# Patient Record
Sex: MALE | Race: WHITE | NOT HISPANIC OR LATINO | Employment: FULL TIME | URBAN - METROPOLITAN AREA
[De-identification: names, ages, dates, MRNs, and addresses within clinical notes are randomized per-mention and may not be internally consistent; named-entity substitution may affect disease eponyms.]

---

## 2017-03-03 ENCOUNTER — TRANSCRIBE ORDERS (OUTPATIENT)
Dept: MRI IMAGING | Facility: CLINIC | Age: 52
End: 2017-03-03

## 2017-03-07 ENCOUNTER — ALLSCRIPTS OFFICE VISIT (OUTPATIENT)
Dept: OTHER | Facility: OTHER | Age: 52
End: 2017-03-07

## 2017-03-21 ENCOUNTER — APPOINTMENT (OUTPATIENT)
Dept: LAB | Facility: CLINIC | Age: 52
End: 2017-03-21
Payer: COMMERCIAL

## 2017-03-21 ENCOUNTER — TRANSCRIBE ORDERS (OUTPATIENT)
Dept: LAB | Facility: CLINIC | Age: 52
End: 2017-03-21

## 2017-03-21 DIAGNOSIS — Z12.11 SPECIAL SCREENING FOR MALIGNANT NEOPLASMS, COLON: Primary | ICD-10-CM

## 2017-03-21 DIAGNOSIS — Z12.5 SPECIAL SCREENING FOR MALIGNANT NEOPLASM OF PROSTATE: ICD-10-CM

## 2017-03-21 DIAGNOSIS — I10 UNSPECIFIED ESSENTIAL HYPERTENSION: ICD-10-CM

## 2017-03-21 DIAGNOSIS — E78.5 HYPERLIPIDEMIA, UNSPECIFIED HYPERLIPIDEMIA TYPE: ICD-10-CM

## 2017-03-21 DIAGNOSIS — Z12.11 SPECIAL SCREENING FOR MALIGNANT NEOPLASMS, COLON: ICD-10-CM

## 2017-03-21 LAB
ALBUMIN SERPL BCP-MCNC: 3.6 G/DL (ref 3.5–5)
ALP SERPL-CCNC: 58 U/L (ref 46–116)
ALT SERPL W P-5'-P-CCNC: 29 U/L (ref 12–78)
ANION GAP SERPL CALCULATED.3IONS-SCNC: 5 MMOL/L (ref 4–13)
AST SERPL W P-5'-P-CCNC: 12 U/L (ref 5–45)
BILIRUB SERPL-MCNC: 0.47 MG/DL (ref 0.2–1)
BUN SERPL-MCNC: 15 MG/DL (ref 5–25)
CALCIUM SERPL-MCNC: 8.8 MG/DL (ref 8.3–10.1)
CHLORIDE SERPL-SCNC: 104 MMOL/L (ref 100–108)
CK SERPL-CCNC: 92 U/L (ref 39–308)
CO2 SERPL-SCNC: 30 MMOL/L (ref 21–32)
CREAT SERPL-MCNC: 0.77 MG/DL (ref 0.6–1.3)
GFR SERPL CREATININE-BSD FRML MDRD: >60 ML/MIN/1.73SQ M
GLUCOSE P FAST SERPL-MCNC: 89 MG/DL (ref 65–99)
POTASSIUM SERPL-SCNC: 3.8 MMOL/L (ref 3.5–5.3)
PROT SERPL-MCNC: 7 G/DL (ref 6.4–8.2)
SODIUM SERPL-SCNC: 139 MMOL/L (ref 136–145)
TSH SERPL DL<=0.05 MIU/L-ACNC: 0.8 UIU/ML (ref 0.36–3.74)

## 2017-03-21 PROCEDURE — 80053 COMPREHEN METABOLIC PANEL: CPT

## 2017-03-21 PROCEDURE — 82550 ASSAY OF CK (CPK): CPT

## 2017-03-21 PROCEDURE — 86617 LYME DISEASE ANTIBODY: CPT

## 2017-03-21 PROCEDURE — 36415 COLL VENOUS BLD VENIPUNCTURE: CPT

## 2017-03-21 PROCEDURE — 84443 ASSAY THYROID STIM HORMONE: CPT

## 2017-03-21 PROCEDURE — 86618 LYME DISEASE ANTIBODY: CPT

## 2017-03-22 LAB
B BURGDOR IGG SER IA-ACNC: 3.03
B BURGDOR IGM SER IA-ACNC: 0.14

## 2017-03-24 LAB

## 2017-03-28 ENCOUNTER — ALLSCRIPTS OFFICE VISIT (OUTPATIENT)
Dept: OTHER | Facility: OTHER | Age: 52
End: 2017-03-28

## 2017-04-10 ENCOUNTER — APPOINTMENT (OUTPATIENT)
Dept: LAB | Facility: CLINIC | Age: 52
End: 2017-04-10
Payer: COMMERCIAL

## 2017-04-10 DIAGNOSIS — I10 ESSENTIAL (PRIMARY) HYPERTENSION: ICD-10-CM

## 2017-04-10 DIAGNOSIS — E87.6 HYPOKALEMIA: ICD-10-CM

## 2017-04-10 LAB
ANION GAP SERPL CALCULATED.3IONS-SCNC: 9 MMOL/L (ref 4–13)
BUN SERPL-MCNC: 20 MG/DL (ref 5–25)
CALCIUM SERPL-MCNC: 8.9 MG/DL (ref 8.3–10.1)
CHLORIDE SERPL-SCNC: 102 MMOL/L (ref 100–108)
CO2 SERPL-SCNC: 29 MMOL/L (ref 21–32)
CREAT SERPL-MCNC: 0.92 MG/DL (ref 0.6–1.3)
GFR SERPL CREATININE-BSD FRML MDRD: >60 ML/MIN/1.73SQ M
GLUCOSE P FAST SERPL-MCNC: 94 MG/DL (ref 65–99)
POTASSIUM SERPL-SCNC: 3.4 MMOL/L (ref 3.5–5.3)
SODIUM SERPL-SCNC: 140 MMOL/L (ref 136–145)

## 2017-04-10 PROCEDURE — 80048 BASIC METABOLIC PNL TOTAL CA: CPT

## 2017-04-10 PROCEDURE — 36415 COLL VENOUS BLD VENIPUNCTURE: CPT

## 2017-04-11 ENCOUNTER — GENERIC CONVERSION - ENCOUNTER (OUTPATIENT)
Dept: OTHER | Facility: OTHER | Age: 52
End: 2017-04-11

## 2017-04-13 ENCOUNTER — ALLSCRIPTS OFFICE VISIT (OUTPATIENT)
Dept: OTHER | Facility: OTHER | Age: 52
End: 2017-04-13

## 2017-04-24 ENCOUNTER — GENERIC CONVERSION - ENCOUNTER (OUTPATIENT)
Dept: OTHER | Facility: OTHER | Age: 52
End: 2017-04-24

## 2017-04-24 ENCOUNTER — APPOINTMENT (OUTPATIENT)
Dept: LAB | Facility: CLINIC | Age: 52
End: 2017-04-24
Payer: COMMERCIAL

## 2017-04-24 DIAGNOSIS — E87.6 HYPOKALEMIA: ICD-10-CM

## 2017-04-24 DIAGNOSIS — I10 ESSENTIAL (PRIMARY) HYPERTENSION: ICD-10-CM

## 2017-04-24 LAB
ANION GAP SERPL CALCULATED.3IONS-SCNC: 7 MMOL/L (ref 4–13)
BUN SERPL-MCNC: 16 MG/DL (ref 5–25)
CALCIUM SERPL-MCNC: 9.4 MG/DL (ref 8.3–10.1)
CHLORIDE SERPL-SCNC: 104 MMOL/L (ref 100–108)
CO2 SERPL-SCNC: 29 MMOL/L (ref 21–32)
CREAT SERPL-MCNC: 0.81 MG/DL (ref 0.6–1.3)
GFR SERPL CREATININE-BSD FRML MDRD: >60 ML/MIN/1.73SQ M
GLUCOSE SERPL-MCNC: 90 MG/DL (ref 65–140)
POTASSIUM SERPL-SCNC: 3.5 MMOL/L (ref 3.5–5.3)
SODIUM SERPL-SCNC: 140 MMOL/L (ref 136–145)

## 2017-04-24 PROCEDURE — 36415 COLL VENOUS BLD VENIPUNCTURE: CPT

## 2017-04-24 PROCEDURE — 80048 BASIC METABOLIC PNL TOTAL CA: CPT

## 2017-06-02 ENCOUNTER — GENERIC CONVERSION - ENCOUNTER (OUTPATIENT)
Dept: OTHER | Facility: OTHER | Age: 52
End: 2017-06-02

## 2018-01-10 NOTE — RESULT NOTES
Verified Results  (1) BASIC METABOLIC PROFILE [id number] 05:01PM Shaun Kerns    Order Number: [id number]     Test Name Result Flag Reference   SODIUM 140 mmol/L  136-145   POTASSIUM 3 4 mmol/L L 3 5-5 3   CHLORIDE 102 mmol/L  100-108   CARBON DIOXIDE 29 mmol/L  21-32   ANION GAP (CALC) 9 mmol/L  4-13   BLOOD UREA NITROGEN 20 mg/dL  5-25   CREATININE 0 92 mg/dL  0 60-1 30   Standardized to IDMS reference method   CALCIUM 8 9 mg/dL  8 3-10 1   eGFR Non-African American      >60 0 ml/min/1 73sq [facility] Energy Disease Education Program recommendations are as follows:  GFR calculation is accurate only with a steady state creatinine  Chronic Kidney disease less than 60 ml/min/1 73 sq  meters  Kidney failure less than 15 ml/min/1 73 sq  meters     GLUCOSE FASTING 94 mg/dL  65-99

## 2018-01-11 NOTE — RESULT NOTES
Verified Results  (1) BASIC METABOLIC PROFILE [id number] 04:22PM Flakita Strauss Estimable   TW Order Number: [id number]     Test Name Result Flag Reference   GLUCOSE,RANDM 90 mg/dL     If the patient is fasting, the ADA then defines impaired fasting glucose as > 100 mg/dL and diabetes as > or equal to 123 mg/dL  SODIUM 140 mmol/L  136-145   POTASSIUM 3 5 mmol/L  3 5-5 3   CHLORIDE 104 mmol/L  100-108   CARBON DIOXIDE 29 mmol/L  21-32   ANION GAP (CALC) 7 mmol/L  4-13   BLOOD UREA NITROGEN 16 mg/dL  5-25   CREATININE 0 81 mg/dL  0 60-1 30   Standardized to IDMS reference method   CALCIUM 9 4 mg/dL  8 3-10 1   eGFR Non-African American      >60 0 ml/min/1 73sq m   [facility] Energy Disease Education Program recommendations are as follows:  GFR calculation is accurate only with a steady state creatinine  Chronic Kidney disease less than 60 ml/min/1 73 sq  meters  Kidney failure less than 15 ml/min/1 73 sq  meters

## 2018-01-13 VITALS
SYSTOLIC BLOOD PRESSURE: 138 MMHG | HEART RATE: 85 BPM | TEMPERATURE: 97.7 F | OXYGEN SATURATION: 96 % | WEIGHT: 271.13 LBS | HEIGHT: 67 IN | DIASTOLIC BLOOD PRESSURE: 94 MMHG | BODY MASS INDEX: 42.55 KG/M2

## 2018-01-13 VITALS
HEART RATE: 67 BPM | DIASTOLIC BLOOD PRESSURE: 90 MMHG | SYSTOLIC BLOOD PRESSURE: 134 MMHG | BODY MASS INDEX: 41.91 KG/M2 | OXYGEN SATURATION: 97 % | TEMPERATURE: 98.1 F | WEIGHT: 267 LBS | HEIGHT: 67 IN

## 2018-01-14 VITALS
HEART RATE: 82 BPM | HEIGHT: 67 IN | OXYGEN SATURATION: 95 % | SYSTOLIC BLOOD PRESSURE: 116 MMHG | WEIGHT: 264.38 LBS | TEMPERATURE: 98 F | BODY MASS INDEX: 41.49 KG/M2 | DIASTOLIC BLOOD PRESSURE: 80 MMHG

## 2018-02-25 ENCOUNTER — APPOINTMENT (EMERGENCY)
Dept: RADIOLOGY | Facility: HOSPITAL | Age: 53
End: 2018-02-25
Payer: COMMERCIAL

## 2018-02-25 ENCOUNTER — HOSPITAL ENCOUNTER (OUTPATIENT)
Facility: HOSPITAL | Age: 53
Setting detail: OBSERVATION
Discharge: HOME | End: 2018-02-26
Attending: EMERGENCY MEDICINE | Admitting: INTERNAL MEDICINE
Payer: COMMERCIAL

## 2018-02-25 DIAGNOSIS — R07.9 CHEST PAIN: Primary | ICD-10-CM

## 2018-02-25 PROBLEM — E87.6 HYPOKALEMIA: Status: ACTIVE | Noted: 2018-02-25

## 2018-02-25 PROBLEM — E78.49 OTHER HYPERLIPIDEMIA: Chronic | Status: ACTIVE | Noted: 2018-02-25

## 2018-02-25 PROBLEM — I10 ESSENTIAL HYPERTENSION: Status: ACTIVE | Noted: 2018-02-25

## 2018-02-25 PROBLEM — R07.89 OTHER CHEST PAIN: Status: ACTIVE | Noted: 2018-02-25

## 2018-02-25 PROBLEM — F17.229 CHEWING TOBACCO NICOTINE DEPENDENCE WITH NICOTINE-INDUCED DISORDER: Status: ACTIVE | Noted: 2018-02-25

## 2018-02-25 PROBLEM — E66.01 MORBID OBESITY (HCC): Chronic | Status: ACTIVE | Noted: 2018-02-25

## 2018-02-25 LAB
ALBUMIN SERPL BCP-MCNC: 4 G/DL (ref 3.5–5)
ALP SERPL-CCNC: 62 U/L (ref 46–116)
ALT SERPL W P-5'-P-CCNC: 37 U/L (ref 12–78)
ANION GAP SERPL CALCULATED.3IONS-SCNC: 7 MMOL/L (ref 4–13)
AST SERPL W P-5'-P-CCNC: 20 U/L (ref 5–45)
ATRIAL RATE: 76 BPM
BASOPHILS # BLD AUTO: 0.05 THOUSANDS/ΜL (ref 0–0.1)
BASOPHILS NFR BLD AUTO: 1 % (ref 0–1)
BILIRUB SERPL-MCNC: 0.5 MG/DL (ref 0.2–1)
BUN SERPL-MCNC: 17 MG/DL (ref 5–25)
CALCIUM SERPL-MCNC: 9.6 MG/DL (ref 8.3–10.1)
CHLORIDE SERPL-SCNC: 101 MMOL/L (ref 100–108)
CO2 SERPL-SCNC: 32 MMOL/L (ref 21–32)
CREAT SERPL-MCNC: 0.92 MG/DL (ref 0.6–1.3)
EOSINOPHIL # BLD AUTO: 0.14 THOUSAND/ΜL (ref 0–0.61)
EOSINOPHIL NFR BLD AUTO: 2 % (ref 0–6)
ERYTHROCYTE [DISTWIDTH] IN BLOOD BY AUTOMATED COUNT: 12.6 % (ref 11.6–15.1)
GFR SERPL CREATININE-BSD FRML MDRD: 95 ML/MIN/1.73SQ M
GLUCOSE SERPL-MCNC: 98 MG/DL (ref 65–140)
HCT VFR BLD AUTO: 49.7 % (ref 36.5–49.3)
HGB BLD-MCNC: 16.3 G/DL (ref 12–17)
LYMPHOCYTES # BLD AUTO: 1.57 THOUSANDS/ΜL (ref 0.6–4.47)
LYMPHOCYTES NFR BLD AUTO: 21 % (ref 14–44)
MAGNESIUM SERPL-MCNC: 2.1 MG/DL (ref 1.6–2.6)
MCH RBC QN AUTO: 28 PG (ref 26.8–34.3)
MCHC RBC AUTO-ENTMCNC: 32.8 G/DL (ref 31.4–37.4)
MCV RBC AUTO: 85 FL (ref 82–98)
MONOCYTES # BLD AUTO: 0.55 THOUSAND/ΜL (ref 0.17–1.22)
MONOCYTES NFR BLD AUTO: 7 % (ref 4–12)
NEUTROPHILS # BLD AUTO: 5.3 THOUSANDS/ΜL (ref 1.85–7.62)
NEUTS SEG NFR BLD AUTO: 69 % (ref 43–75)
NRBC BLD AUTO-RTO: 0 /100 WBCS
P AXIS: 39 DEGREES
PLATELET # BLD AUTO: 187 THOUSANDS/UL (ref 149–390)
PMV BLD AUTO: 8.4 FL (ref 8.9–12.7)
POTASSIUM SERPL-SCNC: 3.4 MMOL/L (ref 3.5–5.3)
PR INTERVAL: 152 MS
PROT SERPL-MCNC: 7.8 G/DL (ref 6.4–8.2)
QRS AXIS: 18 DEGREES
QRSD INTERVAL: 92 MS
QT INTERVAL: 402 MS
QTC INTERVAL: 452 MS
RBC # BLD AUTO: 5.83 MILLION/UL (ref 3.88–5.62)
SODIUM SERPL-SCNC: 140 MMOL/L (ref 136–145)
T WAVE AXIS: 3 DEGREES
TROPONIN I SERPL-MCNC: <0.02 NG/ML
VENTRICULAR RATE: 76 BPM
WBC # BLD AUTO: 7.63 THOUSAND/UL (ref 4.31–10.16)

## 2018-02-25 PROCEDURE — 85025 COMPLETE CBC W/AUTO DIFF WBC: CPT | Performed by: EMERGENCY MEDICINE

## 2018-02-25 PROCEDURE — 93010 ELECTROCARDIOGRAM REPORT: CPT | Performed by: INTERNAL MEDICINE

## 2018-02-25 PROCEDURE — 84484 ASSAY OF TROPONIN QUANT: CPT | Performed by: EMERGENCY MEDICINE

## 2018-02-25 PROCEDURE — 93005 ELECTROCARDIOGRAM TRACING: CPT

## 2018-02-25 PROCEDURE — 71046 X-RAY EXAM CHEST 2 VIEWS: CPT

## 2018-02-25 PROCEDURE — 83735 ASSAY OF MAGNESIUM: CPT | Performed by: INTERNAL MEDICINE

## 2018-02-25 PROCEDURE — 80053 COMPREHEN METABOLIC PANEL: CPT | Performed by: EMERGENCY MEDICINE

## 2018-02-25 PROCEDURE — 84484 ASSAY OF TROPONIN QUANT: CPT | Performed by: INTERNAL MEDICINE

## 2018-02-25 PROCEDURE — 99285 EMERGENCY DEPT VISIT HI MDM: CPT

## 2018-02-25 PROCEDURE — 36415 COLL VENOUS BLD VENIPUNCTURE: CPT | Performed by: EMERGENCY MEDICINE

## 2018-02-25 PROCEDURE — 99220 PR INITIAL OBSERVATION CARE/DAY 70 MINUTES: CPT | Performed by: INTERNAL MEDICINE

## 2018-02-25 RX ORDER — HEPARIN SODIUM 5000 [USP'U]/ML
5000 INJECTION, SOLUTION INTRAVENOUS; SUBCUTANEOUS EVERY 8 HOURS SCHEDULED
Status: DISCONTINUED | OUTPATIENT
Start: 2018-02-25 | End: 2018-02-26 | Stop reason: HOSPADM

## 2018-02-25 RX ORDER — ASPIRIN 325 MG
325 TABLET, DELAYED RELEASE (ENTERIC COATED) ORAL DAILY
Status: DISCONTINUED | OUTPATIENT
Start: 2018-02-26 | End: 2018-02-25

## 2018-02-25 RX ORDER — ATORVASTATIN CALCIUM 40 MG/1
80 TABLET, FILM COATED ORAL
Status: DISCONTINUED | OUTPATIENT
Start: 2018-02-25 | End: 2018-02-26 | Stop reason: HOSPADM

## 2018-02-25 RX ORDER — POTASSIUM CHLORIDE 20 MEQ/1
40 TABLET, EXTENDED RELEASE ORAL ONCE
Status: COMPLETED | OUTPATIENT
Start: 2018-02-25 | End: 2018-02-25

## 2018-02-25 RX ORDER — LOSARTAN POTASSIUM AND HYDROCHLOROTHIAZIDE 25; 100 MG/1; MG/1
1 TABLET ORAL DAILY
COMMUNITY
End: 2018-03-27 | Stop reason: SINTOL

## 2018-02-25 RX ORDER — ASPIRIN 325 MG
325 TABLET, DELAYED RELEASE (ENTERIC COATED) ORAL DAILY
Status: DISCONTINUED | OUTPATIENT
Start: 2018-02-25 | End: 2018-02-26 | Stop reason: HOSPADM

## 2018-02-25 RX ORDER — ATORVASTATIN CALCIUM 10 MG/1
10 TABLET, FILM COATED ORAL DAILY
COMMUNITY
End: 2018-03-27 | Stop reason: SINTOL

## 2018-02-25 RX ADMIN — ASPIRIN 325 MG: 325 TABLET, DELAYED RELEASE ORAL at 17:24

## 2018-02-25 RX ADMIN — POTASSIUM CHLORIDE 40 MEQ: 1500 TABLET, EXTENDED RELEASE ORAL at 17:24

## 2018-02-25 RX ADMIN — HEPARIN SODIUM 5000 UNITS: 5000 INJECTION, SOLUTION INTRAVENOUS; SUBCUTANEOUS at 21:06

## 2018-02-25 RX ADMIN — ATORVASTATIN CALCIUM 80 MG: 40 TABLET, FILM COATED ORAL at 17:24

## 2018-02-25 NOTE — ED NOTES
ORTHOSTATIC VITAL SIGNS BLOOD PRESSURE HEART RATE           LYING 135/90 66           SITTING 130/78 60           STANDING 138/90 900 [facility]  02/25/18 1734

## 2018-02-25 NOTE — H&P
History and Physical - [facility]    Patient Information: Erick Espinosa 47 y o  male MRN: [id number]  Unit/Bed#: ED 29 Encounter: [id number]  Admitting Physician: Lobo Kramer MD  PCP: Sheila Major MD  Date of Admission:  02/25/18    Assessment/Plan:    Hospital Problem List:     Active problems:   Chest pain   HTN  HLP  Morbid obesity   Nicotine dependence  Hypokalemia     Plan for the Primary Problem(s):  · Chest pain: r/o ACS  · Risk factors: HTN, morbid obesity, nicotine dependence  · 1st TNI negative  · EKG - NSR at 76 bpm, TWIs in III, aVF and V1 - no prior for comparison   · CXR clear  · Check TNI x 3, lipid profile, A1c  · Check 2D ECHO  · Start  mg PO Qdaily, Lipitor 80 mg PO QHS  · Start Losartan 50 mg PO Qdaily   · Cardiology consult     Plan for Additional Problems:   · HTN: Resume Losartan/Hctz   · Family to doublecheck dosage  · HLP: check lipid profile  · Lipitor as above  · Morbid obesity: likely 2/2 excess calorie intake  · Recommend lifestyle modification - diet and exercise   · Nicotine dependence: Uses chewing tobacco - cessation advised  · Quit smoking cigarettes 22 yrs ago   · Prolonged 2nd hand smoke exposure growing up   · Start Nicotine patch   · Hypokalemia: check Mg  · Replete w/KCl 40 mEq PO x 1   · Dizziness: unknown etiology   · R/o orthostatic hypotension   · No headaches or visual disturbances   · Hx of transient arrhythmia     VTE Prophylaxis: Heparin  / sequential compression device   Code Status: Full code   POLST: There is no POLST form on file for this patient (pre-hospital)    Anticipated Length of Stay:  Patient will be admitted on an Observation basis with an anticipated length of stay of  < 2 midnights  Justification for Hospital Stay: chest pain r/o ACS    Total Time for Visit, including Counseling / Coordination of Care: 45 minutes  Greater than 50% of this total time spent on direct patient counseling and coordination of care      Chief Complaint:   Chest pain    History of Present Illness:    Viridiaan Delgado is a 47 y o  male w/PMH of HTN, HLP, morbid obesity and nicotine dependence who presents with  4/10, intermittent, achy/throbbing, left sided chest pain that lasts anywhere from 30 sec to 1 minute and resolves on its own without any intervention  It radiates down the left arm along with numbness and tingling  No SOB, diaphoresis, or palpitations  Patient reports dizziness when he was performing simple tasks such as picking up a pot or chopping up vegetables  He states that he lifts a lot of heavy things at work  Four days ago, he was chopping up firewood and lifted the pieces up to place them in the truck  He quit smoking cigarettes 22 yrs ago but still uses chewing tobacco    No F/C/C  No N/V/D  No abdominal pain  No leg swelling  No melena  Urinates frequently but drinks a lot of fluid (on Hctz)  No headaches or visual disturbances  No positional changes bring on the dizziness  Reports a vague hx of an irregular heart rhythm due to dehydration  In the ED, labs were drawn  CXR and EKG were obtained  Patient received     Review of Systems:    Review of Systems   Constitutional: Negative for chills, diaphoresis, fatigue and unexpected weight change  HENT: Negative for congestion, ear discharge, ear pain, facial swelling, hearing loss, mouth sores, nosebleeds, postnasal drip, rhinorrhea, sinus pressure, sneezing, sore throat, tinnitus, trouble swallowing and voice change  Eyes: Negative for photophobia, discharge, redness and visual disturbance  Respiratory: Negative for cough, chest tightness, shortness of breath, wheezing and stridor  Cardiovascular: Positive for chest pain  Negative for palpitations and leg swelling  Gastrointestinal: Negative for abdominal distention, abdominal pain, anal bleeding, blood in stool, constipation, diarrhea, nausea and vomiting  Endocrine: Negative for polydipsia, polyphagia and polyuria  Genitourinary: Negative for decreased urine volume, difficulty urinating, dysuria, flank pain, frequency, hematuria and urgency  Musculoskeletal: Negative for arthralgias, back pain and neck stiffness  Skin: Negative for pallor and rash  Neurological: Positive for dizziness  Negative for seizures, facial asymmetry, speech difficulty, light-headedness, numbness and headaches  Hematological: Negative for adenopathy  Does not bruise/bleed easily  Psychiatric/Behavioral: Negative for agitation and confusion  All other systems reviewed and are negative  Past Medical and Surgical History:     Past Medical History:   Diagnosis Date    Hypertension    HLP     Past Surgical History:   Procedure Laterality Date    BACK SURGERY      spinal fusion    SEPTOPLASTY         Meds/Allergies:    Prior to Admission medications    Losartan/Hctz - unknown dose  Atorvastatin - unknown dose     I have reviewed home medications with patient personally  Allergies: No Known Allergies    Social History:     Marital Status: /Civil Union   Occupation: [employer]ing [employer], [employer]   Patient Pre-hospital Living Situation: Lives w/his wife  Patient Pre-hospital Level of Mobility: Independent   Patient Pre-hospital Diet Restrictions: None  Substance Use History:   History   Alcohol Use No     History   Smoking Status    Former Smoker    Quit date: 2/25/1998   Smokeless Tobacco    Current User    Types: Chew     History   Drug Use No       Family History:  Kids - 24 yo, 25 yo, 24 yo --> healthy   Mother - lung cancer,  Irregular heart beat, HTN, RA  Father - lung cancer    Physical Exam:     Vitals:   Blood Pressure: 119/83 (02/25/18 1430)  Pulse: 66 (02/25/18 1430)  Temperature: 98 °F (36 7 °C) (02/25/18 1348)  Temp Source: Oral (02/25/18 1348)  Respirations: 17 (02/25/18 1430)  Weight - Scale: 118 kg (259 lb 11 2 oz) (02/25/18 1348)  SpO2: 97 % (02/25/18 1430)    Physical Exam   Constitutional: No distress  HENT:   Head: Normocephalic and atraumatic  Nose: Nose normal    Mouth/Throat: Oropharynx is clear and moist    Eyes: Conjunctivae and EOM are normal  Pupils are equal, round, and reactive to light  Neck: Normal range of motion  Neck supple  No JVD present  Cardiovascular: Normal rate, regular rhythm and normal heart sounds  Exam reveals no gallop and no friction rub  No murmur heard  Pulmonary/Chest: Effort normal and breath sounds normal  No respiratory distress  He has no wheezes  He has no rales  He exhibits no tenderness  Abdominal: Soft  Bowel sounds are normal  He exhibits no distension  There is no tenderness  There is no rebound and no guarding  Musculoskeletal: He exhibits no edema  Neurological: He is alert  No cranial nerve deficit  Skin: Skin is warm and dry  No rash noted  Psychiatric: He has a normal mood and affect  Additional Data:     Lab Results: I have personally reviewed pertinent reports  Results from last 7 days  Lab Units 02/25/18  1405   WBC Thousand/uL 7 63   HEMOGLOBIN g/dL 16 3   HEMATOCRIT % 49 7*   PLATELETS Thousands/uL 187   NEUTROS PCT % 69   LYMPHS PCT % 21   MONOS PCT % 7   EOS PCT % 2       Results from last 7 days  Lab Units 02/25/18  1405   SODIUM mmol/L 140   POTASSIUM mmol/L 3 4*   CHLORIDE mmol/L 101   CO2 mmol/L 32   BUN mg/dL 17   CREATININE mg/dL 0 92   CALCIUM mg/dL 9 6   TOTAL PROTEIN g/dL 7 8   BILIRUBIN TOTAL mg/dL 0 50   ALK PHOS U/L 62   ALT U/L 37   AST U/L 20   GLUCOSE RANDOM mg/dL 98           Imaging: I have personally reviewed pertinent films in PACS    X-ray Chest 2 Views    Result Date: 2/25/2018  Narrative: CHEST INDICATION: chest pain COMPARISON:  None EXAM PERFORMED/VIEWS:  XR CHEST PA & LATERAL FINDINGS: Cardiomediastinal silhouette appears unremarkable  The lungs are clear  No pneumothorax or pleural effusion  Osseous structures appear within normal limits for patient age       Impression: No acute cardiopulmonary disease  Workstation performed: [id number]       EKG, Pathology, and Other Studies Reviewed on Admission:   · EKG: NSR at 76 bpm, TWIs in leads III, minimal TWIs in aVF and V1     Allscripts / Epic Records Reviewed: Yes     ** Please Note: This note has been constructed using a voice recognition system   **

## 2018-02-25 NOTE — ED PROVIDER NOTES
History  Chief Complaint   Patient presents with    Chest Pain     patient is c/o left sided chest pain x 4-5 days, worse with exertion        Chest Pain   Pain location:  Substernal area and L chest  Pain quality: aching and tightness    Pain radiates to:  L arm  Pain radiates to the back: no    Pain severity:  Moderate  Onset quality:  Gradual  Duration:  5 days  Timing:  Intermittent  Progression:  Waxing and waning  Chronicity:  New  Context: at rest    Context: not breathing and not lifting    Relieved by:  Rest  Worsened by:  Exertion  Ineffective treatments:  None tried  Associated symptoms: dizziness and nausea    Associated symptoms: no abdominal pain, no anxiety, no back pain, no cough, no diaphoresis, no lower extremity edema, no numbness, no palpitations, no shortness of breath, no syncope, not vomiting and no weakness    Risk factors: high cholesterol, hypertension, male sex and obesity    Risk factors: no coronary artery disease, no diabetes mellitus, no prior DVT/PE and no smoking        None       Past Medical History:   Diagnosis Date    Hypertension        Past Surgical History:   Procedure Laterality Date    BACK SURGERY      spinal fusion    SEPTOPLASTY         History reviewed  No pertinent family history  I have reviewed and agree with the history as documented  Social History   Substance Use Topics    Smoking status: Former Smoker     Quit date: 2/25/1998    Smokeless tobacco: Current User     Types: Chew    Alcohol use No        Review of Systems   Constitutional: Negative for diaphoresis  Respiratory: Negative for cough and shortness of breath  Cardiovascular: Positive for chest pain  Negative for palpitations and syncope  Gastrointestinal: Positive for nausea  Negative for abdominal pain and vomiting  Musculoskeletal: Negative for back pain  Neurological: Positive for dizziness  Negative for weakness and numbness     All other systems reviewed and are negative  Physical Exam  ED Triage Vitals [02/25/18 1348]   Temperature Pulse Respirations Blood Pressure SpO2   98 °F (36 7 °C) 76 16 167/95 100 %      Temp Source Heart Rate Source Patient Position - Orthostatic VS BP Location FiO2 (%)   Oral Monitor Sitting Left arm --      Pain Score       5           Orthostatic Vital Signs  Vitals:    02/25/18 1348 02/25/18 1400 02/25/18 1430   BP: 167/95 144/91 119/83   Pulse: 76 71 66   Patient Position - Orthostatic VS: Sitting         Physical Exam   Constitutional: He is oriented to person, place, and time  He appears well-developed and well-nourished  No distress  HENT:   Head: Normocephalic and atraumatic  Nose: Nose normal    Mouth/Throat: Oropharynx is clear and moist    Eyes: Conjunctivae and EOM are normal  Pupils are equal, round, and reactive to light  Neck: Normal range of motion  Neck supple  Cardiovascular: Normal rate, regular rhythm, normal heart sounds and intact distal pulses  Pulmonary/Chest: Effort normal and breath sounds normal  No respiratory distress  Abdominal: Soft  Bowel sounds are normal  He exhibits no distension  There is no tenderness  Musculoskeletal: Normal range of motion  He exhibits no edema  Neurological: He is alert and oriented to person, place, and time  Skin: Skin is warm and dry  He is not diaphoretic  Psychiatric: He has a normal mood and affect  Nursing note and vitals reviewed        ED Medications  Medications - No data to display    Diagnostic Studies  Results Reviewed     Procedure Component Value Units Date/Time    Troponin I [[id number]]  (Normal) Collected:  02/25/18 1405    Lab Status:  Final result Specimen:  Blood from Arm, Right Updated:  02/25/18 1429     Troponin I <0 02 ng/mL     Narrative:         [employer] Chemistry analyzer 99% cutoff is > 0 04 ng/mL in network labs    o cTnI 99% cutoff is useful only when applied to patients in the clinical setting of myocardial ischemia  o cTnI 99% cutoff should be interpreted in the context of clinical history, ECG findings and possibly cardiac imaging to establish correct diagnosis  o cTnI 99% cutoff may be suggestive but clearly not indicative of a coronary event without the clinical setting of myocardial ischemia  Comprehensive metabolic panel [[id number]]  (Abnormal) Collected:  02/25/18 1405    Lab Status:  Final result Specimen:  Blood from Arm, Right Updated:  02/25/18 1426     Sodium 140 mmol/L      Potassium 3 4 (L) mmol/L      Chloride 101 mmol/L      CO2 32 mmol/L      Anion Gap 7 mmol/L      BUN 17 mg/dL      Creatinine 0 92 mg/dL      Glucose 98 mg/dL      Calcium 9 6 mg/dL      AST 20 U/L      ALT 37 U/L      Alkaline Phosphatase 62 U/L      Total Protein 7 8 g/dL      Albumin 4 0 g/dL      Total Bilirubin 0 50 mg/dL      eGFR 95 ml/min/1 73sq m     Narrative:         National Kidney Disease Education Program recommendations are as follows:  GFR calculation is accurate only with a steady state creatinine  Chronic Kidney disease less than 60 ml/min/1 73 sq  meters  Kidney failure less than 15 ml/min/1 73 sq  meters      CBC and differential [[id number]]  (Abnormal) Collected:  02/25/18 1405    Lab Status:  Final result Specimen:  Blood from Arm, Right Updated:  02/25/18 1410     WBC 7 63 Thousand/uL      RBC 5 83 (H) Million/uL      Hemoglobin 16 3 g/dL      Hematocrit 49 7 (H) %      MCV 85 fL      MCH 28 0 pg      MCHC 32 8 g/dL      RDW 12 6 %      MPV 8 4 (L) fL      Platelets 415 Thousands/uL      nRBC 0 /100 WBCs      Neutrophils Relative 69 %      Lymphocytes Relative 21 %      Monocytes Relative 7 %      Eosinophils Relative 2 %      Basophils Relative 1 %      Neutrophils Absolute 5 30 Thousands/µL      Lymphocytes Absolute 1 57 Thousands/µL      Monocytes Absolute 0 55 Thousand/µL      Eosinophils Absolute 0 14 Thousand/µL      Basophils Absolute 0 05 Thousands/µL                  X-ray chest 2 views   ED Interpretation by Ignacio Romano MD (02/25 1421)   NAD                 Procedures  ECG 12 Lead Documentation  Date/Time: 2/25/2018 2:21 PM  Performed by: Robb Adame  Authorized by: Robb Adame     Indications / Diagnosis:  Chest pain  ECG reviewed by me, the ED Provider: yes    Patient location:  ED  Rate:     ECG rate:  76    ECG rate assessment: normal    Rhythm:     Rhythm: sinus rhythm    Ectopy:     Ectopy: none    QRS:     QRS axis:  Normal  ST segments:     ST segments:  Normal  T waves:     T waves: inverted      Inverted:  III             Phone Contacts  ED Phone Contact    ED Course  ED Course          HEART Risk Score    Flowsheet Row Most Recent Value   History  2 Filed at: 02/25/2018 1453   ECG  0 Filed at: 02/25/2018 1453   Age  1 Filed at: 02/25/2018 1453   Risk Factors  1 Filed at: 02/25/2018 1453   Troponin  0 Filed at: 02/25/2018 1453   Heart Score Risk Calculator   History  2 Filed at: 02/25/2018 1453   ECG  0 Filed at: 02/25/2018 1453   Age  1 Filed at: 02/25/2018 1453   Risk Factors  1 Filed at: 02/25/2018 1453   Troponin  0 Filed at: 02/25/2018 1453   HEART Score  4 Filed at: 02/25/2018 1453   HEART Score  4 Filed at: 02/25/2018 1453                            MDM  Number of Diagnoses or Management Options  Chest pain: new and requires workup     Amount and/or Complexity of Data Reviewed  Clinical lab tests: ordered and reviewed  Tests in the radiology section of CPT®: ordered and reviewed  Independent visualization of images, tracings, or specimens: yes    Risk of Complications, Morbidity, and/or Mortality  Presenting problems: high    Patient Progress  Patient progress: stable    CritCare Time    Disposition  Final diagnoses:   Chest pain     Time reflects when diagnosis was documented in both MDM as applicable and the Disposition within this note     Time User Action Codes Description Comment    2/25/2018  3:27 PM Nasima DUKE Add [R07 9] Chest pain       ED Disposition     ED Disposition Condition Comment Admit  Case was discussed with Yesy Navarro and the patient's admission status was agreed to be Admission Status: observation status to the service of Dr Yesy aNvarro   Follow-up Information    None       Patient's Medications    No medications on file     No discharge procedures on file      ED Provider  Electronically Signed by           Lex Linares MD  02/25/18 8453

## 2018-02-26 ENCOUNTER — APPOINTMENT (OUTPATIENT)
Dept: NUCLEAR MEDICINE | Facility: HOSPITAL | Age: 53
End: 2018-02-26
Payer: COMMERCIAL

## 2018-02-26 ENCOUNTER — APPOINTMENT (OUTPATIENT)
Dept: NON INVASIVE DIAGNOSTICS | Facility: HOSPITAL | Age: 53
End: 2018-02-26
Payer: COMMERCIAL

## 2018-02-26 VITALS
HEIGHT: 66 IN | TEMPERATURE: 98 F | HEART RATE: 74 BPM | OXYGEN SATURATION: 96 % | DIASTOLIC BLOOD PRESSURE: 89 MMHG | BODY MASS INDEX: 40.39 KG/M2 | SYSTOLIC BLOOD PRESSURE: 148 MMHG | WEIGHT: 251.32 LBS | RESPIRATION RATE: 18 BRPM

## 2018-02-26 LAB
ALBUMIN SERPL BCP-MCNC: 3.3 G/DL (ref 3.5–5)
ALP SERPL-CCNC: 50 U/L (ref 46–116)
ALT SERPL W P-5'-P-CCNC: 30 U/L (ref 12–78)
ANION GAP SERPL CALCULATED.3IONS-SCNC: 8 MMOL/L (ref 4–13)
AST SERPL W P-5'-P-CCNC: 14 U/L (ref 5–45)
BILIRUB SERPL-MCNC: 0.7 MG/DL (ref 0.2–1)
BUN SERPL-MCNC: 15 MG/DL (ref 5–25)
CALCIUM SERPL-MCNC: 8.7 MG/DL (ref 8.3–10.1)
CHEST PAIN STATEMENT: NORMAL
CHLORIDE SERPL-SCNC: 104 MMOL/L (ref 100–108)
CHOLEST SERPL-MCNC: 187 MG/DL (ref 50–200)
CO2 SERPL-SCNC: 29 MMOL/L (ref 21–32)
CREAT SERPL-MCNC: 0.81 MG/DL (ref 0.6–1.3)
ERYTHROCYTE [DISTWIDTH] IN BLOOD BY AUTOMATED COUNT: 12.6 % (ref 11.6–15.1)
EST. AVERAGE GLUCOSE BLD GHB EST-MCNC: 111 MG/DL
GFR SERPL CREATININE-BSD FRML MDRD: 102 ML/MIN/1.73SQ M
GLUCOSE P FAST SERPL-MCNC: 95 MG/DL (ref 65–99)
GLUCOSE SERPL-MCNC: 95 MG/DL (ref 65–140)
GLUCOSE SERPL-MCNC: 95 MG/DL (ref 65–140)
HBA1C MFR BLD: 5.5 % (ref 4.2–6.3)
HCT VFR BLD AUTO: 44.4 % (ref 36.5–49.3)
HDLC SERPL-MCNC: 43 MG/DL (ref 40–60)
HGB BLD-MCNC: 14.6 G/DL (ref 12–17)
LDLC SERPL CALC-MCNC: 130 MG/DL (ref 0–100)
MAGNESIUM SERPL-MCNC: 1.9 MG/DL (ref 1.6–2.6)
MAX DIASTOLIC BP: 78 MMHG
MAX HEART RATE: 151 BPM
MAX PREDICTED HEART RATE: 168 BPM
MAX. SYSTOLIC BP: 178 MMHG
MCH RBC QN AUTO: 27.9 PG (ref 26.8–34.3)
MCHC RBC AUTO-ENTMCNC: 32.9 G/DL (ref 31.4–37.4)
MCV RBC AUTO: 85 FL (ref 82–98)
PLATELET # BLD AUTO: 155 THOUSANDS/UL (ref 149–390)
PMV BLD AUTO: 8.5 FL (ref 8.9–12.7)
POTASSIUM SERPL-SCNC: 3.7 MMOL/L (ref 3.5–5.3)
PROT SERPL-MCNC: 6.6 G/DL (ref 6.4–8.2)
PROTOCOL NAME: NORMAL
RBC # BLD AUTO: 5.23 MILLION/UL (ref 3.88–5.62)
SODIUM SERPL-SCNC: 141 MMOL/L (ref 136–145)
TARGET HR FORMULA: NORMAL
TEST INDICATION: NORMAL
TIME IN EXERCISE PHASE: NORMAL
TRIGL SERPL-MCNC: 71 MG/DL
WBC # BLD AUTO: 6.57 THOUSAND/UL (ref 4.31–10.16)

## 2018-02-26 PROCEDURE — 90686 IIV4 VACC NO PRSV 0.5 ML IM: CPT | Performed by: INTERNAL MEDICINE

## 2018-02-26 PROCEDURE — 82948 REAGENT STRIP/BLOOD GLUCOSE: CPT

## 2018-02-26 PROCEDURE — 99244 OFF/OP CNSLTJ NEW/EST MOD 40: CPT | Performed by: INTERNAL MEDICINE

## 2018-02-26 PROCEDURE — 83735 ASSAY OF MAGNESIUM: CPT | Performed by: INTERNAL MEDICINE

## 2018-02-26 PROCEDURE — 80061 LIPID PANEL: CPT | Performed by: INTERNAL MEDICINE

## 2018-02-26 PROCEDURE — 93017 CV STRESS TEST TRACING ONLY: CPT

## 2018-02-26 PROCEDURE — 93306 TTE W/DOPPLER COMPLETE: CPT | Performed by: INTERNAL MEDICINE

## 2018-02-26 PROCEDURE — 85027 COMPLETE CBC AUTOMATED: CPT | Performed by: INTERNAL MEDICINE

## 2018-02-26 PROCEDURE — 78452 HT MUSCLE IMAGE SPECT MULT: CPT

## 2018-02-26 PROCEDURE — 93306 TTE W/DOPPLER COMPLETE: CPT

## 2018-02-26 PROCEDURE — A9502 TC99M TETROFOSMIN: HCPCS

## 2018-02-26 PROCEDURE — 80053 COMPREHEN METABOLIC PANEL: CPT | Performed by: INTERNAL MEDICINE

## 2018-02-26 PROCEDURE — 83036 HEMOGLOBIN GLYCOSYLATED A1C: CPT | Performed by: INTERNAL MEDICINE

## 2018-02-26 PROCEDURE — 99217 PR OBSERVATION CARE DISCHARGE MANAGEMENT: CPT | Performed by: PHYSICIAN ASSISTANT

## 2018-02-26 RX ADMIN — ATORVASTATIN CALCIUM 80 MG: 40 TABLET, FILM COATED ORAL at 17:26

## 2018-02-26 RX ADMIN — INFLUENZA VIRUS VACCINE 0.5 ML: 15; 15; 15; 15 SUSPENSION INTRAMUSCULAR at 17:24

## 2018-02-26 RX ADMIN — HEPARIN SODIUM 5000 UNITS: 5000 INJECTION, SOLUTION INTRAVENOUS; SUBCUTANEOUS at 14:10

## 2018-02-26 RX ADMIN — LOSARTAN POTASSIUM: 50 TABLET, FILM COATED ORAL at 08:17

## 2018-02-26 RX ADMIN — ASPIRIN 325 MG: 325 TABLET, DELAYED RELEASE ORAL at 08:17

## 2018-02-26 RX ADMIN — HEPARIN SODIUM 5000 UNITS: 5000 INJECTION, SOLUTION INTRAVENOUS; SUBCUTANEOUS at 06:10

## 2018-02-26 NOTE — ASSESSMENT & PLAN NOTE
· Chest pain is atypical nature and has been going on for a while  He reports this feels like previous episodes when he has been dehydrated    Symptoms are not clearly reproducible, they are not exertional or positional   · Cardiology evaluated the patient, echocardiogram and stress test

## 2018-02-26 NOTE — PLAN OF CARE
Problem: PAIN - ADULT  Goal: Verbalizes/displays adequate comfort level or baseline comfort level  Interventions:  - Encourage patient to monitor pain and request assistance  - Assess pain using appropriate pain scale  - Administer analgesics based on type and severity of pain and evaluate response  - Implement non-pharmacological measures as appropriate and evaluate response  - Consider cultural and social influences on pain and pain management  - Notify physician/advanced practitioner if interventions unsuccessful or patient reports new pain   Outcome: Progressing      Problem: INFECTION - ADULT  Goal: Absence or prevention of progression during hospitalization  INTERVENTIONS:  - Assess and monitor for signs and symptoms of infection  - Monitor lab/diagnostic results  - Monitor all insertion sites, i e  indwelling lines, tubes, and drains  - Monitor endotracheal (as able) and nasal secretions for changes in amount and color  - [place] appropriate cooling/warming therapies per order  - Administer medications as ordered  - Instruct and encourage patient and family to use good hand hygiene technique  - Identify and instruct in appropriate isolation precautions for identified infection/condition   Outcome: Progressing    Goal: Absence of fever/infection during neutropenic period  INTERVENTIONS:  - Monitor WBC  - Implement neutropenic guidelines   Outcome: Progressing      Problem: SAFETY ADULT  Goal: Patient will remain free of falls  INTERVENTIONS:  - Assess patient frequently for physical needs  -  Identify cognitive and physical deficits and behaviors that affect risk of falls    -  [place] fall precautions as indicated by assessment   - Educate patient/family on patient safety including physical limitations  - Instruct patient to call for assistance with activity based on assessment  - Modify environment to reduce risk of injury  - Consider OT/PT consult to assist with strengthening/mobility   Outcome: Progressing    Goal: Maintain or return to baseline ADL function  INTERVENTIONS:  -  Assess patient's ability to carry out ADLs; assess patient's baseline for ADL function and identify physical deficits which impact ability to perform ADLs (bathing, care of mouth/teeth, toileting, grooming, dressing, etc )  - Assess/evaluate cause of self-care deficits   - Assess range of motion  - Assess patient's mobility; develop plan if impaired  - Assess patient's need for assistive devices and provide as appropriate  - Encourage maximum independence but intervene and supervise when necessary  ¯ Involve family in performance of ADLs  ¯ Assess for home care needs following discharge   ¯ Request OT consult to assist with ADL evaluation and planning for discharge  ¯ Provide patient education as appropriate   Outcome: Progressing    Goal: Maintain or return mobility status to optimal level  INTERVENTIONS:  - Assess patient's baseline mobility status (ambulation, transfers, stairs, etc )    - Identify cognitive and physical deficits and behaviors that affect mobility  - Identify mobility aids required to assist with transfers and/or ambulation (gait belt, sit-to-stand, lift, walker, cane, etc )  - [place] fall precautions as indicated by assessment  - Record patient progress and toleration of activity level on Mobility SBAR; progress patient to next Phase/Stage  - Instruct patient to call for assistance with activity based on assessment  - Request Rehabilitation consult to assist with strengthening/weightbearing, etc    Outcome: Progressing      Problem: DISCHARGE PLANNING  Goal: Discharge to home or other facility with appropriate resources  INTERVENTIONS:  - Identify barriers to discharge w/patient and caregiver  - Arrange for needed discharge resources and transportation as appropriate  - Identify discharge learning needs (meds, wound care, etc )  - Arrange for interpretive services to assist at discharge as needed  - Refer to Case Management Department for coordinating discharge planning if the patient needs post-hospital services based on physician/advanced practitioner order or complex needs related to functional status, cognitive ability, or social support system   Outcome: Progressing      Problem: Knowledge Deficit  Goal: Patient/family/caregiver demonstrates understanding of disease process, treatment plan, medications, and discharge instructions  Complete learning assessment and assess knowledge base  Interventions:  - Provide teaching at level of understanding  - Provide teaching via preferred learning methods   Outcome: Progressing      Problem: CARDIOVASCULAR - ADULT  Goal: Maintains optimal cardiac output and hemodynamic stability  INTERVENTIONS:  - Monitor I/O, vital signs and rhythm  - Monitor for S/S and trends of decreased cardiac output i e  bleeding, hypotension  - Administer and titrate ordered vasoactive medications to optimize hemodynamic stability  - Assess quality of pulses, skin color and temperature  - Assess for signs of decreased coronary artery perfusion - ex   Angina  - Instruct patient to report change in severity of symptoms   Outcome: Progressing    Goal: Absence of cardiac dysrhythmias or at baseline rhythm  INTERVENTIONS:  - Continuous cardiac monitoring, monitor vital signs, obtain 12 lead EKG if indicated  - Administer antiarrhythmic and heart rate control medications as ordered  - Monitor electrolytes and administer replacement therapy as ordered   Outcome: Progressing      Problem: METABOLIC, FLUID AND ELECTROLYTES - ADULT  Goal: Electrolytes maintained within normal limits  INTERVENTIONS:  - Monitor labs and assess patient for signs and symptoms of electrolyte imbalances  - Administer electrolyte replacement as ordered  - Monitor response to electrolyte replacements, including repeat lab results as appropriate  - Instruct patient on fluid and nutrition as appropriate   Outcome: Progressing    Goal: Fluid balance maintained  INTERVENTIONS:  - Monitor labs and assess for signs and symptoms of volume excess or deficit  - Monitor I/O and WT  - Instruct patient on fluid and nutrition as appropriate   Outcome: Progressing    Goal: Glucose maintained within target range  INTERVENTIONS:  - Monitor Blood Glucose as ordered  - Assess for signs and symptoms of hyperglycemia and hypoglycemia  - Administer ordered medications to maintain glucose within target range  - Assess nutritional intake and initiate nutrition service referral as needed   Outcome: Progressing

## 2018-02-26 NOTE — PLAN OF CARE
CARDIOVASCULAR - ADULT     Maintains optimal cardiac output and hemodynamic stability Progressing     Absence of cardiac dysrhythmias or at baseline rhythm Progressing        DISCHARGE PLANNING     Discharge to home or other facility with appropriate resources Progressing        INFECTION - ADULT     Absence or prevention of progression during hospitalization Progressing        Knowledge Deficit     Patient/family/caregiver demonstrates understanding of disease process, treatment plan, medications, and discharge instructions Progressing        METABOLIC, FLUID AND ELECTROLYTES - ADULT     Electrolytes maintained within normal limits Progressing     Fluid balance maintained Progressing     Glucose maintained within target range Progressing        PAIN - ADULT     Verbalizes/displays adequate comfort level or baseline comfort level Progressing        SAFETY ADULT     Patient will remain free of falls Progressing     Maintain or return to baseline ADL function Progressing     Maintain or return mobility status to optimal level Progressing

## 2018-02-26 NOTE — ASSESSMENT & PLAN NOTE
· Patient has history of cigarette use, quit 22 years ago but continues to chew tobacco   Patient counseled on risks can't associate it with chewing tobacco

## 2018-02-26 NOTE — DISCHARGE INSTR - AVS FIRST PAGE
As discussed, please check your blood pressures randomly at home, keep a log of this to take your primary care follow-up  Would also like for you to take a blood pressure cuff to have that correlated at the follow-up appointment  If blood pressures are running low, or symptoms would recur, I recommend that you cut your blood pressure medication in half  Discussed with your primary care provider regarding Holter monitor

## 2018-02-26 NOTE — CASE MANAGEMENT
Initial Clinical Review    Admission: Date/Time/Statement: 2/25 @1528    Orders Placed This Encounter   Procedures    Place in Observation (expected length of stay for this patient is less than two midnights)     Standing Status:   Standing     Number of Occurrences:   1     Order Specific Question:   Admitting Physician     Answer:   Kathytesha Curry [id number]     Order Specific Question:   Level of Care     Answer:   Med Surg [16]         ED: Date/Time/Mode of Arrival:   ED Arrival Information     Expected Arrival Acuity Means of Arrival Escorted By Service Admission Type    - 2/25/2018 13:42 Urgent Walk-In Spouse General Medicine Urgent    Arrival Complaint    CHEST PAIN          Chief Complaint:   Chief Complaint   Patient presents with    Chest Pain     patient is c/o left sided chest pain x 4-5 days, worse with exertion        History of Illness: Stephanie Mary is a 47 y o  male w/PMH of HTN, HLP, morbid obesity and nicotine dependence who presents with  4/10, intermittent, achy/throbbing, left sided chest pain that lasts anywhere from 30 sec to 1 minute and resolves on its own without any intervention  It radiates down the left arm along with numbness and tingling  No SOB, diaphoresis, or palpitations  Patient reports dizziness when he was performing simple tasks such as picking up a pot or chopping up vegetables  He states that he lifts a lot of heavy things at work  Four days ago, he was chopping up firewood and lifted the pieces up to place them in the truck  He quit smoking cigarettes 22 yrs ago but still uses chewing tobacco    a  Urinates frequently but drinks a lot of fluid (on Hctz)  Reports a vague hx of an irregular heart rhythm due to dehydration       ED Vital Signs:   ED Triage Vitals [02/25/18 1348]   Temperature Pulse Respirations Blood Pressure SpO2   98 °F (36 7 °C) 76 16 167/95 100 %      Temp Source Heart Rate Source Patient Position - Orthostatic VS BP Location FiO2 (%)   Oral Monitor Sitting Left arm --      Pain Score       5        Wt Readings from Last 1 Encounters:   02/26/18 114 kg (251 lb 5 2 oz)       Vital Signs (abnormal):     Date/Time  Temp  Pulse  Resp  BP   SpO2  O2 Device  Patient Position - Orthostatic VS   02/26/18 0757  98 °F (36 7 °C)  68  18  153/96   96 %  None (Room air)  Lying         Abnormal Labs/Diagnostic Test Results:   K 3 4  Troponin <0 02, <0 02, <0 02  EKG  Normal sinus rhythm  Normal ECG    ED Treatment:   Medication Administration from 02/25/2018 1342 to 02/25/2018 1750       Date/Time Order Dose Route Action     02/25/2018 1724 atorvastatin (LIPITOR) tablet 80 mg 80 mg Oral Given     02/25/2018 1724 potassium chloride (K-DUR,KLOR-CON) CR tablet 40 mEq 40 mEq Oral Given     02/25/2018 1724 aspirin (ECOTRIN) EC tablet 325 mg 325 mg Oral Given          Past Medical/Surgical History: Active Ambulatory Problems     Diagnosis Date Noted    No Active Ambulatory Problems     Resolved Ambulatory Problems     Diagnosis Date Noted    No Resolved Ambulatory Problems     Past Medical History:   Diagnosis Date    Hypertension        Admitting Diagnosis: Chest pain [R07 9]    Age/Sex: 47 y o  male    Assessment/Plan:   Active problems:   Chest pain   HTN  HLP  Morbid obesity   Nicotine dependence  Hypokalemia      Plan for the Primary Problem(s):  · Chest pain: r/o ACS  ? Risk factors: HTN, morbid obesity, nicotine dependence  ? 1st TNI negative  ? EKG - NSR at 76 bpm, TWIs in III, aVF and V1 - no prior for comparison   ? CXR clear  ? Check TNI x 3, lipid profile, A1c  ? Check 2D ECHO  ? Start  mg PO Qdaily, Lipitor 80 mg PO QHS  ? Start Losartan 50 mg PO Qdaily   ? Cardiology consult      Plan for Additional Problems:   · HTN: Resume Losartan/Hctz    ? Family to doublecheck dosage  · HLP: check lipid profile  ? Lipitor as above  · Morbid obesity: likely 2/2 excess calorie intake  ?  Recommend lifestyle modification - diet and exercise   · Nicotine dependence: Uses chewing tobacco - cessation advised  ? Quit smoking cigarettes 22 yrs ago   ? Prolonged 2nd hand smoke exposure growing up   ? Start Nicotine patch   · Hypokalemia: check Mg  ? Replete w/KCl 40 mEq PO x 1   · Dizziness: unknown etiology   ? R/o orthostatic hypotension   ? No headaches or visual disturbances   ? Hx of transient arrhythmia      VTE Prophylaxis: Heparin  / sequential compression device   Anticipated Length of Stay:  Patient will be admitted on an Observation basis with an anticipated length of stay of  < 2 midnights  Justification for Hospital Stay: chest pain r/o ACS    Admission Orders:  Scheduled Meds:   Current Facility-Administered Medications:  aspirin 325 mg Oral Daily Meagan De La Torre MD   atorvastatin 80 mg Oral Daily With Ophelia Aguilar MD   heparin (porcine) 5,000 Units Subcutaneous Q8H [facility], MD   influenza vaccine 0 5 mL Intramuscular Prior to discharge Peter Yun MD   losartan potassium-hydrochlorothiazide (HYZAAR 50/12  5) combination  Oral Daily Meagan De La Torre MD   nicotine 1 patch Transdermal Daily Meagan De La Torre MD     Continuous Infusions:    PRN Meds: influenza vaccine    Cardiac diet  ECHO  Cardiology consult  Tele  SCD's

## 2018-02-26 NOTE — CONSULTS
Consultation - Cardiology    Muna Faulkner 47 y o  male MRN: [id number]  Unit/Bed#: -01 Encounter: [id number]    Physician Requesting Consult: Truman Marques MD    Reason for Consult / Principal Problem:  Chest pain    HPI: Muna Faulkner is a 47y o  year old male with a past medical history significant hypertension, hyperlipidemia, obesity, tobacco abuse  Patient presented with complaints chest pain  He states that he has been having intermittent stents the last few days  He describes it as a left-sided chest aching and associated with dizziness  He reports the symptoms/30 seconds to a minute at time and spontaneously resolved without treatment  He does admit to left arm numbness/tingling, but states that he has the symptoms chronically due to prior history of back surgery  He denies exertional symptoms  He denies shortness of breath, palpitations, syncope, edema, orthopnea, PND  He denies history of CAD, arrhythmia, valvular disorder  He does state that his mother has history of " irregular heartbeat", but no known CAD  He denies recent cardiac workup  Patient denies smoking, but does use chewing tobacco      Historical Information   Past Medical History:   Diagnosis Date    Hypertension      Past Surgical History:   Procedure Laterality Date    BACK SURGERY      spinal fusion    SEPTOPLASTY       History   Alcohol Use No     History   Drug Use No     History   Smoking Status    Former Smoker    Quit date: 2/25/1998   Smokeless Tobacco    Current User    Types: Chew       FAMILY HISTORY:  History reviewed  No pertinent family history      MEDS & ALLERGIES:  all current active meds have been reviewed and current meds:   Current Facility-Administered Medications   Medication Dose Route Frequency    aspirin (ECOTRIN) EC tablet 325 mg  325 mg Oral Daily    atorvastatin (LIPITOR) tablet 80 mg  80 mg Oral Daily With Dinner    heparin (porcine) subcutaneous injection 5,000 Units  5,000 Units Subcutaneous Q8H [address]    influenza inactivated quadrivalent vaccine (FLULAVAL) IM injection 0 5 mL  0 5 mL Intramuscular Prior to discharge    losartan potassium-hydrochlorothiazide (HYZAAR 50/12  5) combination   Oral Daily    nicotine (NICODERM CQ) 7 mg/24hr TD 24 hr patch 1 patch  1 patch Transdermal Daily        No Known Allergies      REVIEW OF SYSTEMS:  Constitutional: Denies fever or chills  Eyes: Denies visual disturbance change in visual acuity   HENT: Denies nasal congestion or sore throat   Respiratory: Denies cough, expectoration or shortness of breath   Cardiovascular: +chest pain Denies  palpitations or lower extremity swelling   GI: Denies abdominal pain, nausea, vomiting, bloody stools or diarrhea   : Denies dysuria, frequency, hematuria  Musculoskeletal: Denies back pain or joint pain   Neurologic: +lightheadeness Denies syncope, headache, focal weakness or sensory changes   Psychiatric: Denies depression, anxiety       PHYSICAL EXAM:  Vitals:   Vitals:    02/26/18 0757   BP: 153/96   Pulse: 68   Resp: 18   Temp: 98 °F (36 7 °C)   SpO2: 96%     Body mass index is 40 56 kg/m²  Systolic (04ZCW), OEX:726 , Min:119 , DEW:749     Diastolic (92IET), SFR:52, Min:64, Max:96      Intake/Output Summary (Last 24 hours) at 02/26/18 0845  Last data filed at 02/25/18 1900   Gross per 24 hour   Intake              240 ml   Output              200 ml   Net               40 ml     Weight (last 2 days)     Date/Time   Weight    02/26/18 0600  114 (251 32)    02/25/18 1813  114 (250 44)    02/25/18 1348  118 (259 7)            Invasive Devices     Peripheral Intravenous Line            Peripheral IV 02/25/18 Right Antecubital less than 1 day                General: Patient is not in acute distress  Laying comfortably in the bed  Head: Normocephalic  Atraumatic  HEENT: Both pupils normal sized, round and reactive to light  Nonicteric  Neck: JVP not elevated   Trachea central    Respiratory: Bilateral bronchovascular breath sounds with no crackles or rhonchi  Cardiovascular: RRR  S1 and S2 normal  No murmur, rub or gallop  GI: Abdomen soft, nontender  No guarding or rigidity  Neurologic: Patient is awake, alert, responding to commands  Well-oriented to time, place and person   Moving all extremities  Extremities: No clubbing, cyanosis or edema  Integument: No skin rashes or ulceration      LABORATORY RESULTS:    Results from last 7 days  Lab Units 02/25/18  2130 02/25/18  1727 02/25/18  1405   TROPONIN I ng/mL <0 02 <0 02 <0 02       CBC with diff:   Results from last 7 days  Lab Units 02/26/18  0521 02/25/18  1405   WBC Thousand/uL 6 57 7 63   HEMOGLOBIN g/dL 14 6 16 3   HEMATOCRIT % 44 4 49 7*   MCV fL 85 85   PLATELETS Thousands/uL 155 187   MCH pg 27 9 28 0   MCHC g/dL 32 9 32 8   RDW % 12 6 12 6   MPV fL 8 5* 8 4*   NRBC AUTO /100 WBCs  --  0       CMP:  Results from last 7 days  Lab Units 02/26/18  0521 02/25/18  1405   SODIUM mmol/L 141 140   POTASSIUM mmol/L 3 7 3 4*   CHLORIDE mmol/L 104 101   CO2 mmol/L 29 32   ANION GAP mmol/L 8 7   BUN mg/dL 15 17   CREATININE mg/dL 0 81 0 92   GLUCOSE RANDOM mg/dL 95 98   CALCIUM mg/dL 8 7 9 6   AST U/L 14 20   ALT U/L 30 37   ALK PHOS U/L 50 62   TOTAL PROTEIN g/dL 6 6 7 8   BILIRUBIN TOTAL mg/dL 0 70 0 50   EGFR ml/min/1 73sq m 102 95       BMP:  Results from last 7 days  Lab Units 02/26/18  0521 02/25/18  1405   SODIUM mmol/L 141 140   POTASSIUM mmol/L 3 7 3 4*   CHLORIDE mmol/L 104 101   CO2 mmol/L 29 32   BUN mg/dL 15 17   CREATININE mg/dL 0 81 0 92   GLUCOSE RANDOM mg/dL 95 98   CALCIUM mg/dL 8 7 9 6              Results from last 7 days  Lab Units 02/26/18  0521 02/25/18  1405   MAGNESIUM mg/dL 1 9 2 1       Results from last 7 days  Lab Units 02/26/18  0521   HEMOGLOBIN A1C % 5 5               Lipid Profile:   Lab Results   Component Value Date    CHOL 187 02/26/2018    CHOL 136 08/22/2016    CHOL 170 03/23/2016     Lab Results   Component Value Date    HDL 43 02/26/2018    HDL 41 08/22/2016    HDL 51 03/23/2016     Lab Results   Component Value Date    LDLCALC 130 (H) 02/26/2018    LDLCALC 79 08/22/2016    LDLCALC 103 (H) 03/23/2016     Lab Results   Component Value Date    TRIG 71 02/26/2018    TRIG 81 08/22/2016    TRIG 78 03/23/2016         Imaging: I have personally reviewed pertinent reports  EKG reviewed personally:  Normal sinus rhythm, normal EKG    Telemetry reviewed personally:  No events on telemetry      Assessment and Plan:  Chest pain  -Troponin negative X 3  -symptoms are somewhat atypical, however patient had several admissions in the past with similar complaints and he does have risk factors for CAD, will obtain exercise nuclear stress test to evaluate for ischemic etiology  -ECHO pending    Hypertension- BP stable     Hyperlipidemia- continue atorvastatin     Tobacco abuse-cardiovascular risk associated continued tobacco use discussed  Cessation of all tobacco products advised      Code Status: Level 1 - Full Code      Thank you for allowing us to participate in this patient's care  Counseling / Coordination of Care  Total floor / unit time spent today 35 minutes  Greater than 50% of total time was spent with the patient and / or family counseling and / or coordination of care  A description of the counseling / coordination of care: Review of history, current assessment, development of a plan         Sofi Araya PA-C  2/26/2018,8:45 AM

## 2018-02-26 NOTE — DISCHARGE SUMMARY
Discharge- Stephanie [place] 1965, 47 y o  male MRN: [id number]    Unit/Bed#: -01 Encounter: [id number]    Primary Care Provider: Simon Marcus MD   Date and time admitted to hospital: 2/25/2018  1:44 PM    * Other chest pain   Assessment & Plan    · Chest pain is atypical nature and has been going on for a while  He reports this feels like previous episodes when he has been dehydrated  Symptoms are not clearly reproducible, they are not exertional or positional   · Cardiology evaluated the patient, echocardiogram and stress test   Echo pending at time of dictation, stress test normal   · Consider cardiac Holter monitor  · Stable cardiac standpoint for discharge  Other hyperlipidemia   Assessment & Plan    · Total cholesterol 187, , HDL 43, triglycerides 71  A1c 5 5%  ASCVD risk 7 2% for the next 10 years  Continue his previous statin dose  Morbid obesity ([address] )   Assessment & Plan    · BMI 40 56, patient encouraged on lifestyle and dietary modification        Chewing tobacco nicotine dependence with nicotine-induced disorder   Assessment & Plan    · Patient has history of cigarette use, quit 22 years ago but continues to chew tobacco   Patient counseled on risks can't associate it with chewing tobacco         Essential hypertension   Assessment & Plan    · Continue losartan-hydrochlorothiazide, consider decreasing if symptoms recur          Hypokalemia   Assessment & Plan    · Resolved, status post 40 mEq oral replacement            Resolved Problems  Date Reviewed: 2/26/2018    None          Consultations During Hospital Stay:  · Cardiology    Procedures Performed:     · CXR   · Echo  · Stress     Significant Findings / Test Results:     · Hypokalemia, resolved with normal magnesium  · A1c 5 5%  · Total cholesterol 187, , HDL 43, TG 71  · Troponin (-) x3  · Telemetry unremarkable  · CXR normal  · Echo pending at time of discharge  · Stress test artifactual defect, normal stress otherwise with normal EF    Incidental Findings:   · None    Test Results Pending at Discharge (will require follow up): · The echo results     Outpatient Tests Requested:  · Cardiac Holter monitor  · Follow-up PCP in 1 week    Complications:  None    Reason for Admission:  Chest, atypical    Hospital Course:     Mahesh Meier is a 47 y o  male patient who originally presented to the hospital on 2/25/2018 due to atypical chest pain  Patient's past medical history significant for hypertension, hyperlipidemia, morbid obesity, tobacco abuse with current chewing tobacco use  Patient presented with chest pain, atypical in nature, which can last a few days time  It is described as left-sided, aching, with associated dizziness  Patient was brought in under observation for ACS rule out  Troponins (-) x3, echo and stress test obtained  Cardiology evaluated the patient, he should follow up outpatient for Holter monitor to evaluate for any cardiac dysrhythmias, his mother does have apparently have a history of abnormal heart rhythm  Please see above list of diagnoses and related plan for additional information  Condition at Discharge: good     Discharge Day Visit / Exam:     Subjective:  Patient seen examined, no further episodes of pain  He is feeling well  He is hopeful to get stress test be able go home  Denies any palpitations, pain, shortness of breath, dizziness presently  He is eating and hydrating normally  Going to the bathroom as per normal   Vitals: Blood Pressure: 153/96 (02/26/18 0757)  Pulse: 68 (02/26/18 0757)  Temperature: 98 °F (36 7 °C) (02/26/18 0757)  Temp Source: Oral (02/26/18 0757)  Respirations: 18 (02/26/18 0757)  Height: 5' 6" (167 6 cm) (02/25/18 1813)  Weight - Scale: 114 kg (251 lb 5 2 oz) (02/26/18 0600)  SpO2: 96 % (02/26/18 0757)  Exam:   Physical Exam   Constitutional: He is oriented to person, place, and time   Vital signs are normal  He appears well-developed and well-nourished  No distress  Pleasant, obese   Cardiovascular: Normal rate, regular rhythm, S1 normal, S2 normal, normal heart sounds and intact distal pulses  No murmur heard  Pulmonary/Chest: Effort normal and breath sounds normal  No respiratory distress  He has no wheezes  He has no rhonchi  He has no rales  He exhibits no tenderness  Abdominal: Soft  Bowel sounds are normal  He exhibits no distension  There is no tenderness  obese   Musculoskeletal: He exhibits no edema  Neurological: He is alert and oriented to person, place, and time  Nursing note and vitals reviewed  Discussion with Family:  None    Discharge instructions/Information to patient and family:   See after visit summary for information provided to patient and family  Provisions for Follow-Up Care:  See after visit summary for information related to follow-up care and any pertinent home health orders  Disposition:     Home    For Discharges to [facility] SNF:   · Not Applicable to this Patient - Not Applicable to this Patient    Planned Readmission:  None     Discharge Statement:  I spent approximately 25 minutes discharging the patient  This time was spent on the day of discharge  I had direct contact with the patient on the day of discharge  Greater than 50% of the total time was spent examining patient, answering all patient questions, arranging and discussing plan of care with patient as well as directly providing post-discharge instructions  Additional time then spent on discharge activities  Discharge Medications:  See after visit summary for reconciled discharge medications provided to patient and family        ** Please Note: This note has been constructed using a voice recognition system **

## 2018-02-26 NOTE — SOCIAL WORK
Cm met with pt at bedside  OBS status reviewed and signed  Copy to pt and copy to MR for scanning  Pt lives with his wife Mejia Singleton and children in a ranch home with 2 steps w/railing to enter the residence  Pt uses no DME's and is independent with ADL's  He had back surgery 2years ago, but never was in Physical Therapy  He has never used [facility] services  Rates his pain at the present time as a 0/10  His PCP is Dr Deon Stein  Denies substance abuse or mental health issues  He uses [employer] on [address] and has no problem with his co-pays  Pt does not have a POA or Advanced Directive, but states he already has all the info needed to complete  He does work and he does drive  His wife or daughter will transport home when medically cleared  CM discussed discharge needs and none were identified  CM will continue to follow through hosptializations  CM reviewed discharge planning process including the following: identifying help at home, patient preference for discharge planning needs, pharmacy preference, and availability of treatment team to discuss questions or concerns patient and/or family may have regarding understanding medications and recognizing signs and symptoms once discharged  CM also encouraged patient to follow up with all recommended appointments after discharge  Patient advised of importance for patient and family to participate in managing patients medical well being

## 2018-02-26 NOTE — ASSESSMENT & PLAN NOTE
· Continue losartan-hydrochlorothiazide, consider increasing if persistent elevation in blood pressure

## 2018-02-26 NOTE — PLAN OF CARE
Problem: DISCHARGE PLANNING - CARE MANAGEMENT  Goal: Discharge to post-acute care or home with appropriate resources  INTERVENTIONS:  - Conduct assessment to determine patient/family and health care team treatment goals, and need for post-acute services based on payer coverage, community resources, and patient preferences, and barriers to discharge  - Address psychosocial, clinical, and financial barriers to discharge as identified in assessment in conjunction with the patient/family and health care team  - Arrange appropriate level of post-acute services according to patients   needs and preference and payer coverage in collaboration with the physician and health care team  - Communicate with and update the patient/family, physician, and health care team regarding progress on the discharge plan  - Arrange appropriate transportation to post-acute venues  Outcome: Progressing  Cm met with pt at bedside  OBS status reviewed and signed  Copy to pt and copy to MR for scanning  Pt lives with his wife Monique Estes and children in a ranch home with 2 steps w/railing to enter the residence  Pt uses no DME's and is independent with ADL's  He had back surgery 2years ago, but never was in Physical Therapy  He has never used [facility] services  Rates his pain at the present time as a 0/10  His PCP is Dr Cheko Gomez  Denies substance abuse or mental health issues  He uses [employer] on [address] and has no problem with his co-pays  Pt does not have a POA or Advanced Directive, but states he already has all the info needed to complete  He does work and he does drive  His wife or daughter will transport home when medically cleared  CM discussed discharge needs and none were identified  CM will continue to follow through hosptializations      CM reviewed discharge planning process including the following: identifying help at home, patient preference for discharge planning needs, pharmacy preference, and availability of treatment team to discuss questions or concerns patient and/or family may have regarding understanding medications and recognizing signs and symptoms once discharged  CM also encouraged patient to follow up with all recommended appointments after discharge  Patient advised of importance for patient and family to participate in managing patients medical well being

## 2018-02-26 NOTE — ASSESSMENT & PLAN NOTE
· Total cholesterol 187, , HDL 43, triglycerides 71  A1c 5 5%  ASCVD risk 7 2% for the next 10 years

## 2018-02-27 ENCOUNTER — TELEPHONE (OUTPATIENT)
Dept: CARDIOLOGY CLINIC | Facility: CLINIC | Age: 53
End: 2018-02-27

## 2018-02-27 ENCOUNTER — TELEPHONE (OUTPATIENT)
Dept: INTERNAL MEDICINE CLINIC | Facility: CLINIC | Age: 53
End: 2018-02-27

## 2018-02-27 NOTE — TELEPHONE ENCOUNTER
FYI: Patient's wife called to schedule appt for her  for discharge from [address] [place] [address] on 2/26/18  Patient would only see Dr Waynette Kayser so wife was refusing a [facility] appt   Patient is scheduled with [facility] on 3/27/18

## 2018-02-27 NOTE — TELEPHONE ENCOUNTER
PT WAS DISCHARGED YESTERDAY FROM [place]  PT NEEDS A HOSP F/U  WHERE CAN WE SCHED APPT? PLEASE ADVISE   PT HASNT BEEN SEEN IN OFFICE BEFORE

## 2018-02-28 NOTE — TELEPHONE ENCOUNTER
SPOKE TO PT & HE IS COMING IN ON 4/05/18 TO SEE  [address] ; PT IS ALSO ON THE CANCELLATION LIST

## 2018-03-26 RX ORDER — DIFLORASONE DIACETATE 0.5 MG/G
CREAM TOPICAL 2 TIMES DAILY
COMMUNITY
End: 2018-05-08 | Stop reason: SDUPTHER

## 2018-03-27 ENCOUNTER — OFFICE VISIT (OUTPATIENT)
Dept: INTERNAL MEDICINE CLINIC | Facility: CLINIC | Age: 53
End: 2018-03-27
Payer: COMMERCIAL

## 2018-03-27 ENCOUNTER — TELEPHONE (OUTPATIENT)
Dept: INTERNAL MEDICINE CLINIC | Facility: CLINIC | Age: 53
End: 2018-03-27

## 2018-03-27 VITALS
BODY MASS INDEX: 40.85 KG/M2 | WEIGHT: 254.2 LBS | OXYGEN SATURATION: 97 % | HEART RATE: 82 BPM | HEIGHT: 66 IN | DIASTOLIC BLOOD PRESSURE: 78 MMHG | SYSTOLIC BLOOD PRESSURE: 122 MMHG

## 2018-03-27 DIAGNOSIS — E87.6 HYPOKALEMIA: ICD-10-CM

## 2018-03-27 DIAGNOSIS — E78.00 HYPERCHOLESTEROLEMIA: Primary | Chronic | ICD-10-CM

## 2018-03-27 DIAGNOSIS — I10 ESSENTIAL HYPERTENSION: ICD-10-CM

## 2018-03-27 PROBLEM — E66.9 OBESITY: Chronic | Status: ACTIVE | Noted: 2018-02-25

## 2018-03-27 PROBLEM — E86.0 DEHYDRATION: Status: ACTIVE | Noted: 2018-03-27

## 2018-03-27 PROCEDURE — 99214 OFFICE O/P EST MOD 30 MIN: CPT | Performed by: INTERNAL MEDICINE

## 2018-03-27 RX ORDER — ROSUVASTATIN CALCIUM 10 MG/1
5 TABLET, COATED ORAL DAILY
Qty: 30 TABLET | Refills: 5 | Status: SHIPPED | OUTPATIENT
Start: 2018-03-27 | End: 2018-05-08 | Stop reason: SDUPTHER

## 2018-03-27 RX ORDER — VALSARTAN 160 MG/1
160 TABLET ORAL DAILY
Qty: 30 TABLET | Refills: 5 | Status: SHIPPED | OUTPATIENT
Start: 2018-03-27 | End: 2018-05-08 | Stop reason: SDUPTHER

## 2018-03-27 NOTE — ASSESSMENT & PLAN NOTE
Dehydration and hypokalemia  Likely due to his hyzaar   Will dc hyzaar and switch to diovan 160 mg daily and will titrate that to 320 mg and consider adding amlodipine if that's not sufficient to bring him under control

## 2018-03-27 NOTE — PROGRESS NOTES
Assessment/Plan:    Hypertension  Dehydration and hypokalemia  Likely due to his hyzaar  Will dc hyzaar and switch to diovan 160 mg daily and will titrate that to 320 mg and consider adding amlodipine if that's not sufficient to bring him under control    Hypercholesterolemia  He has had myalgias with atorvastatin so will switch to rosuvastatin    Dehydration  Likely secondary to hctz  Will avoid diuretic in the future       Diagnoses and all orders for this visit:    Hypercholesterolemia  -     rosuvastatin (CRESTOR) 10 MG tablet; Take 0 5 tablets (5 mg total) by mouth daily    Essential hypertension  -     valsartan (DIOVAN) 160 mg tablet; Take 1 tablet (160 mg total) by mouth daily    Hypokalemia    Other orders  -     diflorasone (PSORCON) 0 05 % cream; Apply topically 2 (two) times a day          Subjective:      Patient ID: Karla Blackwell is a 47 y o  male  Patient presents follow up after recent hospitalization for lightheadedness and faint and visual changes  He did not have syncope and tingling in his chest without chest pain or pressure sob or palpitations  He was evaluated at [facility] and was diagnosed with dehydration and hypokalemia  He had a similar event a yr ago and was taken off diuretic but his bp remained high and his diuretic was restarted   He had stress test, echo and labs and he had no evidence of ischemia on his test but there was an arrhythmia that was was discovered and he has an appt to see Dr Shy Brothers          The following portions of the patient's history were reviewed and updated as appropriate: allergies, current medications, past family history, past medical history, past social history, past surgical history and problem list     Review of Systems      Objective:      /78 (BP Location: Left arm, Patient Position: Sitting)   Pulse 82   Ht 5' 6" (1 676 m)   Wt 115 kg (254 lb 3 2 oz)   SpO2 97%   BMI 41 03 kg/m²          Physical Exam

## 2018-03-30 ENCOUNTER — TELEPHONE (OUTPATIENT)
Dept: INTERNAL MEDICINE CLINIC | Facility: CLINIC | Age: 53
End: 2018-03-30

## 2018-04-04 ENCOUNTER — HOSPITAL ENCOUNTER (EMERGENCY)
Facility: HOSPITAL | Age: 53
Discharge: HOME | End: 2018-04-04
Attending: EMERGENCY MEDICINE | Admitting: EMERGENCY MEDICINE
Payer: COMMERCIAL

## 2018-04-04 ENCOUNTER — APPOINTMENT (EMERGENCY)
Dept: CT IMAGING | Facility: HOSPITAL | Age: 53
End: 2018-04-04
Payer: COMMERCIAL

## 2018-04-04 ENCOUNTER — APPOINTMENT (EMERGENCY)
Dept: RADIOLOGY | Facility: HOSPITAL | Age: 53
End: 2018-04-04
Payer: COMMERCIAL

## 2018-04-04 VITALS
HEART RATE: 60 BPM | OXYGEN SATURATION: 96 % | TEMPERATURE: 98.1 F | DIASTOLIC BLOOD PRESSURE: 76 MMHG | BODY MASS INDEX: 42.27 KG/M2 | HEIGHT: 66 IN | SYSTOLIC BLOOD PRESSURE: 118 MMHG | WEIGHT: 263 LBS | RESPIRATION RATE: 13 BRPM

## 2018-04-04 DIAGNOSIS — I10 HYPERTENSION: ICD-10-CM

## 2018-04-04 DIAGNOSIS — R42 VERTIGO: Primary | ICD-10-CM

## 2018-04-04 LAB
ANION GAP SERPL CALCULATED.3IONS-SCNC: 10 MMOL/L (ref 4–13)
APTT PPP: 30 SECONDS (ref 23–35)
ATRIAL RATE: 77 BPM
BASOPHILS # BLD AUTO: 0.03 THOUSANDS/ΜL (ref 0–0.1)
BASOPHILS NFR BLD AUTO: 0 % (ref 0–1)
BUN SERPL-MCNC: 14 MG/DL (ref 5–25)
CALCIUM SERPL-MCNC: 8.8 MG/DL (ref 8.3–10.1)
CHLORIDE SERPL-SCNC: 105 MMOL/L (ref 100–108)
CO2 SERPL-SCNC: 26 MMOL/L (ref 21–32)
CREAT SERPL-MCNC: 0.86 MG/DL (ref 0.6–1.3)
EOSINOPHIL # BLD AUTO: 0.12 THOUSAND/ΜL (ref 0–0.61)
EOSINOPHIL NFR BLD AUTO: 1 % (ref 0–6)
ERYTHROCYTE [DISTWIDTH] IN BLOOD BY AUTOMATED COUNT: 12.9 % (ref 11.6–15.1)
GFR SERPL CREATININE-BSD FRML MDRD: 100 ML/MIN/1.73SQ M
GLUCOSE SERPL-MCNC: 109 MG/DL (ref 65–140)
HCT VFR BLD AUTO: 45.5 % (ref 36.5–49.3)
HGB BLD-MCNC: 15.1 G/DL (ref 12–17)
INR PPP: 0.95 (ref 0.86–1.16)
LYMPHOCYTES # BLD AUTO: 1.32 THOUSANDS/ΜL (ref 0.6–4.47)
LYMPHOCYTES NFR BLD AUTO: 15 % (ref 14–44)
MCH RBC QN AUTO: 28 PG (ref 26.8–34.3)
MCHC RBC AUTO-ENTMCNC: 33.2 G/DL (ref 31.4–37.4)
MCV RBC AUTO: 84 FL (ref 82–98)
MONOCYTES # BLD AUTO: 0.54 THOUSAND/ΜL (ref 0.17–1.22)
MONOCYTES NFR BLD AUTO: 6 % (ref 4–12)
NEUTROPHILS # BLD AUTO: 6.92 THOUSANDS/ΜL (ref 1.85–7.62)
NEUTS SEG NFR BLD AUTO: 77 % (ref 43–75)
NRBC BLD AUTO-RTO: 0 /100 WBCS
P AXIS: 46 DEGREES
PLATELET # BLD AUTO: 168 THOUSANDS/UL (ref 149–390)
PMV BLD AUTO: 8.3 FL (ref 8.9–12.7)
POTASSIUM SERPL-SCNC: 3.8 MMOL/L (ref 3.5–5.3)
PR INTERVAL: 140 MS
PROTHROMBIN TIME: 12.9 SECONDS (ref 12.1–14.4)
QRS AXIS: 33 DEGREES
QRSD INTERVAL: 86 MS
QT INTERVAL: 390 MS
QTC INTERVAL: 441 MS
RBC # BLD AUTO: 5.39 MILLION/UL (ref 3.88–5.62)
SODIUM SERPL-SCNC: 141 MMOL/L (ref 136–145)
T WAVE AXIS: 33 DEGREES
TROPONIN I SERPL-MCNC: <0.02 NG/ML
VENTRICULAR RATE: 77 BPM
WBC # BLD AUTO: 8.96 THOUSAND/UL (ref 4.31–10.16)

## 2018-04-04 PROCEDURE — 93010 ELECTROCARDIOGRAM REPORT: CPT | Performed by: INTERNAL MEDICINE

## 2018-04-04 PROCEDURE — 36415 COLL VENOUS BLD VENIPUNCTURE: CPT | Performed by: PHYSICIAN ASSISTANT

## 2018-04-04 PROCEDURE — 70450 CT HEAD/BRAIN W/O DYE: CPT

## 2018-04-04 PROCEDURE — 80048 BASIC METABOLIC PNL TOTAL CA: CPT | Performed by: PHYSICIAN ASSISTANT

## 2018-04-04 PROCEDURE — 96375 TX/PRO/DX INJ NEW DRUG ADDON: CPT

## 2018-04-04 PROCEDURE — 85610 PROTHROMBIN TIME: CPT | Performed by: PHYSICIAN ASSISTANT

## 2018-04-04 PROCEDURE — 96374 THER/PROPH/DIAG INJ IV PUSH: CPT

## 2018-04-04 PROCEDURE — 84484 ASSAY OF TROPONIN QUANT: CPT | Performed by: PHYSICIAN ASSISTANT

## 2018-04-04 PROCEDURE — 71046 X-RAY EXAM CHEST 2 VIEWS: CPT

## 2018-04-04 PROCEDURE — 99285 EMERGENCY DEPT VISIT HI MDM: CPT

## 2018-04-04 PROCEDURE — 85730 THROMBOPLASTIN TIME PARTIAL: CPT | Performed by: PHYSICIAN ASSISTANT

## 2018-04-04 PROCEDURE — 85025 COMPLETE CBC W/AUTO DIFF WBC: CPT | Performed by: PHYSICIAN ASSISTANT

## 2018-04-04 PROCEDURE — 93005 ELECTROCARDIOGRAM TRACING: CPT

## 2018-04-04 RX ORDER — DIAZEPAM 5 MG/ML
5 INJECTION, SOLUTION INTRAMUSCULAR; INTRAVENOUS ONCE
Status: COMPLETED | OUTPATIENT
Start: 2018-04-04 | End: 2018-04-04

## 2018-04-04 RX ORDER — MECLIZINE HYDROCHLORIDE 25 MG/1
25 TABLET ORAL 3 TIMES DAILY PRN
Qty: 30 TABLET | Refills: 0 | Status: SHIPPED | OUTPATIENT
Start: 2018-04-04 | End: 2018-12-27 | Stop reason: CLARIF

## 2018-04-04 RX ORDER — ONDANSETRON 2 MG/ML
4 INJECTION INTRAMUSCULAR; INTRAVENOUS ONCE
Status: COMPLETED | OUTPATIENT
Start: 2018-04-04 | End: 2018-04-04

## 2018-04-04 RX ADMIN — DIAZEPAM 5 MG: 5 INJECTION, SOLUTION INTRAMUSCULAR; INTRAVENOUS at 08:02

## 2018-04-04 RX ADMIN — ONDANSETRON 4 MG: 2 INJECTION INTRAMUSCULAR; INTRAVENOUS at 08:02

## 2018-04-04 NOTE — ED PROVIDER NOTES
History  Chief Complaint   Patient presents with    Dizziness     Pt stated that he woke up this morning with dizziness  dizziness increased with ROM of head  21-year-old male with past medical history significant for hypertension, hypercholesterolemia and obesity presents to the emergency department with chief complaint of high blood pressure and dizziness  Onset of symptoms reported as sudden, this morning when he awoke  Location of symptoms reported as the head  Quality is reported as sensation of dizziness described as feeling of the room spinning around when he got up out of bed this morning  Severity reported as moderate  Associated symptoms:  Positive for elevated blood pressure  Denies chest pain  Denies shortness of breath  Denies syncope  Denies headache  Denies blurred vision  Denies nausea or vomiting  Denies upper lower extremity paralysis paresthesias or weakness  Denies facial droop or slurred speech  Denies neck pain  Modifying factors:  Patient reports movement of his head seems to exacerbate his dizziness  Context:  Patient reports he noted his blood pressure was extremely high this morning  He reports he had his blood pressure medication changed from dyazide to losartan by his primary care physician last week because he was becoming dehydrated frequently which was felt secondary to the diuretic component of his antihypertensive  He reports he had been feeling well since the blood pressure medication change in had an appointment this morning with his cardiologist for recheck but states new onset of symptoms prompted him to come to the emergency department today  He denies any recent falls injuries or trauma  Denies any similar prior episodes in the past   Patient was hospitalized from February 25, 2018 to February 26, 2018 for evaluation of chest pain with dizziness    He underwent echocardiogram which was unremarkable as well as a stress test which showed diaphragmatic attenuation but no other significant abnormalities  History provided by:  Patient and spouse   used: No        Prior to Admission Medications   Prescriptions Last Dose Informant Patient Reported? Taking? diflorasone (PSORCON) 0 05 % cream  Self Yes No   Sig: Apply topically 2 (two) times a day   rosuvastatin (CRESTOR) 10 MG tablet   No No   Sig: Take 0 5 tablets (5 mg total) by mouth daily   valsartan (DIOVAN) 160 mg tablet   No No   Sig: Take 1 tablet (160 mg total) by mouth daily      Facility-Administered Medications: None       Past Medical History:   Diagnosis Date    Hypercholesterolemia     Hypertension     Obesity     Sexual dysfunction     Psychosexual dysfunction with inhibited sexual excitement        Past Surgical History:   Procedure Laterality Date    BACK SURGERY      spinal fusion    SEPTOPLASTY         Family History   Problem Relation Age of Onset    Atrial fibrillation Mother     COPD Mother     Coronary artery disease Mother     Lung cancer Mother     Rheum arthritis Mother     Stroke Mother      Syndrome    Lung cancer Father     Prostate cancer Maternal Grandfather      I have reviewed and agree with the history as documented  Social History   Substance Use Topics    Smoking status: Former Smoker     Quit date: 2/25/1998    Smokeless tobacco: Current User     Types: Chew    Alcohol use Yes      Comment: Occ alcohol use per [employer]         Review of Systems   Constitutional: Negative for activity change, appetite change, chills, diaphoresis, fatigue, fever and unexpected weight change  HENT: Negative for congestion, dental problem, drooling, ear discharge, ear pain, facial swelling, hearing loss, mouth sores, nosebleeds, postnasal drip, rhinorrhea, sinus pain, sinus pressure, sneezing, sore throat, tinnitus, trouble swallowing and voice change  Eyes: Negative for photophobia, pain, discharge, redness, itching and visual disturbance  Respiratory: Negative for apnea, cough, choking, chest tightness, shortness of breath, wheezing and stridor  Cardiovascular: Negative for chest pain, palpitations and leg swelling  Gastrointestinal: Negative for abdominal distention, abdominal pain, anal bleeding, blood in stool, constipation, diarrhea, nausea, rectal pain and vomiting  Endocrine: Negative for cold intolerance, heat intolerance, polydipsia, polyphagia and polyuria  Genitourinary: Negative for difficulty urinating, dysuria, flank pain, frequency, hematuria and urgency  Musculoskeletal: Negative for arthralgias, back pain, gait problem, joint swelling, myalgias, neck pain and neck stiffness  Skin: Negative for color change, pallor, rash and wound  Allergic/Immunologic: Negative for environmental allergies, food allergies and immunocompromised state  Neurological: Positive for dizziness  Negative for tremors, seizures, syncope, facial asymmetry, speech difficulty, weakness, light-headedness, numbness and headaches  Hematological: Negative for adenopathy  Does not bruise/bleed easily  Psychiatric/Behavioral: Negative for agitation, confusion and hallucinations  The patient is nervous/anxious  All other systems reviewed and are negative        Physical Exam  ED Triage Vitals   Temperature Pulse Respirations Blood Pressure SpO2   04/04/18 0732 04/04/18 0721 04/04/18 0721 04/04/18 0721 04/04/18 0721   98 1 °F (36 7 °C) 71 21 (!) 192/101 99 %      Temp Source Heart Rate Source Patient Position - Orthostatic VS BP Location FiO2 (%)   04/04/18 0732 04/04/18 0721 04/04/18 0721 04/04/18 0721 --   Oral Monitor Lying Right arm       Pain Score       --                  Orthostatic Vital Signs  Vitals:    04/04/18 0721 04/04/18 0730 04/04/18 0800 04/04/18 0830   BP: (!) 192/101 170/83 143/93 117/71   Pulse: 71 67 62 61   Patient Position - Orthostatic VS: Lying Sitting Lying Lying       Physical Exam   Constitutional: He is oriented to person, place, and time  He appears well-developed and well-nourished  No distress  BP (!) 192/101 (BP Location: Right arm)   Pulse 71   Temp 98 1 °F (36 7 °C) (Oral)   Resp 21   Ht 5' 6" (1 676 m)   Wt 119 kg (263 lb)   SpO2 99%   BMI 42 45 kg/m²     Vs reviewed - blood pressure hypertensive  Pulse normal, o2 normal, RR slightly tachypneic  Patient observed  HENT:   Head: Normocephalic and atraumatic  Right Ear: External ear normal    Left Ear: External ear normal    Nose: Nose normal    Mouth/Throat: Oropharynx is clear and moist  No oropharyngeal exudate  Eyes: Conjunctivae and EOM are normal  Pupils are equal, round, and reactive to light  Right eye exhibits no discharge  Left eye exhibits no discharge  No scleral icterus  Neck: Normal range of motion  Neck supple  No JVD present  No tracheal deviation present  No thyromegaly present  Cardiovascular: Normal rate, regular rhythm and intact distal pulses  Pulmonary/Chest: Effort normal and breath sounds normal  No stridor  No respiratory distress  He has no wheezes  He has no rales  He exhibits no tenderness  Abdominal: Soft  Bowel sounds are normal  He exhibits no distension and no mass  There is no tenderness  There is no rebound and no guarding  No hernia  Musculoskeletal: Normal range of motion  He exhibits no edema, tenderness or deformity  Lymphadenopathy:     He has no cervical adenopathy  Neurological: He is alert and oriented to person, place, and time  He displays normal reflexes  No cranial nerve deficit or sensory deficit  He exhibits normal muscle tone  Coordination normal    Skin: Skin is warm and dry  Capillary refill takes less than 2 seconds  No rash noted  He is not diaphoretic  No erythema  No pallor  Psychiatric: He has a normal mood and affect  His behavior is normal  Judgment and thought content normal    Nursing note and vitals reviewed        ED Medications  Medications   diazepam (VALIUM) injection 5 mg (5 mg Intravenous Given 4/4/18 0802)   ondansetron (ZOFRAN) injection 4 mg (4 mg Intravenous Given 4/4/18 0802)       Diagnostic Studies  Results Reviewed     Procedure Component Value Units Date/Time    Troponin I [[id number]]  (Normal) Collected:  04/04/18 0733    Lab Status:  Final result Specimen:  Blood from Arm, Left Updated:  04/04/18 0757     Troponin I <0 02 ng/mL     Narrative:         [employer] Chemistry analyzer 99% cutoff is > 0 04 ng/mL in network labs    o cTnI 99% cutoff is useful only when applied to patients in the clinical setting of myocardial ischemia  o cTnI 99% cutoff should be interpreted in the context of clinical history, ECG findings and possibly cardiac imaging to establish correct diagnosis  o cTnI 99% cutoff may be suggestive but clearly not indicative of a coronary event without the clinical setting of myocardial ischemia  Roxann Perry [[id number]]  (Normal) Collected:  04/04/18 0733    Lab Status:  Final result Specimen:  Blood from Arm, Left Updated:  04/04/18 0755     Protime 12 9 seconds      INR 0 95    APTT [[id number]]  (Normal) Collected:  04/04/18 0733    Lab Status:  Final result Specimen:  Blood from Arm, Left Updated:  04/04/18 0755     PTT 30 seconds     Narrative: Therapeutic Heparin Range = 60-90 seconds    Basic metabolic panel [[id number]] Collected:  04/04/18 0733    Lab Status:  Final result Specimen:  Blood from Arm, Left Updated:  04/04/18 0750     Sodium 141 mmol/L      Potassium 3 8 mmol/L      Chloride 105 mmol/L      CO2 26 mmol/L      Anion Gap 10 mmol/L      BUN 14 mg/dL      Creatinine 0 86 mg/dL      Glucose 109 mg/dL      Calcium 8 8 mg/dL      eGFR 100 ml/min/1 73sq m     Narrative:         National Kidney Disease Education Program recommendations are as follows:  GFR calculation is accurate only with a steady state creatinine  Chronic Kidney disease less than 60 ml/min/1 73 sq  meters  Kidney failure less than 15 ml/min/1 73 sq  meters      CBC and differential [[id number]]  (Abnormal) Collected:  04/04/18 0733    Lab Status:  Final result Specimen:  Blood from Arm, Left Updated:  04/04/18 0744     WBC 8 96 Thousand/uL      RBC 5 39 Million/uL      Hemoglobin 15 1 g/dL      Hematocrit 45 5 %      MCV 84 fL      MCH 28 0 pg      MCHC 33 2 g/dL      RDW 12 9 %      MPV 8 3 (L) fL      Platelets 575 Thousands/uL      nRBC 0 /100 WBCs      Neutrophils Relative 77 (H) %      Lymphocytes Relative 15 %      Monocytes Relative 6 %      Eosinophils Relative 1 %      Basophils Relative 0 %      Neutrophils Absolute 6 92 Thousands/µL      Lymphocytes Absolute 1 32 Thousands/µL      Monocytes Absolute 0 54 Thousand/µL      Eosinophils Absolute 0 12 Thousand/µL      Basophils Absolute 0 03 Thousands/µL                  XR chest 2 views   ED Interpretation by Jorge Ruiz PA-C (04/04 4768)   No acute infiltrate, no pneumothorax      Final Result by Mala Crockett MD (04/04 1800)      No acute cardiopulmonary disease  Findings concur with the preliminary report by the referring clinician already in PACS and/or our electronic record EPIC  Workstation performed: [id number]         CT head without contrast   Final Result by Angie Barker MD (04/04 9274)      No acute intracranial abnormality                    Workstation performed: [id number]                    Procedures  ECG 12 Lead Documentation  Date/Time: 4/4/2018 7:30 AM  Performed by: Adan Kumar  Authorized by: Barbara Echevarria     Indications / Diagnosis:  Hypertension/dizziness  ECG reviewed by me, the ED Provider: yes    Patient location:  ED  Previous ECG:     Previous ECG:  Compared to current    Comparison ECG info:  [id number]    Similarity:  No change    Comparison to cardiac monitor: Yes    Interpretation:     Interpretation: normal    Rate:     ECG rate:  77    ECG rate assessment: normal    Rhythm:     Rhythm: sinus rhythm    Ectopy:     Ectopy: none    QRS:     QRS axis:  Normal    QRS intervals:  Normal  Conduction:     Conduction: normal    ST segments:     ST segments:  Normal  T waves:     T waves: normal             Phone Contacts  ED Phone Contact    ED Course  ED Course as of Apr 04 0856   Wed Apr 04, 2018   5117 Cxr reviewed - no acute infiltrate or pneumothorax  XR chest 2 views   0756 Cbc and bmp reviewed- normal wbc, normal hgb and hct, normal bun/creatinine and electrolytes  MCHC: 33 2   0836 Re-evaluation:  blood pressure improved - down to 143/93 on recheck  BP improved, continue to monitor  0839 XR chest 2 views   0839 Ct head images visualized by me  Radiology reports reviewed: no acute intracranial abnormality  CT head without contrast   0853 Re-evaluation patient feels improved after valium given in ed  Dizziness resolved  Blood pressure improved  Discussed all result with patient and wife at bedside  Discussed diagnosis of vertigo - possibly exacerbated by recent discontinuation of diuretic or elevation in blood pressure after antihypertensive reduction  Improved in ed  No ischemic changes on ekg  Lab evaluation unremarkable  Follow up with cardiologist today at 3 pm   Reviewed reasons to return to ed  Patient verbalized understanding of diagnosis and agreement with discharge plan of care as well as understanding of reasons to return to ed  MDM  Number of Diagnoses or Management Options  Diagnosis management comments: ddx includes but is not limited to vertigo, ICH, acs/usa, hypertensive urgency, acute kidney injury, dehydration, electrolyte abnormality, pneumothorax, pericardial effusion, pleural effusion, pericarditis, anxiety, vertebrobasilar insufficiency, cardiac arrhythmia  Plan workup including add valium for vertigo, check labs, ekg, head ct and cxr  MDM:  Patient with significant hypertension on arrival and complaint of dizziness  NIH stroke score is 0  Doubt stroke but consider hypertensive urgency    Will check CT scan of the head to rule out intracranial hemorrhage  Will treat for vertigo with Valium and Zofran  Consider exacerbation by recent discontinuation of diuretic which may be source for patient's increased blood pressure  Monitor blood pressure in ED, consider antihypertensive administration if not improved after observation and administration of valium for vertigo  Amount and/or Complexity of Data Reviewed  Clinical lab tests: ordered  Tests in the radiology section of CPT®: ordered  Tests in the medicine section of CPT®: ordered and reviewed  Obtain history from someone other than the patient: yes (spouse)  Review and summarize past medical records: yes  Independent visualization of images, tracings, or specimens: yes      CritCare Time    Disposition  Final diagnoses:   Vertigo   Hypertension     Time reflects when diagnosis was documented in both MDM as applicable and the Disposition within this note     Time User Action Codes Description Comment    4/4/2018  8:54 AM Raj Calderon [[id number]] Vertigo     4/4/2018  8:54 AM Raj Calderon [[id number]] Hypertension       ED Disposition     ED Disposition Condition Comment    Discharge  [facility] discharge to home/self care      Condition at discharge: Stable        Follow-up Information     Follow up With Specialties Details Why Contact Info Additional Information    Sharron Fernando MD Internal Medicine Call in 1 day for further evaluation of symptoms [address]       Vandana Quiroz MD Cardiology Go on 4/4/2018 for further evaluation of symptoms [address] [facility] [address] [facility] Emergency Department Emergency Medicine Go to If symptoms worsen [address] [facility] [address] ED, [address] [facility], [place], [place], [address]        Patient's Medications   Discharge Prescriptions    MECLIZINE (ANTIVERT) 25 MG TABLET    Take 1 tablet (25 mg total) by mouth 3 (three) times a day as needed for dizziness       Start Date: 4/4/2018  End Date: --       Order Dose: 25 mg       Quantity: 30 tablet    Refills: 0     No discharge procedures on file      ED Provider  Electronically Signed by           Teofilo Sanchez PA-C  04/04/18 4681

## 2018-04-04 NOTE — DISCHARGE INSTRUCTIONS
Vertigo   WHAT YOU NEED TO KNOW:   Vertigo is a condition that causes you to feel dizzy  You may feel that you or everything around you is moving or spinning  You may also feel like you are being pulled down or toward your side  DISCHARGE INSTRUCTIONS:   Return to the emergency department if:   · You have a headache and a stiff neck  · You have shaking chills and a fever  · You vomit over and over with no relief  · You have blood, pus, or fluid coming out of your ears  · You are confused  Contact your healthcare provider if:   · Your symptoms do not get better with treatment  · You have questions about your condition or care  Medicines:   · Medicine  may be given to help relieve your symptoms  · Take your medicine as directed  Contact your healthcare provider if you think your medicine is not helping or if you have side effects  Tell him or her if you are allergic to any medicine  Keep a list of the medicines, vitamins, and herbs you take  Include the amounts, and when and why you take them  Bring the list or the pill bottles to follow-up visits  Carry your medicine list with you in case of an emergency  Manage your symptoms:   · Do not drive , walk without help, or operate heavy machinery when you are dizzy  · Move slowly  when you move from one position to another position  Get up slowly from sitting or lying down  Sit or lie down right away if you feel dizzy  · Drink plenty of liquids  Liquids help prevent dehydration  Ask how much liquid to drink each day and which liquids are best for you  · Vestibular and balance rehabilitation therapy (VBRT)  is used to teach you exercises to improve your balance and strength  These exercises may help decrease your vertigo and improve your balance  Ask for more information about this therapy  Follow up with your healthcare provider as directed:  Write down your questions so you remember to ask them during your visits     © 2017 [address]  Information is for End User's use only and may not be sold, redistributed or otherwise used for commercial purposes  All illustrations and images included in CareNotes® are the copyrighted property of [employer]  or [employer]  The above information is an  only  It is not intended as medical advice for individual conditions or treatments  Talk to your doctor, nurse or pharmacist before following any medical regimen to see if it is safe and effective for you  Hypertension   WHAT YOU NEED TO KNOW:   Hypertension is high blood pressure (BP)  Your BP is the force of your blood moving against the walls of your arteries  Normal BP is less than 120/80  Prehypertension is between 120/80 and 139/89  Hypertension is 140/90 or higher  Hypertension causes your BP to get so high that your heart has to work much harder than normal  This can damage your heart  You can control hypertension with a healthy lifestyle or medicines  A controlled blood pressure helps protect your organs, such as your heart, lungs, brain, and kidneys  DISCHARGE INSTRUCTIONS:   Call 911 for any of the following:   · You have discomfort in your chest that feels like squeezing, pressure, fullness, or pain  · You become confused or have difficulty speaking  · You suddenly feel lightheaded or have trouble breathing  · You have pain or discomfort in your back, neck, jaw, stomach, or arm  Return to the emergency department if:   · You have a severe headache or vision loss  · You have weakness in an arm or leg  Contact your healthcare provider if:   · You feel faint, dizzy, confused, or drowsy  · You have been taking your BP medicine and your BP is still higher than your healthcare provider says it should be  · You have questions or concerns about your condition or care  Medicines: You may  need any of the following:  · Medicine  may be used to help lower your BP   You may need more than one type of medicine  Take the medicine exactly as directed  · Diuretics  help decrease extra fluid that collects in your body  This will help lower your BP  You may urinate more often while you take this medicine  · Cholesterol medicine  helps lower your cholesterol level  A low cholesterol level helps prevent heart disease and makes it easier to control your blood pressure  · Take your medicine as directed  Contact your healthcare provider if you think your medicine is not helping or if you have side effects  Tell him or her if you are allergic to any medicine  Keep a list of the medicines, vitamins, and herbs you take  Include the amounts, and when and why you take them  Bring the list or the pill bottles to follow-up visits  Carry your medicine list with you in case of an emergency  Follow up with your healthcare provider as directed: You will need to return to have your BP checked and to have other lab tests done  Write down your questions so you remember to ask them during your visits  Manage hypertension:  Talk with your healthcare provider about these and other ways to manage hypertension:  · Check your BP at home  Sit and rest for 5 minutes before you take your BP  Extend your arm and support it on a flat surface  Your arm should be at the same level as your heart  Follow the directions that came with your BP monitor  If possible, take at least 2 BP readings each time  Take your BP at least twice a day at the same times each day, such as morning and evening  Keep a record of your BP readings and bring it to your follow-up visits  Ask your healthcare provider what your BP should be  · Limit sodium (salt) as directed  Too much sodium can affect your fluid balance  Check labels to find low-sodium or no-salt-added foods  Some low-sodium foods use potassium salts for flavor  Too much potassium can also cause health problems   Your healthcare provider will tell you how much sodium and potassium are safe for you to have in a day  He or she may recommend that you limit sodium to 2,300 mg a day  · Follow the meal plan recommended by your healthcare provider  A dietitian or your provider can give you more information on low-sodium plans or the DASH (Dietary Approaches to Stop Hypertension) eating plan  The DASH plan is low in sodium, unhealthy fats, and total fat  It is high in potassium, calcium, and fiber  · Exercise to maintain a healthy weight  Exercise at least 30 minutes per day, on most days of the week  This will help decrease your blood pressure  Ask your healthcare provider about the best exercise plan for you  · Decrease stress  This may help lower your BP  Learn ways to relax, such as deep breathing or listening to music  · Limit alcohol  Women should limit alcohol to 1 drink a day  Men should limit alcohol to 2 drinks a day  A drink of alcohol is 12 ounces of beer, 5 ounces of wine, or 1½ ounces of liquor  · Do not smoke  Nicotine and other chemicals in cigarettes and cigars can increase your BP and also cause lung damage  Ask your healthcare provider for information if you currently smoke and need help to quit  E-cigarettes or smokeless tobacco still contain nicotine  Talk to your healthcare provider before you use these products  · Manage any other health conditions you have  Health conditions such as diabetes can increase your risk for hypertension  Follow your healthcare provider's instructions and take all your medicines as directed  © 2017 [address] [employer] Information is for End User's use only and may not be sold, redistributed or otherwise used for commercial purposes  All illustrations and images included in CareNotes® are the copyrighted property of [employer]  or [address]  The above information is an  only   It is not intended as medical advice for individual conditions or treatments  Talk to your doctor, nurse or pharmacist before following any medical regimen to see if it is safe and effective for you

## 2018-04-05 ENCOUNTER — OFFICE VISIT (OUTPATIENT)
Dept: CARDIOLOGY CLINIC | Facility: CLINIC | Age: 53
End: 2018-04-05
Payer: COMMERCIAL

## 2018-04-05 VITALS
HEART RATE: 79 BPM | DIASTOLIC BLOOD PRESSURE: 84 MMHG | SYSTOLIC BLOOD PRESSURE: 132 MMHG | OXYGEN SATURATION: 96 % | BODY MASS INDEX: 41.46 KG/M2 | HEIGHT: 66 IN | WEIGHT: 258 LBS

## 2018-04-05 DIAGNOSIS — M47.812 SPONDYLOSIS OF CERVICAL REGION WITHOUT MYELOPATHY OR RADICULOPATHY: ICD-10-CM

## 2018-04-05 DIAGNOSIS — E66.01 MORBID OBESITY DUE TO EXCESS CALORIES (HCC): ICD-10-CM

## 2018-04-05 DIAGNOSIS — I10 ESSENTIAL HYPERTENSION: ICD-10-CM

## 2018-04-05 DIAGNOSIS — Z72.0 TOBACCO ABUSE: ICD-10-CM

## 2018-04-05 DIAGNOSIS — R42 VERTIGO: Primary | ICD-10-CM

## 2018-04-05 DIAGNOSIS — E78.2 MIXED HYPERLIPIDEMIA: ICD-10-CM

## 2018-04-05 PROCEDURE — 99244 OFF/OP CNSLTJ NEW/EST MOD 40: CPT | Performed by: INTERNAL MEDICINE

## 2018-05-08 ENCOUNTER — OFFICE VISIT (OUTPATIENT)
Dept: INTERNAL MEDICINE CLINIC | Facility: CLINIC | Age: 53
End: 2018-05-08
Payer: COMMERCIAL

## 2018-05-08 VITALS
HEIGHT: 66 IN | BODY MASS INDEX: 40.72 KG/M2 | OXYGEN SATURATION: 96 % | SYSTOLIC BLOOD PRESSURE: 128 MMHG | WEIGHT: 253.4 LBS | HEART RATE: 66 BPM | DIASTOLIC BLOOD PRESSURE: 88 MMHG

## 2018-05-08 DIAGNOSIS — E78.00 HYPERCHOLESTEROLEMIA: Chronic | ICD-10-CM

## 2018-05-08 DIAGNOSIS — Z12.5 SCREENING FOR PROSTATE CANCER: ICD-10-CM

## 2018-05-08 DIAGNOSIS — I10 ESSENTIAL HYPERTENSION: Primary | ICD-10-CM

## 2018-05-08 DIAGNOSIS — Z12.11 SCREENING FOR MALIGNANT NEOPLASM OF COLON: ICD-10-CM

## 2018-05-08 DIAGNOSIS — E66.01 MORBID OBESITY DUE TO EXCESS CALORIES (HCC): Chronic | ICD-10-CM

## 2018-05-08 DIAGNOSIS — E86.0 DEHYDRATION: ICD-10-CM

## 2018-05-08 DIAGNOSIS — L24.89 IRRITANT CONTACT DERMATITIS DUE TO OTHER AGENTS: ICD-10-CM

## 2018-05-08 PROCEDURE — 99214 OFFICE O/P EST MOD 30 MIN: CPT | Performed by: INTERNAL MEDICINE

## 2018-05-08 RX ORDER — ROSUVASTATIN CALCIUM 5 MG/1
5 TABLET, COATED ORAL DAILY
Qty: 30 TABLET | Refills: 11 | Status: SHIPPED | OUTPATIENT
Start: 2018-05-08 | End: 2019-06-18 | Stop reason: SDUPTHER

## 2018-05-08 RX ORDER — DIFLORASONE DIACETATE 0.5 MG/G
CREAM TOPICAL 2 TIMES DAILY
Qty: 30 G | Refills: 1 | Status: SHIPPED | OUTPATIENT
Start: 2018-05-08 | End: 2018-06-05

## 2018-05-08 RX ORDER — VALSARTAN 320 MG/1
320 TABLET ORAL DAILY
Qty: 30 TABLET | Refills: 11 | Status: SHIPPED | OUTPATIENT
Start: 2018-05-08 | End: 2018-12-19

## 2018-05-08 NOTE — PROGRESS NOTES
Assessment/Plan:    Screening for malignant neoplasm of colon  A colonoscopy had been ordered about a yr ago but he never scheduled it  Will reorder test and encouraged him to do it  Hypercholesterolemia  Check ck lfts and lipids      Irritant contact dermatitis due to other agents  Renew psorcon    Dehydration  Avoid diuretic    Hypertension  Diastolic bp still elevated  Increase to 320 mg daily of valsartan     Screening for prostate cancer  psa and mireya at follow up        Diagnoses and all orders for this visit:    Essential hypertension  -     valsartan (DIOVAN) 320 MG tablet; Take 1 tablet (320 mg total) by mouth daily  -     Comprehensive metabolic panel; Future    Hypercholesterolemia  -     rosuvastatin (CRESTOR) 5 mg tablet; Take 1 tablet (5 mg total) by mouth daily  -     Comprehensive metabolic panel; Future  -     CK; Future  -     Lipid Panel with Direct LDL reflex; Future    Morbid obesity due to excess calories (HCC)    Dehydration    Irritant contact dermatitis due to other agents  -     diflorasone (PSORCON) 0 05 % cream; Apply topically 2 (two) times a day    Screening for malignant neoplasm of colon  -     Ambulatory referral to Gastroenterology; Future    Screening for prostate cancer  -     PSA, Total Screen; Future          Subjective:      Patient ID: France Okeefe is a 47 y o  male  Patient presents in follow up for his recent episode of dehydration  Now off diuretic  Dr Kenya Humphries felt his chest pain was msk and he has been getting massage to help with spasm in his upper back and chest  He has no lightheadedness chest pain palpitations           The following portions of the patient's history were reviewed and updated as appropriate: allergies, current medications, past family history, past medical history, past social history, past surgical history and problem list     Review of Systems   Constitutional: Negative for activity change, appetite change, chills, diaphoresis, fatigue, fever and unexpected weight change  HENT: Negative for congestion, dental problem, drooling, ear discharge, ear pain, facial swelling, hearing loss, mouth sores, nosebleeds, postnasal drip, rhinorrhea, sinus pain, sinus pressure, sneezing, sore throat, tinnitus, trouble swallowing and voice change  Eyes: Negative for photophobia, pain, discharge, redness, itching and visual disturbance  Respiratory: Negative for apnea, cough, choking, chest tightness, shortness of breath, wheezing and stridor  Cardiovascular: Negative for chest pain, palpitations and leg swelling  Gastrointestinal: Negative for abdominal distention, abdominal pain, anal bleeding, blood in stool, constipation, diarrhea, nausea, rectal pain and vomiting  Endocrine: Negative for cold intolerance, heat intolerance, polydipsia, polyphagia and polyuria  Genitourinary: Negative for decreased urine volume, difficulty urinating, dysuria, enuresis, flank pain, frequency, genital sores, hematuria and urgency  Musculoskeletal: Negative for arthralgias, back pain, gait problem, joint swelling, myalgias, neck pain and neck stiffness  Skin: Negative for color change, pallor, rash and wound  Allergic/Immunologic: Negative for environmental allergies, food allergies and immunocompromised state  Neurological: Negative for dizziness, tremors, seizures, syncope, facial asymmetry, speech difficulty, weakness, light-headedness, numbness and headaches  Hematological: Negative for adenopathy  Does not bruise/bleed easily  Psychiatric/Behavioral: Negative for agitation, self-injury, sleep disturbance and suicidal ideas  The patient is not nervous/anxious  Objective:      /88 (BP Location: Left arm, Patient Position: Sitting)   Pulse 66   Ht 5' 6" (1 676 m)   Wt 115 kg (253 lb 6 4 oz)   SpO2 96%   BMI 40 90 kg/m²          Physical Exam   Constitutional: He is oriented to person, place, and time   He appears well-developed and well-nourished  No distress  HENT:   Head: Normocephalic and atraumatic  Right Ear: External ear normal    Left Ear: External ear normal    Mouth/Throat: Oropharynx is clear and moist    Eyes: Conjunctivae and EOM are normal  Pupils are equal, round, and reactive to light  No scleral icterus  Neck: Normal range of motion  Neck supple  No JVD present  No tracheal deviation present  No thyromegaly present  Cardiovascular: Normal rate, regular rhythm and intact distal pulses  Exam reveals no gallop and no friction rub  No murmur heard  Pulmonary/Chest: Effort normal and breath sounds normal  No respiratory distress  He has no wheezes  He has no rales  He exhibits no tenderness  Abdominal: Soft  Bowel sounds are normal  He exhibits no distension and no mass  There is no tenderness  There is no rebound and no guarding  Musculoskeletal: Normal range of motion  He exhibits no edema, tenderness or deformity  Lymphadenopathy:     He has no cervical adenopathy  Neurological: He is alert and oriented to person, place, and time  He has normal reflexes  No cranial nerve deficit  Coordination normal    Skin: Skin is warm and dry  No rash noted  He is not diaphoretic  No erythema  No pallor  Psychiatric: He has a normal mood and affect   His behavior is normal  Judgment and thought content normal

## 2018-05-08 NOTE — ASSESSMENT & PLAN NOTE
A colonoscopy had been ordered about a yr ago but he never scheduled it  Will reorder test and encouraged him to do it

## 2018-05-24 ENCOUNTER — TELEPHONE (OUTPATIENT)
Dept: GASTROENTEROLOGY | Facility: CLINIC | Age: 53
End: 2018-05-24

## 2018-05-29 PROBLEM — Z12.11 SCREEN FOR COLON CANCER: Status: ACTIVE | Noted: 2018-05-29

## 2018-06-05 ENCOUNTER — APPOINTMENT (EMERGENCY)
Dept: RADIOLOGY | Facility: HOSPITAL | Age: 53
End: 2018-06-05
Payer: COMMERCIAL

## 2018-06-05 ENCOUNTER — HOSPITAL ENCOUNTER (OUTPATIENT)
Facility: HOSPITAL | Age: 53
Setting detail: OBSERVATION
Discharge: HOME | End: 2018-06-06
Attending: EMERGENCY MEDICINE | Admitting: INTERNAL MEDICINE
Payer: COMMERCIAL

## 2018-06-05 ENCOUNTER — APPOINTMENT (EMERGENCY)
Dept: INTERVENTIONAL RADIOLOGY/VASCULAR | Facility: HOSPITAL | Age: 53
End: 2018-06-05
Payer: COMMERCIAL

## 2018-06-05 DIAGNOSIS — R07.9 CHEST PAIN: Primary | ICD-10-CM

## 2018-06-05 DIAGNOSIS — K21.9 GASTROESOPHAGEAL REFLUX DISEASE WITHOUT ESOPHAGITIS: ICD-10-CM

## 2018-06-05 LAB
ALBUMIN SERPL BCP-MCNC: 4.5 G/DL (ref 3.5–5)
ALP SERPL-CCNC: 60 U/L (ref 46–116)
ALT SERPL W P-5'-P-CCNC: 42 U/L (ref 12–78)
ANION GAP SERPL CALCULATED.3IONS-SCNC: 13 MMOL/L (ref 4–13)
AST SERPL W P-5'-P-CCNC: 17 U/L (ref 5–45)
ATRIAL RATE: 66 BPM
BASOPHILS # BLD AUTO: 0.04 THOUSANDS/ΜL (ref 0–0.1)
BASOPHILS NFR BLD AUTO: 1 % (ref 0–1)
BILIRUB SERPL-MCNC: 0.5 MG/DL (ref 0.2–1)
BUN SERPL-MCNC: 13 MG/DL (ref 5–25)
CALCIUM SERPL-MCNC: 9.5 MG/DL (ref 8.3–10.1)
CHLORIDE SERPL-SCNC: 103 MMOL/L (ref 100–108)
CO2 SERPL-SCNC: 26 MMOL/L (ref 21–32)
CREAT SERPL-MCNC: 0.9 MG/DL (ref 0.6–1.3)
EOSINOPHIL # BLD AUTO: 0.1 THOUSAND/ΜL (ref 0–0.61)
EOSINOPHIL NFR BLD AUTO: 1 % (ref 0–6)
ERYTHROCYTE [DISTWIDTH] IN BLOOD BY AUTOMATED COUNT: 12.3 % (ref 11.6–15.1)
GFR SERPL CREATININE-BSD FRML MDRD: 98 ML/MIN/1.73SQ M
GLUCOSE SERPL-MCNC: 101 MG/DL (ref 65–140)
HCT VFR BLD AUTO: 50.4 % (ref 36.5–49.3)
HGB BLD-MCNC: 16.7 G/DL (ref 12–17)
IMM GRANULOCYTES # BLD AUTO: 0.04 THOUSAND/UL (ref 0–0.2)
IMM GRANULOCYTES NFR BLD AUTO: 1 % (ref 0–2)
LYMPHOCYTES # BLD AUTO: 1.56 THOUSANDS/ΜL (ref 0.6–4.47)
LYMPHOCYTES NFR BLD AUTO: 19 % (ref 14–44)
MCH RBC QN AUTO: 28 PG (ref 26.8–34.3)
MCHC RBC AUTO-ENTMCNC: 33.1 G/DL (ref 31.4–37.4)
MCV RBC AUTO: 85 FL (ref 82–98)
MONOCYTES # BLD AUTO: 0.51 THOUSAND/ΜL (ref 0.17–1.22)
MONOCYTES NFR BLD AUTO: 6 % (ref 4–12)
NEUTROPHILS # BLD AUTO: 6.14 THOUSANDS/ΜL (ref 1.85–7.62)
NEUTS SEG NFR BLD AUTO: 72 % (ref 43–75)
NRBC BLD AUTO-RTO: 0 /100 WBCS
P AXIS: 48 DEGREES
PLATELET # BLD AUTO: 186 THOUSANDS/UL (ref 149–390)
PMV BLD AUTO: 8.5 FL (ref 8.9–12.7)
POTASSIUM SERPL-SCNC: 3.5 MMOL/L (ref 3.5–5.3)
PR INTERVAL: 142 MS
PROT SERPL-MCNC: 7.9 G/DL (ref 6.4–8.2)
QRS AXIS: 16 DEGREES
QRSD INTERVAL: 92 MS
QT INTERVAL: 436 MS
QTC INTERVAL: 457 MS
RBC # BLD AUTO: 5.96 MILLION/UL (ref 3.88–5.62)
SODIUM SERPL-SCNC: 142 MMOL/L (ref 136–145)
T WAVE AXIS: 26 DEGREES
TROPONIN I SERPL-MCNC: <0.02 NG/ML
VENTRICULAR RATE: 66 BPM
WBC # BLD AUTO: 8.39 THOUSAND/UL (ref 4.31–10.16)

## 2018-06-05 PROCEDURE — 93458 L HRT ARTERY/VENTRICLE ANGIO: CPT | Performed by: PHYSICIAN ASSISTANT

## 2018-06-05 PROCEDURE — 93005 ELECTROCARDIOGRAM TRACING: CPT

## 2018-06-05 PROCEDURE — 93010 ELECTROCARDIOGRAM REPORT: CPT | Performed by: INTERNAL MEDICINE

## 2018-06-05 PROCEDURE — 71046 X-RAY EXAM CHEST 2 VIEWS: CPT

## 2018-06-05 PROCEDURE — C1894 INTRO/SHEATH, NON-LASER: HCPCS | Performed by: PHYSICIAN ASSISTANT

## 2018-06-05 PROCEDURE — C1769 GUIDE WIRE: HCPCS | Performed by: PHYSICIAN ASSISTANT

## 2018-06-05 PROCEDURE — 96375 TX/PRO/DX INJ NEW DRUG ADDON: CPT

## 2018-06-05 PROCEDURE — 93458 L HRT ARTERY/VENTRICLE ANGIO: CPT | Performed by: INTERNAL MEDICINE

## 2018-06-05 PROCEDURE — 99219 PR INITIAL OBSERVATION CARE/DAY 50 MINUTES: CPT | Performed by: INTERNAL MEDICINE

## 2018-06-05 PROCEDURE — 80053 COMPREHEN METABOLIC PANEL: CPT

## 2018-06-05 PROCEDURE — 99152 MOD SED SAME PHYS/QHP 5/>YRS: CPT | Performed by: PHYSICIAN ASSISTANT

## 2018-06-05 PROCEDURE — 84484 ASSAY OF TROPONIN QUANT: CPT

## 2018-06-05 PROCEDURE — 85025 COMPLETE CBC W/AUTO DIFF WBC: CPT

## 2018-06-05 PROCEDURE — 96374 THER/PROPH/DIAG INJ IV PUSH: CPT

## 2018-06-05 PROCEDURE — 36415 COLL VENOUS BLD VENIPUNCTURE: CPT

## 2018-06-05 PROCEDURE — 99152 MOD SED SAME PHYS/QHP 5/>YRS: CPT | Performed by: INTERNAL MEDICINE

## 2018-06-05 PROCEDURE — 99244 OFF/OP CNSLTJ NEW/EST MOD 40: CPT | Performed by: INTERNAL MEDICINE

## 2018-06-05 PROCEDURE — C1760 CLOSURE DEV, VASC: HCPCS | Performed by: PHYSICIAN ASSISTANT

## 2018-06-05 PROCEDURE — 99285 EMERGENCY DEPT VISIT HI MDM: CPT

## 2018-06-05 PROCEDURE — 99153 MOD SED SAME PHYS/QHP EA: CPT | Performed by: PHYSICIAN ASSISTANT

## 2018-06-05 RX ORDER — FENTANYL CITRATE 50 UG/ML
INJECTION, SOLUTION INTRAMUSCULAR; INTRAVENOUS CODE/TRAUMA/SEDATION MEDICATION
Status: COMPLETED | OUTPATIENT
Start: 2018-06-05 | End: 2018-06-05

## 2018-06-05 RX ORDER — NITROGLYCERIN 20 MG/100ML
INJECTION INTRAVENOUS CODE/TRAUMA/SEDATION MEDICATION
Status: COMPLETED | OUTPATIENT
Start: 2018-06-05 | End: 2018-06-05

## 2018-06-05 RX ORDER — ASPIRIN 81 MG/1
324 TABLET, CHEWABLE ORAL ONCE
Status: COMPLETED | OUTPATIENT
Start: 2018-06-05 | End: 2018-06-05

## 2018-06-05 RX ORDER — ACETAMINOPHEN 325 MG/1
650 TABLET ORAL EVERY 6 HOURS PRN
Status: DISCONTINUED | OUTPATIENT
Start: 2018-06-05 | End: 2018-06-06 | Stop reason: HOSPADM

## 2018-06-05 RX ORDER — LIDOCAINE HYDROCHLORIDE 10 MG/ML
INJECTION, SOLUTION INFILTRATION; PERINEURAL CODE/TRAUMA/SEDATION MEDICATION
Status: COMPLETED | OUTPATIENT
Start: 2018-06-05 | End: 2018-06-05

## 2018-06-05 RX ORDER — VERAPAMIL HCL 2.5 MG/ML
AMPUL (ML) INTRAVENOUS CODE/TRAUMA/SEDATION MEDICATION
Status: COMPLETED | OUTPATIENT
Start: 2018-06-05 | End: 2018-06-05

## 2018-06-05 RX ORDER — MECLIZINE HYDROCHLORIDE 25 MG/1
25 TABLET ORAL 3 TIMES DAILY PRN
Status: DISCONTINUED | OUTPATIENT
Start: 2018-06-05 | End: 2018-06-06 | Stop reason: HOSPADM

## 2018-06-05 RX ORDER — PRAVASTATIN SODIUM 40 MG
40 TABLET ORAL
Status: DISCONTINUED | OUTPATIENT
Start: 2018-06-05 | End: 2018-06-06 | Stop reason: HOSPADM

## 2018-06-05 RX ORDER — ISOSORBIDE MONONITRATE 30 MG/1
30 TABLET, EXTENDED RELEASE ORAL DAILY
Status: DISCONTINUED | OUTPATIENT
Start: 2018-06-05 | End: 2018-06-06 | Stop reason: HOSPADM

## 2018-06-05 RX ORDER — SODIUM CHLORIDE 9 MG/ML
50 INJECTION, SOLUTION INTRAVENOUS CONTINUOUS
Status: DISCONTINUED | OUTPATIENT
Start: 2018-06-05 | End: 2018-06-06 | Stop reason: HOSPADM

## 2018-06-05 RX ORDER — MIDAZOLAM HYDROCHLORIDE 1 MG/ML
INJECTION INTRAMUSCULAR; INTRAVENOUS CODE/TRAUMA/SEDATION MEDICATION
Status: COMPLETED | OUTPATIENT
Start: 2018-06-05 | End: 2018-06-05

## 2018-06-05 RX ORDER — VALSARTAN 160 MG/1
320 TABLET ORAL DAILY
Status: DISCONTINUED | OUTPATIENT
Start: 2018-06-05 | End: 2018-06-06 | Stop reason: HOSPADM

## 2018-06-05 RX ADMIN — FENTANYL CITRATE 50 MCG: 50 INJECTION, SOLUTION INTRAMUSCULAR; INTRAVENOUS at 12:42

## 2018-06-05 RX ADMIN — ASPIRIN 81 MG 324 MG: 81 TABLET ORAL at 11:38

## 2018-06-05 RX ADMIN — SODIUM CHLORIDE 50 ML/HR: 0.9 INJECTION, SOLUTION INTRAVENOUS at 16:44

## 2018-06-05 RX ADMIN — LIDOCAINE HYDROCHLORIDE 1 ML: 10 INJECTION, SOLUTION INFILTRATION; PERINEURAL at 12:41

## 2018-06-05 RX ADMIN — VERAPAMIL HYDROCHLORIDE 2.5 MG: 2.5 INJECTION, SOLUTION INTRAVENOUS at 12:43

## 2018-06-05 RX ADMIN — VALSARTAN 320 MG: 160 TABLET, FILM COATED ORAL at 16:43

## 2018-06-05 RX ADMIN — ISOSORBIDE MONONITRATE 30 MG: 30 TABLET, EXTENDED RELEASE ORAL at 14:46

## 2018-06-05 RX ADMIN — MIDAZOLAM HYDROCHLORIDE 2 MG: 1 INJECTION, SOLUTION INTRAMUSCULAR; INTRAVENOUS at 12:40

## 2018-06-05 RX ADMIN — NITROGLYCERIN 200 MCG: 20 INJECTION INTRAVENOUS at 12:43

## 2018-06-05 RX ADMIN — FENTANYL CITRATE 50 MCG: 50 INJECTION, SOLUTION INTRAMUSCULAR; INTRAVENOUS at 12:40

## 2018-06-05 RX ADMIN — PRAVASTATIN SODIUM 40 MG: 40 TABLET ORAL at 16:43

## 2018-06-05 RX ADMIN — LIDOCAINE HYDROCHLORIDE 8 ML: 10 INJECTION, SOLUTION INFILTRATION; PERINEURAL at 12:46

## 2018-06-05 RX ADMIN — IOHEXOL 50 ML: 350 INJECTION, SOLUTION INTRAVENOUS at 13:05

## 2018-06-05 NOTE — ASSESSMENT & PLAN NOTE
· Patient with history of, states that he did have an episode of lightheadedness/dizziness today but is currently asymptomatic  · Continue p r n   Meclizine  · Will give gentle IVF   · Monitor

## 2018-06-05 NOTE — H&P
H&P- Pro Bassett 1965, 47 y o  male MRN: [id number]    Unit/Bed#: -01 Encounter: [id number]    Primary Care Provider: Prabhakar Hanks MD   Date and time admitted to hospital: 6/5/2018 10:16 AM        DOS: 6/5/2018      * Chest pain   Assessment & Plan    · Patient with complaints of left-sided chest pain during yard work today  · Chest x-ray with no evidence of acute cardiopulmonary disease  · Has been admitted for chest pain in the past, unremarkable echo and stress test performed in February of this year  · Patient underwent cardiac catheterization today, no significant CAD noted  · Troponin negative  · EKG normal sinus rhythm  · Cardiology following  · Recommending patient's symptoms could be related to microvascular disease or coronary spasm  · Admit the patient for continues cardiac monitoring overnight and add Imdur 30 mg daily  · Monitor symptoms and telemetry        Tobacco abuse   Assessment & Plan    · Pt reports that he does not smoke cigarettes anymore but at times does use chewing tobacco   · Encourage cessation         Vertigo   Assessment & Plan    · Patient with history of, states that he did have an episode of lightheadedness/dizziness today but is currently asymptomatic  · Continue p r n  Meclizine  · Will give gentle IVF   · Monitor        Hypercholesterolemia   Assessment & Plan    · Continue statin        Hypertension   Assessment & Plan    · Most recent /80  · Seems well controlled here  · Continue valsartan 320 mg daily  · Monitor          VTE Prophylaxis: Patient low risk, ambulate patient    Code Status:  Level 1-full code  POLST: There is no POLST form on file for this patient (pre-hospital)  Discussion with family:  Discussed with patient at bedside    Anticipated Length of Stay:  Patient will be admitted on an Observation basis with an anticipated length of stay of  < 2 midnights     Justification for Hospital Stay:  Continuous cardiac monitoring of chest pain status post catheterization and trial of Imdur  Total Time for Visit, including Counseling / Coordination of Care: 45 minutes  Greater than 50% of this total time spent on direct patient counseling and coordination of care  Chief Complaint:   "I started having chest pain when I was mowing my lawn today "    History of Present Illness:    Stephanie Soni is a 47 y o  male with significant past medical history of hypertension, hyperlipidemia, tobacco use who presents with chest pain x1 day  Pt reports that he woke up this morning and felt a little lightheaded but states he did drink a few beers last night which is abnormal for him  He reports that he when out to mow his lawn and shortly after developed chest pressure/tightness that was substernal and radiated to his left shoulder  Patient also reported nausea and states that he had some dry heaving but did not vomit  He reports this was the only episode of chest pain but it lasted for many minutes and he continues to have some discomfort in his left shoulder still  Patient reports that he was hospitalized before for chest pains and he had a stress test and echo performed that were within normal limits  He denies any previous history of any arrhythmias or cardiac events  Patient reports that he did take 1 meclizine due to some lightheadedness/dizziness that accompanied that episode  He denies these symptoms currently  Review of Systems:    Review of Systems   Constitutional: Negative for appetite change, chills, diaphoresis and fever  HENT: Negative for congestion, postnasal drip, rhinorrhea, sinus pain, sinus pressure, sneezing, sore throat, tinnitus and trouble swallowing  Eyes: Negative  Negative for visual disturbance  Respiratory: Negative for cough, chest tightness, shortness of breath and wheezing  Cardiovascular: Positive for chest pain  Negative for palpitations and leg swelling  Gastrointestinal: Positive for nausea   Negative for abdominal pain, constipation, diarrhea and vomiting  Genitourinary: Negative for difficulty urinating, dysuria, frequency, hematuria and urgency  Musculoskeletal: Negative for gait problem, joint swelling and myalgias  Skin: Negative for color change, pallor and rash  Neurological: Positive for dizziness and light-headedness  Negative for syncope and headaches  Past Medical and Surgical History:     Past Medical History:   Diagnosis Date    Hypercholesterolemia     Hypertension     Obesity     Sexual dysfunction     Psychosexual dysfunction with inhibited sexual excitement        Past Surgical History:   Procedure Laterality Date    BACK SURGERY      spinal fusion    SEPTOPLASTY         Meds/Allergies:    Prior to Admission medications    Medication Sig Start Date End Date Taking? Authorizing Provider   meclizine (ANTIVERT) 25 mg tablet Take 1 tablet (25 mg total) by mouth 3 (three) times a day as needed for dizziness 4/4/18  Yes Gardenia Neff PA-C   rosuvastatin (CRESTOR) 5 mg tablet Take 1 tablet (5 mg total) by mouth daily 5/8/18  Yes Wilson Agosto MD   valsartan (DIOVAN) 320 MG tablet Take 1 tablet (320 mg total) by mouth daily 5/8/18  Yes Wilson Agosto MD   diflorasone (PSORCON) 0 05 % cream Apply topically 2 (two) times a day 5/8/18 6/5/18  Wilson Agosto MD     I have reviewed home medications with patient personally      Allergies: No Known Allergies    Social History:     Marital Status: /Civil Union   Occupation:   Patient Pre-hospital Living Situation:  Patient lives at home with his wife and children  Patient Pre-hospital Level of Mobility:  Full mobility  Patient Pre-hospital Diet Restrictions:  None  Substance Use History:   History   Alcohol Use    Yes     Comment: Occ alcohol use per [employer]      History   Smoking Status    Former Smoker    Quit date: 2/25/1998   Smokeless Tobacco    Current User    Types: Chew     History   Drug Use No       Family History:    Patient reports mother with heart disease    Physical Exam:     Vitals:   Blood Pressure: 120/80 (06/05/18 1515)  Pulse: 64 (06/05/18 1515)  Temperature: 98 °F (36 7 °C) (06/05/18 1430)  Temp Source: Oral (06/05/18 1430)  Respirations: 17 (06/05/18 1515)  Height: 5' 6" (167 6 cm) (06/05/18 1500)  Weight - Scale: 110 kg (242 lb 8 1 oz) (06/05/18 1500)  SpO2: 96 % (06/05/18 1515)    Physical Exam   Constitutional: He appears well-developed and well-nourished  No distress  Patient is in no acute distress lying in his hospital bed resting comfortably   HENT:   Head: Normocephalic and atraumatic  Eyes: Conjunctivae are normal  No scleral icterus  Cardiovascular: Normal rate, regular rhythm and intact distal pulses  Pulmonary/Chest: Effort normal and breath sounds normal  No respiratory distress  He has no wheezes  He has no rales  Abdominal: Soft  Bowel sounds are normal  He exhibits no distension  There is no tenderness  There is no rebound  Musculoskeletal: He exhibits no edema  Neurological: He is alert  Skin: Skin is warm and dry  He is not diaphoretic  No erythema  Psychiatric: He has a normal mood and affect  Vitals reviewed  Additional Data:     Lab Results: I have personally reviewed pertinent reports  Results from last 7 days  Lab Units 06/05/18  1027   WBC Thousand/uL 8 39   HEMOGLOBIN g/dL 16 7   HEMATOCRIT % 50 4*   PLATELETS Thousands/uL 186   NEUTROS PCT % 72   LYMPHS PCT % 19   MONOS PCT % 6   EOS PCT % 1       Results from last 7 days  Lab Units 06/05/18  1027   SODIUM mmol/L 142   POTASSIUM mmol/L 3 5   CHLORIDE mmol/L 103   CO2 mmol/L 26   BUN mg/dL 13   CREATININE mg/dL 0 90   CALCIUM mg/dL 9 5   TOTAL PROTEIN g/dL 7 9   BILIRUBIN TOTAL mg/dL 0 50   ALK PHOS U/L 60   ALT U/L 42   AST U/L 17   GLUCOSE RANDOM mg/dL 101                   Imaging: I have personally reviewed pertinent reports        X-ray chest 2 views   ED Interpretation by Jordon Tracy DO (06/05 1104)   No acute abnormality      Final Result by Mary Fregoso MD (06/05 1124)      No acute cardiopulmonary disease  Workstation performed: [id number]             EKG, Pathology, and Other Studies Reviewed on Admission:   · Chest x-ray results reviewed    Allscripts / Epic Records Reviewed: Yes     ** Please Note: This note has been constructed using a voice recognition system   **

## 2018-06-05 NOTE — ASSESSMENT & PLAN NOTE
· Patient with complaints of left-sided chest pain during yard work today  · Chest x-ray with no evidence of acute cardiopulmonary disease  · Has been admitted for chest pain in the past, unremarkable echo and stress test performed in February of this year  · Patient underwent cardiac catheterization today, no significant CAD noted  · Troponin negative  · EKG normal sinus rhythm  · Cardiology following  · Recommending patient's symptoms could be related to microvascular disease or coronary spasm  · Admit the patient for continues cardiac monitoring overnight and add Imdur 30 mg daily  · Monitor symptoms and telemetry

## 2018-06-05 NOTE — ED PROVIDER NOTES
History  Chief Complaint   Patient presents with    Chest Pain     Pt presents to ER with c/o's dizziness with chest pressure that started today while mowing lawn  Healthy appearing adult presents in no distress  52-year-old male history of hypertension hyperlipidemia obesity previous tobacco use presenting for evaluation of chest pain  Patient reports that he woke up this morning in his usual health he went to mow the lawn in approximately 20 min into mowing he developed a tightness across his entire chest state radiated to his left shoulder is described as uncomfortable pressure he states that he felt nauseous with this and had a stop improved after approximately 20 min however but is intermittent and persistent since that time again described as a mild uncomfortable pressure across the top of his chest   Patient is here with his wife for further evaluation he was told in the past this was muscle spasm he was hospitalized at an outside hospital approximately a year ago, he was hospitalized earlier this year for a stress test which was likely normal but limited secondary to motion  Patient otherwise denies recent fevers headache cough production hemoptysis shortness of breath diaphoresis abdominal pain flank or back pain change in bowels or bladder joint pain swelling rashes or other associated symptoms denies signs symptoms risk factors for DVT or PE            Prior to Admission Medications   Prescriptions Last Dose Informant Patient Reported?  Taking?   meclizine (ANTIVERT) 25 mg tablet  Self No Yes   Sig: Take 1 tablet (25 mg total) by mouth 3 (three) times a day as needed for dizziness   rosuvastatin (CRESTOR) 5 mg tablet  Self No Yes   Sig: Take 1 tablet (5 mg total) by mouth daily   valsartan (DIOVAN) 320 MG tablet  Self No Yes   Sig: Take 1 tablet (320 mg total) by mouth daily      Facility-Administered Medications: None       Past Medical History:   Diagnosis Date    Hypercholesterolemia     Hypertension     Obesity     Sexual dysfunction     Psychosexual dysfunction with inhibited sexual excitement        Past Surgical History:   Procedure Laterality Date    BACK SURGERY      spinal fusion    SEPTOPLASTY         Family History   Problem Relation Age of Onset    Atrial fibrillation Mother     COPD Mother     Coronary artery disease Mother     Lung cancer Mother     Rheum arthritis Mother     Stroke Mother      Syndrome    Lung cancer Father     Prostate cancer Maternal Grandfather      I have reviewed and agree with the history as documented  Social History   Substance Use Topics    Smoking status: Former Smoker     Quit date: 2/25/1998    Smokeless tobacco: Current User     Types: Chew    Alcohol use Yes      Comment: Occ alcohol use per [employer]         Review of Systems   Constitutional: Negative for chills and fever  HENT: Negative for rhinorrhea and sore throat  Eyes: Negative for photophobia and pain  Respiratory: Positive for chest tightness  Negative for cough and shortness of breath  Cardiovascular: Positive for chest pain  Negative for palpitations  Gastrointestinal: Positive for nausea  Negative for abdominal pain, diarrhea and vomiting  Endocrine: Negative for polydipsia and polyphagia  Genitourinary: Negative for dysuria, frequency and urgency  Musculoskeletal: Negative for arthralgias, back pain and myalgias  Skin: Negative for color change and rash  Neurological: Positive for dizziness  Negative for weakness and headaches  Hematological: Negative for adenopathy  Does not bruise/bleed easily  Psychiatric/Behavioral: Negative for agitation and behavioral problems  All other systems reviewed and are negative  Physical Exam  Physical Exam   Constitutional: He is oriented to person, place, and time  He appears well-developed and well-nourished  No distress  Well-appearing in no acute distress   HENT:   Head: Normocephalic and atraumatic  Eyes: EOM are normal  Pupils are equal, round, and reactive to light  Neck: Normal range of motion  Neck supple  No tracheal deviation present  Cardiovascular: Normal rate, regular rhythm and normal heart sounds  Exam reveals no gallop and no friction rub  No murmur heard  Pulmonary/Chest: Effort normal and breath sounds normal  He has no wheezes  He has no rales  Abdominal: Soft  Bowel sounds are normal  He exhibits no distension  There is no tenderness  There is no rebound and no guarding  Musculoskeletal: Normal range of motion  He exhibits no edema or tenderness  Neurological: He is alert and oriented to person, place, and time  No cranial nerve deficit  He exhibits normal muscle tone  Coordination normal    Skin: Skin is warm and dry  No rash noted  Psychiatric: He has a normal mood and affect  His behavior is normal    Nursing note and vitals reviewed        Vital Signs  ED Triage Vitals   Temperature Pulse Respirations Blood Pressure SpO2   06/05/18 1022 06/05/18 1022 06/05/18 1022 06/05/18 1022 06/05/18 1022   98 1 °F (36 7 °C) 67 20 167/83 100 %      Temp Source Heart Rate Source Patient Position - Orthostatic VS BP Location FiO2 (%)   06/05/18 1022 06/05/18 1022 06/05/18 1022 06/05/18 1022 --   Oral Monitor Lying Right arm       Pain Score       06/05/18 1131       No Pain           Vitals:    06/05/18 1654 06/05/18 1715 06/05/18 1815 06/05/18 1915   BP: 115/65 117/69 121/75 120/67   Pulse:  64 64 70   Patient Position - Orthostatic VS:  Lying Lying Lying       Visual Acuity      ED Medications  Medications   isosorbide mononitrate (IMDUR) 24 hr tablet 30 mg (30 mg Oral Given 6/5/18 1446)   pravastatin (PRAVACHOL) tablet 40 mg (40 mg Oral Given 6/5/18 1643)   valsartan (DIOVAN) tablet 320 mg (320 mg Oral Given 6/5/18 1643)   meclizine (ANTIVERT) tablet 25 mg (not administered)   acetaminophen (TYLENOL) tablet 650 mg (not administered)   sodium chloride 0 9 % infusion (50 mL/hr Intravenous New Bag 6/5/18 1644)   aspirin chewable tablet 324 mg (324 mg Oral Given 6/5/18 1138)   lidocaine (XYLOCAINE) 1 % injection (8 mL Infiltration Given 6/5/18 1246)   fentanyl citrate (PF) 100 MCG/2ML (50 mcg Intravenous Given 6/5/18 1242)   midazolam (VERSED) injection (2 mg Intravenous Given 6/5/18 1240)   nitroGLYcerin (TRIDIL) 50 mg in 250 mL infusion (premix) (200 mcg Intra-arterial Given 6/5/18 1243)   verapamil (ISOPTIN) injection (2 5 mg Intra-arterial Given 6/5/18 1243)   iohexol (OMNIPAQUE) 350 MG/ML injection (SINGLE-DOSE) (50 mL Intravenous Given 6/5/18 1305)       Diagnostic Studies  Results Reviewed     Procedure Component Value Units Date/Time    Troponin I [[id number]]  (Normal) Collected:  06/05/18 1027    Lab Status:  Final result Specimen:  Blood from Arm, Left Updated:  06/05/18 1059     Troponin I <0 02 ng/mL     Narrative:         [employer] Chemistry analyzer 99% cutoff is > 0 04 ng/mL in network labs    o cTnI 99% cutoff is useful only when applied to patients in the clinical setting of myocardial ischemia  o cTnI 99% cutoff should be interpreted in the context of clinical history, ECG findings and possibly cardiac imaging to establish correct diagnosis  o cTnI 99% cutoff may be suggestive but clearly not indicative of a coronary event without the clinical setting of myocardial ischemia      Comprehensive metabolic panel [[id number]] Collected:  06/05/18 1027    Lab Status:  Final result Specimen:  Blood from Arm, Left Updated:  06/05/18 1054     Sodium 142 mmol/L      Potassium 3 5 mmol/L      Chloride 103 mmol/L      CO2 26 mmol/L      Anion Gap 13 mmol/L      BUN 13 mg/dL      Creatinine 0 90 mg/dL      Glucose 101 mg/dL      Calcium 9 5 mg/dL      AST 17 U/L      ALT 42 U/L      Alkaline Phosphatase 60 U/L      Total Protein 7 9 g/dL      Albumin 4 5 g/dL      Total Bilirubin 0 50 mg/dL      eGFR 98 ml/min/1 73sq m     Narrative:         National Kidney Disease Education Program recommendations are as follows:  GFR calculation is accurate only with a steady state creatinine  Chronic Kidney disease less than 60 ml/min/1 73 sq  meters  Kidney failure less than 15 ml/min/1 73 sq  meters  CBC and differential [[id number]]  (Abnormal) Collected:  06/05/18 1027    Lab Status:  Final result Specimen:  Blood from Arm, Left Updated:  06/05/18 1036     WBC 8 39 Thousand/uL      RBC 5 96 (H) Million/uL      Hemoglobin 16 7 g/dL      Hematocrit 50 4 (H) %      MCV 85 fL      MCH 28 0 pg      MCHC 33 1 g/dL      RDW 12 3 %      MPV 8 5 (L) fL      Platelets 795 Thousands/uL      nRBC 0 /100 WBCs      Neutrophils Relative 72 %      Immat GRANS % 1 %      Lymphocytes Relative 19 %      Monocytes Relative 6 %      Eosinophils Relative 1 %      Basophils Relative 1 %      Neutrophils Absolute 6 14 Thousands/µL      Immature Grans Absolute 0 04 Thousand/uL      Lymphocytes Absolute 1 56 Thousands/µL      Monocytes Absolute 0 51 Thousand/µL      Eosinophils Absolute 0 10 Thousand/µL      Basophils Absolute 0 04 Thousands/µL                  X-ray chest 2 views   ED Interpretation by Alfredo Garzon DO (06/05 1104)   No acute abnormality      Final Result by Jaguar Johnson MD (06/05 1124)      No acute cardiopulmonary disease  Workstation performed: [id number]                    Procedures  Procedures       Phone Contacts  ED Phone Contact    ED Course  ED Course as of Jun 05 2310 Tue Jun 05, 2018   1029 EKG reviewed normal sinus rhythm 66 beats per minute normal access normal intervals nonspecific T-wave abnormality unchanged from EKG April 2018    1032 2/26 IMPRESSIONS: Normal study after maximal exercise  There was a moderate to large myocardial perfusion defect of the basal-mid inferior and inferoseptal wall and apical cap region which significantly improved on the supine stress images and  was not seen on the prone stress images indicating that it was likely a diaphragmatic attenuation artifact   Left ventricular systolic function was normal     1317 Patient to cardiac cath, clean coronaries skin concerning story for anginal symptoms, discussed with Cardiology concern for possible vasospasm in the setting of clean coronary arteries, request observe, will just medications, better plan for discharge, discussed with Medicine, agreeable                                MDM  Number of Diagnoses or Management Options  Chest pain:   Diagnosis management comments: 60-year-old male hypertension hyperlipidemia previous smoker, seen evaluated approximately 4 times in the last year for chest discomfort, escalating, was mowing the lawn had exertional central chest pressure tightness and pain in his central chest radiated to his left shoulder, associated with nausea, improved with rest, mildly persistent and ongoing since that time patient reports similar symptoms over the last year but again worsening, he has no risk factors signs of symptoms of DVT or PE no other risk factors for ACS no other personal cardiac history previous stress tests and hospitalizations and consult reviewed, 1st nurse cardiac labs EKG chest x-ray reviewed no acute abnormality given the patient is concerning story, age, risk factors, recent stress test will discuss with Cardiology for further evaluation and management, disposition pending further evaluation reassessment    CritCare Time    Disposition  Final diagnoses:   Chest pain     Time reflects when diagnosis was documented in both MDM as applicable and the Disposition within this note     Time User Action Codes Description Comment    6/5/2018 11:10 AM Parisa Dejesus Add [R07 9] Chest pain     6/5/2018  3:30 PM Rosetta Sumter Modify [R07 9] Chest pain       ED Disposition     ED Disposition Condition Comment    Admit  Case was discussed with Ted Landau and the patient's admission status was agreed to be Admission Status: observation status to the service of Dr Olya Price None         Current Discharge Medication List      CONTINUE these medications which have NOT CHANGED    Details   meclizine (ANTIVERT) 25 mg tablet Take 1 tablet (25 mg total) by mouth 3 (three) times a day as needed for dizziness  Qty: 30 tablet, Refills: 0    Associated Diagnoses: Vertigo      rosuvastatin (CRESTOR) 5 mg tablet Take 1 tablet (5 mg total) by mouth daily  Qty: 30 tablet, Refills: 11    Associated Diagnoses: Hypercholesterolemia      valsartan (DIOVAN) 320 MG tablet Take 1 tablet (320 mg total) by mouth daily  Qty: 30 tablet, Refills: 11    Associated Diagnoses: Essential hypertension           No discharge procedures on file      ED Provider  Electronically Signed by           Alfredo Garzon DO  06/05/18 6390

## 2018-06-05 NOTE — CONSULTS
Consultation - Cardiology Team One  Sallie Fleischer 47 y o  male MRN: [id number]  Unit/Bed#: ED 17 Encounter: [id number]    Inpatient consult to Cardiology  Consult performed by: Evelio All  Consult ordered by: Ronald Murrell          Physician Requesting Consult: Josi Daniels DO  Reason for Consult / Principal Problem: Chest pain    HPI: Cardiologist Dr Jang Angry is a 47y o  year old male who has a history of HTN, HLD, obesity, chewing tobacco use presenting with chest pain  Patient states that he had substernal chest tightness while mowing lawn, and another episode shortly after  Chest pain radiated to the jaw, posterior neck, and back  Symptoms accompanied by lightheadedness  Denies SOB, palpitations, leg swelling, syncope  Has been having exertional chest pain episodes for the last 2 years, and pain has been getting progressively worse  Had unremarkable ECHO and stress test in February 2018  No prior cardiac history  No family history of CAD  Quit smoking 20 years ago, but continues to occasionally chew tobacco       REVIEW OF SYSTEMS:  Constitutional:  Denies fever or chills   Eyes:  Denies change in visual acuity   HENT:  Denies nasal congestion or sore throat   Respiratory:  Denies cough or shortness of breath   Cardiovascular:  +chest pain  Denies  edema   GI:  Denies abdominal pain, nausea, vomiting, bloody stools or diarrhea   :  Denies dysuria, frequency, difficulty in micturition and nocturia  Musculoskeletal:  Denies back pain or joint pain   Neurologic:  +lightheadedness   Denies headache, focal weakness or sensory changes   Endocrine:  Denies polyuria or polydipsia   Lymphatic:  Denies swollen glands   Psychiatric:  Denies depression or anxiety     Historical Information   Past Medical History:   Diagnosis Date    Hypercholesterolemia     Hypertension     Obesity     Sexual dysfunction     Psychosexual dysfunction with inhibited sexual excitement      Past Surgical History:   Procedure Laterality Date    BACK SURGERY      spinal fusion    SEPTOPLASTY       History   Alcohol Use    Yes     Comment: Occ alcohol use per [employer]      History   Drug Use No     History   Smoking Status    Former Smoker    Quit date: 2/25/1998   Smokeless Tobacco    Current User    Types: Chew       Family History:   Family History   Problem Relation Age of Onset    Atrial fibrillation Mother     COPD Mother     Coronary artery disease Mother     Lung cancer Mother     Rheum arthritis Mother     Stroke Mother      Syndrome    Lung cancer Father     Prostate cancer Maternal Grandfather        MEDS & ALLERGIES:  all current active meds have been reviewed and current meds: Current Facility-Administered Medications   Medication Dose Route Frequency    aspirin chewable tablet 324 mg  324 mg Oral Once        No Known Allergies    OBJECTIVE:  Vitals:   Vitals:    06/05/18 1022   BP: 167/83   Pulse: 67   Resp: 20   Temp: 98 1 °F (36 7 °C)   SpO2: 100%     Body mass index is 39 22 kg/m²  Systolic (46SLC), FOH:385 , Min:167 , IUP:690     Diastolic (82MRQ), STEPHANIE:17, Min:83, Max:83    No intake or output data in the 24 hours ending 06/05/18 1115  Weight (last 2 days)     Date/Time   Weight    06/05/18 1022  110 (243)            Invasive Devices     Peripheral Intravenous Line            Peripheral IV 06/05/18 Left Antecubital less than 1 day                PHYSICAL EXAMS:  General:  Patient is not in acute distress, laying in the bed comfortably, awake, alert responding to commands  Head: Normocephalic, Atraumatic  HEENT: White sclera, pink conjunctiva,  PERRLA,pharynx benign  Neck:  Supple, no neck vein distention, carotids+2/+2 no bruits, thyromegaly, adenopathy  Respiratory: clear to P/A  Cardiovascular:  PMI normal, S1-S2 normal, No  Murmurs, thrills, gallops, rubs   Regular rhythm  GI:  Abdomen soft nontender   No hepatosplenomegaly, adenopathy, ascites,or rebound tenderness  Extremities: No edema, normal pulses, no calf tenderness, no joint deformities, no venous disease   Integument:  No skin rashes or ulceration  Lymphatic:  No cervical or inguinal lymphadenopathy  Neurologic:  Patient is awake alert, responding to command, well-oriented to time and place and person moving all extremities    LABORATORY RESULTS:    Results from last 7 days  Lab Units 06/05/18  1027   TROPONIN I ng/mL <0 02     CBC with diff:   Results from last 7 days  Lab Units 06/05/18  1027   WBC Thousand/uL 8 39   HEMOGLOBIN g/dL 16 7   HEMATOCRIT % 50 4*   MCV fL 85   PLATELETS Thousands/uL 186   MCH pg 28 0   MCHC g/dL 33 1   RDW % 12 3   MPV fL 8 5*   NRBC AUTO /100 WBCs 0       CMP:  Results from last 7 days  Lab Units 06/05/18  1027   SODIUM mmol/L 142   POTASSIUM mmol/L 3 5   CHLORIDE mmol/L 103   CO2 mmol/L 26   ANION GAP mmol/L 13   BUN mg/dL 13   CREATININE mg/dL 0 90   GLUCOSE RANDOM mg/dL 101   CALCIUM mg/dL 9 5   AST U/L 17   ALT U/L 42   ALK PHOS U/L 60   TOTAL PROTEIN g/dL 7 9   BILIRUBIN TOTAL mg/dL 0 50   EGFR ml/min/1 73sq m 98       BMP:  Results from last 7 days  Lab Units 06/05/18  1027   SODIUM mmol/L 142   POTASSIUM mmol/L 3 5   CHLORIDE mmol/L 103   CO2 mmol/L 26   BUN mg/dL 13   CREATININE mg/dL 0 90   GLUCOSE RANDOM mg/dL 101   CALCIUM mg/dL 9 5                              Lipid Profile:   Lab Results   Component Value Date    CHOL 187 02/26/2018    CHOL 136 08/22/2016    CHOL 170 03/23/2016     Lab Results   Component Value Date    HDL 43 02/26/2018    HDL 41 08/22/2016    HDL 51 03/23/2016     Lab Results   Component Value Date    LDLCALC 130 (H) 02/26/2018    LDLCALC 79 08/22/2016    LDLCALC 103 (H) 03/23/2016     Lab Results   Component Value Date    TRIG 71 02/26/2018    TRIG 81 08/22/2016    TRIG 78 03/23/2016       Cardiac testing:   Results for orders placed during the hospital encounter of 02/25/18   Echo complete with contrast if indicated    Narrative [facility] [place], [place] [address]  [phone]    Transthoracic Echocardiogram  2D, M-mode, Doppler, and Color Doppler    Study date:  2018    Patient: Marlon Emmanuel  MR number: [id number]  Account number: [de-identified]  : 1965  Age: 47 years  Gender: Male  Status: Outpatient  Location: Bedside  Height: 66 in  Weight: 251 lb  BP: 153/ 96 mmHg    Indications: Chest pain, pressure nad tightness, Assess left ventricular function  Diagnoses: R07 9 - Chest pain, unspecified    Sonographer:   [facility] [address] [place]  Interpreting Physician:  Nisa Reynoso MD  Referring Physician:  Patience Morales MD  Group:  [facility]    SUMMARY    PROCEDURE INFORMATION:  This was a technically difficult study  Echocardiographic views were limited due to poor acoustic window availability, decreased penetration, and lung interference  LEFT VENTRICLE:  Systolic function was normal  Ejection fraction was estimated to be 60 %  There were no regional wall motion abnormalities  Left ventricular diastolic function parameters were normal     RIGHT VENTRICLE:  The size was normal   Systolic function was normal     MITRAL VALVE:  There was trace regurgitation  TRICUSPID VALVE:  There was trace regurgitation  Pulmonary artery systolic pressure was within the normal range  HISTORY: Symptoms: chest pain  PRIOR HISTORY: Risk factors: hypertension, hypercholesterolemia, and morbid obesity  PROCEDURE: The procedure was performed at the bedside  This was a routine study  The transthoracic approach was used  The study included complete 2D imaging, M-mode, complete spectral Doppler, and color Doppler  The heart rate was 69 bpm,  at the start of the study  Images were obtained from the parasternal, apical, subcostal, and suprasternal notch acoustic windows   Echocardiographic views were limited due to poor acoustic window availability, decreased penetration, and  lung interference  This was a technically difficult study  LEFT VENTRICLE: Size was normal  Systolic function was normal  Ejection fraction was estimated to be 60 %  There were no regional wall motion abnormalities  Wall thickness was normal  No evidence of apical thrombus  DOPPLER: Left  ventricular diastolic function parameters were normal     RIGHT VENTRICLE: The size was normal  Systolic function was normal  Wall thickness was normal     LEFT ATRIUM: Size was normal     RIGHT ATRIUM: Size was normal     MITRAL VALVE: Valve structure was normal  There was normal leaflet separation  DOPPLER: The transmitral velocity was within the normal range  There was no evidence for stenosis  There was trace regurgitation  AORTIC VALVE: The valve was trileaflet  Leaflets exhibited mildly increased thickness and normal cuspal separation  DOPPLER: Transaortic velocity was within the normal range  There was no evidence for stenosis  There was no significant  regurgitation  TRICUSPID VALVE: The valve structure was normal  There was normal leaflet separation  DOPPLER: The transtricuspid velocity was within the normal range  There was no evidence for stenosis  There was trace regurgitation  Pulmonary artery  systolic pressure was within the normal range  PULMONIC VALVE: Leaflets exhibited normal thickness, no calcification, and normal cuspal separation  DOPPLER: The transpulmonic velocity was within the normal range  There was no significant regurgitation  PERICARDIUM: There was no pericardial effusion  The pericardium was normal in appearance  AORTA: The root exhibited normal size  SYSTEMIC VEINS: IVC: The inferior vena cava was normal in size      SYSTEM MEASUREMENT TABLES    2D  %FS: 36 6 %  Ao Diam: 2 9 cm  EDV(Teich): 93 6 ml  EF(Teich): 66 5 %  ESV(Teich): 31 3 ml  IVSd: 1 cm  LA Area: 18 1 cm2  LA Diam: 4 cm  LVEDV MOD A4C: 130 5 ml  LVEF MOD A4C: 65 5 %  LVESV MOD A4C: 45 ml  LVIDd: 4 5 cm  LVIDs: 2 9 cm  LVLd A4C: 9 cm  LVLs A4C: 6 7 cm  LVPWd: 0 9 cm  RA Area: 19 3 cm2  RVIDd: 3 3 cm  SV MOD A4C: 85 5 ml  SV(Teich): 62 2 ml    CW  TR Vmax: 2 3 m/s  TR maxP 8 mmHg    MM  TAPSE: 2 3 cm    PW  E': 0 1 m/s  E/E': 8  MV A Tono: 0 8 m/s  MV Dec [place]: 3 6 m/s2  MV DecT: 227 5 ms  MV E Tono: 0 8 m/s  MV E/A Ratio: 1  MV PHT: 66 ms  MVA By PHT: 3 3 cm2    [facility] Accredited [facility]chocardiography Laboratory    Prepared and electronically signed by    Wai Florian MD  Signed [id number] 21:17:05         No procedure found  Results for orders placed during the hospital encounter of 18   NM myocardial perfusion spect (stress and/or rest)    [address] [phone]    Rest/Stress Gated SPECT Myocardial Perfusion Imaging After Exercise    Patient: Alvina Covarrubias  MR number: [id number]  Account number: [de-identified]  : 1965  Age: 47 years  Gender: Male  Status: Outpatient  Location: Stress lab  Height: 66 in  Weight: 250 lb  BP: 118/ 80 mmHg    Allergies: NO KNOWN ALLERGIES    Diagnosis: R07 9 - Chest pain, unspecified    Primary Physician:  Rohan Oliva MD  RN:  Luisa Leon  Interpreting Physician:  Wai Florian MD  Referring Physician:  Rowena Ortiz PA-C  Group:  [facility]  Report Prepared By[de-identified]  Jeffry Villa    INDICATIONS: Detection of coronary artery disease  HISTORY: The patient is a 47year old  male  Chest pain status: recent onset chest pain, radiation to left arm with numbness and tingling  Coronary artery disease risk factors: dyslipidemia, hypertension, and smoking  Medications:  an ARB, a diuretic, aspirin, and a lipid lowering agent  PHYSICAL EXAM: Baseline physical exam screening: no wheezes audible, no loud murmur  REST ECG: Normal baseline ECG  PROCEDURE: The study was performed in the stress lab   Treadmill exercise testing was performed, using the Harrison protocol  Gated SPECT myocardial perfusion imaging was performed after stress and at rest  Systolic blood pressure was 118  mmHg, at the start of the study  Diastolic blood pressure was 80 mmHg, at the start of the study  The heart rate was 75 bpm, at the start of the study  Patient was not experiencing chest pain at the time of the injection of the  radiopharmaceutical  IV double checked  HARRISON PROTOCOL:  HR bpm SBP mmHg DBP mmHg Symptoms ST change Rhythm/conduct  Baseline 75 118 80 none none NSR, no ectopy  Stage 1 97 140 80 none -- --  Stage 2 117 144 82 none -- --  Stage 3 148 160 80 mild dyspnea, mild fatigue -- --  Recovery 1 78 178 78 subsiding -- --  Recovery 2 102 134 80 none -- --  No medications or fluids given  STRESS SUMMARY: Duration of exercise was 9 min  The patient exercised to protocol stage 3  Maximal work rate was 10 4 METs  Functional capacity was normal  Maximal heart rate during stress was 151 bpm ( 90 % of maximal predicted heart  rate)  The heart rate response to stress was normal  There was normal resting blood pressure with an appropriate response to stress  The rate-pressure product for the peak heart rate and blood pressure was 30094  There was no chest pain  during stress  The stress test was terminated due to achievement of maximal (symptom limited) exercise, mild dyspnea, and mild fatigue  Pre oxygen saturation: 98 %  Peak oxygen saturation: 98 %  The stress ECG was negative for ischemia and  normal  Arrhythmia during stress: isolated atrial premature beats and isolated premature ventricular beats  ISOTOPE ADMINISTRATION:  Resting isotope administration Stress isotope administration  Agent Sestamibi Sestamibi  Dose 11 mCi 33 mCi  Date 02/26/2018 02/26/2018  Injection-image interval 30 min 30 min    The radiopharmaceutical was injected one minute before the end of exercise  The radiopharmaceutical was injected at the peak effect of pharmacologic stress      MYOCARDIAL PERFUSION IMAGING:  The image quality was fair  Left ventricular size was normal     PERFUSION DEFECTS:  -  There was a moderate to large myocardial perfusion defect of the basal-mid inferior and inferoseptal wall and apical cap region which significantly improved on the supine stress images and was not seen on the prone stress images  indicating that it was likely a diaphragmatic attenuation artifact  GATED SPECT:  The calculated left ventricular ejection fraction was 61 %  Left ventricular ejection fraction was within normal limits by visual estimate  There was no diagnostic evidence for left ventricular regional abnormality  SUMMARY:  -  Stress results: Duration of exercise was 9 min  Target heart rate was achieved  There was no chest pain during stress  -  ECG conclusions: The stress ECG was negative for ischemia and normal  Arrhythmia during stress: isolated atrial premature beats and isolated premature ventricular beats  -  Perfusion imaging: There was a moderate to large myocardial perfusion defect of the basal-mid inferior and inferoseptal wall and apical cap region which significantly improved on the supine stress images and was not seen on the prone  stress images indicating that it was likely a diaphragmatic attenuation artifact   -  Gated SPECT: The calculated left ventricular ejection fraction was 61 %  Left ventricular ejection fraction was within normal limits by visual estimate  There was no diagnostic evidence for left ventricular regional abnormality  IMPRESSIONS: Normal study after maximal exercise  There was a moderate to large myocardial perfusion defect of the basal-mid inferior and inferoseptal wall and apical cap region which significantly improved on the supine stress images and  was not seen on the prone stress images indicating that it was likely a diaphragmatic attenuation artifact   Left ventricular systolic function was normal     Prepared and signed by    Aranza Franz MD  Signed 02/26/2018 16:49:00           Imaging:   I have personally reviewed pertinent reports  EKG reviewed personally:  NSR    Assessment/Plan:  1  Chest pain:    -Initial troponin negative, continue to trend  EKG unremarkable  -CXR unremarkable  -Echo and stress test done in February 2018 unremarkable  -chest pain concerning for angina  Will proceed with cardiac catheterization  Procedure of cardiac catheterization explained  Risks and benefits discussed  Adverse outcomes include 1% risk of bleeding infection, stroke, myocardial infarction, kidney injury  All questions answered, informed consent obtained  No dye allergy  NPO starting now  2   Hyperlipidemia:  Last lipid panel shows   Resume home statin  3   Hypertension:  Stable, continue present regimen  4   Tobacco use:  Patient continues to chew tobacco     Code Status: Prior    Counseling / Coordination of Care  Total floor / unit time spent today 35  minutes  Greater than 50% of total time was spent with the patient and / or family counseling and / or coordination of care  A description of the counseling / coordination of care: Review of history, current assessment, development of a plan      Jr Woods PA-C  6/5/2018,11:15 AM

## 2018-06-05 NOTE — ASSESSMENT & PLAN NOTE
· Pt reports that he does not smoke cigarettes anymore but at times does use chewing tobacco   · Encourage cessation

## 2018-06-06 VITALS
SYSTOLIC BLOOD PRESSURE: 116 MMHG | HEIGHT: 66 IN | WEIGHT: 242.51 LBS | OXYGEN SATURATION: 97 % | RESPIRATION RATE: 18 BRPM | BODY MASS INDEX: 38.97 KG/M2 | TEMPERATURE: 97.9 F | HEART RATE: 69 BPM | DIASTOLIC BLOOD PRESSURE: 66 MMHG

## 2018-06-06 PROBLEM — K21.9 GASTROESOPHAGEAL REFLUX DISEASE WITHOUT ESOPHAGITIS: Status: ACTIVE | Noted: 2018-06-06

## 2018-06-06 LAB
ERYTHROCYTE [DISTWIDTH] IN BLOOD BY AUTOMATED COUNT: 12.6 % (ref 11.6–15.1)
HCT VFR BLD AUTO: 42.7 % (ref 36.5–49.3)
HGB BLD-MCNC: 13.8 G/DL (ref 12–17)
MCH RBC QN AUTO: 28 PG (ref 26.8–34.3)
MCHC RBC AUTO-ENTMCNC: 32.3 G/DL (ref 31.4–37.4)
MCV RBC AUTO: 87 FL (ref 82–98)
PLATELET # BLD AUTO: 147 THOUSANDS/UL (ref 149–390)
PMV BLD AUTO: 8.7 FL (ref 8.9–12.7)
RBC # BLD AUTO: 4.93 MILLION/UL (ref 3.88–5.62)
WBC # BLD AUTO: 8.11 THOUSAND/UL (ref 4.31–10.16)

## 2018-06-06 PROCEDURE — 85027 COMPLETE CBC AUTOMATED: CPT | Performed by: PHYSICIAN ASSISTANT

## 2018-06-06 PROCEDURE — 99213 OFFICE O/P EST LOW 20 MIN: CPT | Performed by: INTERNAL MEDICINE

## 2018-06-06 PROCEDURE — 99217 PR OBSERVATION CARE DISCHARGE MANAGEMENT: CPT | Performed by: INTERNAL MEDICINE

## 2018-06-06 RX ORDER — PANTOPRAZOLE SODIUM 40 MG/1
40 TABLET, DELAYED RELEASE ORAL
Status: DISCONTINUED | OUTPATIENT
Start: 2018-06-06 | End: 2018-06-06 | Stop reason: HOSPADM

## 2018-06-06 RX ORDER — CALCIUM CARBONATE 200(500)MG
500 TABLET,CHEWABLE ORAL 3 TIMES DAILY PRN
Status: DISCONTINUED | OUTPATIENT
Start: 2018-06-06 | End: 2018-06-06 | Stop reason: HOSPADM

## 2018-06-06 RX ORDER — ISOSORBIDE MONONITRATE 30 MG/1
30 TABLET, EXTENDED RELEASE ORAL DAILY
Qty: 30 TABLET | Refills: 2 | Status: ON HOLD | OUTPATIENT
Start: 2018-06-07 | End: 2018-06-14 | Stop reason: SINTOL

## 2018-06-06 RX ORDER — PANTOPRAZOLE SODIUM 40 MG/1
40 TABLET, DELAYED RELEASE ORAL
Qty: 60 TABLET | Refills: 2 | Status: SHIPPED | OUTPATIENT
Start: 2018-06-06 | End: 2018-12-27 | Stop reason: CLARIF

## 2018-06-06 RX ORDER — ASPIRIN 81 MG/1
81 TABLET, CHEWABLE ORAL DAILY
Refills: 0
Start: 2018-06-06

## 2018-06-06 RX ADMIN — ACETAMINOPHEN 650 MG: 325 TABLET ORAL at 00:06

## 2018-06-06 RX ADMIN — SODIUM CHLORIDE 50 ML/HR: 0.9 INJECTION, SOLUTION INTRAVENOUS at 04:48

## 2018-06-06 RX ADMIN — ISOSORBIDE MONONITRATE 30 MG: 30 TABLET, EXTENDED RELEASE ORAL at 09:35

## 2018-06-06 RX ADMIN — PANTOPRAZOLE SODIUM 40 MG: 40 TABLET, DELAYED RELEASE ORAL at 10:39

## 2018-06-06 RX ADMIN — ACETAMINOPHEN 650 MG: 325 TABLET ORAL at 05:49

## 2018-06-06 RX ADMIN — ANTACID TABLETS 500 MG: 500 TABLET, CHEWABLE ORAL at 10:40

## 2018-06-06 RX ADMIN — VALSARTAN 320 MG: 160 TABLET, FILM COATED ORAL at 09:35

## 2018-06-06 NOTE — CASE MANAGEMENT
Initial Clinical Review    Admission: Date/Time/Statement: OBS    6/5   1320    Orders Placed This Encounter   Procedures    Place in Observation (expected length of stay for this patient is less than two midnights)     Standing Status:   Standing     Number of Occurrences:   1     Order Specific Question:   Admitting Physician     Answer:   Smith Huff [[id number]]     Order Specific Question:   Level of Care     Answer:   Med Surg [16]         ED: Date/Time/Mode of Arrival:   ED Arrival Information     Expected Arrival Acuity Means of Arrival Escorted By Service Admission Type    - 6/5/2018 10:12 Emergent Walk-In Spouse General Medicine Emergency    Arrival Complaint    ELEVATED BP/CP          Chief Complaint:   Chief Complaint   Patient presents with    Chest Pain     Pt presents to ER with c/o's dizziness with chest pressure that started today while mowing lawn  Healthy appearing adult presents in no distress  History of Illness:    Ana Sandoval is a 47 y o  male with significant past medical history of hypertension, hyperlipidemia, tobacco use who presents with chest pain x1 day  Pt reports that he woke up this morning and felt a little lightheaded but states he did drink a few beers last night which is abnormal for him  He reports that he when out to mow his lawn and shortly after developed chest pressure/tightness that was substernal and radiated to his left shoulder  Patient also reported nausea and states that he had some dry heaving but did not vomit  He reports this was the only episode of chest pain but it lasted for many minutes and he continues to have some discomfort in his left shoulder still  Patient reports that he was hospitalized before for chest pains and he had a stress test and echo performed that were within normal limits  He denies any previous history of any arrhythmias or cardiac events    Patient reports that he did take 1 meclizine due to some lightheadedness/dizziness that accompanied that episode  He denies these symptoms currently  ED Vital Signs:   ED Triage Vitals   Temperature Pulse Respirations Blood Pressure SpO2   06/05/18 1022 06/05/18 1022 06/05/18 1022 06/05/18 1022 06/05/18 1022   98 1 °F (36 7 °C) 67 20 167/83 100 %      Temp Source Heart Rate Source Patient Position - Orthostatic VS BP Location FiO2 (%)   06/05/18 1022 06/05/18 1022 06/05/18 1022 06/05/18 1022 --   Oral Monitor Lying Right arm       Pain Score       06/05/18 1131       No Pain        Wt Readings from Last 1 Encounters:   06/05/18 110 kg (242 lb 8 1 oz)       Vital Signs (abnormal): wnl     Abnormal Labs/Diagnostic Test Results:   H&H   16 7  50 4   CXR - wnl   EKG- NSR     ED Treatment:   Medication Administration from 06/05/2018 1012 to 06/05/2018 1430       Date/Time Order Dose Route Action Action by Comments     06/05/2018 1138 aspirin chewable tablet 324 mg 324 mg Oral Given Haven Truong RN      06/05/2018 1246 lidocaine (XYLOCAINE) 1 % injection 8 mL Infiltration Given Ricardo Nava MD RIGHT GROIN     06/05/2018 1241 lidocaine (XYLOCAINE) 1 % injection 1 mL Infiltration Given Ricardo Nava MD      06/05/2018 1242 fentanyl citrate (PF) 100 MCG/2ML 50 mcg Intravenous Given Angelo Enrique RN      06/05/2018 1240 fentanyl citrate (PF) 100 MCG/2ML 50 mcg Intravenous Given Angelo Enrique RN      06/05/2018 1240 midazolam (VERSED) injection 2 mg Intravenous Given Angelo Enrique RN      06/05/2018 1243 nitroGLYcerin (TRIDIL) 50 mg in 250 mL infusion (premix) 200 mcg Intra-arterial Given Angelo Enrique RN      06/05/2018 1243 verapamil (ISOPTIN) injection 2 5 mg Intra-arterial Given Angelo Enrique RN      06/05/2018 1305 iohexol (OMNIPAQUE) 350 MG/ML injection (SINGLE-DOSE) 50 mL Intravenous Given Ricardo Nava MD intra-arterial via [facility]          Past Medical/Surgical History:    Active Ambulatory Problems     Diagnosis Date Noted    Hypertension 02/25/2018    Hypercholesterolemia 02/25/2018    Morbid obesity due to excess calories ([facility] [address] ) 02/25/2018    Chewing tobacco nicotine dependence with nicotine-induced disorder 02/25/2018    Allergic rhinitis 01/10/2014    Lumbar radiculopathy 09/15/2015    Dehydration 03/27/2018    Vertigo 04/05/2018    Spondylosis of cervical region without myelopathy or radiculopathy 04/05/2018    Tobacco abuse 04/05/2018    Screening for malignant neoplasm of colon 05/08/2018    Irritant contact dermatitis due to other agents 05/08/2018    Screening for prostate cancer 05/08/2018    Screen for colon cancer 05/29/2018     Resolved Ambulatory Problems     Diagnosis Date Noted    No Resolved Ambulatory Problems     Past Medical History:   Diagnosis Date    Hypercholesterolemia     Hypertension     Obesity     Sexual dysfunction        Admitting Diagnosis: Chest pain [R07 9]    Age/Sex: 47 y o  male    Assessment/Plan:   * Chest pain   Assessment & Plan     · Patient with complaints of left-sided chest pain during yard work today  · Chest x-ray with no evidence of acute cardiopulmonary disease  · Has been admitted for chest pain in the past, unremarkable echo and stress test performed in February of this year  · Patient underwent cardiac catheterization today, no significant CAD noted  · Troponin negative  · EKG normal sinus rhythm  · Cardiology following  ? Recommending patient's symptoms could be related to microvascular disease or coronary spasm  ? Admit the patient for continues cardiac monitoring overnight and add Imdur 30 mg daily  ? Monitor symptoms and telemetry        Tobacco abuse   Assessment & Plan     · Pt reports that he does not smoke cigarettes anymore but at times does use chewing tobacco   · Encourage cessation         Vertigo   Assessment & Plan     · Patient with history of, states that he did have an episode of lightheadedness/dizziness today but is currently asymptomatic  · Continue p r n   Meclizine  · Will give gentle IVF · Monitor        Hypercholesterolemia   Assessment & Plan     · Continue statin        Hypertension   Assessment & Plan     · Most recent /80  · Seems well controlled here  · Continue valsartan 320 mg daily  · Monitor         VTE Prophylaxis: Patient low risk, ambulate patient    Code Status:  Level 1-full code  POLST: There is no POLST form on file for this patient (pre-hospital)  Discussion with family:  Discussed with patient at bedside   Anticipated Length of Stay:  Patient will be admitted on an Observation basis with an anticipated length of stay of  < 2 midnights  Justification for Hospital Stay:  Continuous cardiac monitoring of chest pain status post catheterization and trial of Imdur      Admission Orders:  Scheduled Meds:   Current Facility-Administered Medications:  acetaminophen 650 mg Oral Q6H PRN Milagros Alina, PA-C    isosorbide mononitrate 30 mg Oral Daily Nurys Vandana, PA-C    meclizine 25 mg Oral TID PRN Milagros Alina, PA-C    pravastatin 40 mg Oral Daily With Dinner Evelyne Nunez PA-C    sodium chloride 50 mL/hr Intravenous Continuous Milagros Alina, PA-C Last Rate: 50 mL/hr (06/06/18 0448)   valsartan 320 mg Oral Daily Evelyne MORENO Nunez PA-JENNY      Continuous Infusions:   sodium chloride 50 mL/hr Last Rate: 50 mL/hr (06/06/18 0448)     PRN Meds:   acetaminophen    meclizine     Neurovascular checks  Tele   EKG w / CP   Elevate HOB   Up and OOB as rakesh   amb pt   Cardiology consult   Cardiac cath   Serial trop all wnl   6/6 cbc   plt   147    Cardiology consult   6/5  Assessment/Plan:  1  Chest pain:    -Initial troponin negative, continue to trend  EKG unremarkable  -CXR unremarkable  -Echo and stress test done in February 2018 unremarkable  -chest pain concerning for angina  Will proceed with cardiac catheterization  Procedure of cardiac catheterization explained  Risks and benefits discussed    Adverse outcomes include 1% risk of bleeding infection, stroke, myocardial infarction, kidney injury  All questions answered, informed consent obtained  No dye allergy  NPO starting now    2   Hyperlipidemia:  Last lipid panel shows   Resume home statin    3   Hypertension:  Stable, continue present regimen    4   Tobacco use:  Patient continues to chew tobacco     Cardiac cath 6/6  CORONARY VESSELS:   --  Left main: Normal   --  LAD: Normal  DHRUV II flow  The vessel was normal sized  --  1st diagonal: Normal   --  Circumflex: Normal  DHRUV II flow  The vessel was normal sized  --  Ramus intermedius: Normal  The vessel was normal sized  --  RCA: Normal  DHRUV II flow  The vessel was large sized (dominant)    --  Right PDA: Normal   --  Right posterolateral segment: Normal

## 2018-06-06 NOTE — PROGRESS NOTES
General Cardiology   Progress Note   Ana Sandoval 47 y o  male MRN: [id number]  Unit/Bed#: -01 Encounter: [id number]        Subjective:    No significant events since the last encounter  s/p cardiac catheterization which did not show significant CAD  Patient placed on imdur, today complains of headache  Objective:   Vitals:  Vitals:    06/06/18 0700   BP: 123/78   Pulse: 59   Resp: 18   Temp: 97 9 °F (36 6 °C)   SpO2: 98%       Body mass index is 39 14 kg/m²  Systolic (03AMI), POQ:666 , Min:102 , EMMANUEL:965     Diastolic (27FFJ), BCK:53, Min:59, Max:83      Intake/Output Summary (Last 24 hours) at 06/06/18 1049  Last data filed at 06/06/18 0645   Gross per 24 hour   Intake           700 83 ml   Output              300 ml   Net           400 83 ml     Weight (last 2 days)     Date/Time   Weight    06/05/18 1500  110 (242 51)    06/05/18 1022  110 (243)              PHYSICAL EXAMS:  General:  Patient is not in acute distress, laying in the bed comfortably, awake, alert responding to commands  Head: Normocephalic, Atraumatic  HEENT: White sclera, pink conjunctiva,  PERRLA,pharynx benign  Neck:  Supple, no neck vein distention, carotids+2/+2 no bruits, thyromegaly, adenopathy  Respiratory: clear to P/A  Cardiovascular:  PMI normal, S1-S2 normal, No  Murmurs, thrills, gallops, rubs   Regular rhythm  GI:  Abdomen soft nontender   No hepatosplenomegaly, adenopathy, ascites,or rebound tenderness  Extremities: No edema, normal pulses, no calf tenderness, no joint deformities, no venous disease   Integument:  No skin rashes or ulceration  Lymphatic:  No cervical or inguinal lymphadenopathy  Neurologic:  Patient is awake alert, responding to command, well-oriented to time and place and person moving all extremities      LABORATORY RESULTS:    Results from last 7 days  Lab Units 06/05/18  1027   TROPONIN I ng/mL <0 02     CBC with diff:   Results from last 7 days  Lab Units 06/06/18  0454 06/05/18  1027   WBC Thousand/uL 8 11 8 39   HEMOGLOBIN g/dL 13 8 16 7   HEMATOCRIT % 42 7 50 4*   MCV fL 87 85   PLATELETS Thousands/uL 147* 186   MCH pg 28 0 28 0   MCHC g/dL 32 3 33 1   RDW % 12 6 12 3   MPV fL 8 7* 8 5*   NRBC AUTO /100 WBCs  --  0       CMP:  Results from last 7 days  Lab Units 18  1027   SODIUM mmol/L 142   POTASSIUM mmol/L 3 5   CHLORIDE mmol/L 103   CO2 mmol/L 26   ANION GAP mmol/L 13   BUN mg/dL 13   CREATININE mg/dL 0 90   GLUCOSE RANDOM mg/dL 101   CALCIUM mg/dL 9 5   AST U/L 17   ALT U/L 42   ALK PHOS U/L 60   TOTAL PROTEIN g/dL 7 9   BILIRUBIN TOTAL mg/dL 0 50   EGFR ml/min/1 73sq m 98       BMP:  Results from last 7 days  Lab Units 18  1027   SODIUM mmol/L 142   POTASSIUM mmol/L 3 5   CHLORIDE mmol/L 103   CO2 mmol/L 26   BUN mg/dL 13   CREATININE mg/dL 0 90   GLUCOSE RANDOM mg/dL 101   CALCIUM mg/dL 9 5                                Lipid Profile:   Lab Results   Component Value Date    CHOL 187 2018    CHOL 136 2016    CHOL 170 2016     Lab Results   Component Value Date    HDL 43 2018    HDL 41 2016    HDL 51 2016     Lab Results   Component Value Date    LDLCALC 130 (H) 2018    LDLCALC 79 2016    LDLCALC 103 (H) 2016     Lab Results   Component Value Date    TRIG 71 2018    TRIG 81 2016    TRIG 78 2016       Cardiac testing:   Results for orders placed during the hospital encounter of 18   Echo complete with contrast if indicated    Narrative [address] [place] [id number] [phone]    Transthoracic Echocardiogram  2D, M-mode, Doppler, and Color Doppler    Study date:  2018    Patient: Jayro Inman  MR number: [id number]  Account number: [de-identified]  : 1965  Age: 47 years  Gender: Male  Status: Outpatient  Location: Bedside  Height: 66 in  Weight: 251 lb  BP: 153/ 96 mmHg    Indications: Chest pain, pressure nad tightness, Assess left ventricular function  Diagnoses: R07 9 - Chest pain, unspecified    Sonographer:  April 100 [facility] Marisela Richter  Interpreting Physician:  Janiya Toth MD  Referring Physician:  Mary Jane Agarwal MD  Group:  [facility]    SUMMARY    PROCEDURE INFORMATION:  This was a technically difficult study  Echocardiographic views were limited due to poor acoustic window availability, decreased penetration, and lung interference  LEFT VENTRICLE:  Systolic function was normal  Ejection fraction was estimated to be 60 %  There were no regional wall motion abnormalities  Left ventricular diastolic function parameters were normal     RIGHT VENTRICLE:  The size was normal   Systolic function was normal     MITRAL VALVE:  There was trace regurgitation  TRICUSPID VALVE:  There was trace regurgitation  Pulmonary artery systolic pressure was within the normal range  HISTORY: Symptoms: chest pain  PRIOR HISTORY: Risk factors: hypertension, hypercholesterolemia, and morbid obesity  PROCEDURE: The procedure was performed at the bedside  This was a routine study  The transthoracic approach was used  The study included complete 2D imaging, M-mode, complete spectral Doppler, and color Doppler  The heart rate was 69 bpm,  at the start of the study  Images were obtained from the parasternal, apical, subcostal, and suprasternal notch acoustic windows  Echocardiographic views were limited due to poor acoustic window availability, decreased penetration, and  lung interference  This was a technically difficult study  LEFT VENTRICLE: Size was normal  Systolic function was normal  Ejection fraction was estimated to be 60 %  There were no regional wall motion abnormalities  Wall thickness was normal  No evidence of apical thrombus   DOPPLER: Left  ventricular diastolic function parameters were normal     RIGHT VENTRICLE: The size was normal  Systolic function was normal  Wall thickness was normal     LEFT ATRIUM: Size was normal     RIGHT ATRIUM: Size was normal     MITRAL VALVE: Valve structure was normal  There was normal leaflet separation  DOPPLER: The transmitral velocity was within the normal range  There was no evidence for stenosis  There was trace regurgitation  AORTIC VALVE: The valve was trileaflet  Leaflets exhibited mildly increased thickness and normal cuspal separation  DOPPLER: Transaortic velocity was within the normal range  There was no evidence for stenosis  There was no significant  regurgitation  TRICUSPID VALVE: The valve structure was normal  There was normal leaflet separation  DOPPLER: The transtricuspid velocity was within the normal range  There was no evidence for stenosis  There was trace regurgitation  Pulmonary artery  systolic pressure was within the normal range  PULMONIC VALVE: Leaflets exhibited normal thickness, no calcification, and normal cuspal separation  DOPPLER: The transpulmonic velocity was within the normal range  There was no significant regurgitation  PERICARDIUM: There was no pericardial effusion  The pericardium was normal in appearance  AORTA: The root exhibited normal size  SYSTEMIC VEINS: IVC: The inferior vena cava was normal in size      SYSTEM MEASUREMENT TABLES    2D  %FS: 36 6 %  Ao Diam: 2 9 cm  EDV(Teich): 93 6 ml  EF(Teich): 66 5 %  ESV(Teich): 31 3 ml  IVSd: 1 cm  LA Area: 18 1 cm2  LA Diam: 4 cm  LVEDV MOD A4C: 130 5 ml  LVEF MOD A4C: 65 5 %  LVESV MOD A4C: 45 ml  LVIDd: 4 5 cm  LVIDs: 2 9 cm  LVLd A4C: 9 cm  LVLs A4C: 6 7 cm  LVPWd: 0 9 cm  RA Area: 19 3 cm2  RVIDd: 3 3 cm  SV MOD A4C: 85 5 ml  SV(Teich): 62 2 ml    CW  TR Vmax: 2 3 m/s  TR maxP 8 mmHg    MM  TAPSE: 2 3 cm    PW  E': 0 1 m/s  E/E': 8  MV A Tono: 0 8 m/s  MV Dec Bradford: 3 6 m/s2  MV DecT: 227 5 ms  MV E Tono: 0 8 m/s  MV E/A Ratio: 1  MV PHT: 66 ms  MVA By PHT: 3 3 cm2    [employer] Accredited Echocardiography Laboratory    Prepared and electronically signed by    Morgan Miller Sanjuana Burdick MD  Signed 2018 21:17:05           No procedure found  Results for orders placed during the hospital encounter of 18   NM myocardial perfusion spect (stress and/or rest)    [place] [id number]  [phone]    Rest/Stress Gated SPECT Myocardial Perfusion Imaging After Exercise    Patient: Linette Herrera  MR number: [id number]  Account number: [de-identified]  : 1965  Age: 47 years  Gender: Male  Status: Outpatient  Location: Stress lab  Height: 66 in  Weight: 250 lb  BP: 118/ 80 mmHg    Allergies: NO KNOWN ALLERGIES    Diagnosis: R07 9 - Chest pain, unspecified    Primary Physician:  Marcia Cabrera MD  RN:  Adam Tucker  Interpreting Physician:  Sylvie Garner MD  Referring Physician:  Mercedez Helm PA-C  Group:  [facility]  Report Prepared By[de-identified]  Shawna Prado    INDICATIONS: Detection of coronary artery disease  HISTORY: The patient is a 47year old  male  Chest pain status: recent onset chest pain, radiation to left arm with numbness and tingling  Coronary artery disease risk factors: dyslipidemia, hypertension, and smoking  Medications:  an ARB, a diuretic, aspirin, and a lipid lowering agent  PHYSICAL EXAM: Baseline physical exam screening: no wheezes audible, no loud murmur  REST ECG: Normal baseline ECG  PROCEDURE: The study was performed in the stress lab  Treadmill exercise testing was performed, using the Maikel protocol  Gated SPECT myocardial perfusion imaging was performed after stress and at rest  Systolic blood pressure was 118  mmHg, at the start of the study  Diastolic blood pressure was 80 mmHg, at the start of the study  The heart rate was 75 bpm, at the start of the study  Patient was not experiencing chest pain at the time of the injection of the  radiopharmaceutical  IV double checked      MAIKEL PROTOCOL:  HR bpm SBP mmHg DBP mmHg Symptoms ST change Rhythm/conduct  Baseline 75 118 80 none none NSR, no ectopy  Stage 1 97 140 80 none -- --  Stage 2 117 144 82 none -- --  Stage 3 148 160 80 mild dyspnea, mild fatigue -- --  Recovery 1 78 178 78 subsiding -- --  Recovery 2 102 134 80 none -- --  No medications or fluids given  STRESS SUMMARY: Duration of exercise was 9 min  The patient exercised to protocol stage 3  Maximal work rate was 10 4 METs  Functional capacity was normal  Maximal heart rate during stress was 151 bpm ( 90 % of maximal predicted heart  rate)  The heart rate response to stress was normal  There was normal resting blood pressure with an appropriate response to stress  The rate-pressure product for the peak heart rate and blood pressure was 39081  There was no chest pain  during stress  The stress test was terminated due to achievement of maximal (symptom limited) exercise, mild dyspnea, and mild fatigue  Pre oxygen saturation: 98 %  Peak oxygen saturation: 98 %  The stress ECG was negative for ischemia and  normal  Arrhythmia during stress: isolated atrial premature beats and isolated premature ventricular beats  ISOTOPE ADMINISTRATION:  Resting isotope administration Stress isotope administration  Agent Sestamibi Sestamibi  Dose 11 mCi 33 mCi  Date 02/26/2018 02/26/2018  Injection-image interval 30 min 30 min    The radiopharmaceutical was injected one minute before the end of exercise  The radiopharmaceutical was injected at the peak effect of pharmacologic stress  MYOCARDIAL PERFUSION IMAGING:  The image quality was fair  Left ventricular size was normal     PERFUSION DEFECTS:  -  There was a moderate to large myocardial perfusion defect of the basal-mid inferior and inferoseptal wall and apical cap region which significantly improved on the supine stress images and was not seen on the prone stress images  indicating that it was likely a diaphragmatic attenuation artifact      GATED SPECT:  The calculated left ventricular ejection fraction was 61 %  Left ventricular ejection fraction was within normal limits by visual estimate  There was no diagnostic evidence for left ventricular regional abnormality  SUMMARY:  -  Stress results: Duration of exercise was 9 min  Target heart rate was achieved  There was no chest pain during stress  -  ECG conclusions: The stress ECG was negative for ischemia and normal  Arrhythmia during stress: isolated atrial premature beats and isolated premature ventricular beats  -  Perfusion imaging: There was a moderate to large myocardial perfusion defect of the basal-mid inferior and inferoseptal wall and apical cap region which significantly improved on the supine stress images and was not seen on the prone  stress images indicating that it was likely a diaphragmatic attenuation artifact   -  Gated SPECT: The calculated left ventricular ejection fraction was 61 %  Left ventricular ejection fraction was within normal limits by visual estimate  There was no diagnostic evidence for left ventricular regional abnormality  IMPRESSIONS: Normal study after maximal exercise  There was a moderate to large myocardial perfusion defect of the basal-mid inferior and inferoseptal wall and apical cap region which significantly improved on the supine stress images and  was not seen on the prone stress images indicating that it was likely a diaphragmatic attenuation artifact   Left ventricular systolic function was normal     Prepared and signed by    Lesia Reno MD  Signed 02/26/2018 16:49:00         Meds/Allergies   all current active meds have been reviewed and current meds:   Current Facility-Administered Medications   Medication Dose Route Frequency    acetaminophen (TYLENOL) tablet 650 mg  650 mg Oral Q6H PRN    calcium carbonate (TUMS) chewable tablet 500 mg  500 mg Oral TID PRN    isosorbide mononitrate (IMDUR) 24 hr tablet 30 mg  30 mg Oral Daily    meclizine (ANTIVERT) tablet 25 mg  25 mg Oral TID PRN   Bruce Ashford pantoprazole (PROTONIX) EC tablet 40 mg  40 mg Oral BID AC    pravastatin (PRAVACHOL) tablet 40 mg  40 mg Oral Daily With Dinner    sodium chloride 0 9 % infusion  50 mL/hr Intravenous Continuous    valsartan (DIOVAN) tablet 320 mg  320 mg Oral Daily     Prescriptions Prior to Admission   Medication    meclizine (ANTIVERT) 25 mg tablet    rosuvastatin (CRESTOR) 5 mg tablet    valsartan (DIOVAN) 320 MG tablet         sodium chloride 50 mL/hr Last Rate: 50 mL/hr (06/06/18 0448)       Assessment/Plan:  1  Chest pain, coronary vasospasm:    -Initial troponin negative  EKG unremarkable  -CXR unremarkable  -ECHO and stress test done in February 2018 unremarkable  -Patient underwent cardiac catheterization which did not show occlusive CAD, but it is suspected that patient had vasospasm  Imdur was started  -Headache could possibly be due to Imdur  Advised patient to continue to take Imdur for 1 week, if headache persists then patient is advised to discontinue Imdur      2  Hyperlipidemia:  Last lipid panel shows   Resume home statin      3  Hypertension:  Stable, continue present regimen      4  Tobacco use:  Patient continues to chew tobacco     Stable from cardiac standpoint  Follow up with patient's cardiologist, Dr Bernabe Closs, as scheduled  ** Please Note: Dragon 360 Dictation voice to text software may have been used in the creation of this document   **

## 2018-06-06 NOTE — DISCHARGE SUMMARY
Discharge Summary - [address] [facility]    Patient Information: Nataly Reed 47 y o  male MRN: [id number]  Unit/Bed#: -01 Encounter: [id number]    Discharging Physician / Practitioner: Yin Herrera MD  PCP: Melissa Giordano MD  Admission Date: 6/5/2018  Discharge Date: 06/06/18    Reason for Admission:  Chest pain    Discharge Diagnoses:     Principal Problem:    Chest pain  Active Problems:    Hypertension    Hypercholesterolemia    Vertigo    Tobacco abuse    Gastroesophageal reflux disease without esophagitis  Resolved Problems:    * No resolved hospital problems  *    Present on Admission:   Chest pain   Hypertension   Hypercholesterolemia   Tobacco abuse   Vertigo   Gastroesophageal reflux disease without esophagitis    Consultations During Hospital Stay:  · Cardiology    Procedures Performed:     · Status post cardiac catheterization-no significant coronary artery disease, however, concern about coronary spasm    Significant Findings:     · As above    Incidental Findings:   · As above     Test Results Pending at Discharge (will require follow up): · None     Outpatient Tests Requested:  · None    Complications:  None    Hospital Course:     Nataly Reed is a 47 y o  male patient who originally presented to the hospital on 6/5/2018 due to chest pain  He was a kept under observation status after cardiac catheterization as there was concern about patient having coronary arteries past causing his symptoms and to start him on new medications  He has some discomfort after he ate this morning which was more so upper part of his abdomen under the rib cage area  Not sure if he is having some reflux symptoms  Otherwise no real chest pain  Complaining of some headache which could be secondary to nitrates  Started him on PPI  He would also be on nitrates in addition to his home medications and baby aspirin to take daily  He is scheduled to have a colonoscopy within the next week or so  I advised him to discussed with his gastroenterologist about getting an EGD as well  He was encouraged to quit smoking  Also advised on healthy diet and exercise as his BMI is almost 40  Condition at Discharge: good     Discharge Day Visit / Exam:     Subjective:  Complaining of some discomfort/nausea after he ate  The discomfort was under his ribcage area  Now feels fine  No real chest pain  Vitals: Blood Pressure: 123/78 (06/06/18 0700)  Pulse: 59 (06/06/18 0700)  Temperature: 97 9 °F (36 6 °C) (06/06/18 0700)  Temp Source: Oral (06/06/18 0700)  Respirations: 18 (06/06/18 0700)  Height: 5' 6" (167 6 cm) (06/05/18 1500)  Weight - Scale: 110 kg (242 lb 8 1 oz) (06/05/18 1500)  SpO2: 98 % (06/06/18 0700)  Exam:        Vital signs reviewed as above  Patient sitting in chair  Not in any distress  S1 and S2 audible  In sinus rhythm  Abdomen is obese  Nontender  Bowel sounds are  Awake and alert  Oriented x3  Mood and affect are pleasant  Skin is warm and dry             Discharge instructions/Information to patient and family:   See after visit summary for information provided to patient and family  Provisions for Follow-Up Care:  See after visit summary for information related to follow-up care and any pertinent home health orders  Disposition:     Home    For Discharges to [address] [facility]:   · Not Applicable to this Patient - Not Applicable to this Patient    Planned Readmission:  None     Discharge Statement:  I spent 25+ minutes discharging the patient  This time was spent on the day of discharge  I had direct contact with the patient on the day of discharge  Greater than 50% of the total time was spent examining patient, answering all patient questions, arranging and discussing plan of care with patient as well as directly providing post-discharge instructions  Additional time then spent on discharge activities      Discharge Medications:  See after visit summary for reconciled discharge medications provided to patient and family  ** Please Note: Dragon 360 Dictation voice to text software may have been used in the creation of this document   **

## 2018-06-12 ENCOUNTER — OFFICE VISIT (OUTPATIENT)
Dept: GASTROENTEROLOGY | Facility: CLINIC | Age: 53
End: 2018-06-12
Payer: COMMERCIAL

## 2018-06-12 VITALS
DIASTOLIC BLOOD PRESSURE: 74 MMHG | SYSTOLIC BLOOD PRESSURE: 124 MMHG | BODY MASS INDEX: 39.53 KG/M2 | HEIGHT: 66 IN | WEIGHT: 246 LBS

## 2018-06-12 DIAGNOSIS — R10.13 DYSPEPSIA: Primary | ICD-10-CM

## 2018-06-12 DIAGNOSIS — Z12.11 ENCOUNTER FOR SCREENING COLONOSCOPY: ICD-10-CM

## 2018-06-12 PROCEDURE — 99204 OFFICE O/P NEW MOD 45 MIN: CPT | Performed by: NURSE PRACTITIONER

## 2018-06-12 PROCEDURE — 1111F DSCHRG MED/CURRENT MED MERGE: CPT | Performed by: NURSE PRACTITIONER

## 2018-06-12 NOTE — PROGRESS NOTES
Assessment/Plan:    Patient for screening colonoscopy  Endoscopy for dyspepsia, GERD  Continue pantoprazole as prescribed  Gaviscon as prescribed       Problem List Items Addressed This Visit     Dyspepsia - Primary    Relevant Medications    aluminum hydroxide-magnesium carbonate (GAVISCON)  mg/15 mL oral suspension    Other Relevant Orders    US abdomen complete    Case request operating room: EGD AND COLONOSCOPY (Completed)      Other Visit Diagnoses     Encounter for screening colonoscopy        Relevant Orders    Case request operating room: EGD AND COLONOSCOPY (Completed)            Subjective:      Patient ID: Sujit Alvarenga is a 47 y o  male  81-year-old male is here for screening colonoscopy  He has no lower GI symptoms  This is his initial screening colonoscopy  Patient with back surgery last year and medications related to back pain and then recovery including NSAIDs is here for upper GI symptoms including abdominal bloating, belching, nausea, heartburn  He had a full workup done for cardiac recently and was told that this is GI  He was started on pantoprazole which seems to be helping but he still has episodes of abdominal bloating and dyspepsia  He has not had an abdominal ultrasound get and so we will order that along with an endoscopy and Gaviscon  He is professionally a  and is trying hard to follow a GERD diet        The following portions of the patient's history were reviewed and updated as appropriate:   He  has a past medical history of Hypercholesterolemia; Hypertension; Obesity; and Sexual dysfunction    He   Patient Active Problem List    Diagnosis Date Noted    Dyspepsia 06/12/2018    Gastroesophageal reflux disease without esophagitis 06/06/2018    Chest pain 06/05/2018    Screen for colon cancer 05/29/2018    Screening for malignant neoplasm of colon 05/08/2018    Irritant contact dermatitis due to other agents 05/08/2018    Screening for prostate cancer 05/08/2018  Vertigo 04/05/2018    Spondylosis of cervical region without myelopathy or radiculopathy 04/05/2018    Tobacco abuse 04/05/2018    Dehydration 03/27/2018    Hypertension 02/25/2018    Hypercholesterolemia 02/25/2018    Morbid obesity due to excess calories ([facility] [address] ) 02/25/2018    Chewing tobacco nicotine dependence with nicotine-induced disorder 02/25/2018    Lumbar radiculopathy 09/15/2015    Allergic rhinitis 01/10/2014     He  has a past surgical history that includes Back surgery and Septoplasty  His family history includes Atrial fibrillation in his mother; COPD in his mother; Coronary artery disease in his mother; Lung cancer in his father and mother; Prostate cancer in his maternal grandfather; Rheum arthritis in his mother; Stroke in his mother  He  reports that he quit smoking about 20 years ago  His smokeless tobacco use includes Chew  He reports that he drinks alcohol  He reports that he does not use drugs  Current Outpatient Prescriptions   Medication Sig Dispense Refill    aspirin 81 mg chewable tablet Chew 1 tablet (81 mg total) daily  0    meclizine (ANTIVERT) 25 mg tablet Take 1 tablet (25 mg total) by mouth 3 (three) times a day as needed for dizziness 30 tablet 0    pantoprazole (PROTONIX) 40 mg tablet Take 1 tablet (40 mg total) by mouth 2 (two) times a day before meals 60 tablet 2    rosuvastatin (CRESTOR) 5 mg tablet Take 1 tablet (5 mg total) by mouth daily 30 tablet 11    valsartan (DIOVAN) 320 MG tablet Take 1 tablet (320 mg total) by mouth daily 30 tablet 11    aluminum hydroxide-magnesium carbonate (GAVISCON)  mg/15 mL oral suspension Take 15 mL by mouth 4 (four) times daily (after meals and at bedtime) 1 Bottle 0    isosorbide mononitrate (IMDUR) 30 mg 24 hr tablet Take 1 tablet (30 mg total) by mouth daily 30 tablet 2     No current facility-administered medications for this visit        Current Outpatient Prescriptions on File Prior to Visit   Medication Sig    aspirin 81 mg chewable tablet Chew 1 tablet (81 mg total) daily    meclizine (ANTIVERT) 25 mg tablet Take 1 tablet (25 mg total) by mouth 3 (three) times a day as needed for dizziness    pantoprazole (PROTONIX) 40 mg tablet Take 1 tablet (40 mg total) by mouth 2 (two) times a day before meals    rosuvastatin (CRESTOR) 5 mg tablet Take 1 tablet (5 mg total) by mouth daily    valsartan (DIOVAN) 320 MG tablet Take 1 tablet (320 mg total) by mouth daily    isosorbide mononitrate (IMDUR) 30 mg 24 hr tablet Take 1 tablet (30 mg total) by mouth daily     No current facility-administered medications on file prior to visit  He has No Known Allergies       Review of Systems   Constitutional: Negative for fatigue  HENT: Negative  Respiratory: Negative  Cardiovascular: Negative  Gastrointestinal: Positive for abdominal distention, abdominal pain and nausea  Skin: Negative  Psychiatric/Behavioral: Negative  Objective:      /74   Ht 5' 6" (1 676 m)   Wt 112 kg (246 lb)   BMI 39 71 kg/m²          Physical Exam   Constitutional: He is oriented to person, place, and time  He appears well-developed and well-nourished  Eyes: No scleral icterus  Cardiovascular: Normal rate and regular rhythm  Pulmonary/Chest: Effort normal and breath sounds normal    Abdominal: Soft  Bowel sounds are normal    Lymphadenopathy:     He has no cervical adenopathy  Neurological: He is alert and oriented to person, place, and time  Skin: Skin is warm and dry  Psychiatric: He has a normal mood and affect

## 2018-06-14 ENCOUNTER — HOSPITAL ENCOUNTER (INPATIENT)
Facility: HOSPITAL | Age: 53
LOS: 3 days | Discharge: HOME | DRG: 394 | End: 2018-06-17
Attending: EMERGENCY MEDICINE | Admitting: ANESTHESIOLOGY
Payer: COMMERCIAL

## 2018-06-14 ENCOUNTER — APPOINTMENT (EMERGENCY)
Dept: CT IMAGING | Facility: HOSPITAL | Age: 53
DRG: 394 | End: 2018-06-14
Payer: COMMERCIAL

## 2018-06-14 DIAGNOSIS — K62.5 BRIGHT RED BLOOD PER RECTUM: ICD-10-CM

## 2018-06-14 DIAGNOSIS — R10.13 DYSPEPSIA: ICD-10-CM

## 2018-06-14 DIAGNOSIS — K92.2 GI BLEED: Primary | ICD-10-CM

## 2018-06-14 DIAGNOSIS — K21.9 GASTROESOPHAGEAL REFLUX DISEASE WITHOUT ESOPHAGITIS: ICD-10-CM

## 2018-06-14 PROBLEM — R79.89 BLOOD CREATININE INCREASED COMPARED WITH PRIOR MEASUREMENT: Status: ACTIVE | Noted: 2018-06-14

## 2018-06-14 PROBLEM — D72.829 LEUKOCYTOSIS: Status: ACTIVE | Noted: 2018-06-14

## 2018-06-14 LAB
ABO GROUP BLD: NORMAL
ALBUMIN SERPL BCP-MCNC: 3.6 G/DL (ref 3.5–5)
ALP SERPL-CCNC: 59 U/L (ref 46–116)
ALT SERPL W P-5'-P-CCNC: 25 U/L (ref 12–78)
ANION GAP SERPL CALCULATED.3IONS-SCNC: 9 MMOL/L (ref 4–13)
APTT PPP: 28 SECONDS (ref 24–36)
AST SERPL W P-5'-P-CCNC: 14 U/L (ref 5–45)
BASOPHILS # BLD AUTO: 0.04 THOUSANDS/ΜL (ref 0–0.1)
BASOPHILS # BLD AUTO: 0.05 THOUSANDS/ΜL (ref 0–0.1)
BASOPHILS NFR BLD AUTO: 1 % (ref 0–1)
BASOPHILS NFR BLD AUTO: 1 % (ref 0–1)
BILIRUB SERPL-MCNC: 0.5 MG/DL (ref 0.2–1)
BLD GP AB SCN SERPL QL: NEGATIVE
BUN SERPL-MCNC: 20 MG/DL (ref 5–25)
CALCIUM SERPL-MCNC: 9 MG/DL (ref 8.3–10.1)
CHLORIDE SERPL-SCNC: 105 MMOL/L (ref 100–108)
CO2 SERPL-SCNC: 28 MMOL/L (ref 21–32)
CREAT SERPL-MCNC: 1.14 MG/DL (ref 0.6–1.3)
EOSINOPHIL # BLD AUTO: 0.15 THOUSAND/ΜL (ref 0–0.61)
EOSINOPHIL # BLD AUTO: 0.15 THOUSAND/ΜL (ref 0–0.61)
EOSINOPHIL NFR BLD AUTO: 1 % (ref 0–6)
EOSINOPHIL NFR BLD AUTO: 2 % (ref 0–6)
ERYTHROCYTE [DISTWIDTH] IN BLOOD BY AUTOMATED COUNT: 12.4 % (ref 11.6–15.1)
ERYTHROCYTE [DISTWIDTH] IN BLOOD BY AUTOMATED COUNT: 12.4 % (ref 11.6–15.1)
GFR SERPL CREATININE-BSD FRML MDRD: 74 ML/MIN/1.73SQ M
GLUCOSE SERPL-MCNC: 131 MG/DL (ref 65–140)
HCT VFR BLD AUTO: 39.2 % (ref 36.5–49.3)
HCT VFR BLD AUTO: 42.8 % (ref 36.5–49.3)
HGB BLD-MCNC: 12.5 G/DL (ref 12–17)
HGB BLD-MCNC: 12.8 G/DL (ref 12–17)
HGB BLD-MCNC: 14.1 G/DL (ref 12–17)
IMM GRANULOCYTES # BLD AUTO: 0.02 THOUSAND/UL (ref 0–0.2)
IMM GRANULOCYTES # BLD AUTO: 0.03 THOUSAND/UL (ref 0–0.2)
IMM GRANULOCYTES NFR BLD AUTO: 0 % (ref 0–2)
IMM GRANULOCYTES NFR BLD AUTO: 0 % (ref 0–2)
INR PPP: 1.08 (ref 0.86–1.17)
LACTATE SERPL-SCNC: 1.2 MMOL/L (ref 0.5–2)
LYMPHOCYTES # BLD AUTO: 1.44 THOUSANDS/ΜL (ref 0.6–4.47)
LYMPHOCYTES # BLD AUTO: 2.55 THOUSANDS/ΜL (ref 0.6–4.47)
LYMPHOCYTES NFR BLD AUTO: 20 % (ref 14–44)
LYMPHOCYTES NFR BLD AUTO: 23 % (ref 14–44)
MCH RBC QN AUTO: 27.9 PG (ref 26.8–34.3)
MCH RBC QN AUTO: 28.3 PG (ref 26.8–34.3)
MCHC RBC AUTO-ENTMCNC: 32.7 G/DL (ref 31.4–37.4)
MCHC RBC AUTO-ENTMCNC: 32.9 G/DL (ref 31.4–37.4)
MCV RBC AUTO: 86 FL (ref 82–98)
MCV RBC AUTO: 86 FL (ref 82–98)
MONOCYTES # BLD AUTO: 0.55 THOUSAND/ΜL (ref 0.17–1.22)
MONOCYTES # BLD AUTO: 0.86 THOUSAND/ΜL (ref 0.17–1.22)
MONOCYTES NFR BLD AUTO: 8 % (ref 4–12)
MONOCYTES NFR BLD AUTO: 8 % (ref 4–12)
NEUTROPHILS # BLD AUTO: 5.17 THOUSANDS/ΜL (ref 1.85–7.62)
NEUTROPHILS # BLD AUTO: 7.28 THOUSANDS/ΜL (ref 1.85–7.62)
NEUTS SEG NFR BLD AUTO: 67 % (ref 43–75)
NEUTS SEG NFR BLD AUTO: 69 % (ref 43–75)
NRBC BLD AUTO-RTO: 0 /100 WBCS
NRBC BLD AUTO-RTO: 0 /100 WBCS
PLATELET # BLD AUTO: 166 THOUSANDS/UL (ref 149–390)
PLATELET # BLD AUTO: 200 THOUSANDS/UL (ref 149–390)
PMV BLD AUTO: 8.5 FL (ref 8.9–12.7)
PMV BLD AUTO: 8.7 FL (ref 8.9–12.7)
POTASSIUM SERPL-SCNC: 3.7 MMOL/L (ref 3.5–5.3)
PROT SERPL-MCNC: 6.9 G/DL (ref 6.4–8.2)
PROTHROMBIN TIME: 13.9 SECONDS (ref 11.8–14.2)
RBC # BLD AUTO: 4.58 MILLION/UL (ref 3.88–5.62)
RBC # BLD AUTO: 4.99 MILLION/UL (ref 3.88–5.62)
RH BLD: POSITIVE
SODIUM SERPL-SCNC: 142 MMOL/L (ref 136–145)
SPECIMEN EXPIRATION DATE: NORMAL
TROPONIN I SERPL-MCNC: <0.02 NG/ML
WBC # BLD AUTO: 10.92 THOUSAND/UL (ref 4.31–10.16)
WBC # BLD AUTO: 7.37 THOUSAND/UL (ref 4.31–10.16)

## 2018-06-14 PROCEDURE — 85610 PROTHROMBIN TIME: CPT | Performed by: EMERGENCY MEDICINE

## 2018-06-14 PROCEDURE — 84484 ASSAY OF TROPONIN QUANT: CPT | Performed by: PHYSICIAN ASSISTANT

## 2018-06-14 PROCEDURE — 36415 COLL VENOUS BLD VENIPUNCTURE: CPT | Performed by: EMERGENCY MEDICINE

## 2018-06-14 PROCEDURE — 93005 ELECTROCARDIOGRAM TRACING: CPT

## 2018-06-14 PROCEDURE — 85730 THROMBOPLASTIN TIME PARTIAL: CPT | Performed by: EMERGENCY MEDICINE

## 2018-06-14 PROCEDURE — 83605 ASSAY OF LACTIC ACID: CPT | Performed by: PHYSICIAN ASSISTANT

## 2018-06-14 PROCEDURE — 86850 RBC ANTIBODY SCREEN: CPT | Performed by: EMERGENCY MEDICINE

## 2018-06-14 PROCEDURE — 74177 CT ABD & PELVIS W/CONTRAST: CPT

## 2018-06-14 PROCEDURE — 85025 COMPLETE CBC W/AUTO DIFF WBC: CPT | Performed by: EMERGENCY MEDICINE

## 2018-06-14 PROCEDURE — 86900 BLOOD TYPING SEROLOGIC ABO: CPT | Performed by: EMERGENCY MEDICINE

## 2018-06-14 PROCEDURE — 86901 BLOOD TYPING SEROLOGIC RH(D): CPT | Performed by: EMERGENCY MEDICINE

## 2018-06-14 PROCEDURE — 99285 EMERGENCY DEPT VISIT HI MDM: CPT

## 2018-06-14 PROCEDURE — 85018 HEMOGLOBIN: CPT | Performed by: PHYSICIAN ASSISTANT

## 2018-06-14 PROCEDURE — 80053 COMPREHEN METABOLIC PANEL: CPT | Performed by: EMERGENCY MEDICINE

## 2018-06-14 RX ORDER — CHLORHEXIDINE GLUCONATE 0.12 MG/ML
15 RINSE ORAL EVERY 12 HOURS SCHEDULED
Status: DISCONTINUED | OUTPATIENT
Start: 2018-06-14 | End: 2018-06-17 | Stop reason: HOSPADM

## 2018-06-14 RX ORDER — PANTOPRAZOLE SODIUM 40 MG/1
40 TABLET, DELAYED RELEASE ORAL
Status: DISCONTINUED | OUTPATIENT
Start: 2018-06-15 | End: 2018-06-15

## 2018-06-14 RX ORDER — SODIUM CHLORIDE, SODIUM GLUCONATE, SODIUM ACETATE, POTASSIUM CHLORIDE, MAGNESIUM CHLORIDE, SODIUM PHOSPHATE, DIBASIC, AND POTASSIUM PHOSPHATE .53; .5; .37; .037; .03; .012; .00082 G/100ML; G/100ML; G/100ML; G/100ML; G/100ML; G/100ML; G/100ML
500 INJECTION, SOLUTION INTRAVENOUS ONCE
Status: DISCONTINUED | OUTPATIENT
Start: 2018-06-14 | End: 2018-06-14

## 2018-06-14 RX ORDER — SODIUM CHLORIDE, SODIUM GLUCONATE, SODIUM ACETATE, POTASSIUM CHLORIDE, MAGNESIUM CHLORIDE, SODIUM PHOSPHATE, DIBASIC, AND POTASSIUM PHOSPHATE .53; .5; .37; .037; .03; .012; .00082 G/100ML; G/100ML; G/100ML; G/100ML; G/100ML; G/100ML; G/100ML
125 INJECTION, SOLUTION INTRAVENOUS CONTINUOUS
Status: DISCONTINUED | OUTPATIENT
Start: 2018-06-14 | End: 2018-06-16

## 2018-06-14 RX ADMIN — CHLORHEXIDINE GLUCONATE 15 ML: 1.2 RINSE ORAL at 21:33

## 2018-06-14 RX ADMIN — POLYETHYLENE GLYCOL 3350, SODIUM SULFATE ANHYDROUS, SODIUM BICARBONATE, SODIUM CHLORIDE, POTASSIUM CHLORIDE 4000 ML: 236; 22.74; 6.74; 5.86; 2.97 POWDER, FOR SOLUTION ORAL at 22:50

## 2018-06-14 RX ADMIN — POLYETHYLENE GLYCOL 3350, SODIUM SULFATE ANHYDROUS, SODIUM BICARBONATE, SODIUM CHLORIDE, POTASSIUM CHLORIDE 4000 ML: 236; 22.74; 6.74; 5.86; 2.97 POWDER, FOR SOLUTION ORAL at 22:02

## 2018-06-14 RX ADMIN — POLYETHYLENE GLYCOL 3350, SODIUM SULFATE ANHYDROUS, SODIUM BICARBONATE, SODIUM CHLORIDE, POTASSIUM CHLORIDE 4000 ML: 236; 22.74; 6.74; 5.86; 2.97 POWDER, FOR SOLUTION ORAL at 23:30

## 2018-06-14 RX ADMIN — IOHEXOL 100 ML: 350 INJECTION, SOLUTION INTRAVENOUS at 18:50

## 2018-06-14 RX ADMIN — POLYETHYLENE GLYCOL 3350, SODIUM SULFATE ANHYDROUS, SODIUM BICARBONATE, SODIUM CHLORIDE, POTASSIUM CHLORIDE 4000 ML: 236; 22.74; 6.74; 5.86; 2.97 POWDER, FOR SOLUTION ORAL at 23:00

## 2018-06-14 RX ADMIN — POLYETHYLENE GLYCOL 3350, SODIUM SULFATE ANHYDROUS, SODIUM BICARBONATE, SODIUM CHLORIDE, POTASSIUM CHLORIDE 4000 ML: 236; 22.74; 6.74; 5.86; 2.97 POWDER, FOR SOLUTION ORAL at 23:45

## 2018-06-14 RX ADMIN — POLYETHYLENE GLYCOL 3350, SODIUM SULFATE ANHYDROUS, SODIUM BICARBONATE, SODIUM CHLORIDE, POTASSIUM CHLORIDE 4000 ML: 236; 22.74; 6.74; 5.86; 2.97 POWDER, FOR SOLUTION ORAL at 23:15

## 2018-06-14 RX ADMIN — BISACODYL 10 MG: 5 TABLET, COATED ORAL at 21:33

## 2018-06-14 RX ADMIN — POLYETHYLENE GLYCOL 3350, SODIUM SULFATE ANHYDROUS, SODIUM BICARBONATE, SODIUM CHLORIDE, POTASSIUM CHLORIDE 4000 ML: 236; 22.74; 6.74; 5.86; 2.97 POWDER, FOR SOLUTION ORAL at 22:30

## 2018-06-14 RX ADMIN — POLYETHYLENE GLYCOL 3350, SODIUM SULFATE ANHYDROUS, SODIUM BICARBONATE, SODIUM CHLORIDE, POTASSIUM CHLORIDE 4000 ML: 236; 22.74; 6.74; 5.86; 2.97 POWDER, FOR SOLUTION ORAL at 22:45

## 2018-06-14 RX ADMIN — POLYETHYLENE GLYCOL 3350, SODIUM SULFATE ANHYDROUS, SODIUM BICARBONATE, SODIUM CHLORIDE, POTASSIUM CHLORIDE 4000 ML: 236; 22.74; 6.74; 5.86; 2.97 POWDER, FOR SOLUTION ORAL at 22:15

## 2018-06-14 RX ADMIN — SODIUM CHLORIDE, SODIUM GLUCONATE, SODIUM ACETATE, POTASSIUM CHLORIDE, MAGNESIUM CHLORIDE, SODIUM PHOSPHATE, DIBASIC, AND POTASSIUM PHOSPHATE 125 ML/HR: .53; .5; .37; .037; .03; .012; .00082 INJECTION, SOLUTION INTRAVENOUS at 21:33

## 2018-06-14 NOTE — ED NOTES
Patient transported to [facility] [address] [place] [address]  06/14/18 0803

## 2018-06-14 NOTE — ED PROVIDER NOTES
Pt Name: Lanette Sparks  MRN: [id number]  [place] 1965  Age/Sex: 47 y o  male  Date of evaluation: 6/14/2018  PCP: Jing Hernández MD    [address] [place] [address]    Chief Complaint   Patient presents with   Wilbern Martinez or Bloody Stool     Pt states he has had rectal bleeding since Tuesday  Pt states he came in today because there was more blood than the last 2 days  Pt c/o RUQ pain         HPI    47 y o  male presenting with bright red blood per rectum  Patient notes his 1st episode on Tuesday, with dark red blood in the toilet  Yesterday, he had a normal bowel, but today he came home from work and had a large, loose bowel movement with a large quantity of bright red blood  He denies chest pain, shortness of breath, trauma, prior episodes other than this week  He also complains of abdominal pain, although he is unable to clearly localize where it is worst   Patient has had a history of chest pain in the past, and had a negative catheterization recently, he is now seen GI with concern for possible ulcer and has an endoscopy and colonoscopy scheduled for Tuesday of next week  HPI      Past Medical and Surgical History    Past Medical History:   Diagnosis Date    Hypercholesterolemia     Hypertension     Obesity     Sexual dysfunction     Psychosexual dysfunction with inhibited sexual excitement        Past Surgical History:   Procedure Laterality Date    BACK SURGERY      spinal fusion    CARDIAC CATHETERIZATION      SEPTOPLASTY         Family History   Problem Relation Age of Onset    Atrial fibrillation Mother     COPD Mother     Coronary artery disease Mother     Lung cancer Mother     Rheum arthritis Mother     Stroke Mother         Syndrome    Lung cancer Father     Prostate cancer Maternal Grandfather        Social History   Substance Use Topics    Smoking status: Former Smoker     Quit date: 2/25/1998    Smokeless tobacco: Current User     Types: Chew    Alcohol use Yes      Comment:  Occ alcohol use per [employer]            Allergies    No Known Allergies    Home Medications    Prior to Admission medications    Medication Sig Start Date End Date Taking? Authorizing Provider   aluminum hydroxide-magnesium carbonate (GAVISCON)  mg/15 mL oral suspension Take 15 mL by mouth 4 (four) times daily (after meals and at bedtime) 6/12/18   ASHLEY Howard   aspirin 81 mg chewable tablet Chew 1 tablet (81 mg total) daily 6/6/18   Horace Garner MD   isosorbide mononitrate (IMDUR) 30 mg 24 hr tablet Take 1 tablet (30 mg total) by mouth daily 6/7/18   Horace Garner MD   meclizine (ANTIVERT) 25 mg tablet Take 1 tablet (25 mg total) by mouth 3 (three) times a day as needed for dizziness 4/4/18   Carmen Conti PA-C   pantoprazole (PROTONIX) 40 mg tablet Take 1 tablet (40 mg total) by mouth 2 (two) times a day before meals 6/6/18   Horace Garner MD   rosuvastatin (CRESTOR) 5 mg tablet Take 1 tablet (5 mg total) by mouth daily 5/8/18   Morales Cook MD   valsartan (DIOVAN) 320 MG tablet Take 1 tablet (320 mg total) by mouth daily 5/8/18   Morales Cook MD           Review of Systems    Review of Systems   Constitutional: Negative for appetite change, chills and diaphoresis  HENT: Negative for drooling, facial swelling, trouble swallowing and voice change  Respiratory: Negative for apnea, shortness of breath and wheezing  Cardiovascular: Negative for chest pain and leg swelling  Gastrointestinal: Positive for abdominal pain, blood in stool and nausea  Negative for abdominal distention, diarrhea and vomiting  Genitourinary: Negative for dysuria and urgency  Musculoskeletal: Negative for arthralgias, back pain, gait problem and neck pain  Skin: Negative for color change, rash and wound  Neurological: Negative for seizures, speech difficulty, weakness and headaches  Psychiatric/Behavioral: Negative for agitation, behavioral problems and dysphoric mood  The patient is not nervous/anxious  All other systems reviewed and negative  Physical Exam      ED Triage Vitals [06/14/18 1733]   Temperature Pulse Respirations Blood Pressure SpO2   98 7 °F (37 1 °C) 80 20 119/91 99 %      Temp Source Heart Rate Source Patient Position - Orthostatic VS BP Location FiO2 (%)   Oral Monitor Sitting Left arm --      Pain Score       5               Physical Exam   Constitutional: He is oriented to person, place, and time  He appears well-developed and well-nourished  HENT:   Head: Normocephalic and atraumatic  Eyes: Conjunctivae and EOM are normal  Pupils are equal, round, and reactive to light  Neck: Normal range of motion  Neck supple  No tracheal deviation present  Cardiovascular: Normal rate, regular rhythm, normal heart sounds and intact distal pulses  No murmur heard  Pulmonary/Chest: Effort normal and breath sounds normal  No stridor  No respiratory distress  He has no wheezes  He has no rales  Abdominal: Soft  He exhibits no distension  There is no tenderness  There is no rebound and no guarding  Genitourinary:   Genitourinary Comments: Small bright red blood noted on rectal exam, skin tag and hemorrhoid noted but unclear if that is the source of the bleeding  Musculoskeletal: Normal range of motion  He exhibits no edema or deformity  Neurological: He is alert and oriented to person, place, and time  Skin: Skin is warm and dry  No rash noted  Psychiatric: He has a normal mood and affect   His behavior is normal  Judgment and thought content normal             Diagnostic Results      Labs:    Results for orders placed or performed during the hospital encounter of 06/14/18   CBC and differential   Result Value Ref Range    WBC 7 37 4 31 - 10 16 Thousand/uL    RBC 4 99 3 88 - 5 62 Million/uL    Hemoglobin 14 1 12 0 - 17 0 g/dL    Hematocrit 42 8 36 5 - 49 3 %    MCV 86 82 - 98 fL    MCH 28 3 26 8 - 34 3 pg    MCHC 32 9 31 4 - 37 4 g/dL    RDW 12 4 11 6 - 15 1 %    MPV 8 5 (L) 8 9 - 12 7 fL    Platelets 847 570 - 961 Thousands/uL    nRBC 0 /100 WBCs    Neutrophils Relative 69 43 - 75 %    Immat GRANS % 0 0 - 2 %    Lymphocytes Relative 20 14 - 44 %    Monocytes Relative 8 4 - 12 %    Eosinophils Relative 2 0 - 6 %    Basophils Relative 1 0 - 1 %    Neutrophils Absolute 5 17 1 85 - 7 62 Thousands/µL    Immature Grans Absolute 0 02 0 00 - 0 20 Thousand/uL    Lymphocytes Absolute 1 44 0 60 - 4 47 Thousands/µL    Monocytes Absolute 0 55 0 17 - 1 22 Thousand/µL    Eosinophils Absolute 0 15 0 00 - 0 61 Thousand/µL    Basophils Absolute 0 04 0 00 - 0 10 Thousands/µL   Comprehensive metabolic panel   Result Value Ref Range    Sodium 142 136 - 145 mmol/L    Potassium 3 7 3 5 - 5 3 mmol/L    Chloride 105 100 - 108 mmol/L    CO2 28 21 - 32 mmol/L    Anion Gap 9 4 - 13 mmol/L    BUN 20 5 - 25 mg/dL    Creatinine 1 14 0 60 - 1 30 mg/dL    Glucose 131 65 - 140 mg/dL    Calcium 9 0 8 3 - 10 1 mg/dL    AST 14 5 - 45 U/L    ALT 25 12 - 78 U/L    Alkaline Phosphatase 59 46 - 116 U/L    Total Protein 6 9 6 4 - 8 2 g/dL    Albumin 3 6 3 5 - 5 0 g/dL    Total Bilirubin 0 50 0 20 - 1 00 mg/dL    eGFR 74 ml/min/1 73sq m   CBC and differential   Result Value Ref Range    WBC 10 92 (H) 4 31 - 10 16 Thousand/uL    RBC 4 58 3 88 - 5 62 Million/uL    Hemoglobin 12 8 12 0 - 17 0 g/dL    Hematocrit 39 2 36 5 - 49 3 %    MCV 86 82 - 98 fL    MCH 27 9 26 8 - 34 3 pg    MCHC 32 7 31 4 - 37 4 g/dL    RDW 12 4 11 6 - 15 1 %    MPV 8 7 (L) 8 9 - 12 7 fL    Platelets 612 936 - 245 Thousands/uL    nRBC 0 /100 WBCs    Neutrophils Relative 67 43 - 75 %    Immat GRANS % 0 0 - 2 %    Lymphocytes Relative 23 14 - 44 %    Monocytes Relative 8 4 - 12 %    Eosinophils Relative 1 0 - 6 %    Basophils Relative 1 0 - 1 %    Neutrophils Absolute 7 28 1 85 - 7 62 Thousands/µL    Immature Grans Absolute 0 03 0 00 - 0 20 Thousand/uL    Lymphocytes Absolute 2 55 0 60 - 4 47 Thousands/µL    Monocytes Absolute 0 86 0 17 - 1 22 Thousand/µL Eosinophils Absolute 0 15 0 00 - 0 61 Thousand/µL    Basophils Absolute 0 05 0 00 - 0 10 Thousands/µL   APTT   Result Value Ref Range    PTT 28 24 - 36 seconds   Protime-INR   Result Value Ref Range    Protime 13 9 11 8 - 14 2 seconds    INR 1 08 0 86 - 1 17   Type and screen   Result Value Ref Range    ABO Grouping O     Rh Factor Positive     Antibody Screen Negative     Specimen Expiration Date [id number]        All labs reviewed and utilized in the medical decision making process    Radiology:    CT abdomen pelvis with contrast   Final Result      Extravasation of the administered intravenous contrast noted within the lumen of the proximal descending colon compatible with active GI bleeding      No free air or free fluid  Mild splenomegaly  I personally discussed this study with Yojana Cohn on 6/14/2018 at 7:14 PM                    Workstation performed: [id number]             All radiology studies independently viewed by me and interpreted by the radiologist     Procedure    Procedures    The patient presented with a condition in which there was a high probability of imminent or life-threatening deterioration, and critical care services (excluding separately billable procedures) totalled 30-74 minutes  ED Course of Care and Re-Assessments      During stay in ED, patient had multiple bloody bowel movements  After 1 such bowel movement, patient was seen in the hallway stating he was dizzy  Patient assisted to bed and noted to be pale, diaphoretic, and less talkative  Patient placed back on monitor blood pressure taken, found to be bradycardic followed by tachycardic, blood pressure 90/60  Additional IV access obtained, 1 L normal saline bolus given, patient recovered his mental status and started feeling better although not before vomiting (nonbloody)  Patient then had a large bloody bowel movement on the bed      Discussed case with Dr Arslan Chowdhury of Gastroenterology, who recommended the patient be admitted to intensive care unit or step-down, and started on a bowel prep with the stated plan of endoscopy tomorrow morning or tonight if the patient decompensates  Patient consented for blood transfusion, although transfusion not indicated during the ER stay  Medications   bisacodyl (DULCOLAX) EC tablet 10 mg (not administered)   polyethylene glycol (GOLYTELY) bowel prep 4,000 mL (not administered)   pantoprazole (PROTONIX) EC tablet 40 mg (not administered)   chlorhexidine (PERIDEX) 0 12 % oral rinse 15 mL (not administered)   multi-electrolyte (ISOLYTE-S PH 7 4 equivalent) IV solution (not administered)   iohexol (OMNIPAQUE) 350 MG/ML injection (MULTI-DOSE) 100 mL (100 mL Intravenous Given 6/14/18 1850)           FINAL IMPRESSION    Final diagnoses:   GI bleed         DISPOSITION/PLAN    64-year-old male with history and symptoms above  Vital signs reassuring, examination concerning for bright red blood per rectum  CT scan appears to have identified source of bleed in the proximal descending colon  Patient continued to have blood from his rectum during the course of his ER stay, hemoglobin dropped approximately 2 points over 2 hours  Discussed case with GI as above, admitted to step-down after discussion with the ICU attending  Patient hemodynamically stable and comfortable at time of admit  Time reflects when diagnosis was documented in both MDM as applicable and the Disposition within this note     Time User Action Codes Description Comment    6/14/2018  8:07 PM Jerome DYSON Add [K92 2] GI bleed     6/14/2018  8:40 PM Dinesh Her Add [R10 13] Dyspepsia       ED Disposition     ED Disposition Condition Comment    Admit  Case was discussed with Dr Chayo Moy and the patient's admission status was agreed to be Admission Status: inpatient status to the service of Dr Chayo Moy           Follow-up Information    None           PATIENT REFERRED TO:    No follow-up provider specified  DISCHARGE MEDICATIONS:    Current Discharge Medication List      CONTINUE these medications which have NOT CHANGED    Details   aluminum hydroxide-magnesium carbonate (GAVISCON)  mg/15 mL oral suspension Take 15 mL by mouth 4 (four) times daily (after meals and at bedtime)  Qty: 1 Bottle, Refills: 0    Associated Diagnoses: Dyspepsia      aspirin 81 mg chewable tablet Chew 1 tablet (81 mg total) daily  Refills: 0    Associated Diagnoses: Chest pain      isosorbide mononitrate (IMDUR) 30 mg 24 hr tablet Take 1 tablet (30 mg total) by mouth daily  Qty: 30 tablet, Refills: 2    Associated Diagnoses: Chest pain      meclizine (ANTIVERT) 25 mg tablet Take 1 tablet (25 mg total) by mouth 3 (three) times a day as needed for dizziness  Qty: 30 tablet, Refills: 0    Associated Diagnoses: Vertigo      pantoprazole (PROTONIX) 40 mg tablet Take 1 tablet (40 mg total) by mouth 2 (two) times a day before meals  Qty: 60 tablet, Refills: 2    Associated Diagnoses: Gastroesophageal reflux disease without esophagitis      rosuvastatin (CRESTOR) 5 mg tablet Take 1 tablet (5 mg total) by mouth daily  Qty: 30 tablet, Refills: 11    Associated Diagnoses: Hypercholesterolemia      valsartan (DIOVAN) 320 MG tablet Take 1 tablet (320 mg total) by mouth daily  Qty: 30 tablet, Refills: 11    Associated Diagnoses: Essential hypertension             No discharge procedures on file           MD Ivone Angeles MD  06/14/18 0080

## 2018-06-15 ENCOUNTER — ANESTHESIA EVENT (INPATIENT)
Dept: PERIOP | Facility: HOSPITAL | Age: 53
DRG: 394 | End: 2018-06-15
Payer: COMMERCIAL

## 2018-06-15 ENCOUNTER — ANESTHESIA (INPATIENT)
Dept: PERIOP | Facility: HOSPITAL | Age: 53
DRG: 394 | End: 2018-06-15
Payer: COMMERCIAL

## 2018-06-15 PROBLEM — K92.2 GI BLEED: Status: ACTIVE | Noted: 2018-06-14

## 2018-06-15 LAB
ANION GAP SERPL CALCULATED.3IONS-SCNC: 7 MMOL/L (ref 4–13)
ATRIAL RATE: 70 BPM
ATRIAL RATE: 83 BPM
BASOPHILS # BLD AUTO: 0.02 THOUSANDS/ΜL (ref 0–0.1)
BASOPHILS NFR BLD AUTO: 0 % (ref 0–1)
BUN SERPL-MCNC: 15 MG/DL (ref 5–25)
CA-I BLD-SCNC: 1.08 MMOL/L (ref 1.12–1.32)
CALCIUM SERPL-MCNC: 8.3 MG/DL (ref 8.3–10.1)
CHLORIDE SERPL-SCNC: 107 MMOL/L (ref 100–108)
CO2 SERPL-SCNC: 28 MMOL/L (ref 21–32)
CREAT SERPL-MCNC: 0.83 MG/DL (ref 0.6–1.3)
EOSINOPHIL # BLD AUTO: 0.04 THOUSAND/ΜL (ref 0–0.61)
EOSINOPHIL NFR BLD AUTO: 0 % (ref 0–6)
ERYTHROCYTE [DISTWIDTH] IN BLOOD BY AUTOMATED COUNT: 12.4 % (ref 11.6–15.1)
GFR SERPL CREATININE-BSD FRML MDRD: 101 ML/MIN/1.73SQ M
GLUCOSE SERPL-MCNC: 116 MG/DL (ref 65–140)
HCT VFR BLD AUTO: 35.7 % (ref 36.5–49.3)
HGB BLD-MCNC: 11.9 G/DL (ref 12–17)
HGB BLD-MCNC: 9.6 G/DL (ref 12–17)
HGB BLD-MCNC: 9.6 G/DL (ref 12–17)
HGB BLD-MCNC: 9.7 G/DL (ref 12–17)
IMM GRANULOCYTES # BLD AUTO: 0.04 THOUSAND/UL (ref 0–0.2)
IMM GRANULOCYTES NFR BLD AUTO: 0 % (ref 0–2)
LYMPHOCYTES # BLD AUTO: 1.21 THOUSANDS/ΜL (ref 0.6–4.47)
LYMPHOCYTES NFR BLD AUTO: 13 % (ref 14–44)
MAGNESIUM SERPL-MCNC: 2 MG/DL (ref 1.6–2.6)
MCH RBC QN AUTO: 28.7 PG (ref 26.8–34.3)
MCHC RBC AUTO-ENTMCNC: 33.3 G/DL (ref 31.4–37.4)
MCV RBC AUTO: 86 FL (ref 82–98)
MONOCYTES # BLD AUTO: 0.64 THOUSAND/ΜL (ref 0.17–1.22)
MONOCYTES NFR BLD AUTO: 7 % (ref 4–12)
NEUTROPHILS # BLD AUTO: 7.71 THOUSANDS/ΜL (ref 1.85–7.62)
NEUTS SEG NFR BLD AUTO: 80 % (ref 43–75)
NRBC BLD AUTO-RTO: 0 /100 WBCS
P AXIS: 36 DEGREES
P AXIS: 53 DEGREES
PHOSPHATE SERPL-MCNC: 3.1 MG/DL (ref 2.7–4.5)
PLATELET # BLD AUTO: 152 THOUSANDS/UL (ref 149–390)
PMV BLD AUTO: 8.7 FL (ref 8.9–12.7)
POTASSIUM SERPL-SCNC: 3.6 MMOL/L (ref 3.5–5.3)
PR INTERVAL: 132 MS
PR INTERVAL: 152 MS
QRS AXIS: 29 DEGREES
QRS AXIS: 6 DEGREES
QRSD INTERVAL: 76 MS
QRSD INTERVAL: 80 MS
QT INTERVAL: 380 MS
QT INTERVAL: 406 MS
QTC INTERVAL: 438 MS
QTC INTERVAL: 446 MS
RBC # BLD AUTO: 4.14 MILLION/UL (ref 3.88–5.62)
SODIUM SERPL-SCNC: 142 MMOL/L (ref 136–145)
T WAVE AXIS: -8 DEGREES
T WAVE AXIS: 2 DEGREES
VENTRICULAR RATE: 70 BPM
VENTRICULAR RATE: 83 BPM
WBC # BLD AUTO: 9.66 THOUSAND/UL (ref 4.31–10.16)

## 2018-06-15 PROCEDURE — 83735 ASSAY OF MAGNESIUM: CPT | Performed by: PHYSICIAN ASSISTANT

## 2018-06-15 PROCEDURE — 0DBP8ZX EXCISION OF RECTUM, VIA NATURAL OR ARTIFICIAL OPENING ENDOSCOPIC, DIAGNOSTIC: ICD-10-PCS | Performed by: INTERNAL MEDICINE

## 2018-06-15 PROCEDURE — 0DBL8ZX EXCISION OF TRANSVERSE COLON, VIA NATURAL OR ARTIFICIAL OPENING ENDOSCOPIC, DIAGNOSTIC: ICD-10-PCS | Performed by: INTERNAL MEDICINE

## 2018-06-15 PROCEDURE — 85018 HEMOGLOBIN: CPT | Performed by: PHYSICIAN ASSISTANT

## 2018-06-15 PROCEDURE — 0DJ08ZZ INSPECTION OF UPPER INTESTINAL TRACT, VIA NATURAL OR ARTIFICIAL OPENING ENDOSCOPIC: ICD-10-PCS | Performed by: INTERNAL MEDICINE

## 2018-06-15 PROCEDURE — 80048 BASIC METABOLIC PNL TOTAL CA: CPT | Performed by: PHYSICIAN ASSISTANT

## 2018-06-15 PROCEDURE — 88305 TISSUE EXAM BY PATHOLOGIST: CPT | Performed by: PATHOLOGY

## 2018-06-15 PROCEDURE — 45380 COLONOSCOPY AND BIOPSY: CPT | Performed by: INTERNAL MEDICINE

## 2018-06-15 PROCEDURE — 82330 ASSAY OF CALCIUM: CPT | Performed by: PHYSICIAN ASSISTANT

## 2018-06-15 PROCEDURE — 84100 ASSAY OF PHOSPHORUS: CPT | Performed by: PHYSICIAN ASSISTANT

## 2018-06-15 PROCEDURE — 99232 SBSQ HOSP IP/OBS MODERATE 35: CPT | Performed by: INTERNAL MEDICINE

## 2018-06-15 PROCEDURE — 93010 ELECTROCARDIOGRAM REPORT: CPT | Performed by: INTERNAL MEDICINE

## 2018-06-15 PROCEDURE — 0DBN8ZX EXCISION OF SIGMOID COLON, VIA NATURAL OR ARTIFICIAL OPENING ENDOSCOPIC, DIAGNOSTIC: ICD-10-PCS | Performed by: INTERNAL MEDICINE

## 2018-06-15 PROCEDURE — 85025 COMPLETE CBC W/AUTO DIFF WBC: CPT | Performed by: PHYSICIAN ASSISTANT

## 2018-06-15 PROCEDURE — C9113 INJ PANTOPRAZOLE SODIUM, VIA: HCPCS | Performed by: NURSE PRACTITIONER

## 2018-06-15 RX ORDER — PROPOFOL 10 MG/ML
INJECTION, EMULSION INTRAVENOUS AS NEEDED
Status: DISCONTINUED | OUTPATIENT
Start: 2018-06-15 | End: 2018-06-15 | Stop reason: SURG

## 2018-06-15 RX ORDER — SODIUM CHLORIDE, SODIUM GLUCONATE, SODIUM ACETATE, POTASSIUM CHLORIDE, MAGNESIUM CHLORIDE, SODIUM PHOSPHATE, DIBASIC, AND POTASSIUM PHOSPHATE .53; .5; .37; .037; .03; .012; .00082 G/100ML; G/100ML; G/100ML; G/100ML; G/100ML; G/100ML; G/100ML
1000 INJECTION, SOLUTION INTRAVENOUS ONCE
Status: COMPLETED | OUTPATIENT
Start: 2018-06-15 | End: 2018-06-15

## 2018-06-15 RX ORDER — PANTOPRAZOLE SODIUM 40 MG/1
40 INJECTION, POWDER, FOR SOLUTION INTRAVENOUS EVERY 12 HOURS SCHEDULED
Status: DISCONTINUED | OUTPATIENT
Start: 2018-06-15 | End: 2018-06-17

## 2018-06-15 RX ORDER — SODIUM CHLORIDE, SODIUM LACTATE, POTASSIUM CHLORIDE, CALCIUM CHLORIDE 600; 310; 30; 20 MG/100ML; MG/100ML; MG/100ML; MG/100ML
INJECTION, SOLUTION INTRAVENOUS CONTINUOUS PRN
Status: DISCONTINUED | OUTPATIENT
Start: 2018-06-15 | End: 2018-06-15 | Stop reason: SURG

## 2018-06-15 RX ADMIN — CHLORHEXIDINE GLUCONATE 15 ML: 1.2 RINSE ORAL at 21:31

## 2018-06-15 RX ADMIN — POLYETHYLENE GLYCOL 3350, SODIUM SULFATE ANHYDROUS, SODIUM BICARBONATE, SODIUM CHLORIDE, POTASSIUM CHLORIDE 4000 ML: 236; 22.74; 6.74; 5.86; 2.97 POWDER, FOR SOLUTION ORAL at 04:45

## 2018-06-15 RX ADMIN — POLYETHYLENE GLYCOL 3350, SODIUM SULFATE ANHYDROUS, SODIUM BICARBONATE, SODIUM CHLORIDE, POTASSIUM CHLORIDE 4000 ML: 236; 22.74; 6.74; 5.86; 2.97 POWDER, FOR SOLUTION ORAL at 03:00

## 2018-06-15 RX ADMIN — PROPOFOL 50 MG: 10 INJECTION, EMULSION INTRAVENOUS at 15:08

## 2018-06-15 RX ADMIN — SODIUM CHLORIDE, SODIUM LACTATE, POTASSIUM CHLORIDE, AND CALCIUM CHLORIDE: .6; .31; .03; .02 INJECTION, SOLUTION INTRAVENOUS at 14:45

## 2018-06-15 RX ADMIN — POLYETHYLENE GLYCOL 3350, SODIUM SULFATE ANHYDROUS, SODIUM BICARBONATE, SODIUM CHLORIDE, POTASSIUM CHLORIDE 4000 ML: 236; 22.74; 6.74; 5.86; 2.97 POWDER, FOR SOLUTION ORAL at 01:15

## 2018-06-15 RX ADMIN — POLYETHYLENE GLYCOL 3350, SODIUM SULFATE ANHYDROUS, SODIUM BICARBONATE, SODIUM CHLORIDE, POTASSIUM CHLORIDE 4000 ML: 236; 22.74; 6.74; 5.86; 2.97 POWDER, FOR SOLUTION ORAL at 08:07

## 2018-06-15 RX ADMIN — SODIUM CHLORIDE, SODIUM GLUCONATE, SODIUM ACETATE, POTASSIUM CHLORIDE, MAGNESIUM CHLORIDE, SODIUM PHOSPHATE, DIBASIC, AND POTASSIUM PHOSPHATE 125 ML/HR: .53; .5; .37; .037; .03; .012; .00082 INJECTION, SOLUTION INTRAVENOUS at 16:11

## 2018-06-15 RX ADMIN — SODIUM CHLORIDE, SODIUM GLUCONATE, SODIUM ACETATE, POTASSIUM CHLORIDE, MAGNESIUM CHLORIDE, SODIUM PHOSPHATE, DIBASIC, AND POTASSIUM PHOSPHATE 125 ML/HR: .53; .5; .37; .037; .03; .012; .00082 INJECTION, SOLUTION INTRAVENOUS at 05:00

## 2018-06-15 RX ADMIN — PANTOPRAZOLE SODIUM 40 MG: 40 INJECTION, POWDER, FOR SOLUTION INTRAVENOUS at 09:02

## 2018-06-15 RX ADMIN — PROPOFOL 50 MG: 10 INJECTION, EMULSION INTRAVENOUS at 14:55

## 2018-06-15 RX ADMIN — POLYETHYLENE GLYCOL 3350, SODIUM SULFATE ANHYDROUS, SODIUM BICARBONATE, SODIUM CHLORIDE, POTASSIUM CHLORIDE 4000 ML: 236; 22.74; 6.74; 5.86; 2.97 POWDER, FOR SOLUTION ORAL at 04:15

## 2018-06-15 RX ADMIN — POLYETHYLENE GLYCOL 3350, SODIUM SULFATE ANHYDROUS, SODIUM BICARBONATE, SODIUM CHLORIDE, POTASSIUM CHLORIDE 4000 ML: 236; 22.74; 6.74; 5.86; 2.97 POWDER, FOR SOLUTION ORAL at 08:45

## 2018-06-15 RX ADMIN — POLYETHYLENE GLYCOL 3350, SODIUM SULFATE ANHYDROUS, SODIUM BICARBONATE, SODIUM CHLORIDE, POTASSIUM CHLORIDE 4000 ML: 236; 22.74; 6.74; 5.86; 2.97 POWDER, FOR SOLUTION ORAL at 06:04

## 2018-06-15 RX ADMIN — POLYETHYLENE GLYCOL 3350, SODIUM SULFATE ANHYDROUS, SODIUM BICARBONATE, SODIUM CHLORIDE, POTASSIUM CHLORIDE 4000 ML: 236; 22.74; 6.74; 5.86; 2.97 POWDER, FOR SOLUTION ORAL at 03:17

## 2018-06-15 RX ADMIN — POLYETHYLENE GLYCOL 3350, SODIUM SULFATE ANHYDROUS, SODIUM BICARBONATE, SODIUM CHLORIDE, POTASSIUM CHLORIDE 4000 ML: 236; 22.74; 6.74; 5.86; 2.97 POWDER, FOR SOLUTION ORAL at 07:00

## 2018-06-15 RX ADMIN — LIDOCAINE HYDROCHLORIDE 100 MG: 20 INJECTION, SOLUTION INTRAVENOUS at 14:53

## 2018-06-15 RX ADMIN — POLYETHYLENE GLYCOL 3350, SODIUM SULFATE ANHYDROUS, SODIUM BICARBONATE, SODIUM CHLORIDE, POTASSIUM CHLORIDE 4000 ML: 236; 22.74; 6.74; 5.86; 2.97 POWDER, FOR SOLUTION ORAL at 01:32

## 2018-06-15 RX ADMIN — PROPOFOL 50 MG: 10 INJECTION, EMULSION INTRAVENOUS at 15:02

## 2018-06-15 RX ADMIN — POLYETHYLENE GLYCOL 3350, SODIUM SULFATE ANHYDROUS, SODIUM BICARBONATE, SODIUM CHLORIDE, POTASSIUM CHLORIDE 4000 ML: 236; 22.74; 6.74; 5.86; 2.97 POWDER, FOR SOLUTION ORAL at 00:15

## 2018-06-15 RX ADMIN — POLYETHYLENE GLYCOL 3350, SODIUM SULFATE ANHYDROUS, SODIUM BICARBONATE, SODIUM CHLORIDE, POTASSIUM CHLORIDE 4000 ML: 236; 22.74; 6.74; 5.86; 2.97 POWDER, FOR SOLUTION ORAL at 00:29

## 2018-06-15 RX ADMIN — SODIUM CHLORIDE, SODIUM GLUCONATE, SODIUM ACETATE, POTASSIUM CHLORIDE, MAGNESIUM CHLORIDE, SODIUM PHOSPHATE, DIBASIC, AND POTASSIUM PHOSPHATE 1000 ML: .53; .5; .37; .037; .03; .012; .00082 INJECTION, SOLUTION INTRAVENOUS at 08:55

## 2018-06-15 RX ADMIN — POLYETHYLENE GLYCOL 3350, SODIUM SULFATE ANHYDROUS, SODIUM BICARBONATE, SODIUM CHLORIDE, POTASSIUM CHLORIDE 4000 ML: 236; 22.74; 6.74; 5.86; 2.97 POWDER, FOR SOLUTION ORAL at 05:02

## 2018-06-15 RX ADMIN — POLYETHYLENE GLYCOL 3350, SODIUM SULFATE ANHYDROUS, SODIUM BICARBONATE, SODIUM CHLORIDE, POTASSIUM CHLORIDE 4000 ML: 236; 22.74; 6.74; 5.86; 2.97 POWDER, FOR SOLUTION ORAL at 08:30

## 2018-06-15 RX ADMIN — PROPOFOL 50 MG: 10 INJECTION, EMULSION INTRAVENOUS at 15:04

## 2018-06-15 RX ADMIN — POLYETHYLENE GLYCOL 3350, SODIUM SULFATE ANHYDROUS, SODIUM BICARBONATE, SODIUM CHLORIDE, POTASSIUM CHLORIDE 4000 ML: 236; 22.74; 6.74; 5.86; 2.97 POWDER, FOR SOLUTION ORAL at 00:00

## 2018-06-15 RX ADMIN — POLYETHYLENE GLYCOL 3350, SODIUM SULFATE ANHYDROUS, SODIUM BICARBONATE, SODIUM CHLORIDE, POTASSIUM CHLORIDE 4000 ML: 236; 22.74; 6.74; 5.86; 2.97 POWDER, FOR SOLUTION ORAL at 05:45

## 2018-06-15 RX ADMIN — POLYETHYLENE GLYCOL 3350, SODIUM SULFATE ANHYDROUS, SODIUM BICARBONATE, SODIUM CHLORIDE, POTASSIUM CHLORIDE 4000 ML: 236; 22.74; 6.74; 5.86; 2.97 POWDER, FOR SOLUTION ORAL at 06:45

## 2018-06-15 RX ADMIN — POLYETHYLENE GLYCOL 3350, SODIUM SULFATE ANHYDROUS, SODIUM BICARBONATE, SODIUM CHLORIDE, POTASSIUM CHLORIDE 4000 ML: 236; 22.74; 6.74; 5.86; 2.97 POWDER, FOR SOLUTION ORAL at 04:30

## 2018-06-15 RX ADMIN — PROPOFOL 50 MG: 10 INJECTION, EMULSION INTRAVENOUS at 14:59

## 2018-06-15 RX ADMIN — POLYETHYLENE GLYCOL 3350, SODIUM SULFATE ANHYDROUS, SODIUM BICARBONATE, SODIUM CHLORIDE, POTASSIUM CHLORIDE 4000 ML: 236; 22.74; 6.74; 5.86; 2.97 POWDER, FOR SOLUTION ORAL at 02:30

## 2018-06-15 RX ADMIN — POLYETHYLENE GLYCOL 3350, SODIUM SULFATE ANHYDROUS, SODIUM BICARBONATE, SODIUM CHLORIDE, POTASSIUM CHLORIDE 4000 ML: 236; 22.74; 6.74; 5.86; 2.97 POWDER, FOR SOLUTION ORAL at 03:45

## 2018-06-15 RX ADMIN — POLYETHYLENE GLYCOL 3350, SODIUM SULFATE ANHYDROUS, SODIUM BICARBONATE, SODIUM CHLORIDE, POTASSIUM CHLORIDE 4000 ML: 236; 22.74; 6.74; 5.86; 2.97 POWDER, FOR SOLUTION ORAL at 03:30

## 2018-06-15 RX ADMIN — PROPOFOL 50 MG: 10 INJECTION, EMULSION INTRAVENOUS at 15:12

## 2018-06-15 RX ADMIN — POLYETHYLENE GLYCOL 3350, SODIUM SULFATE ANHYDROUS, SODIUM BICARBONATE, SODIUM CHLORIDE, POTASSIUM CHLORIDE 4000 ML: 236; 22.74; 6.74; 5.86; 2.97 POWDER, FOR SOLUTION ORAL at 05:30

## 2018-06-15 RX ADMIN — POLYETHYLENE GLYCOL 3350, SODIUM SULFATE ANHYDROUS, SODIUM BICARBONATE, SODIUM CHLORIDE, POTASSIUM CHLORIDE 4000 ML: 236; 22.74; 6.74; 5.86; 2.97 POWDER, FOR SOLUTION ORAL at 07:15

## 2018-06-15 RX ADMIN — POLYETHYLENE GLYCOL 3350, SODIUM SULFATE ANHYDROUS, SODIUM BICARBONATE, SODIUM CHLORIDE, POTASSIUM CHLORIDE 4000 ML: 236; 22.74; 6.74; 5.86; 2.97 POWDER, FOR SOLUTION ORAL at 05:15

## 2018-06-15 RX ADMIN — POLYETHYLENE GLYCOL 3350, SODIUM SULFATE ANHYDROUS, SODIUM BICARBONATE, SODIUM CHLORIDE, POTASSIUM CHLORIDE 4000 ML: 236; 22.74; 6.74; 5.86; 2.97 POWDER, FOR SOLUTION ORAL at 02:15

## 2018-06-15 RX ADMIN — POLYETHYLENE GLYCOL 3350, SODIUM SULFATE ANHYDROUS, SODIUM BICARBONATE, SODIUM CHLORIDE, POTASSIUM CHLORIDE 4000 ML: 236; 22.74; 6.74; 5.86; 2.97 POWDER, FOR SOLUTION ORAL at 07:45

## 2018-06-15 RX ADMIN — POLYETHYLENE GLYCOL 3350, SODIUM SULFATE ANHYDROUS, SODIUM BICARBONATE, SODIUM CHLORIDE, POTASSIUM CHLORIDE 4000 ML: 236; 22.74; 6.74; 5.86; 2.97 POWDER, FOR SOLUTION ORAL at 08:00

## 2018-06-15 RX ADMIN — PANTOPRAZOLE SODIUM 40 MG: 40 INJECTION, POWDER, FOR SOLUTION INTRAVENOUS at 21:32

## 2018-06-15 RX ADMIN — SODIUM CHLORIDE, SODIUM GLUCONATE, SODIUM ACETATE, POTASSIUM CHLORIDE, MAGNESIUM CHLORIDE, SODIUM PHOSPHATE, DIBASIC, AND POTASSIUM PHOSPHATE 125 ML/HR: .53; .5; .37; .037; .03; .012; .00082 INJECTION, SOLUTION INTRAVENOUS at 23:29

## 2018-06-15 RX ADMIN — PROPOFOL 150 MG: 10 INJECTION, EMULSION INTRAVENOUS at 14:53

## 2018-06-15 RX ADMIN — CHLORHEXIDINE GLUCONATE 15 ML: 1.2 RINSE ORAL at 09:00

## 2018-06-15 RX ADMIN — POLYETHYLENE GLYCOL 3350, SODIUM SULFATE ANHYDROUS, SODIUM BICARBONATE, SODIUM CHLORIDE, POTASSIUM CHLORIDE 4000 ML: 236; 22.74; 6.74; 5.86; 2.97 POWDER, FOR SOLUTION ORAL at 04:00

## 2018-06-15 RX ADMIN — POLYETHYLENE GLYCOL 3350, SODIUM SULFATE ANHYDROUS, SODIUM BICARBONATE, SODIUM CHLORIDE, POTASSIUM CHLORIDE 4000 ML: 236; 22.74; 6.74; 5.86; 2.97 POWDER, FOR SOLUTION ORAL at 06:30

## 2018-06-15 RX ADMIN — POLYETHYLENE GLYCOL 3350, SODIUM SULFATE ANHYDROUS, SODIUM BICARBONATE, SODIUM CHLORIDE, POTASSIUM CHLORIDE 4000 ML: 236; 22.74; 6.74; 5.86; 2.97 POWDER, FOR SOLUTION ORAL at 08:15

## 2018-06-15 RX ADMIN — POLYETHYLENE GLYCOL 3350, SODIUM SULFATE ANHYDROUS, SODIUM BICARBONATE, SODIUM CHLORIDE, POTASSIUM CHLORIDE 4000 ML: 236; 22.74; 6.74; 5.86; 2.97 POWDER, FOR SOLUTION ORAL at 01:45

## 2018-06-15 RX ADMIN — POLYETHYLENE GLYCOL 3350, SODIUM SULFATE ANHYDROUS, SODIUM BICARBONATE, SODIUM CHLORIDE, POTASSIUM CHLORIDE 4000 ML: 236; 22.74; 6.74; 5.86; 2.97 POWDER, FOR SOLUTION ORAL at 02:12

## 2018-06-15 NOTE — PLAN OF CARE
DISCHARGE PLANNING     Discharge to home or other facility with appropriate resources Progressing        GASTROINTESTINAL - ADULT     Minimal or absence of nausea and/or vomiting Progressing     Maintains or returns to baseline bowel function Progressing     Maintains adequate nutritional intake Progressing        HEMATOLOGIC - ADULT     Maintains hematologic stability Progressing        Knowledge Deficit     Patient/family/caregiver demonstrates understanding of disease process, treatment plan, medications, and discharge instructions Progressing        METABOLIC, FLUID AND ELECTROLYTES - ADULT     Electrolytes maintained within normal limits Progressing     Fluid balance maintained Progressing        PAIN - ADULT     Verbalizes/displays adequate comfort level or baseline comfort level Progressing        Potential for Falls     Patient will remain free of falls Progressing        SAFETY ADULT     Maintain or return to baseline ADL function Progressing

## 2018-06-15 NOTE — ANESTHESIA PREPROCEDURE EVALUATION
Review of Systems/Medical History    Chart reviewed  No history of anesthetic complications     Cardiovascular  Exercise tolerance (METS): >4,  Hyperlipidemia, Hypertension ,    Pulmonary       GI/Hepatic    GERD ,   Comment: BRB rectum          Endo/Other     GYN       Hematology   Musculoskeletal    Arthritis     Neurology   Psychology       Study date: 06/05/2018    Interventional Cardiologist:  Jf Dickinson MD     SUMMARY     CORONARY CIRCULATION:  Left main: Normal   LAD: Normal  DHRUV II flow  The vessel was normal sized  1st diagonal: Normal   Circumflex: Normal  DHRUV II flow  Ramus intermedius: Normal   RCA: Normal  DHRUV II flow  Right PDA: Normal   Right posterolateral segment: Normal               Anesthesia Plan  ASA Score- 2     Anesthesia Type- IV sedation with anesthesia with ASA Monitors  Additional Monitors:   Airway Plan:         Plan Factors- Patient instructed to abstain from smoking on day of procedure  Patient did not smoke on day of surgery  Induction- intravenous  Postoperative Plan- Plan for postoperative opioid use  Planned trial extubation    Informed Consent- Anesthetic plan and risks discussed with patient

## 2018-06-15 NOTE — ANESTHESIA POSTPROCEDURE EVALUATION
Post-Op Assessment Note      CV Status:  Stable    Mental Status:  Alert and awake    Hydration Status:  Euvolemic    PONV Controlled:  Controlled    Airway Patency:  Patent    Post Op Vitals Reviewed: Yes          Staff: CRNA           BP   113/69   Temp     Pulse  79   Resp   16   SpO2   100%

## 2018-06-15 NOTE — ED NOTES
Pt more alert answering question appropriately vomitted x 2   Will continue monitoring patient     Rebeca Ramos RN  06/14/18 2023

## 2018-06-15 NOTE — OP NOTE
**** GI/ENDOSCOPY REPORT ****     PATIENT NAME: Yuli Martinez ------ VISIT ID:  Patient ID:   [id number] YOB: 1965     INTRODUCTION: Colonoscopy - A 47 male patient presents for an inpatient   Colonoscopy at [facility]  PREVIOUS COLONOSCOPY: Patient's last colonoscopy was more than 10 years   ago  INDICATIONS: Hematochezia  Abdominal pain  CONSENT:  The benefits, risks, and alternatives to the procedure were   discussed and informed consent was obtained from the patient  PREPARATION: EKG, pulse, pulse oximetry and blood pressure were monitored   throughout the procedure  The patient was identified by myself both   verbally and by visual inspection of ID band  Airway Assessment   Classification: Airway class 2 - Visualization of the soft palate, fauces   and uvula  ASA Classification: Class 3 - Patient has severe systemic   disturbance that may or may not be related to the disorder requiring   surgery  MEDICATIONS: Anesthesia-check records     PROCEDURE:  The endoscope was passed without difficulty through the anus   under direct visualization and advanced to the terminal ileum  The scope   was withdrawn and the mucosa was carefully examined  The quality of the   preparation was good  Retroflexion was performed  Cecal Intubation Time: 1   minute(s) Scope Withdrawal Time: 18 minutes(s)     RECTAL EXAM: Normal rectal exam      FINDINGS:  There was evidence of mild colitis in the splenic flexure  Multiple biopsies was taken  Sigmoid Erosion  A biopsy was taken from the   sigmoid colon  A single sessile polyp, measuring less than 5 mm in size,   was found in the rectum  The polyp was removed by cold biopsy polypectomy  COMPLICATIONS: There were no complications  IMPRESSIONS: Mild colitis found in the splenic flexure - likely ischemic   at watershed area  This was probably the source of bleeding  Multiple   biopsies taken  Sigmoid Erosion   Biopsy taken  A single sessile polyp   found in the rectum; removed by cold biopsy polypectomy  RECOMMENDATIONS: Follow-up on the results of the biopsy specimens  Colonoscopy recommended in 10 years or sooner if clinically indicated  Return to floor and monitor for further bleeding  ESTIMATED BLOOD LOSS:     PATHOLOGY SPECIMENS: Multiple biopsies taken  Associated finding: Colitis  Biopsy taken from sigmoid colon  Associated finding: Erosion  Removed by   cold biopsy polypectomy  PROCEDURE CODES:     ICD-9 Codes: 569 3 Hemorrhage of rectum and anus 789 00 Abdominal pain,   unspecified site 558 9 Other and unspecified noninfectious   gastroenteritides and colitis 211 4 Benign neoplasm of rectum and anal   canal     ICD-10 Codes: K92 1 Melena R10 Abdominal and pelvic pain K52 9   Noninfective gastroenteritis and colitis, unspecified K63 5 Polyp of colon     PERFORMED BY: LEILANI Infante  on 06/15/2018  Version 1, electronically signed by Dr Merlinda Combe, M D  on 06/15/2018   at 15:50

## 2018-06-15 NOTE — H&P
History and Physical - Critical Care   Enzo Bonner 47 y o  male MRN: [id number]  Unit/Bed#:  Encounter: [id number]    Reason for Admission / Chief Complaint: Acute lower GI Bleed    History of Present Illness:  Enzo Bonner is a 47 y o  male who presents to the [address] [facility] emergency department with a PMH of HTN and HLD with acute lower GI Bleeding  Patient had been in his normal state of health until approximately February  At that time, he was admitted for atypical chest pain and admitted 2/25-2/26 with atypical chest pain  He had troponins trended that were negative x3 as well as an ECHO that showed and EF of 60%, no RWMA, No diastolic dysfunction and trace MR and MR as well as a stress test that showed a moderate to large perfusion deficit of the basal-mid inferior and inferoseptal wall and apical cap region that likely was a diaphragmatic attenuation artifact  He was discharged home with holter monitor and cardiology follow up  He, once again had an admission for chest pain in June from 6/5-6/6 at which time he underwent cardiac cath, this was normal and patient was once again discharged, this time on Protonix as it appeared without cardiac answer to chest pain, GERD may be causing the symptoms  Ironically, patient was to be scheduled for his screening colonoscopy so he was set up with an appointment with Dr Kalina Kelsey on 6/12 with plan for EGD/[place] next Tuesday  At this appointment, patient was told to stop his baby aspirin which he did  Following that appointment, patient had an episode of crampy abdominal pain, different than the chest pain that he was having prompting last hospital admissions followed by a stool that he thought looked like it had blood clots in it  Over the course of the night Tuesday and day Wednesday, patient went about his normal routine without any issue  Tonight, 6/14, patient arrived home from work at about 1500   At 1600, he ate dinner, and immediately following, patient had episode of crampy abdominal pain followed by BRBPR  He proceded to have 3 more frankly bloody bowel movements so he was brought to the [facility] emergency department  While in the Emergency department, patient preceded to have continued frankly bloody stools  He was presyncopal following them  His BP also decreased to 82/52 however improved with fluids  Thankfully, patient is on losartin at home which he takes nightly and did not take tonight  Patient also had episode of vomiting in emergency department too which was non-bloody  A CT was obtained that showed extrav of IV contrast in the proximal descending colon  On arrival, his H/H was 14 1 on arrival at [facility], his repeat at 12 was 12 8  Because of this and volume of GIB, patient was referred for stepdown admission  Dr Agustín Pandey of GI was notified and suggested watching patient in the SDU tonight, undergoing bowel prep, and undergoing colonoscopy +/- EGD tomorrow AM     History obtained from chart review and the patient      Past Medical History:  Past Medical History:   Diagnosis Date    Hypercholesterolemia     Hypertension     Obesity     Sexual dysfunction     Psychosexual dysfunction with inhibited sexual excitement        Past Surgical History:  Past Surgical History:   Procedure Laterality Date    BACK SURGERY      spinal fusion    CARDIAC CATHETERIZATION      SEPTOPLASTY         Past Family History:  Family History   Problem Relation Age of Onset    Atrial fibrillation Mother     COPD Mother     Coronary artery disease Mother     Lung cancer Mother     Rheum arthritis Mother     Stroke Mother         Syndrome    Lung cancer Father     Prostate cancer Maternal Grandfather        Social History:  History   Smoking Status    Former Smoker    Quit date: 2/25/1998   Smokeless Tobacco    Current User    Types: Chew     History   Alcohol Use No     Comment: Occ alcohol use per [employer]      History   Drug Use No     Marital Status: /Civil Union  Exercise History: Independent ADLs    Medications:  Current Facility-Administered Medications   Medication Dose Route Frequency    chlorhexidine (PERIDEX) 0 12 % oral rinse 15 mL  15 mL Swish & Spit Q12H [facility]    multi-electrolyte (ISOLYTE-S PH 7 4 equivalent) IV solution  125 mL/hr Intravenous Continuous    [START ON 6/15/2018] pantoprazole (PROTONIX) EC tablet 40 mg  40 mg Oral BID AC    polyethylene glycol (GOLYTELY) bowel prep 4,000 mL  4,000 mL Oral Q15 Min     Home medications:  Prior to Admission medications    Medication Sig Start Date End Date Taking? Authorizing Provider   aluminum hydroxide-magnesium carbonate (GAVISCON)  mg/15 mL oral suspension Take 15 mL by mouth 4 (four) times daily (after meals and at bedtime) 6/12/18   ASHLEY Conner   aspirin 81 mg chewable tablet Chew 1 tablet (81 mg total) daily 6/6/18   Bing Hayes MD   isosorbide mononitrate (IMDUR) 30 mg 24 hr tablet Take 1 tablet (30 mg total) by mouth daily 6/7/18   Bing Hayes MD   meclizine (ANTIVERT) 25 mg tablet Take 1 tablet (25 mg total) by mouth 3 (three) times a day as needed for dizziness 4/4/18   Sheela Melton PA-C   pantoprazole (PROTONIX) 40 mg tablet Take 1 tablet (40 mg total) by mouth 2 (two) times a day before meals 6/6/18   Bing Hayes MD   rosuvastatin (CRESTOR) 5 mg tablet Take 1 tablet (5 mg total) by mouth daily 5/8/18   Yves Fountain MD   valsartan (DIOVAN) 320 MG tablet Take 1 tablet (320 mg total) by mouth daily 5/8/18   Yves Fountain MD     Allergies:  No Known Allergies    ROS:   Review of Systems   Constitutional: Positive for fatigue  Respiratory: Negative for chest tightness and shortness of breath  Cardiovascular: Negative for chest pain, palpitations and leg swelling  Gastrointestinal: Positive for abdominal distention, abdominal pain, blood in stool, diarrhea and vomiting  Neurological: Positive for dizziness and light-headedness     Hematological: Does not bruise/bleed easily  All other systems reviewed and are negative  Vitals:  Vitals:    18   BP: 111/76 105/73 110/66 117/74   BP Location: Left arm Left arm Left arm Left arm   Pulse: 65 68 67 64   Resp: 20 (!) 27 (!) 24 (!) 28   Temp:       TempSrc:       SpO2: 100% 100% 100% 100%   Weight:       Height:         Temperature:   Temp (24hrs), Av 7 °F (37 1 °C), Min:98 7 °F (37 1 °C), Max:98 7 °F (37 1 °C)    Current Temperature: 98 7 °F (37 1 °C)    Weights:   IBW: 63 8 kg  Body mass index is 39 22 kg/m²  Hemodynamic Monitoring:  N/A     Non-Invasive/Invasive Ventilation Settings:  Respiratory    Lab Data (Last 4 hours)    None         O2/Vent Data (Last 4 hours)    None              No results found for: PHART, TXW2LSX, PO2ART, SIQ0DMV, W0IWEPCJ, BEART, SOURCE  SpO2: SpO2: 100 %, SpO2 Activity: SpO2 Activity: At Rest, SpO2 Device: O2 Device: Nasal cannula     Physical Exam:  Physical Exam   Constitutional: He appears well-developed and well-nourished  No distress  HENT:   Head: Normocephalic and atraumatic  Mouth/Throat: Mucous membranes are dry  Eyes: EOM are normal  Pupils are equal, round, and reactive to light  Neck: Trachea normal and phonation normal  Neck supple  No JVD present  Cardiovascular: Normal rate, regular rhythm, S1 normal, S2 normal and normal pulses  No extrasystoles are present  Exam reveals no gallop  No murmur heard  Pulmonary/Chest: Effort normal and breath sounds normal  No accessory muscle usage  Tachypnea noted  No respiratory distress  He has no rhonchi  He has no rales  Nursing note and vitals reviewed        Labs:    Results from last 7 days  Lab Units 18   WBC Thousand/uL 10 92* 7 37   HEMOGLOBIN g/dL 12 8 14 1   HEMATOCRIT % 39 2 42 8   PLATELETS Thousands/uL 200 166   NEUTROS PCT % 67 69   MONOS PCT % 8 8       Results from last 7 days  Lab Units 18 SODIUM mmol/L 142   POTASSIUM mmol/L 3 7   CHLORIDE mmol/L 105   CO2 mmol/L 28   BUN mg/dL 20   CREATININE mg/dL 1 14   CALCIUM mg/dL 9 0   ANION GAP mmol/L 9   ALBUMIN g/dL 3 6   BILIRUBIN TOTAL mg/dL 0 50   ALK PHOS U/L 59   ALT U/L 25   AST U/L 14   GLUCOSE RANDOM mg/dL 131            Results from last 7 days  Lab Units 06/14/18 1956   INR  1 08   PTT seconds 28             ABG:No results found for: PHART, ODI3XCH, PO2ART, TXE9FMX, Y0NYKWAV, BEART, SOURCE  VBG:      Imaging:   CT abdomen pelvis with contrast   Final Result by Francisco Javier Calabrese MD (06/14 1918)      Extravasation of the administered intravenous contrast noted within the lumen of the proximal descending colon compatible with active GI bleeding      No free air or free fluid  Mild splenomegaly  I personally discussed this study with Eva Orlando on 6/14/2018 at 7:14 PM                    Workstation performed: [id number]              I have personally reviewed pertinent films in PACS  EKG: This was personally reviewed by myself  Micro:  No results found for: Wayne Garcia    ______________________________________________________________________    Assessment:   Principal Problem:    Bright red blood per rectum  Active Problems:    Hypertension    Hypercholesterolemia    Chest pain    Gastroesophageal reflux disease without esophagitis    Dyspepsia    Blood creatinine increased compared with prior measurement    Leukocytosis  Resolved Problems:    * No resolved hospital problems  *    Plan:    Neuro:   No issues  Patient not on any chronic outpatient pain reg  Patient complaining of crampy pain in abdomen likely 2/2 GIB  PRN tylenol  Regulate sleep/wake cycle with minimal interruptions if possible    CV:   HTN  - Holding home diovan    - Patient did not take this for 24 hours    HLD  - Holding Statin while NPO    Chest Pain  - Patient with 2 recent hospital admissions for this with full work up including a stress test, cardiac cath, and holter monitor, all negative  - Patient currently not complaining of any chest pain, but will send troponin and trend if elevated due to ongoing cardiac work up  - Rhythm: NSR  Follow rhythm on telemetry    ECHO: 5/08: Systolic function was normal  Ejection fraction was estimated to be 60 %  There were no regional wall motion abnormalities  Left ventricular diastolic function parameters were normal  Trace TR and MR    Pulm:   No issues  Tachypnea likely a combination of crampy abdominal pain as well as compensation for GIB  Continue to monitor  Patient not complaining of SOB  No supplemental O2 needed  Will place NC for sats < 92%  Encourage deep breathing/coughing/IS/PulmToileting       GI:   BRBPR  - Patient with clotty BM on Tuesday with beau bloody bowel movements tonight with some presyncopal symptoms and hypotension in ED which were fluid responsive  - H/H with drop 14 1 to 12 8 in less than 2 hours  - Large bore IVs  - Type and screen obtained as well as blood consent  - No AC/AP, has been off aspirin since Tuesday  - Trend H/H Q6 hours  - Monitor for tachycardia/hypotension/increasing tachypnea  - Dr Shea Flow of GI aware  Patient ordered bowel prep with probable [place] and ? EGD tomorrow    GERD  - Continue BID protonix  - ? EGD tomorrow    Nutrition/diet plan: NPO  Stress ulcer prophylaxis: Protonix PO    :   Cr elevated Above Baseline  - Patient does not yet met YELENA criteria, however at risk due to intravascular volume depletion as well as contrast load  - Will aggressively fluid hydrate  - Accurate I and Os  Indwelling [facility] present: no       FEN:   No issues   No acidosis on BMP but will send lactic with next H/H check  Fluid/Diuretic plan: Needs volume Isolyte 125 cc hr  Goal 24 hour fluid balance: Resuscitation   Electrolytes repleted: yes  Goal: K >4 0, Mag >2 0, and Phos >3 0    ID:   Leukocytosis  - Mild  - Likely 2/2 GIB    Trend temps and WBC count    Heme:   ABLA  - 2/2 GIB   - Q6 H/H  - Blood consent in chart  - 2 large bore IVs at all times  Trend hgb and plts  Transfuse as needed for goal hgb >7    Endo:   No issues  Glycemic control plan: N/A    Msk/Skin:  Fall precautions  Mobility goal: OOB with assist  PT consult: When appropriate  OT consult: When appropriate  Frequent turning and off-loading    Family:  Family updated within 24 hours: yes   Family meeting planned today: no     Lines:  None    VTE Prophylaxis:  Pharmacologic Prophylaxis: Reason for no pharmacologic prophylaxis GIB  Mechanical Prophylaxis: sequential compression device    Disposition: Continue Stepdown    Code Status: Level 1 - Full Code    Counseling / Coordination of Care  Total Critical Care time spent 42 minutes excluding procedures, teaching and family updates  ______________________________________________________________________    Invasive lines and devices: Invasive Devices     Peripheral Intravenous Line            Peripheral IV 06/14/18 Right Antecubital less than 1 day    Peripheral IV 06/14/18 Right Hand less than 1 day                Code Status: Level 1 - Full Code  POA:    POLST:      Given critical illness, patient length of stay will require greater than two midnights  Portions of the record may have been created with voice recognition software  Occasional wrong word or "sound a like" substitutions may have occurred due to the inherent limitations of voice recognition software  Read the chart carefully and recognize, using context, where substitutions have occurred        Calos Avalos PA-C

## 2018-06-15 NOTE — CASE MANAGEMENT
Initial Clinical Review    Admission: Date/Time/Statement: 6/14/18 @ 2009     Orders Placed This Encounter   Procedures    Inpatient Admission (expected length of stay for this patient is greater than two midnights)     Standing Status:   Standing     Number of Occurrences:   1     Order Specific Question:   Admitting Physician     Answer:   Liborio Mortimer     Order Specific Question:   Level of Care     Answer:   Level 1 Stepdown [13]     Order Specific Question:   Estimated length of stay     Answer:   More than 2 Midnights     Order Specific Question:   Certification     Answer:   I certify that inpatient services are medically necessary for this patient for a duration of greater than two midnights  See H&P and MD Progress Notes for additional information about the patient's course of treatment  ED: Date/Time/Mode of Arrival:   ED Arrival Information     Expected Arrival Acuity Means of Arrival Escorted By Service Admission Type    - 6/14/2018 17:07 Urgent Walk-In Spouse General Medicine Urgent    Arrival Complaint    blood in urin          Chief Complaint:   Chief Complaint   Patient presents with    Black or Bloody Stool     Pt states he has had rectal bleeding since Tuesday  Pt states he came in today because there was more blood than the last 2 days  Pt c/o RUQ pain       History of Illness: 47 y o  male presenting with bright red blood per rectum  Patient notes his 1st episode on Tuesday, with dark red blood in the toilet  Yesterday, he had a normal bowel, but today he came home from work and had a large, loose bowel movement with a large quantity of bright red blood  He denies chest pain, shortness of breath, trauma, prior episodes other than this week    He also complains of abdominal pain, although he is unable to clearly localize where it is worst   Patient has had a history of chest pain in the past, and had a negative catheterization recently, he is now seen GI with concern for possible ulcer and has an endoscopy and colonoscopy scheduled for Tuesday of next week  ED Vital Signs:   ED Triage Vitals [06/14/18 1733]   Temperature Pulse Respirations Blood Pressure SpO2   98 7 °F (37 1 °C) 80 20 119/91 99 %      Temp Source Heart Rate Source Patient Position - Orthostatic VS BP Location FiO2 (%)   Oral Monitor Sitting Left arm --      Pain Score       5        Wt Readings from Last 1 Encounters:   06/14/18 110 kg (243 lb)       Vital Signs (abnormal):   Date and Time Temp Pulse SpO2 Resp BP   06/14/18 2030 -- 64 100 %  28 117/74   06/14/18 2020 -- 67 100 %  24 110/66   06/14/18 2015 -- 68 100 %  27 105/73   06/14/18 2012 -- 65 100 % 20 111/76   06/14/18 2004 -- 70 98 %  24  82/52   06/14/18 1945 -- 79 98 %  32 92/62     Abnormal Labs/Diagnostic Test Results: WBC 10 92    CT of Abd/Pelvis: Extravasation of the administered intravenous contrast noted within the lumen of the proximal descending colon compatible with active GI bleeding  No free air or free fluid  Mild splenomegaly  ED Treatment:   Medication Administration from 06/14/2018 1706 to 06/14/2018 2059       Date/Time Order Dose Route Action Action by Comments     06/14/2018 1850 iohexol (OMNIPAQUE) 350 MG/ML injection (MULTI-DOSE) 100 mL 100 mL Intravenous Given Honor       06/14/2018 2045 polyethylene glycol (GOLYTELY) bowel prep 4,000 mL 4,000 mL Oral Not Given Val Benton RN         Physical Exam   Constitutional: He is oriented to person, place, and time  He appears well-developed and well-nourished  Cardiovascular: Normal rate, regular rhythm, normal heart sounds and intact distal pulses  No murmur heard  Pulmonary/Chest: Effort normal and breath sounds normal  No stridor  No respiratory distress  He has no wheezes  He has no rales  Abdominal: Soft  He exhibits no distension  There is no tenderness  There is no rebound and no guarding     Genitourinary:   Genitourinary Comments: Small bright red blood noted on rectal exam, skin tag and hemorrhoid noted but unclear if that is the source of the bleeding  Past Medical/Surgical History: Active Ambulatory Problems     Diagnosis Date Noted    Hypertension 02/25/2018    Hypercholesterolemia 02/25/2018    Morbid obesity due to excess calories ([facility] [address] ) 02/25/2018    Chewing tobacco nicotine dependence with nicotine-induced disorder 02/25/2018    Allergic rhinitis 01/10/2014    Lumbar radiculopathy 09/15/2015    Dehydration 03/27/2018    Vertigo 04/05/2018    Spondylosis of cervical region without myelopathy or radiculopathy 04/05/2018    Tobacco abuse 04/05/2018    Screening for malignant neoplasm of colon 05/08/2018    Irritant contact dermatitis due to other agents 05/08/2018    Screening for prostate cancer 05/08/2018    Screen for colon cancer 05/29/2018    Chest pain 06/05/2018    Gastroesophageal reflux disease without esophagitis 06/06/2018    Dyspepsia 06/12/2018    Encounter for screening colonoscopy 06/12/2018     Resolved Ambulatory Problems     Diagnosis Date Noted    No Resolved Ambulatory Problems     Past Medical History:   Diagnosis Date    Hypercholesterolemia     Hypertension     Obesity     Sexual dysfunction        Admitting Diagnosis: Blood in stool [K92 1]  Dyspepsia [R10 13]  GI bleed [K92 2]    Age/Sex: 47 y o  male    Assessment/Plan:     Assessment:   Principal Problem:    Bright red blood per rectum  Active Problems:    Hypertension    Hypercholesterolemia    Chest pain    Gastroesophageal reflux disease without esophagitis    Dyspepsia    Blood creatinine increased compared with prior measurement    Leukocytosis  Resolved Problems:    * No resolved hospital problems  *     Plan:     Neuro:   No issues  Patient not on any chronic outpatient pain reg  Patient complaining of crampy pain in abdomen likely 2/2 GIB  PRN tylenol   Regulate sleep/wake cycle with minimal interruptions if possible     CV:   HTN  - Holding home diovan  - Patient did not take this for 24 hours     HLD  - Holding Statin while NPO     Chest Pain  - Patient with 2 recent hospital admissions for this with full work up including a stress test, cardiac cath, and holter monitor, all negative  - Patient currently not complaining of any chest pain, but will send troponin and trend if elevated due to ongoing cardiac work up  - Rhythm: NSR  Follow rhythm on telemetry     ECHO: 6/25: Systolic function was normal  Ejection fraction was estimated to be 60 %  There were no regional wall motion abnormalities  Left ventricular diastolic function parameters were normal  Trace TR and MR     Pulm:   No issues  Tachypnea likely a combination of crampy abdominal pain as well as compensation for GIB  Continue to monitor  Patient not complaining of SOB  No supplemental O2 needed  Will place NC for sats < 92%  Encourage deep breathing/coughing/IS/PulmToileting         GI:   [facility]  - Patient with clotty BM on Tuesday with beau bloody bowel movements tonight with some presyncopal symptoms and hypotension in ED which were fluid responsive  - H/H with drop 14 1 to 12 8 in less than 2 hours  - Large bore IVs  - Type and screen obtained as well as blood consent  - No AC/AP, has been off aspirin since Tuesday  - Trend H/H Q6 hours  - Monitor for tachycardia/hypotension/increasing tachypnea  - Dr Sergio Sims of GI aware  Patient ordered bowel prep with probable [place] and ? EGD tomorrow     GERD  - Continue BID protonix  - ? EGD tomorrow     Nutrition/diet plan: NPO  Stress ulcer prophylaxis: Protonix PO     :   Cr elevated Above Baseline  - Patient does not yet met YELENA criteria, however at risk due to intravascular volume depletion as well as contrast load  - Will aggressively fluid hydrate  - Accurate I and Os      Indwelling [facility] present: no         FEN:   No issues   No acidosis on BMP but will send lactic with next H/H check  Fluid/Diuretic plan: Needs volume Isolyte 125 cc hr  Goal 24 hour fluid balance: Resuscitation   Electrolytes repleted: yes  Goal: K >4 0, Mag >2 0, and Phos >3 0     ID:   Leukocytosis  - Mild  - Likely 2/2 GIB     Trend temps and WBC count     Heme:   ABLA  - 2/2 GIB   - Q6 H/H  - Blood consent in chart  - 2 large bore IVs at all times  Trend hgb and plts  Transfuse as needed for goal hgb >7     Endo:   No issues  Glycemic control plan: N/A     Msk/Skin:  Fall precautions     Mobility goal: OOB with assist  PT consult: When appropriate  OT consult: When appropriate  Frequent turning and off-loading     Family:  Family updated within 24 hours: yes   Family meeting planned today: no      Lines:  None     VTE Prophylaxis:  Pharmacologic Prophylaxis: Reason for no pharmacologic prophylaxis GIB  Mechanical Prophylaxis: sequential compression device     Disposition: Continue Stepdown       Admission Orders:  Inpatient/StepDown  Continuous Cardiac Monitoring  Consult GI r/e gi bleed   Bilateral Sequential Compression Device  H/H q6h  Neuro Checks q4h  NPO    Isolyte-S pH 7 4 @ 125    Scheduled Meds:   Current Facility-Administered Medications:  chlorhexidine 15 mL Swish [employer] Spit Q12H [address]   pantoprazole 40 mg Intravenous Q12H [address]

## 2018-06-15 NOTE — ED NOTES
Dr Selin Jose at bedside with pt  Pt ambulating back from bathroom and became dizzy  Pt helped back to room with assistance of nurse and physician  Pt visibly pale, diaphoretic , and unable to answer questions appropriately  I NORAH valenzuela vitals and ekg obtained  Second IV started and CBC drawn        Christina Baird RN  06/14/18 1565

## 2018-06-15 NOTE — PROGRESS NOTES
Daily Progress Note - Critical Care/ Stepdown   Dustin Prieto 47 y o  male MRN: [id number]  Unit/Bed#:  Encounter: [id number]    Assessment:   Principal Problem:    Bright red blood per rectum  Active Problems:    Hypertension    Hypercholesterolemia    Chest pain    Gastroesophageal reflux disease without esophagitis    Dyspepsia    Blood creatinine increased compared with prior measurement    Leukocytosis  Resolved Problems:    * No resolved hospital problems  *    Plan:    Neuro:   Fall off bedside commode  - patient without external signs of trauma  - neurologically intact, alert and oriented  - remembers all details of fall, had landed on mattress  - not on any blood thinners or anticoagulation  - have any neurologic decline, will send for CT head  CV:   Hypertension  - holding home Diovan  - patient was office for greater than 24 hours    Hyperlipidemia  - holding statin while NPO    Chest pain  - patient with 2 recent hospital admissions with full workup including a stress test, cardiac catheterization, and Holter monitor, all negative  - no chest pain currently  - troponin negative    Rhythm: NSR  Follow rhythm on telemetry    Pulm:   No issues  Tachypnea resolved    Encourage deep breathing/coughing/IS/PulmToileting   Add O2 for sats < 92%  GI:   Bright red blood per rectum  - patient cloudy bowel movement on Thursday with beau bloody bowel movements overnight with transient hypotension emergency department that was fluid responsive   - H&H did decrease from 14 1 on admission at 12 8 and less than 2 hours, however repeat 4 hours after 12 5  Follow up a m  level  - 2 large-bore IVs at all times  - type and screen obtained  Blood consent obtained  - no anticoagulation or antiplatelets  Has been off of aspirin since Tuesday when planning for colonoscopy EGD next Tuesday  - trend H&H q 6  - blood pressure, heart rate within normal limits    Tachypnea improved  - GI aware, likely for colonoscopy +/-EGD this morning    GERD with dyspepsia  - continue b i d  Protonix  - possible EGD today      Nutrition/diet plan:  NPO  Stress ulcer prophylaxis: Protonix PO    :   Serum creatinine elevated above baseline  - does not yet meet YELENA criteria but certainly a risk due to intravascular volume depletion as well as contrast load  - aggressive fluid hydration  - accurate I&Os  - follow up a m  levels    FEN:   No issues  No lactic acidosis  BMP pending at time of this note   Fluid/Diuretic plan: Needs volume Isolate  Goal 24 hour fluid balance:  Resuscitation  Electrolytes repleted:  No as laboratories pending  Goal: K >4 0, Mag >2 0, and Phos >3 0    ID:   Leukocytosis  - mild  - likely secondary to GI bleed    Trend temps and WBC count    Heme:   Acute blood loss anemia  - secondary GI bleed  - q 6 H&H  - type and screen Active, blood consent on chart  - 2 large-bore IVs at all times    CBC pending at time of this note   Trend hgb and plts  Transfuse as needed for goal hgb >7    Endo:   No issues  Glycemic control plan: N/A    Msk/Skin:  Due to patient fall, now on bedrest   Fall precautions  Patient declined having me call wife at time of fall, will disclose fall to patient's wife this morning  Mobility goal:  Bed rest  PT consult:  When appropriate  OT consult: On appropriate  Frequent turning and off-loading    Family:  Family updated within 24 hours: yes   Family meeting planned today: no     Lines:  2 18 gauge IVs    VTE Prophylaxis:  Pharmacologic Prophylaxis: Reason for no pharmacologic prophylaxis GI bleeding  Mechanical Prophylaxis: sequential compression device    Disposition: Transfer to Stepdown    Code Status: Level 1 - Full Code    Counseling / Coordination of Care  Total Critical Care time spent 32 minutes excluding procedures, teaching and family updates      ______________________________________________________________________    HPI/24hr events:   PER HPI by Shae Pearce PA-C Date of Service 6/14:  "Odette Garrison is a 47 y o  male who presents to the [facility] emergency department with a PMH of HTN and HLD with acute lower GI Bleeding  Patient had been in his normal state of health until approximately February  At that time, he was admitted for atypical chest pain and admitted 2/25-2/26 with atypical chest pain  He had troponins trended that were negative x3 as well as an ECHO that showed and EF of 60%, no RWMA, No diastolic dysfunction and trace MR and TR as well as a stress test that showed a moderate to large perfusion deficit of the basal-mid inferior and inferoseptal wall and apical cap region that likely was a diaphragmatic attenuation artifact  He was discharged home with holter monitor and cardiology follow up  He, once again had an admission for chest pain in June from 6/5-6/6 at which time he underwent cardiac cath, this was normal and patient was once again discharged, this time on Protonix as it appeared without cardiac answer to chest pain, GERD may be causing the symptoms  Ironically, patient was to be scheduled for his screening colonoscopy so he was set up with an appointment with Dr Teofilo Witt on 6/12 with plan for EGD/[place] next Tuesday  At this appointment, patient was told to stop his baby aspirin which he did  Following that appointment, patient had an episode of crampy abdominal pain, different than the chest pain that he was having prompting last hospital admissions followed by a stool that he thought looked like it had blood clots in it  Over the course of the night Tuesday and day Wednesday, patient went about his normal routine without any issue  Tonight, 6/14, patient arrived home from work at about 1500  At 1600, he ate dinner, and immediately following, patient had episode of crampy abdominal pain followed by [facility]   He proceded to have 3 more frankly bloody bowel movements so [facility]      While in the Emergency department, patient preceded to have continued frankly bloody stools  He was presyncopal following them  His BP also decreased to 82/52 however improved with fluids  Thankfully, patient is on losartin at home which he takes nightly and did not take tonight  Patient also had episode of vomiting in emergency department too which was non-bloody  A CT was obtained that showed extrav of IV contrast in the proximal descending colon  On arrival, his H/H was 14 1 on arrival at [facility], his repeat at 12 was 12 8  Because of this and volume of GIB, patient was referred for stepdown admission  Dr Thom Teran of GI was notified and suggested watching patient in the SDU tonight, undergoing bowel prep, and undergoing colonoscopy +/- EGD tomorrow AM "    Overnight, patient continued to have bloody bowel movements  He did sustain a fall off the bedside commode  Please see progress note detailing events       ______________________________________________________________________  VITALS:    Vitals:    06/15/18 0100 06/15/18 0200 06/15/18 0215 06/15/18 0300   BP: 123/83  135/85 131/81   BP Location: Left arm   Left arm   Pulse: 70 70 72 70   Resp: 22 17 21 16   Temp:    98 7 °F (37 1 °C)   TempSrc:    Oral   SpO2: 97% 96%  96%   Weight:       Height:         Temp  Min: 98 7 °F (37 1 °C)  Max: 98 8 °F (37 1 °C)  IBW: 63 8 kg       Temp (24hrs), Av 7 °F (37 1 °C), Min:98 7 °F (37 1 °C), Max:98 8 °F (37 1 °C)    HR:  [64-80] 70  Resp:  [16-32] 16  BP: ()/(52-91) 131/81  SpO2:  [96 %-100 %] 96 %    Weights:   IBW: 63 8 kg    Body mass index is 39 22 kg/m²    Weight (last 2 days)     Date/Time   Weight    18 1733  110 (243)              Hemodynamic Monitoring:  N/A       Non-Invasive/Invasive Ventilation Settings:  Respiratory    Lab Data (Last 4 hours)    None         O2/Vent Data (Last 4 hours)    None              No results found for: PHART, NKP7OBF, PO2ART, QOA1UGL, K8NLNXPT, BEART, SOURCE  SpO2: SpO2: 96 %, SpO2 Activity: SpO2 Activity: At Rest, SpO2 Device: O2 Device: None (Room air)    ______________________________________________________________________    Physical Exam:   Physical Exam   Constitutional: He is oriented to person, place, and time  He appears well-developed and well-nourished  No distress  HENT:   Head: Normocephalic and atraumatic  Eyes: EOM are normal  Pupils are equal, round, and reactive to light  Neck: Normal range of motion  Neck supple  No JVD present  Cardiovascular: Normal rate and regular rhythm  Exam reveals no gallop and no friction rub  No murmur heard  Pulmonary/Chest: Effort normal  He has no wheezes  Abdominal: Soft  He exhibits distension  He exhibits no fluid wave  Bowel sounds are increased  There is generalized tenderness  Genitourinary:   Genitourinary Comments: Deferred   Musculoskeletal: Normal range of motion  He exhibits no edema  Neurological: He is alert and oriented to person, place, and time  No cranial nerve deficit  Skin: Skin is warm and dry  Capillary refill takes less than 2 seconds  Nursing note and vitals reviewed  ______________________________________________________________________    Intake and Outputs:  I/O       06/13 0701 - 06/14 0700 06/14 0701 - 06/15 0700    Urine (mL/kg/hr)  500    Total Output   500    Net   -500          Unmeasured Emesis Occurrence  1 x            Intake/Output Summary (Last 24 hours) at 06/15/18 0330  Last data filed at 06/15/18 0026   Gross per 24 hour   Intake                0 ml   Output              500 ml   Net             -500 ml       Nutrition:        Diet Orders            Start     Ordered    06/14/18 2100  Diet NPO  Diet effective now     Question Answer Comment   Diet Type NPO    RD to adjust diet per protocol?  Yes        06/14/18 2100          ______________________________________________________________________    Labs:     Results from last 7 days  Lab Units 06/14/18  2245 06/14/18  1951 06/14/18  1805   WBC Thousand/uL  --  10 92* 7 37   HEMOGLOBIN g/dL 12 5 12 8 14 1   HEMATOCRIT %  --  39 2 42 8   PLATELETS Thousands/uL  --  200 166   NEUTROS PCT %  --  67 69   MONOS PCT %  --  8 8       Results from last 7 days  Lab Units 06/14/18  1805   SODIUM mmol/L 142   POTASSIUM mmol/L 3 7   CHLORIDE mmol/L 105   CO2 mmol/L 28   BUN mg/dL 20   CREATININE mg/dL 1 14   CALCIUM mg/dL 9 0   TOTAL PROTEIN g/dL 6 9   BILIRUBIN TOTAL mg/dL 0 50   ALK PHOS U/L 59   ALT U/L 25   AST U/L 14   GLUCOSE RANDOM mg/dL 131         No results found for: PHOS     Results from last 7 days  Lab Units 06/14/18  1956   INR  1 08   PTT seconds 28       0  Lab Value Date/Time   TROPONINI <0 02 06/14/2018 2245   TROPONINI <0 02 06/05/2018 1027   TROPONINI <0 02 04/04/2018 0733   TROPONINI <0 02 02/25/2018 2130   TROPONINI <0 02 02/25/2018 1727   TROPONINI <0 02 02/25/2018 1405       Results from last 7 days  Lab Units 06/14/18  2245   LACTIC ACID mmol/L 1 2     ABG:No results found for: PHART, PDW3NSQ, PO2ART, SVW6QDR, M0ISMTNZ, BEART, SOURCE    Imaging:   CT abdomen pelvis with contrast   Final Result by Joy Higgins MD (06/14 1918)      Extravasation of the administered intravenous contrast noted within the lumen of the proximal descending colon compatible with active GI bleeding      No free air or free fluid  Mild splenomegaly  I personally discussed this study with Daniel Charles on 6/14/2018 at 7:14 PM                    Workstation performed: [id number]         I have personally reviewed pertinent films in PACS        Micro:  No results found for: Xavier Moralez    ______________________________________________________________________      Medications:   Scheduled Meds:  Current Facility-Administered Medications:  chlorhexidine 15 mL [facility] Leonarda Mckoy    multi-electrolyte 125 mL/hr Intravenous Continuous Leonarda Mckoy Last Rate: 125 mL/hr (06/14/18 2133)   pantoprazole 40 mg Oral BID AC Duke Kimble PA-C    polyethylene glycol 4,000 mL Oral Q15 Min Alfredo Moscoso      Continuous Infusions:  multi-electrolyte 125 mL/hr Last Rate: 125 mL/hr (06/14/18 2133)     PRN Meds:          Allergies: No Known Allergies    ______________________________________________________________________    Invasive lines and devices: Invasive Devices     Peripheral Intravenous Line            Peripheral IV 06/14/18 Left Hand less than 1 day    Peripheral IV 06/14/18 Right Antecubital less than 1 day                   SIGNATURE: Duke Kimble PA-C  DATE: Roseanne 15, 2018    Portions of the record may have been created with voice recognition software  Occasional wrong word or "sound a like" substitutions may have occurred due to the inherent limitations of voice recognition software  Read the chart carefully and recognize, using context, where substitutions have occurred

## 2018-06-15 NOTE — PROGRESS NOTES
S:  Called to patient's bedside by nursing  Patient was sitting on bedside commode when he had a "dizzy spell" and fell off the bed  The patient fell to his knees and laid his head onto the mattress of the bed  Following the fall, he began vomiting clear vomit  Nursing was walking into the room as fall happened this patient was remain call bell to complain of dizziness  Patient was then lowered to the floor  States that he feels much better  Does have crampy abdominal pain but does not have any head or neck pain  In the bedside commode is a [facility] bright red blood bowel movement  O:  Gen: Patient on floor  Awake alert  Head:  Normocephalic, atraumatic  No ecchymosis hematomas, or lacerations  Neck:  Supple  No midline tenderness  Full range of motion  Neuro:  Alert and orient x4  Nonfocal   Pupils equal reactive to light  Neuromuscularly intact  Not amnestic to the fall  Skin:  Cool, clammy  A/P:    Fall:  Patient was helped back to bed with nursing staff, after blood pressure cuff cycled and noted to be this systolics greater than 376  Feels much better and dizziness has subsided  From now on, patient will use only bed pan  Patient not on any anti-platelet agents, blood thinners, or prophylaxis for DVT as he is having significant GI bleed  Because he fell on soft mattress, has no external signs of trauma, has no head pain, confusion, no intractable vomiting, and no amnesia, will not send patient for CT head  Will monitor neuro checks q 2 hours  With any decline, will send for CT head  Likely a fall was induced by vasovagal versus presyncopal episode due to decrease in hemoglobin level from ongoing GI bleed  I asked patient if he would like me to call his wife to disclose the fall, he declined at this time and agreed that we would disclose the fall to her in the morning       Olivia Ghotra PA-C

## 2018-06-15 NOTE — CONSULTS
Consultation - 126 [facility]  Tristen Cardenas 47 y o  male MRN: [id number]  Unit/Bed#:  Encounter: [id number]         Reason for Consult / Principal Problem:  GI bleeding    HPI:  Gold Herrera is a 64-year-old male with history of lumbar radiculopathy status post lumbar fusion, GERD, hypertension and hyperlipidemia  Patient presented to the ER last night for hematochezia  Patient reports that he had bright red blood per rectum on Wednesday night while wiping after bowel movement  On Thursday after eating dinner with his family, he experienced lower abdominal cramping which prompted him to have a bowel movement  This time, the toilet bowl had opaque maroon-colored blood  When asked about NSAID use, the patient does report that he uses Aleve for back pain 2 tabs at the time weekly and sometimes more frequently  He is on 81 mg aspirin daily  However, he has not taken the since Tuesday as he was preparing for a planned colonoscopy and endoscopy next Tuesday  When asked about upper GI symptoms, patient does report that he experiences a chest tightness that he was unsure if this was a heartburn since his cardiology workup was negative per patient  He does report abdominal bloating and belching  On admission, his hemoglobin was 14 1  CT of the abdomen pelvis revealing extravasation at the administered IV contrast within the lumen of the proximal descending colon  Noted to be consistent with active GI bleeding  Fatty liver and splenomegaly also noted  Today his hemoglobin went from 11 9-9 7  He was receiving fluid boluses secondary to a syncopal episode last night  He had red tinged stool overnight  He had a brown bowel movement this morning  Patient reports he had a colonoscopy many years ago  He does not believe he has had an upper endoscopy before  Review of Systems:    CONSTITUTIONAL: Denies any fever, chills, or rigors  Good appetite, and no recent weight loss    HEENT: No earache or tinnitus  Denies hearing loss or visual disturbances  CARDIOVASCULAR: No chest pain or palpitations  RESPIRATORY: Denies any cough, hemoptysis, shortness of breath or dyspnea on exertion  GASTROINTESTINAL: As noted in the History of Present Illness  GENITOURINARY: No problems with urination  Denies any hematuria or dysuria  NEUROLOGIC: No dizziness or vertigo, denies headaches  MUSCULOSKELETAL: Denies any muscle or joint pain  SKIN: Denies skin rashes or itching  ENDOCRINE: Denies excessive thirst  Denies intolerance to heat or cold  PSYCHOSOCIAL: Denies depression or anxiety  Denies any recent memory loss         Historical Information   Past Medical History:   Diagnosis Date    Hypercholesterolemia     Hypertension     Obesity     Sexual dysfunction     Psychosexual dysfunction with inhibited sexual excitement      Past Surgical History:   Procedure Laterality Date    BACK SURGERY      spinal fusion    CARDIAC CATHETERIZATION      SEPTOPLASTY       Social History   History   Alcohol Use No     Comment: Occ alcohol use per [employer]      History   Drug Use No     History   Smoking Status    Former Smoker    Types: Cigarettes    Quit date: 2/25/1998   Smokeless Tobacco    Current User    Types: Chew     Family History   Problem Relation Age of Onset    Atrial fibrillation Mother     COPD Mother     Coronary artery disease Mother     Lung cancer Mother     Rheum arthritis Mother     Stroke Mother         Syndrome    Lung cancer Father     Prostate cancer Maternal Grandfather         Meds/Allergies     Current Facility-Administered Medications   Medication Dose Route Frequency    chlorhexidine (PERIDEX) 0 12 % oral rinse 15 mL  15 mL Swish & Spit Q12H [address]    multi-electrolyte (ISOLYTE-S PH 7 4 equivalent) IV solution  125 mL/hr Intravenous Continuous    pantoprazole (PROTONIX) injection 40 mg  40 mg Intravenous Q12H [address]       No Known Allergies      Objective     Blood pressure 154/91, pulse 76, temperature 98 2 °F (36 8 °C), temperature source Oral, resp  rate (!) 24, height 5' 6" (1 676 m), weight 110 kg (243 lb), SpO2 98 %  Intake/Output Summary (Last 24 hours) at 06/15/18 1044  Last data filed at 06/15/18 0801   Gross per 24 hour   Intake          2308 33 ml   Output              500 ml   Net          1808 33 ml         PHYSICAL EXAM:      General Appearance:   Alert and oriented x 3  Cooperative, and in no respiratory distress   HEENT:   Normocephalic, atraumatic, anicteric      Neck:  Supple, symmetrical, trachea midline   Lungs:   Clear to auscultation bilaterally   Heart[de-identified]   Regular rate and rhythm   Abdomen:   Soft, obese, diffuse abdominal tenderness without guarding, non-distended; normal bowel sounds; no masses, no organomegaly    Genitalia:   Deferred    Rectal:   Deferred    Extremities:  No cyanosis, clubbing or edema    Pulses:  2+ and symmetric all extremities    Skin:  Skin color, texture, turgor normal, no rashes or lesions    Lymph nodes:  No palpable cervical or supraclavicular lymphadenopathy        Lab Results:     Results from last 7 days  Lab Units 06/15/18  1008 06/15/18  0440   WBC Thousand/uL  --  9 66   HEMOGLOBIN g/dL 9 7* 11 9*   HEMATOCRIT %  --  35 7*   PLATELETS Thousands/uL  --  152   NEUTROS PCT %  --  80*   LYMPHS PCT %  --  13*   MONOS PCT %  --  7   EOS PCT %  --  0       Results from last 7 days  Lab Units 06/15/18  0440 06/14/18  1805   SODIUM mmol/L 142 142   POTASSIUM mmol/L 3 6 3 7   CHLORIDE mmol/L 107 105   CO2 mmol/L 28 28   BUN mg/dL 15 20   CREATININE mg/dL 0 83 1 14   CALCIUM mg/dL 8 3 9 0   TOTAL PROTEIN g/dL  --  6 9   BILIRUBIN TOTAL mg/dL  --  0 50   ALK PHOS U/L  --  59   ALT U/L  --  25   AST U/L  --  14   GLUCOSE RANDOM mg/dL 116 131       Results from last 7 days  Lab Units 06/14/18  1956   INR  1 08           Imaging Studies: I have personally reviewed pertinent imaging studies      Ct Abdomen Pelvis With Contrast    Result Date: 6/14/2018  Impression: Extravasation of the administered intravenous contrast noted within the lumen of the proximal descending colon compatible with active GI bleeding No free air or free fluid  Mild splenomegaly  I personally discussed this study with Daniel Charles on 6/14/2018 at 7:14 PM   Workstation performed: [id number]       ASSESSMENT and PLAN:      1) Hematochezia - Patient's hemoglobin stable on admission  I suspect this is a drop from 11 to 9 this morning was dilutional as patient had a pre-syncopal episode requiring IV fluid  He does take NSAIDs on a regular basis  I suspect likely lower in nature given CT scan results  However, PUD cannot be excluded  - Plan for EGD and colonoscopy this afternoon  - ICU prep the patient with GoLYTELY, now NPO  - Continue to monitor hemoglobin closely and transfuse as necessary with a goal hemoglobin above 7  - PPI BID    2) Hepatic steatosis and mild splenomegaly - Incidental finding on CT scan imaging  Patient's LFTs within normal limits  Platelet count also within normal limits  Likely secondary to nonalcoholic fatty liver disease   - Diet and exercise  - Outpatient follow-up    The patient was seen and examined by Dr Perfecto Major, all joyner medical decisions were made with Dr Perfecto Major  Thank you for allowing us to participate in the care of this pleasant patient  We will follow up with you closely

## 2018-06-16 LAB
ANION GAP SERPL CALCULATED.3IONS-SCNC: 4 MMOL/L (ref 4–13)
BUN SERPL-MCNC: 9 MG/DL (ref 5–25)
CALCIUM SERPL-MCNC: 7.9 MG/DL (ref 8.3–10.1)
CHLORIDE SERPL-SCNC: 109 MMOL/L (ref 100–108)
CO2 SERPL-SCNC: 28 MMOL/L (ref 21–32)
CREAT SERPL-MCNC: 0.73 MG/DL (ref 0.6–1.3)
ERYTHROCYTE [DISTWIDTH] IN BLOOD BY AUTOMATED COUNT: 12.5 % (ref 11.6–15.1)
GFR SERPL CREATININE-BSD FRML MDRD: 107 ML/MIN/1.73SQ M
GLUCOSE SERPL-MCNC: 98 MG/DL (ref 65–140)
HCT VFR BLD AUTO: 29 % (ref 36.5–49.3)
HGB BLD-MCNC: 10.5 G/DL (ref 12–17)
HGB BLD-MCNC: 10.6 G/DL (ref 12–17)
HGB BLD-MCNC: 9.4 G/DL (ref 12–17)
MCH RBC QN AUTO: 28.2 PG (ref 26.8–34.3)
MCHC RBC AUTO-ENTMCNC: 32.4 G/DL (ref 31.4–37.4)
MCV RBC AUTO: 87 FL (ref 82–98)
PLATELET # BLD AUTO: 130 THOUSANDS/UL (ref 149–390)
PMV BLD AUTO: 9.2 FL (ref 8.9–12.7)
POTASSIUM SERPL-SCNC: 3.6 MMOL/L (ref 3.5–5.3)
RBC # BLD AUTO: 3.33 MILLION/UL (ref 3.88–5.62)
SODIUM SERPL-SCNC: 141 MMOL/L (ref 136–145)
WBC # BLD AUTO: 4.42 THOUSAND/UL (ref 4.31–10.16)

## 2018-06-16 PROCEDURE — 85018 HEMOGLOBIN: CPT | Performed by: PHYSICIAN ASSISTANT

## 2018-06-16 PROCEDURE — 85018 HEMOGLOBIN: CPT | Performed by: NURSE PRACTITIONER

## 2018-06-16 PROCEDURE — 80048 BASIC METABOLIC PNL TOTAL CA: CPT | Performed by: NURSE PRACTITIONER

## 2018-06-16 PROCEDURE — C9113 INJ PANTOPRAZOLE SODIUM, VIA: HCPCS | Performed by: NURSE PRACTITIONER

## 2018-06-16 PROCEDURE — 99232 SBSQ HOSP IP/OBS MODERATE 35: CPT | Performed by: INTERNAL MEDICINE

## 2018-06-16 PROCEDURE — 85027 COMPLETE CBC AUTOMATED: CPT | Performed by: NURSE PRACTITIONER

## 2018-06-16 RX ORDER — VALSARTAN 160 MG/1
160 TABLET ORAL DAILY
Status: DISCONTINUED | OUTPATIENT
Start: 2018-06-16 | End: 2018-06-17 | Stop reason: HOSPADM

## 2018-06-16 RX ORDER — PRAVASTATIN SODIUM 40 MG
40 TABLET ORAL
Status: DISCONTINUED | OUTPATIENT
Start: 2018-06-16 | End: 2018-06-17 | Stop reason: HOSPADM

## 2018-06-16 RX ADMIN — PANTOPRAZOLE SODIUM 40 MG: 40 INJECTION, POWDER, FOR SOLUTION INTRAVENOUS at 09:13

## 2018-06-16 RX ADMIN — VALSARTAN 160 MG: 160 TABLET, FILM COATED ORAL at 09:13

## 2018-06-16 RX ADMIN — CHLORHEXIDINE GLUCONATE 15 ML: 1.2 RINSE ORAL at 09:13

## 2018-06-16 RX ADMIN — PANTOPRAZOLE SODIUM 40 MG: 40 INJECTION, POWDER, FOR SOLUTION INTRAVENOUS at 21:10

## 2018-06-16 RX ADMIN — PRAVASTATIN SODIUM 40 MG: 40 TABLET ORAL at 17:36

## 2018-06-16 RX ADMIN — CHLORHEXIDINE GLUCONATE 15 ML: 1.2 RINSE ORAL at 21:10

## 2018-06-16 NOTE — PROGRESS NOTES
Progress Note - Critical Care   Sharona Neal 47 y o  male MRN: [id number]  Unit/Bed#:  Encounter: [id number]    Attending Physician: Kael Menchaca MD      ______________________________________________________________________  Assessment and Plan:   Principal Problem:    Bright red blood per rectum  Active Problems:    Hypertension    Hypercholesterolemia    Chest pain    Gastroesophageal reflux disease without esophagitis    Dyspepsia    Blood creatinine increased compared with prior measurement    Leukocytosis    GI bleed  Resolved Problems:    * No resolved hospital problems  *        Neuro:  No further near syncopal episodes  Encourage good sleep hygiene  CV:  Hemodynamically stable  Remains in normal sinus rhythm  Restart statins and home Diovan at half dose  Pulm:  Issues identified  Mobilize out of bed as tolerated  Encourage incentive spirometry to prevent atelectasis  GI:  Tolerating p o  diet  No further hematochezia  Continue PPI  :  No issues identified  F/E/N:  IV fluids discontinued as tolerating p o  diet  Continue to monitor electrolytes and replete as needed  ID:  Continue to monitor fever and leukocyte curves  No indication for antibiotics at this time  Heme:  Hemoglobin remains stable  Continue to monitor  Endo:  Continue to monitor glucoses on routine lab work  Stable  Msk/Skin:  Mobilize out of bed with assist     Disposition:  May transfer to Medical Surgical Tele if remains stable  Code Status: Level 1 - Full Code    Counseling / Coordination of Care  Total time spent today 26 minutes  Greater than 50% of total time was spent with the patient and / or family counseling and / or coordination of care    ______________________________________________________________________    24 Hour Events: Tolerating p o  diet  No further bleeding episodes noted   Hemoglobin stable       ______________________________________________________________________    Physical Exam:   GEN:  Well nourished, well developed, appears stated age, no acute distress  HEENT:  Sclera anicteric, mucous membranes pink and moist, conjunctiva pink, no rafael/rhinorrhea  Neck:  No lymphadenopathy, normal ROM, supple, no bruits  CV :  S1S2, no murmurs, rubs or gallops  Intact distal pulses  No JVD  Resp:  Lungs CTA =B/L  No subq air or crepitus  Symmetrical expansion  No cough noted  GI :  Abd soft, nontender, no guarding/rebound, nondistended, normoactive bowel sounds X4 quads, no organomegaly appreciated  Neuro:  CN II-XII grossly intact, nonfocal exam, speech clear, GCS 15  Psych:  Appropriate affect      ______________________________________________________________________  Blood pressure 117/75, pulse 68, temperature 98 5 °F (36 9 °C), temperature source Oral, resp  rate 17, height 5' 6" (1 676 m), weight 110 kg (243 lb), SpO2 96 %  Temperature:   Temp (24hrs), Av 1 °F (36 7 °C), Min:97 2 °F (36 2 °C), Max:98 7 °F (37 1 °C)    Current Temperature: 98 5 °F (36 9 °C)  Weights:   IBW: 63 8 kg    Body mass index is 39 22 kg/m²    Weight (last 2 days)     Date/Time   Weight    18 1733  110 (243)              Non-Invasive/Invasive Ventilation Settings:  Respiratory    Lab Data (Last 4 hours)    None         O2/Vent Data (Last 4 hours)    None              No results found for: PHART, YMS0OBO, PO2ART, BXQ5CFD, I0MKUIAI, BEART, SOURCE  SpO2: SpO2: 96 %  Intake and Outputs:  I/O        07 - 06/15 0700 06/15 07 -  0700    I V  (mL/kg)  3718 8 (33 8)    Total Intake(mL/kg)  3718 8 (33 8)    Urine (mL/kg/hr) 500 2750 (1)    Total Output 500 2750    Net -500 +968 8          Unmeasured Stool Occurrence 1 x 1 x    Unmeasured Emesis Occurrence 1 x           Nutrition:        Diet Orders            Start     Ordered    06/15/18 1605  Diet Regular; Regular House  Diet effective now Question Answer Comment   Diet Type Regular    Regular Regular House    RD to adjust diet per protocol? Yes        06/15/18 1604          Labs:   Pending      Imaging:  None today  EKG:  Sinus rhythm on cardiac monitor  Micro:  No results found for: Julián Hughes, WOUNDCULT, SPUTUMCULTUR  Allergies: No Known Allergies  Medications:   Scheduled Meds:  Current Facility-Administered Medications:  chlorhexidine 15 mL [place], [place]    multi-electrolyte 125 mL/hr Intravenous Continuous [place], [place] Last Rate: 125 mL/hr (06/15/18 2329)   pantoprazole 40 mg Intravenous Q12H [address] ASHLEY Chin      Continuous Infusions:  multi-electrolyte 125 mL/hr Last Rate: 125 mL/hr (06/15/18 2329)     PRN Meds:     VTE Pharmacologic Prophylaxis: Deferred due to bleeding  VTE Mechanical Prophylaxis: sequential compression device  Invasive lines and devices: Invasive Devices     Peripheral Intravenous Line            Peripheral IV 06/14/18 Left Hand 1 day    Peripheral IV 06/14/18 Right Antecubital 1 day                     Portions of the record may have been created with voice recognition software  Occasional wrong word or "sound a like" substitutions may have occurred due to the inherent limitations of voice recognition software  Read the chart carefully and recognize, using context, where substitutions have occurred      ASHLEY Bhakta

## 2018-06-16 NOTE — PROGRESS NOTES
[facility]  Progress Note - Sujit Alvarenga 47 y o  male MRN: [id number]    Unit/Bed#:  Encounter: [id number]    Assessment/Plan:  1  Hematochezia:  Likely due to area of colitis noted at the splenic flexure-likely ischemic  He also had a polyp, status post polypectomy, he has not had a bowel movement since colonoscopy  EGD revealed gastritis and duodenitis  Would continue supportive care with IV fluids  No need for antibiotics at this time  Continue to monitor H&H and the color of stool-most recent hemoglobin is 10 6  If he continues to tolerate diet and hemoglobin remains stable, can possibly be discharged home tomorrow from GI standpoint  2   Gastritis/duodenitis-continue switch to oral Protonix 40 mg b i d     Avoid NSAIDs  Patient was explained about the lifestyle and dietary modifications  Advised to avoid fatty foods, chocolates, caffeine, alcohol and any other triggering foods  Avoid eating for at least 3 hours before going to bed  Subjective:   Patient seen and examined at bedside, he has been able to tolerate diet  He states that he feels slightly lightheaded otherwise he has not had any bowel movements or hematochezia  Denies abdominal pain  Vitals have remained stable  Objective:     Vitals: Blood pressure 139/85, pulse 78, temperature 98 3 °F (36 8 °C), temperature source Oral, resp  rate 18, height 5' 6" (1 676 m), weight 110 kg (243 lb), SpO2 96 %  ,Body mass index is 39 22 kg/m²        Intake/Output Summary (Last 24 hours) at 06/16/18 1045  Last data filed at 06/16/18 0901   Gross per 24 hour   Intake          1410 42 ml   Output             4600 ml   Net         -3189 58 ml       Physical Exam:    GEN: wn/wd, NAD  HEENT: MMM, anciteric  CV: RRR, no m/r/g  CHEST: CTA b/l, no w/r/r  ABD: +BS, soft, NT/ND  NEURO: aaox3      Invasive Devices     Peripheral Intravenous Line            Peripheral IV 06/14/18 Left Hand 1 day    Peripheral IV 06/14/18 Right Antecubital 1 day                        Lab, Imaging and other studies:     Admission on 06/14/2018   Component Date Value    WBC 06/14/2018 7 37     RBC 06/14/2018 4 99     Hemoglobin 06/14/2018 14 1     Hematocrit 06/14/2018 42 8     MCV 06/14/2018 86     MCH 06/14/2018 28 3     MCHC 06/14/2018 32 9     RDW 06/14/2018 12 4     MPV 06/14/2018 8 5*    Platelets 73/68/2899 166     nRBC 06/14/2018 0     Neutrophils Relative 06/14/2018 69     Immat GRANS % 06/14/2018 0     Lymphocytes Relative 06/14/2018 20     Monocytes Relative 06/14/2018 8     Eosinophils Relative 06/14/2018 2     Basophils Relative 06/14/2018 1     Neutrophils Absolute 06/14/2018 5 17     Immature Grans Absolute 06/14/2018 0 02     Lymphocytes Absolute 06/14/2018 1 44     Monocytes Absolute 06/14/2018 0 55     Eosinophils Absolute 06/14/2018 0 15     Basophils Absolute 06/14/2018 0 04     Sodium 06/14/2018 142     Potassium 06/14/2018 3 7     Chloride 06/14/2018 105     CO2 06/14/2018 28     Anion Gap 06/14/2018 9     BUN 06/14/2018 20     Creatinine 06/14/2018 1 14     Glucose 06/14/2018 131     Calcium 06/14/2018 9 0     AST 06/14/2018 14     ALT 06/14/2018 25     Alkaline Phosphatase 06/14/2018 59     Total Protein 06/14/2018 6 9     Albumin 06/14/2018 3 6     Total Bilirubin 06/14/2018 0 50     eGFR 06/14/2018 74     ABO Grouping 06/14/2018 O     Rh Factor 06/14/2018 Positive     Antibody Screen 06/14/2018 Negative     Specimen Expiration Date 06/14/2018 16235514     WBC 06/14/2018 10 92*    RBC 06/14/2018 4 58     Hemoglobin 06/14/2018 12 8     Hematocrit 06/14/2018 39 2     MCV 06/14/2018 86     MCH 06/14/2018 27 9     MCHC 06/14/2018 32 7     RDW 06/14/2018 12 4     MPV 06/14/2018 8 7*    Platelets 29/23/9082 200     nRBC 06/14/2018 0     Neutrophils Relative 06/14/2018 67     Immat GRANS % 06/14/2018 0     Lymphocytes Relative 06/14/2018 23     Monocytes Relative 06/14/2018 8     Eosinophils Relative 06/14/2018 1     Basophils Relative 06/14/2018 1     Neutrophils Absolute 06/14/2018 7 28     Immature Grans Absolute 06/14/2018 0 03     Lymphocytes Absolute 06/14/2018 2 55     Monocytes Absolute 06/14/2018 0 86     Eosinophils Absolute 06/14/2018 0 15     Basophils Absolute 06/14/2018 0 05     PTT 06/14/2018 28     Protime 06/14/2018 13 9     INR 06/14/2018 1 08     Hemoglobin 06/14/2018 12 5     Troponin I 06/14/2018 <0 02     LACTIC ACID 06/14/2018 1 2     WBC 06/15/2018 9 66     RBC 06/15/2018 4 14     Hemoglobin 06/15/2018 11 9*    Hematocrit 06/15/2018 35 7*    MCV 06/15/2018 86     MCH 06/15/2018 28 7     MCHC 06/15/2018 33 3     RDW 06/15/2018 12 4     MPV 06/15/2018 8 7*    Platelets 05/07/2306 152     nRBC 06/15/2018 0     Neutrophils Relative 06/15/2018 80*    Immat GRANS % 06/15/2018 0     Lymphocytes Relative 06/15/2018 13*    Monocytes Relative 06/15/2018 7     Eosinophils Relative 06/15/2018 0     Basophils Relative 06/15/2018 0     Neutrophils Absolute 06/15/2018 7 71*    Immature Grans Absolute 06/15/2018 0 04     Lymphocytes Absolute 06/15/2018 1 21     Monocytes Absolute 06/15/2018 0 64     Eosinophils Absolute 06/15/2018 0 04     Basophils Absolute 06/15/2018 0 02     Sodium 06/15/2018 142     Potassium 06/15/2018 3 6     Chloride 06/15/2018 107     CO2 06/15/2018 28     Anion Gap 06/15/2018 7     BUN 06/15/2018 15     Creatinine 06/15/2018 0 83     Glucose 06/15/2018 116     Calcium 06/15/2018 8 3     eGFR 06/15/2018 101     Magnesium 06/15/2018 2 0     Phosphorus 06/15/2018 3 1     Calcium, Ionized 06/15/2018 1 08*    Hemoglobin 06/15/2018 9 7*    Hemoglobin 06/15/2018 9 6*    Ventricular Rate 06/14/2018 70     Atrial Rate 06/14/2018 70     SC Interval 06/14/2018 152     QRSD Interval 06/14/2018 80     QT Interval 06/14/2018 406     QTC Interval 06/14/2018 438     P Axis 06/14/2018 53     QRS Axis 06/14/2018 29  T Wave Axis 06/14/2018 2     Ventricular Rate 06/14/2018 83     Atrial Rate 06/14/2018 83     MI Interval 06/14/2018 132     QRSD Interval 06/14/2018 76     QT Interval 06/14/2018 380     QTC Interval 06/14/2018 446     P Axis 06/14/2018 36     QRS Axis 06/14/2018 6     T Wave Axis 06/14/2018 -8     Hemoglobin 06/15/2018 9 6*    WBC 06/16/2018 4 42     RBC 06/16/2018 3 33*    Hemoglobin 06/16/2018 9 4*    Hematocrit 06/16/2018 29 0*    MCV 06/16/2018 87     MCH 06/16/2018 28 2     MCHC 06/16/2018 32 4     RDW 06/16/2018 12 5     Platelets 01/70/8878 130*    MPV 06/16/2018 9 2     Sodium 06/16/2018 141     Potassium 06/16/2018 3 6     Chloride 06/16/2018 109*    CO2 06/16/2018 28     Anion Gap 06/16/2018 4     BUN 06/16/2018 9     Creatinine 06/16/2018 0 73     Glucose 06/16/2018 98     Calcium 06/16/2018 7 9*    eGFR 06/16/2018 107     Hemoglobin 06/16/2018 10 6*         I have personally reviewed pertinent films in PACS    Current Facility-Administered Medications   Medication Dose Route Frequency    chlorhexidine (PERIDEX) 0 12 % oral rinse 15 mL  15 mL Swish & Spit Q12H [address]    pantoprazole (PROTONIX) injection 40 mg  40 mg Intravenous Q12H [address]    pravastatin (PRAVACHOL) tablet 40 mg  40 mg Oral Daily With Dinner    valsartan (DIOVAN) tablet 160 mg  160 mg Oral Daily

## 2018-06-17 VITALS
DIASTOLIC BLOOD PRESSURE: 88 MMHG | HEIGHT: 66 IN | WEIGHT: 243 LBS | OXYGEN SATURATION: 95 % | BODY MASS INDEX: 39.05 KG/M2 | RESPIRATION RATE: 26 BRPM | HEART RATE: 74 BPM | TEMPERATURE: 98.1 F | SYSTOLIC BLOOD PRESSURE: 147 MMHG

## 2018-06-17 LAB — HGB BLD-MCNC: 10.5 G/DL (ref 12–17)

## 2018-06-17 PROCEDURE — 85018 HEMOGLOBIN: CPT | Performed by: NURSE PRACTITIONER

## 2018-06-17 PROCEDURE — 43235 EGD DIAGNOSTIC BRUSH WASH: CPT | Performed by: INTERNAL MEDICINE

## 2018-06-17 PROCEDURE — C9113 INJ PANTOPRAZOLE SODIUM, VIA: HCPCS | Performed by: NURSE PRACTITIONER

## 2018-06-17 RX ORDER — POTASSIUM CHLORIDE 20 MEQ/1
40 TABLET, EXTENDED RELEASE ORAL ONCE
Status: COMPLETED | OUTPATIENT
Start: 2018-06-17 | End: 2018-06-17

## 2018-06-17 RX ORDER — PANTOPRAZOLE SODIUM 40 MG/1
40 TABLET, DELAYED RELEASE ORAL
Status: DISCONTINUED | OUTPATIENT
Start: 2018-06-17 | End: 2018-06-17 | Stop reason: HOSPADM

## 2018-06-17 RX ADMIN — CHLORHEXIDINE GLUCONATE 15 ML: 1.2 RINSE ORAL at 08:47

## 2018-06-17 RX ADMIN — VALSARTAN 160 MG: 160 TABLET, FILM COATED ORAL at 08:47

## 2018-06-17 RX ADMIN — PANTOPRAZOLE SODIUM 40 MG: 40 INJECTION, POWDER, FOR SOLUTION INTRAVENOUS at 08:47

## 2018-06-17 RX ADMIN — POTASSIUM CHLORIDE 40 MEQ: 1500 TABLET, EXTENDED RELEASE ORAL at 08:47

## 2018-06-17 NOTE — DISCHARGE SUMMARY
Discharge Summary   Hardy Restrepo 47 y o  male MRN: [id number]    [facility] ICU Room / Bed: / Encounter: [id number]      Admitting Provider: Cristina Mortimer, MD  Discharge Provider: Cristina Mortimer, MD  Admission Date: 6/14/2018     Discharge Date: No discharge date for patient encounter  Primary Care Physician at Discharge: Mart Luna [phone]    Primary Discharge Diagnosis  Principal Problem:    Bright red blood per rectum  Active Problems:    Hypertension    Hypercholesterolemia    Chest pain    Gastroesophageal reflux disease without esophagitis    Dyspepsia    Blood creatinine increased compared with prior measurement    Leukocytosis    GI bleed  Resolved Problems:    * No resolved hospital problems   *      Other Problems Addressed  Patient Active Problem List    Diagnosis Date Noted    Bright red blood per rectum 06/14/2018    Blood creatinine increased compared with prior measurement 06/14/2018    Leukocytosis 06/14/2018    GI bleed 06/14/2018    Dyspepsia 06/12/2018    Encounter for screening colonoscopy 06/12/2018    Gastroesophageal reflux disease without esophagitis 06/06/2018    Chest pain 06/05/2018    Screen for colon cancer 05/29/2018    Screening for malignant neoplasm of colon 05/08/2018    Irritant contact dermatitis due to other agents 05/08/2018    Screening for prostate cancer 05/08/2018    Vertigo 04/05/2018    Spondylosis of cervical region without myelopathy or radiculopathy 04/05/2018    Tobacco abuse 04/05/2018    Dehydration 03/27/2018    Hypertension 02/25/2018    Hypercholesterolemia 02/25/2018    Morbid obesity due to excess calories ([address] ) 02/25/2018    Chewing tobacco nicotine dependence with nicotine-induced disorder 02/25/2018    Lumbar radiculopathy 09/15/2015    Allergic rhinitis 01/10/2014       Consulting Providers   GI    Diagnostic Procedures Performed  colonoscopy    Other Pertinent Test Results  Lab Results   Component Value Date    WBC 4 42 06/16/2018    HGB 10 6 (L) 06/16/2018    HCT 29 0 (L) 06/16/2018    MCV 87 06/16/2018     (L) 06/16/2018     Lab Results   Component Value Date    GLUCOSE 98 06/16/2018    CALCIUM 7 9 (L) 06/16/2018     06/16/2018    K 3 6 06/16/2018    CO2 28 06/16/2018     (H) 06/16/2018    BUN 9 06/16/2018    CREATININE 0 73 06/16/2018     Lab Results   Component Value Date    CALCIUM 7 9 (L) 06/16/2018    PHOS 3 1 06/15/2018     Lab Results   Component Value Date     06/16/2018    K 3 6 06/16/2018     (H) 06/16/2018    CO2 28 06/16/2018    ANIONGAP 4 06/16/2018    BUN 9 06/16/2018    CREATININE 0 73 06/16/2018    GLUCOSE 98 06/16/2018    GLUF 95 02/26/2018    CALCIUM 7 9 (L) 06/16/2018    AST 14 06/14/2018    ALT 25 06/14/2018    ALKPHOS 59 06/14/2018    PROT 6 9 06/14/2018    BILITOT 0 50 06/14/2018    EGFR 107 06/16/2018     Lab Results   Component Value Date    INR 1 08 06/14/2018    INR 0 95 04/04/2018    PROTIME 13 9 06/14/2018    PROTIME 12 9 04/04/2018     Lab Results   Component Value Date    ALT 25 06/14/2018    AST 14 06/14/2018    ALKPHOS 59 06/14/2018    BILITOT 0 50 06/14/2018     No results found for: TSH, A3PZSMM, J2NTDMW, THYROIDAB  Lab Results   Component Value Date    HGBA1C 5 5 02/26/2018         Presenting Problem/History of Present Illness  Bright red blood per rectum    Hospital Course  30-year-old male with past medical history significant for hypertension hyperlipidemia presented to the emergency department with acute BRBPR  Patient seen earlier in the year for chest pain was deemed to be noncardiac in nature  GI follow-up was recommended with a EGD/colonoscopy performed on June 19th  On June 14th patient experienced an episode of crampy abdominal pain after eating dinner, followed by BRBPR  After 3 such bowel movements, patient came to the emergency department    CT abdomen pelvis obtained showing extravasation of IV contrast in the proximal descending colon  Patient experienced near syncopal episode while on the commode  No cardiac sequelae  Colonoscopy was performed day post admission, revealing lesion suspicious for ischemic colitis near the splenic flexure  There was an associated finding of an erosion also, as well as underwent polypectomy  Patient has tolerated a regular diet, with no further hematochezia  Gastroenterology feels patient is stable for discharge  Physical Exam  Vitals: Blood pressure 124/79, pulse 67, temperature 98 4 °F (36 9 °C), temperature source Oral, resp  rate 21, height 5' 6" (1 676 m), weight 110 kg (243 lb), SpO2 98 %  ,Body mass index is 39 22 kg/m²  Gen: Awake, alert, pleasant, cooperative, in no acute distress  HEENT:  Atraumatic, pupils equal and reactive, conjugate gaze, trachea midline, no JVD  CV:  Regular rate and rhythm  Pulm:  Lungs clear to auscultation bilaterally  GI:  Abdomen soft, nontender, nondistended, with bowel sounds present  MSK:  Atraumatic no clubbing/cyanosis/edema  Skin:  Warm, dry, intact      Discharge Disposition: Home/Self Care  Discharged With Lines: no    Test Results Pending at Discharge  Biopsy  Medications   See after visit summary for reconciled discharge medications provided to patient and family  Allergies  No Known Allergies  Diet restrictions: none  Activity restrictions: none  Discharge Condition: good      Outpatient Follow-Up  Gastroenterology      Code Status: Level 1 - Full Code  Advance Directive and Living Will: <no information>  Power of :    POLST:      Discharge  Statement   I spent 25 minutes discharging the patient  This time was spent on the day of discharge  I had direct contact with the patient on the day of discharge  Additional documentation is required if more than 30 minutes were spent on discharge

## 2018-06-18 ENCOUNTER — TRANSITIONAL CARE MANAGEMENT (OUTPATIENT)
Dept: INTERNAL MEDICINE CLINIC | Facility: CLINIC | Age: 53
End: 2018-06-18

## 2018-06-22 NOTE — OP NOTE
**** GI/ENDOSCOPY REPORT ****     PATIENT NAME: Zach Diamond - VISIT ID:  Patient ID: [id number]   YOB: 1965     INTRODUCTION: Esophagogastroduodenoscopy - A 47 male patient presents for   an inpatient Esophagogastroduodenoscopy at [address] [place] [address]  INDICATIONS: Chest pain  Abdominal pain  Hematochezia  CONSENT: The benefits, risks, and alternatives to the procedure were   discussed and informed consent was obtained from the patient  PREPARATION:  EKG, pulse, pulse oximetry and blood pressure were monitored   throughout the procedure  ASA Classification: Class 3 - Patient has severe   systemic disturbance that may or may not be related to the disorder   requiring surgery  MEDICATIONS: Anesthesia-check records     PROCEDURE:  The endoscope was passed without difficulty through the mouth   under direct visualization and advanced to the 2nd portion of the   duodenum  The scope was withdrawn and the mucosa was carefully examined  Retroflexion was performed  FINDINGS:   Esophagus: The esophagus appeared to be normal   GE junction:   There was a small sliding hiatus hernia visible in the GE junction  Stomach: Gastritis was found in the stomach  Duodenum: There was   evidence of duodenitis in the duodenum  COMPLICATIONS: There were no complications  IMPRESSIONS: Normal esophagus  A hiatus hernia found  Gastritis found  Duodenitis in the duodenum  RECOMMENDATIONS: Anti-reflux measures: Raise the head of the bed 4 to 6   inches  Avoid smoking  Avoid excess coffee, tea or other caffeinated   beverages  Avoid garments that fit tightly through the abdomen  Avoid   eating before bed  Continue current medications       ESTIMATED BLOOD LOSS:     PATHOLOGY SPECIMENS:     PROCEDURE CODES:     ICD-9 Codes: 786 50 Chest pain, unspecified 789 00 Abdominal pain,   unspecified site 578 1 Blood in stool 553 3 Diaphragmatic hernia without   mention of obstruction or gangrene 535 50 Unspecified gastritides and   gastroduodenitis, without mention of hemorrhage 535 60 Duodenitis, without   mention of hemorrhage     ICD-10 Codes: R07 9 Chest pain, unspecified R10 Abdominal and pelvic pain   K92 1 Melena K44 Diaphragmatic hernia K29 Gastritis and duodenitis K29   Gastritis and duodenitis     PERFORMED BY: LEILANI Aviles  on 06/15/2018  Version 1, electronically signed by LEILANI Valentine  on 06/22/2018   at 02:06

## 2018-06-22 NOTE — PROGRESS NOTES
My medical assistant will call him with his results  Colon biopsies were consistent with ischemic colitis (as expected) but no infection or Crohn's disease found  The rectal polyp was not precancerous, so can repeat colonoscopy in 10 years  Ischemic colitis was probably due to transient low blood flow to that part of his colon and should fully heal  He should follow up in our office in the next 2-3 months

## 2018-07-09 ENCOUNTER — OFFICE VISIT (OUTPATIENT)
Dept: INTERNAL MEDICINE CLINIC | Facility: CLINIC | Age: 53
End: 2018-07-09
Payer: COMMERCIAL

## 2018-07-09 VITALS
SYSTOLIC BLOOD PRESSURE: 132 MMHG | OXYGEN SATURATION: 96 % | HEART RATE: 81 BPM | BODY MASS INDEX: 39.21 KG/M2 | WEIGHT: 244 LBS | DIASTOLIC BLOOD PRESSURE: 82 MMHG | HEIGHT: 66 IN

## 2018-07-09 DIAGNOSIS — I10 ESSENTIAL HYPERTENSION: ICD-10-CM

## 2018-07-09 DIAGNOSIS — K92.2 GASTROINTESTINAL HEMORRHAGE, UNSPECIFIED GASTROINTESTINAL HEMORRHAGE TYPE: Primary | ICD-10-CM

## 2018-07-09 PROCEDURE — 99213 OFFICE O/P EST LOW 20 MIN: CPT | Performed by: INTERNAL MEDICINE

## 2018-07-09 NOTE — ASSESSMENT & PLAN NOTE
Etiology unclear  Recheck cbc  He will follow with Dr Lori Luque and will likely need a capsule egd

## 2018-07-09 NOTE — PROGRESS NOTES
Assessment/Plan:    GI bleed  Etiology unclear  Recheck cbc  He will follow with Dr Mary Urbina and will likely need a capsule egd  Hypertension  Well controlled  Continue with valsartan  Diagnoses and all orders for this visit:    Gastrointestinal hemorrhage, unspecified gastrointestinal hemorrhage type  -     CBC and differential; Future    Essential hypertension          Subjective:      Patient ID: [place] Okeefe is a 47 y o  male  Patient presents in follow up for his medical problems  He had been admitted mid June to [address] [address] for gi bleed  A egd and colonoscopy were performed but the site of the bleeding was not detecting  He says since he was discharged from hospital he has been feeling better  He has no abdominal pain, nausea, vomiting, constipation, diarrhea, brbpr or melena        The following portions of the patient's history were reviewed and updated as appropriate: allergies, current medications, past family history, past medical history, past social history, past surgical history and problem list     Review of Systems   Constitutional: Negative for activity change, appetite change, chills, diaphoresis, fatigue, fever and unexpected weight change  HENT: Negative for congestion, dental problem, drooling, ear discharge, ear pain, facial swelling, hearing loss, mouth sores, nosebleeds, postnasal drip, rhinorrhea, sinus pain, sinus pressure, sneezing, sore throat, tinnitus, trouble swallowing and voice change  Eyes: Negative for photophobia, pain, discharge, redness, itching and visual disturbance  Respiratory: Negative for apnea, cough, choking, chest tightness, shortness of breath, wheezing and stridor  Cardiovascular: Negative for chest pain, palpitations and leg swelling  Gastrointestinal: Positive for anal bleeding  Negative for abdominal distention, abdominal pain, blood in stool, constipation, diarrhea, nausea, rectal pain and vomiting     Endocrine: Negative for cold intolerance, heat intolerance, polydipsia, polyphagia and polyuria  Genitourinary: Negative for decreased urine volume, difficulty urinating, dysuria, enuresis, flank pain, frequency, genital sores, hematuria and urgency  Musculoskeletal: Negative for arthralgias, back pain, gait problem, joint swelling, myalgias, neck pain and neck stiffness  Skin: Negative for color change, pallor, rash and wound  Allergic/Immunologic: Negative for environmental allergies, food allergies and immunocompromised state  Neurological: Negative for dizziness, tremors, seizures, syncope, facial asymmetry, speech difficulty, weakness, light-headedness, numbness and headaches  Hematological: Negative for adenopathy  Does not bruise/bleed easily  Psychiatric/Behavioral: Negative for agitation, self-injury, sleep disturbance and suicidal ideas  The patient is not nervous/anxious  Objective:      /82   Pulse 81   Ht 5' 6" (1 676 m)   Wt 111 kg (244 lb)   SpO2 96%   BMI 39 38 kg/m²          Physical Exam   Constitutional: He is oriented to person, place, and time  He appears well-developed and well-nourished  No distress  HENT:   Head: Normocephalic and atraumatic  Right Ear: External ear normal    Left Ear: External ear normal    Mouth/Throat: Oropharynx is clear and moist    Eyes: Conjunctivae and EOM are normal  Pupils are equal, round, and reactive to light  No scleral icterus  Neck: Normal range of motion  Neck supple  No JVD present  No tracheal deviation present  No thyromegaly present  Cardiovascular: Normal rate, regular rhythm and intact distal pulses  Exam reveals no gallop and no friction rub  No murmur heard  Pulmonary/Chest: Effort normal and breath sounds normal  No respiratory distress  He has no wheezes  He has no rales  He exhibits no tenderness  Abdominal: Soft  Bowel sounds are normal  He exhibits no distension and no mass  There is no tenderness   There is no rebound and no guarding  Musculoskeletal: Normal range of motion  He exhibits no edema, tenderness or deformity  Lymphadenopathy:     He has no cervical adenopathy  Neurological: He is alert and oriented to person, place, and time  He has normal reflexes  No cranial nerve deficit  Coordination normal    Skin: Skin is warm and dry  No rash noted  He is not diaphoretic  No erythema  No pallor  Psychiatric: He has a normal mood and affect   His behavior is normal  Judgment and thought content normal

## 2018-07-12 ENCOUNTER — OFFICE VISIT (OUTPATIENT)
Dept: GASTROENTEROLOGY | Facility: CLINIC | Age: 53
End: 2018-07-12
Payer: COMMERCIAL

## 2018-07-12 VITALS
HEART RATE: 72 BPM | SYSTOLIC BLOOD PRESSURE: 115 MMHG | DIASTOLIC BLOOD PRESSURE: 80 MMHG | WEIGHT: 245 LBS | HEIGHT: 66 IN | BODY MASS INDEX: 39.37 KG/M2

## 2018-07-12 DIAGNOSIS — K55.9 ISCHEMIC COLITIS (HCC): Primary | ICD-10-CM

## 2018-07-12 PROCEDURE — 1111F DSCHRG MED/CURRENT MED MERGE: CPT | Performed by: INTERNAL MEDICINE

## 2018-07-12 PROCEDURE — 99213 OFFICE O/P EST LOW 20 MIN: CPT | Performed by: INTERNAL MEDICINE

## 2018-07-12 NOTE — PROGRESS NOTES
[facility]      Chief Complaint:  Ischemic colitis    HPI:  Nataly Reed is a 47 y o   male who presents with need for follow-up for ischemic colitis  Patient had a significant amount of abdominal cramping and bleeding  He was seen in the hospital were colonoscopy revealed ischemic colitis as well as small colon polyps  Patient currently is completely asymptomatic  No abdominal pain  Normal stool  EGD apparently unremarkable  Absolutely no complaints at the present time  Patient had been worked up for possible heart issues but had a negative cardiac catheterization  He maintains his hydration  He works as a   He does do some heavy lifting  However he did nothing out of the ordinary prior to the onset of his problem  Review of Systems:   Constitutional: No fever or chills, feels well, no tiredness, no recent weight gain or weight loss  HENT: No complaints of earache, no hearing loss, no nosebleeds, no nasal discharge, no sore throat, no hoarseness  Eyes: No complaints of eye pain, no red eyes, no discharge from eyes, no itchy eyes  Cardiovascular: No complaints of slow heart rate, no fast heart rate, no chest pain, no palpitations, no leg claudication, no lower extremity edema  Occasional chest tightness  Respiratory: No complaints of shortness of breath, no wheezing, no cough, no SOB on exertion, no orthopnea  Gastrointestinal: As noted in HPI  Genitourinary: No complaints of dysuria, no incontinence, no hesitancy, no nocturia  Musculoskeletal: No complaints of arthralgia, no myalgias, no joint swelling or stiffness, no limb pain or swelling  Neurological: No complaints of headache, no confusion, no convulsions, no numbness or tingling, no dizziness or fainting, no limb weakness, no difficulty walking  Skin: No complaints of skin rash or skin lesions, no itching, no skin wound, no dry skin      Hematological/Lymphatic: No complaints of swollen glands, does not bleed easy  Allergic/Immunologic: No immunocompromised state  Endocrine:  No complaints of polyuria, no polydipsia  Psychiatric/Behavioral: is not suicidal, no sleep disturbances, no anxiety or depression, no change in personality, no emotional problems  Historical Information   Past Medical History:   Diagnosis Date    Hypercholesterolemia     Hypertension     Obesity     Sexual dysfunction     Psychosexual dysfunction with inhibited sexual excitement      Past Surgical History:   Procedure Laterality Date    BACK SURGERY      spinal fusion    CARDIAC CATHETERIZATION      CARDIAC CATHETERIZATION      EGD AND COLONOSCOPY N/A 6/15/2018    Procedure: EGD AND COLONOSCOPY;  Surgeon: Rell Joe MD;  Location: [facility]; Service: Gastroenterology    SEPTOPLASTY       Social History   History   Alcohol Use No     Comment: Occ alcohol use per [employer]      History   Drug Use No     History   Smoking Status    Former Smoker    Types: Cigarettes    Quit date: 2/25/1998   Smokeless Tobacco    Current User    Types: Chew     Family History   Problem Relation Age of Onset    Atrial fibrillation Mother     COPD Mother     Coronary artery disease Mother     Lung cancer Mother     Rheum arthritis Mother     Stroke Mother         Syndrome    Lung cancer Father     Prostate cancer Maternal Grandfather          Current Medications: has a current medication list which includes the following prescription(s): aluminum hydroxide-magnesium carbonate, aspirin, meclizine, pantoprazole, rosuvastatin, and valsartan  Vital Signs: /80   Pulse 72   Ht 5' 6" (1 676 m)   Wt 111 kg (245 lb)   BMI 39 54 kg/m²       Physical Exam:   Constitutional  General Appearance: No acute distress, well appearing and well nourished  Head  Normocephalic  Eyes  Conjunctivae and lids: No swelling, erythema, or discharge  Pupils and irises: Equal, round and reactive to light     Ears, Nose, Mouth, and Throat  External inspection of ears and nose: Normal  Nasal mucosa, septum and turbinates: Normal without edema or erythema/   Oropharynx: Normal with no erythema, edema, exudate or lesions  Neck  Normal range of motion  Neck supple  Cardiovascular  Auscultation of the heart: Normal rate and rhythm, normal S1 and S2 without murmurs  Examination of the extremities for edema and/or varicosities: Normal  Pulmonary/Chest  Respiratory effort: No increased work of breathing or signs of respiratory distress  Auscultation of lungs: Clear to auscultation, equal breath sounds bilaterally, no wheezes, rales, no rhonchi  Abdomen  Abdomen: Non-tender, no masses  Liver and spleen: No hepatomegaly or splenomegaly  Musculoskeletal  Gait and station: normal   Digits and Nails: normal without clubbing or cyanosis  Inspection/palpation of joints, bones, and muscles: Normal  Neurological  No nystagmus or asterixis  Skin  Skin and subcutaneous tissue: Normal without rashes or lesions  Lymphatic  Palpation of the lymph nodes in neck: No lymphadenopathy     Psychiatric  Orientation to person, place and time: Normal   Mood and affect: Normal          Labs:  Lab Results   Component Value Date    ALT 25 06/14/2018    AST 14 06/14/2018    BUN 9 06/16/2018    CALCIUM 7 9 (L) 06/16/2018     (H) 06/16/2018    CHOL 187 02/26/2018    CO2 28 06/16/2018    CREATININE 0 73 06/16/2018    HDL 43 02/26/2018    HCT 29 0 (L) 06/16/2018    HGB 10 5 (L) 06/17/2018    HGBA1C 5 5 02/26/2018    MG 2 0 06/15/2018    PHOS 3 1 06/15/2018     (L) 06/16/2018    K 3 6 06/16/2018    PSA 0 3 03/23/2016     06/16/2018    TRIG 71 02/26/2018    WBC 4 42 06/16/2018         X-Rays & Procedures:   VAS celiac and/or mesenteric duplex; complete study    (Results Pending)           ______________________________________________________________________      Assessment & Plan:     Diagnoses and all orders for this visit:    Ischemic colitis ([facility] [address] )  -     VAS celiac and/or mesenteric duplex; complete study; Future     Patient will call me for the results and further recommendations  He will be placed on a call-back for 5 years for his colon polyps

## 2018-07-17 ENCOUNTER — APPOINTMENT (OUTPATIENT)
Dept: LAB | Facility: CLINIC | Age: 53
End: 2018-07-17
Payer: COMMERCIAL

## 2018-07-17 DIAGNOSIS — E78.00 HYPERCHOLESTEROLEMIA: Chronic | ICD-10-CM

## 2018-07-17 DIAGNOSIS — K92.2 GASTROINTESTINAL HEMORRHAGE, UNSPECIFIED GASTROINTESTINAL HEMORRHAGE TYPE: ICD-10-CM

## 2018-07-17 DIAGNOSIS — Z12.5 SCREENING FOR PROSTATE CANCER: ICD-10-CM

## 2018-07-17 DIAGNOSIS — I10 ESSENTIAL HYPERTENSION: ICD-10-CM

## 2018-07-17 LAB
ALBUMIN SERPL BCP-MCNC: 3.6 G/DL (ref 3.5–5)
ALP SERPL-CCNC: 55 U/L (ref 46–116)
ALT SERPL W P-5'-P-CCNC: 26 U/L (ref 12–78)
ANION GAP SERPL CALCULATED.3IONS-SCNC: 2 MMOL/L (ref 4–13)
AST SERPL W P-5'-P-CCNC: 13 U/L (ref 5–45)
BASOPHILS # BLD AUTO: 0.04 THOUSANDS/ΜL (ref 0–0.1)
BASOPHILS NFR BLD AUTO: 1 % (ref 0–1)
BILIRUB SERPL-MCNC: 0.53 MG/DL (ref 0.2–1)
BUN SERPL-MCNC: 15 MG/DL (ref 5–25)
CALCIUM SERPL-MCNC: 9 MG/DL (ref 8.3–10.1)
CHLORIDE SERPL-SCNC: 107 MMOL/L (ref 100–108)
CO2 SERPL-SCNC: 30 MMOL/L (ref 21–32)
CREAT SERPL-MCNC: 0.87 MG/DL (ref 0.6–1.3)
EOSINOPHIL # BLD AUTO: 0.12 THOUSAND/ΜL (ref 0–0.61)
EOSINOPHIL NFR BLD AUTO: 2 % (ref 0–6)
ERYTHROCYTE [DISTWIDTH] IN BLOOD BY AUTOMATED COUNT: 12.2 % (ref 11.6–15.1)
GFR SERPL CREATININE-BSD FRML MDRD: 99 ML/MIN/1.73SQ M
GLUCOSE SERPL-MCNC: 89 MG/DL (ref 65–140)
HCT VFR BLD AUTO: 39.7 % (ref 36.5–49.3)
HGB BLD-MCNC: 12.2 G/DL (ref 12–17)
IMM GRANULOCYTES # BLD AUTO: 0.01 THOUSAND/UL (ref 0–0.2)
IMM GRANULOCYTES NFR BLD AUTO: 0 % (ref 0–2)
LYMPHOCYTES # BLD AUTO: 1.53 THOUSANDS/ΜL (ref 0.6–4.47)
LYMPHOCYTES NFR BLD AUTO: 28 % (ref 14–44)
MCH RBC QN AUTO: 26.5 PG (ref 26.8–34.3)
MCHC RBC AUTO-ENTMCNC: 30.7 G/DL (ref 31.4–37.4)
MCV RBC AUTO: 86 FL (ref 82–98)
MONOCYTES # BLD AUTO: 0.47 THOUSAND/ΜL (ref 0.17–1.22)
MONOCYTES NFR BLD AUTO: 9 % (ref 4–12)
NEUTROPHILS # BLD AUTO: 3.38 THOUSANDS/ΜL (ref 1.85–7.62)
NEUTS SEG NFR BLD AUTO: 60 % (ref 43–75)
NRBC BLD AUTO-RTO: 0 /100 WBCS
PLATELET # BLD AUTO: 167 THOUSANDS/UL (ref 149–390)
PMV BLD AUTO: 9 FL (ref 8.9–12.7)
POTASSIUM SERPL-SCNC: 4.5 MMOL/L (ref 3.5–5.3)
PROT SERPL-MCNC: 7 G/DL (ref 6.4–8.2)
PSA SERPL-MCNC: 0.3 NG/ML (ref 0–4)
RBC # BLD AUTO: 4.6 MILLION/UL (ref 3.88–5.62)
SODIUM SERPL-SCNC: 139 MMOL/L (ref 136–145)
WBC # BLD AUTO: 5.55 THOUSAND/UL (ref 4.31–10.16)

## 2018-07-17 PROCEDURE — 80053 COMPREHEN METABOLIC PANEL: CPT

## 2018-07-17 PROCEDURE — 85025 COMPLETE CBC W/AUTO DIFF WBC: CPT

## 2018-07-17 PROCEDURE — 36415 COLL VENOUS BLD VENIPUNCTURE: CPT

## 2018-07-17 PROCEDURE — G0103 PSA SCREENING: HCPCS

## 2018-07-26 ENCOUNTER — HOSPITAL ENCOUNTER (OUTPATIENT)
Dept: NON INVASIVE DIAGNOSTICS | Facility: CLINIC | Age: 53
Discharge: HOME | End: 2018-07-26
Payer: COMMERCIAL

## 2018-07-26 DIAGNOSIS — K55.9 ISCHEMIC COLITIS (HCC): ICD-10-CM

## 2018-07-26 DIAGNOSIS — R10.9 ABDOMINAL PAIN: ICD-10-CM

## 2018-07-26 PROCEDURE — 93975 VASCULAR STUDY: CPT

## 2018-07-26 PROCEDURE — 93975 VASCULAR STUDY: CPT | Performed by: SURGERY

## 2018-07-30 ENCOUNTER — TELEPHONE (OUTPATIENT)
Dept: GASTROENTEROLOGY | Facility: CLINIC | Age: 53
End: 2018-07-30

## 2018-08-07 ENCOUNTER — OFFICE VISIT (OUTPATIENT)
Dept: INTERNAL MEDICINE CLINIC | Facility: CLINIC | Age: 53
End: 2018-08-07
Payer: COMMERCIAL

## 2018-08-07 VITALS
RESPIRATION RATE: 16 BRPM | SYSTOLIC BLOOD PRESSURE: 138 MMHG | WEIGHT: 240 LBS | HEIGHT: 66 IN | DIASTOLIC BLOOD PRESSURE: 88 MMHG | HEART RATE: 59 BPM | BODY MASS INDEX: 38.57 KG/M2

## 2018-08-07 DIAGNOSIS — E78.00 HYPERCHOLESTEROLEMIA: Chronic | ICD-10-CM

## 2018-08-07 DIAGNOSIS — I10 ESSENTIAL HYPERTENSION: ICD-10-CM

## 2018-08-07 DIAGNOSIS — K92.2 GASTROINTESTINAL HEMORRHAGE, UNSPECIFIED GASTROINTESTINAL HEMORRHAGE TYPE: Primary | ICD-10-CM

## 2018-08-07 DIAGNOSIS — Z12.5 SCREENING FOR PROSTATE CANCER: ICD-10-CM

## 2018-08-07 PROBLEM — Z72.0 TOBACCO ABUSE: Status: RESOLVED | Noted: 2018-04-05 | Resolved: 2018-08-07

## 2018-08-07 PROBLEM — S16.1XXA CERVICAL STRAIN, ACUTE, INITIAL ENCOUNTER: Status: ACTIVE | Noted: 2018-08-07

## 2018-08-07 PROCEDURE — 99214 OFFICE O/P EST MOD 30 MIN: CPT | Performed by: INTERNAL MEDICINE

## 2018-08-07 PROCEDURE — 3008F BODY MASS INDEX DOCD: CPT | Performed by: INTERNAL MEDICINE

## 2018-08-07 PROCEDURE — 3075F SYST BP GE 130 - 139MM HG: CPT | Performed by: INTERNAL MEDICINE

## 2018-08-07 PROCEDURE — 1036F TOBACCO NON-USER: CPT | Performed by: INTERNAL MEDICINE

## 2018-08-07 PROCEDURE — 3079F DIAST BP 80-89 MM HG: CPT | Performed by: INTERNAL MEDICINE

## 2018-08-07 RX ORDER — TIZANIDINE 2 MG/1
2 TABLET ORAL
Qty: 30 TABLET | Refills: 0 | Status: SHIPPED | OUTPATIENT
Start: 2018-08-07 | End: 2019-02-08

## 2018-08-07 NOTE — PROGRESS NOTES
Assessment/Plan:    Hypercholesterolemia  Will reorder lipid panel now and in follow up      Hypertension  Well controlled bmp wnl    Screening for prostate cancer  Psa and mireya benign  Cervical strain, acute, initial encounter  Will give him tizanidine at night and he will take tylenol for pain  He can also use topical salonpas lidocaine       Diagnoses and all orders for this visit:    Gastrointestinal hemorrhage, unspecified gastrointestinal hemorrhage type  -     tiZANidine (ZANAFLEX) 2 mg tablet; Take 1 tablet (2 mg total) by mouth daily at bedtime    Essential hypertension  -     Lipid Panel with Direct LDL reflex; Future    Hypercholesterolemia  -     Lipid Panel with Direct LDL reflex; Future  -     Comprehensive metabolic panel; Future  -     Lipid Panel with Direct LDL reflex; Future  -     CK; Future    Screening for prostate cancer          Subjective:      Patient ID: John Cespedes is a 47 y o  male  Patient presents in follow up for his medical problems and to review recent lab work  He had lipids ordered and he went to lab but they werent run  He had repeat cbc which showed normalization of his hgb  He complains of neck pain and stiffness        The following portions of the patient's history were reviewed and updated as appropriate: allergies, current medications, past family history, past medical history, past social history, past surgical history and problem list/     Review of Systems   Musculoskeletal: Positive for neck pain  Objective:      /74   Pulse 59   Resp 16   Ht 5' 6" (1 676 m)   Wt 109 kg (240 lb)   SpO2 (!) 59%   BMI 38 74 kg/m²          Physical Exam   Constitutional: He is oriented to person, place, and time  He appears well-developed and well-nourished  No distress  HENT:   Head: Normocephalic and atraumatic  Right Ear: External ear normal    Left Ear: External ear normal    Mouth/Throat: No oropharyngeal exudate     Eyes: Conjunctivae and EOM are normal  Pupils are equal, round, and reactive to light  No scleral icterus  Neck: Normal range of motion  Neck supple  No JVD present  No tracheal deviation present  No thyromegaly present  Cardiovascular: Normal rate, regular rhythm and intact distal pulses  Exam reveals no gallop and no friction rub  No murmur heard  Pulmonary/Chest: Effort normal and breath sounds normal  No respiratory distress  He has no wheezes  He has no rales  He exhibits no tenderness  Abdominal: Soft  Bowel sounds are normal  He exhibits no distension and no mass  There is no tenderness  There is no rebound and no guarding  Genitourinary:   Genitourinary Comments: Nemg  Testes descended bilaterally without masses  No penile discharge or masses  Nl rectal tone  The prostate is small and smooth and without masses  Musculoskeletal: Normal range of motion  He exhibits tenderness  He exhibits no edema or deformity  Tender in the right trapezius mm with spasm   Lymphadenopathy:     He has no cervical adenopathy  Neurological: He is alert and oriented to person, place, and time  He has normal reflexes  No cranial nerve deficit  He exhibits normal muscle tone  Coordination normal    Skin: Skin is warm and dry  No rash noted  He is not diaphoretic  No erythema  No pallor  Psychiatric: He has a normal mood and affect   His behavior is normal  Judgment and thought content normal

## 2018-08-07 NOTE — ASSESSMENT & PLAN NOTE
Will give him tizanidine at night and he will take tylenol for pain   He can also use topical salonpas lidocaine

## 2018-08-20 ENCOUNTER — TRANSCRIBE ORDERS (OUTPATIENT)
Dept: LAB | Facility: CLINIC | Age: 53
End: 2018-08-20

## 2018-08-20 DIAGNOSIS — M54.5 LOW BACK PAIN, UNSPECIFIED BACK PAIN LATERALITY, UNSPECIFIED CHRONICITY, WITH SCIATICA PRESENCE UNSPECIFIED: Primary | ICD-10-CM

## 2018-08-21 ENCOUNTER — APPOINTMENT (OUTPATIENT)
Dept: LAB | Facility: CLINIC | Age: 53
End: 2018-08-21
Payer: COMMERCIAL

## 2018-08-21 DIAGNOSIS — M54.5 LOW BACK PAIN, UNSPECIFIED BACK PAIN LATERALITY, UNSPECIFIED CHRONICITY, WITH SCIATICA PRESENCE UNSPECIFIED: ICD-10-CM

## 2018-08-21 LAB
BUN SERPL-MCNC: 11 MG/DL (ref 5–25)
CREAT SERPL-MCNC: 0.85 MG/DL (ref 0.6–1.3)
GFR SERPL CREATININE-BSD FRML MDRD: 100 ML/MIN/1.73SQ M

## 2018-08-21 PROCEDURE — 36415 COLL VENOUS BLD VENIPUNCTURE: CPT

## 2018-08-21 PROCEDURE — 84520 ASSAY OF UREA NITROGEN: CPT

## 2018-08-21 PROCEDURE — 82565 ASSAY OF CREATININE: CPT

## 2018-12-19 ENCOUNTER — TELEPHONE (OUTPATIENT)
Dept: INTERNAL MEDICINE CLINIC | Facility: CLINIC | Age: 53
End: 2018-12-19

## 2018-12-19 DIAGNOSIS — I10 ESSENTIAL HYPERTENSION: Primary | ICD-10-CM

## 2018-12-19 RX ORDER — IRBESARTAN 300 MG/1
300 TABLET ORAL
Qty: 30 TABLET | Refills: 3 | Status: SHIPPED | OUTPATIENT
Start: 2018-12-19 | End: 2019-02-08 | Stop reason: SDUPTHER

## 2018-12-19 NOTE — TELEPHONE ENCOUNTER
Berenice from [employer] called about his Aylin Opoka being recalled  Can we send in a replacement for him?       AD#[phone]

## 2018-12-20 DIAGNOSIS — I10 ESSENTIAL HYPERTENSION: ICD-10-CM

## 2018-12-20 RX ORDER — IRBESARTAN 300 MG/1
300 TABLET ORAL
Qty: 90 TABLET | Refills: 3 | OUTPATIENT
Start: 2018-12-20

## 2018-12-27 ENCOUNTER — OFFICE VISIT (OUTPATIENT)
Dept: INTERNAL MEDICINE CLINIC | Facility: CLINIC | Age: 53
End: 2018-12-27
Payer: COMMERCIAL

## 2018-12-27 VITALS
SYSTOLIC BLOOD PRESSURE: 126 MMHG | HEIGHT: 66 IN | HEART RATE: 64 BPM | WEIGHT: 250.2 LBS | DIASTOLIC BLOOD PRESSURE: 82 MMHG | RESPIRATION RATE: 16 BRPM | BODY MASS INDEX: 40.21 KG/M2 | TEMPERATURE: 98.3 F

## 2018-12-27 DIAGNOSIS — K21.9 GASTROESOPHAGEAL REFLUX DISEASE WITHOUT ESOPHAGITIS: Primary | ICD-10-CM

## 2018-12-27 DIAGNOSIS — I10 ESSENTIAL HYPERTENSION: ICD-10-CM

## 2018-12-27 PROCEDURE — 3074F SYST BP LT 130 MM HG: CPT | Performed by: PHYSICIAN ASSISTANT

## 2018-12-27 PROCEDURE — 99214 OFFICE O/P EST MOD 30 MIN: CPT | Performed by: PHYSICIAN ASSISTANT

## 2018-12-27 PROCEDURE — 3079F DIAST BP 80-89 MM HG: CPT | Performed by: PHYSICIAN ASSISTANT

## 2018-12-27 PROCEDURE — 1036F TOBACCO NON-USER: CPT | Performed by: PHYSICIAN ASSISTANT

## 2018-12-27 RX ORDER — PANTOPRAZOLE SODIUM 40 MG/1
40 TABLET, DELAYED RELEASE ORAL
Qty: 60 TABLET | Refills: 3 | Status: SHIPPED | OUTPATIENT
Start: 2018-12-27 | End: 2019-01-03 | Stop reason: SDUPTHER

## 2018-12-28 NOTE — PROGRESS NOTES
Assessment/Plan:  5 day history of abdominal bloating and heartburn after he eats  This seems to be improving slightly is physical exam is unremarkable his symptoms are not exertional he had a negative cardiac catheterization 6 months ago  Will restart Protonix which she stopped about a month ago he will see GI       Diagnoses and all orders for this visit:    Gastroesophageal reflux disease without esophagitis  -     pantoprazole (PROTONIX) 40 mg tablet; Take 1 tablet (40 mg total) by mouth 2 (two) times a day before meals for 30 days    Essential hypertension        No problem-specific Assessment & Plan notes found for this encounter  Subjective:      Patient ID: Dileep Stahl is a 47 y o  male  Four days ago after eating a lot of rich food he developed epigastric discomfort and bloating with a lot of belching he also some sternal burning  He has a previous history of GERD however his PPIs were stopped about a month ago he was using some Gaviscon in the past 4 days without much benefit no vomiting or nausea somewhat poor appetite until today no trouble at all with his bowels a few months ago he had upper and lower endoscopy esophagitis was found        The following portions of the patient's history were reviewed and updated as appropriate:   He has a past medical history of Hypercholesterolemia; Hypertension; Obesity; and Sexual dysfunction  ,   does not have any pertinent problems on file  ,   has a past surgical history that includes Back surgery; Septoplasty; Cardiac catheterization; Cardiac catheterization; and EGD AND COLONOSCOPY (N/A, 6/15/2018)  ,  family history includes Atrial fibrillation in his mother; COPD in his mother; Coronary artery disease in his mother; Lung cancer in his father and mother; Prostate cancer in his maternal grandfather; Rheum arthritis in his mother; Stroke in his mother  ,   reports that he quit smoking about 20 years ago  His smoking use included Cigarettes   He has never used smokeless tobacco  He reports that he drinks alcohol  He reports that he does not use drugs  ,  has No Known Allergies     Current Outpatient Prescriptions   Medication Sig Dispense Refill    aluminum hydroxide-magnesium carbonate (GAVISCON)  mg/15 mL oral suspension Take 15 mL by mouth 4 (four) times daily (after meals and at bedtime) 1 Bottle 0    aspirin 81 mg chewable tablet Chew 1 tablet (81 mg total) daily  0    irbesartan (AVAPRO) 300 mg tablet Take 1 tablet (300 mg total) by mouth daily at bedtime 30 tablet 3    rosuvastatin (CRESTOR) 5 mg tablet Take 1 tablet (5 mg total) by mouth daily 30 tablet 11    tiZANidine (ZANAFLEX) 2 mg tablet Take 1 tablet (2 mg total) by mouth daily at bedtime 30 tablet 0    pantoprazole (PROTONIX) 40 mg tablet Take 1 tablet (40 mg total) by mouth 2 (two) times a day before meals for 30 days 60 tablet 3     No current facility-administered medications for this visit  Review of Systems   Constitutional: Negative for activity change, appetite change, chills, diaphoresis, fatigue, fever and unexpected weight change  HENT: Negative for congestion, dental problem, drooling, ear discharge, ear pain, facial swelling, hearing loss, nosebleeds, postnasal drip, rhinorrhea, sinus pain, sinus pressure, sneezing, sore throat, tinnitus, trouble swallowing and voice change  Eyes: Negative for photophobia, pain, discharge, redness, itching and visual disturbance  Respiratory: Negative for apnea, cough, choking, chest tightness, shortness of breath, wheezing and stridor  Cardiovascular: Negative for chest pain, palpitations and leg swelling  Gastrointestinal: Positive for abdominal distention and abdominal pain  Negative for anal bleeding, blood in stool, constipation, diarrhea, nausea, rectal pain and vomiting  Endocrine: Negative for cold intolerance, heat intolerance, polydipsia, polyphagia and polyuria     Genitourinary: Negative for decreased urine volume, difficulty urinating, dysuria, enuresis, flank pain, frequency, genital sores, hematuria and urgency  Musculoskeletal: Negative for arthralgias, back pain, gait problem, joint swelling, myalgias, neck pain and neck stiffness  Skin: Negative for color change, pallor, rash and wound  Neurological: Negative for dizziness, tremors, seizures, syncope, facial asymmetry, speech difficulty, weakness, light-headedness, numbness and headaches  Hematological: Negative for adenopathy  Does not bruise/bleed easily  Psychiatric/Behavioral: Negative for agitation, behavioral problems, confusion, decreased concentration, dysphoric mood, hallucinations, self-injury, sleep disturbance and suicidal ideas  The patient is not nervous/anxious and is not hyperactive  Objective:  Vitals:    12/27/18 1818   BP: 126/82   BP Location: Left arm   Patient Position: Sitting   Cuff Size: Standard   Pulse: 64   Resp: 16   Temp: 98 3 °F (36 8 °C)   TempSrc: Oral   Weight: 113 kg (250 lb 3 2 oz)   Height: 5' 6" (1 676 m)     Body mass index is 40 38 kg/m²  Physical Exam   Constitutional: He is oriented to person, place, and time  He appears well-developed  Obese   HENT:   Head: Normocephalic  Right Ear: External ear normal    Left Ear: External ear normal    Mouth/Throat: Oropharynx is clear and moist    Eyes: Pupils are equal, round, and reactive to light  Conjunctivae are normal    Neck: Neck supple  No thyromegaly present  Cardiovascular: Normal rate, regular rhythm, normal heart sounds and intact distal pulses  Pulmonary/Chest: Effort normal and breath sounds normal    Abdominal: Soft  Bowel sounds are normal    Musculoskeletal: Normal range of motion  Neurological: He is alert and oriented to person, place, and time  He has normal reflexes  Skin: Skin is warm and dry

## 2019-01-03 ENCOUNTER — OFFICE VISIT (OUTPATIENT)
Dept: GASTROENTEROLOGY | Facility: CLINIC | Age: 54
End: 2019-01-03
Payer: COMMERCIAL

## 2019-01-03 VITALS
HEART RATE: 69 BPM | BODY MASS INDEX: 39.86 KG/M2 | WEIGHT: 248 LBS | DIASTOLIC BLOOD PRESSURE: 80 MMHG | HEIGHT: 66 IN | SYSTOLIC BLOOD PRESSURE: 132 MMHG

## 2019-01-03 DIAGNOSIS — K21.9 GASTROESOPHAGEAL REFLUX DISEASE WITHOUT ESOPHAGITIS: ICD-10-CM

## 2019-01-03 PROCEDURE — 99213 OFFICE O/P EST LOW 20 MIN: CPT | Performed by: NURSE PRACTITIONER

## 2019-01-03 RX ORDER — PANTOPRAZOLE SODIUM 40 MG/1
40 TABLET, DELAYED RELEASE ORAL
Qty: 180 TABLET | Refills: 3 | Status: SHIPPED | OUTPATIENT
Start: 2019-01-03 | End: 2019-01-24 | Stop reason: SDUPTHER

## 2019-01-03 NOTE — PROGRESS NOTES
Assessment/Plan:    Follow-up and pantoprazole refill  Episode of GERD on [place] but overall is doing well on pantoprazole  I told patient to carry Gaviscon with him in case he has any dietary indiscretions       Diagnoses and all orders for this visit:    Gastroesophageal reflux disease without esophagitis  -     pantoprazole (PROTONIX) 40 mg tablet; Take 1 tablet (40 mg total) by mouth 2 (two) times a day before meals for 90 days            Subjective:      Patient ID: Johan Mcneill is a 47 y o  male  60-year-old male who had an endoscopy earlier in the year that showed duodenitis and gastritis  He was on pantoprazole ran out of his medication and did over the counter which did not seem to help him  On [place] he ate too much fried food and developed severe dyspeptic symptoms and started on over-the-counter Gaviscon which seems to be helping him  He generally tries to follow a GERD diet but finds it difficult because he is a   We will renew his 40 mg of pantoprazole twice a day and he has Gaviscon as needed  Earlier this year he had rectal bleeding and severe abdominal pain and was found to have ischemic colitis  He has not had any episodes since and he had a negative mesenteric Doppler  Heartburn   He reports no sore throat  Pertinent negatives include no fatigue  The following portions of the patient's history were reviewed and updated as appropriate: He  has a past medical history of Hypercholesterolemia; Hypertension; Obesity; and Sexual dysfunction    He   Patient Active Problem List    Diagnosis Date Noted    Cervical strain, acute, initial encounter 08/07/2018    Bright red blood per rectum 06/14/2018    Blood creatinine increased compared with prior measurement 06/14/2018    Leukocytosis 06/14/2018    GI bleed 06/14/2018    Dyspepsia 06/12/2018    Encounter for screening colonoscopy 06/12/2018    Gastroesophageal reflux disease without esophagitis 06/06/2018  Chest pain 06/05/2018    Screen for colon cancer 05/29/2018    Screening for malignant neoplasm of colon 05/08/2018    Irritant contact dermatitis due to other agents 05/08/2018    Screening for prostate cancer 05/08/2018    Vertigo 04/05/2018    Spondylosis of cervical region without myelopathy or radiculopathy 04/05/2018    Dehydration 03/27/2018    Hypertension 02/25/2018    Hypercholesterolemia 02/25/2018    Morbid obesity due to excess calories ([facility] [address] ) 02/25/2018    Chewing tobacco nicotine dependence with nicotine-induced disorder 02/25/2018    Lumbar radiculopathy 09/15/2015    Allergic rhinitis 01/10/2014     He  has a past surgical history that includes Back surgery; Septoplasty; Cardiac catheterization; Cardiac catheterization; and EGD AND COLONOSCOPY (N/A, 6/15/2018)  His family history includes Atrial fibrillation in his mother; COPD in his mother; Coronary artery disease in his mother; Lung cancer in his father and mother; Prostate cancer in his maternal grandfather; Rheum arthritis in his mother; Stroke in his mother  He  reports that he quit smoking about 20 years ago  His smoking use included Cigarettes  He has never used smokeless tobacco  He reports that he drinks alcohol  He reports that he does not use drugs    Current Outpatient Prescriptions   Medication Sig Dispense Refill    aluminum hydroxide-magnesium carbonate (GAVISCON)  mg/15 mL oral suspension Take 15 mL by mouth 4 (four) times daily (after meals and at bedtime) 1 Bottle 0    aspirin 81 mg chewable tablet Chew 1 tablet (81 mg total) daily  0    irbesartan (AVAPRO) 300 mg tablet Take 1 tablet (300 mg total) by mouth daily at bedtime 30 tablet 3    pantoprazole (PROTONIX) 40 mg tablet Take 1 tablet (40 mg total) by mouth 2 (two) times a day before meals for 90 days 180 tablet 3    rosuvastatin (CRESTOR) 5 mg tablet Take 1 tablet (5 mg total) by mouth daily 30 tablet 11    tiZANidine (ZANAFLEX) 2 mg tablet Take 1 tablet (2 mg total) by mouth daily at bedtime 30 tablet 0     No current facility-administered medications for this visit  Current Outpatient Prescriptions on File Prior to Visit   Medication Sig    aluminum hydroxide-magnesium carbonate (GAVISCON)  mg/15 mL oral suspension Take 15 mL by mouth 4 (four) times daily (after meals and at bedtime)    aspirin 81 mg chewable tablet Chew 1 tablet (81 mg total) daily    irbesartan (AVAPRO) 300 mg tablet Take 1 tablet (300 mg total) by mouth daily at bedtime    rosuvastatin (CRESTOR) 5 mg tablet Take 1 tablet (5 mg total) by mouth daily    tiZANidine (ZANAFLEX) 2 mg tablet Take 1 tablet (2 mg total) by mouth daily at bedtime    [DISCONTINUED] pantoprazole (PROTONIX) 40 mg tablet Take 1 tablet (40 mg total) by mouth 2 (two) times a day before meals for 30 days     No current facility-administered medications on file prior to visit  He has No Known Allergies       Review of Systems   Constitutional: Negative for activity change, appetite change, chills, diaphoresis, fatigue and unexpected weight change  HENT: Negative for mouth sores, sore throat, trouble swallowing and voice change  Eyes: Negative for pain  Respiratory: Negative  Cardiovascular: Negative  Gastrointestinal: Negative  Endocrine: Negative  Musculoskeletal: Negative for arthralgias  Skin: Negative  Hematological: Negative for adenopathy  Does not bruise/bleed easily  Psychiatric/Behavioral: Negative  Objective:      /80   Pulse 69   Ht 5' 6" (1 676 m)   Wt 112 kg (248 lb)   BMI 40 03 kg/m²          Physical Exam   Constitutional: He is oriented to person, place, and time  He appears well-developed and well-nourished  Eyes: No scleral icterus  Cardiovascular: Normal rate and regular rhythm  Pulmonary/Chest: Effort normal and breath sounds normal    Abdominal: Soft   Bowel sounds are normal    Lymphadenopathy:     He has no cervical adenopathy  Neurological: He is alert and oriented to person, place, and time  Skin: Skin is warm and dry  Psychiatric: He has a normal mood and affect

## 2019-01-24 DIAGNOSIS — K21.9 GASTROESOPHAGEAL REFLUX DISEASE WITHOUT ESOPHAGITIS: ICD-10-CM

## 2019-01-24 RX ORDER — PANTOPRAZOLE SODIUM 40 MG/1
TABLET, DELAYED RELEASE ORAL
Qty: 60 TABLET | Refills: 0 | Status: CANCELLED | OUTPATIENT
Start: 2019-01-24

## 2019-01-24 NOTE — TELEPHONE ENCOUNTER
ptn said the pharmacy never got the script for the pantoprazole   Please resend to the pharmacy on file

## 2019-01-25 RX ORDER — PANTOPRAZOLE SODIUM 40 MG/1
40 TABLET, DELAYED RELEASE ORAL
Qty: 180 TABLET | Refills: 3 | Status: SHIPPED | OUTPATIENT
Start: 2019-01-25 | End: 2020-01-14 | Stop reason: SDUPTHER

## 2019-02-07 ENCOUNTER — APPOINTMENT (OUTPATIENT)
Dept: LAB | Facility: CLINIC | Age: 54
End: 2019-02-07
Payer: COMMERCIAL

## 2019-02-07 DIAGNOSIS — E78.00 HYPERCHOLESTEROLEMIA: Chronic | ICD-10-CM

## 2019-02-07 DIAGNOSIS — I10 ESSENTIAL HYPERTENSION: ICD-10-CM

## 2019-02-07 LAB
ALBUMIN SERPL BCP-MCNC: 3.4 G/DL (ref 3.5–5)
ALP SERPL-CCNC: 62 U/L (ref 46–116)
ALT SERPL W P-5'-P-CCNC: 35 U/L (ref 12–78)
ANION GAP SERPL CALCULATED.3IONS-SCNC: 4 MMOL/L (ref 4–13)
AST SERPL W P-5'-P-CCNC: 21 U/L (ref 5–45)
BILIRUB SERPL-MCNC: 0.62 MG/DL (ref 0.2–1)
BUN SERPL-MCNC: 14 MG/DL (ref 5–25)
CALCIUM SERPL-MCNC: 8.8 MG/DL (ref 8.3–10.1)
CHLORIDE SERPL-SCNC: 105 MMOL/L (ref 100–108)
CHOLEST SERPL-MCNC: 91 MG/DL (ref 50–200)
CK SERPL-CCNC: 56 U/L (ref 39–308)
CO2 SERPL-SCNC: 29 MMOL/L (ref 21–32)
CREAT SERPL-MCNC: 0.93 MG/DL (ref 0.6–1.3)
GFR SERPL CREATININE-BSD FRML MDRD: 93 ML/MIN/1.73SQ M
GLUCOSE P FAST SERPL-MCNC: 86 MG/DL (ref 65–99)
HDLC SERPL-MCNC: 43 MG/DL (ref 40–60)
LDLC SERPL CALC-MCNC: 38 MG/DL (ref 0–100)
POTASSIUM SERPL-SCNC: 3.6 MMOL/L (ref 3.5–5.3)
PROT SERPL-MCNC: 6.9 G/DL (ref 6.4–8.2)
SODIUM SERPL-SCNC: 138 MMOL/L (ref 136–145)
TRIGL SERPL-MCNC: 51 MG/DL

## 2019-02-07 PROCEDURE — 36415 COLL VENOUS BLD VENIPUNCTURE: CPT

## 2019-02-07 PROCEDURE — 82550 ASSAY OF CK (CPK): CPT

## 2019-02-07 PROCEDURE — 80053 COMPREHEN METABOLIC PANEL: CPT

## 2019-02-07 PROCEDURE — 80061 LIPID PANEL: CPT

## 2019-02-08 ENCOUNTER — OFFICE VISIT (OUTPATIENT)
Dept: CARDIOLOGY CLINIC | Facility: CLINIC | Age: 54
End: 2019-02-08
Payer: COMMERCIAL

## 2019-02-08 VITALS
DIASTOLIC BLOOD PRESSURE: 72 MMHG | BODY MASS INDEX: 40.02 KG/M2 | OXYGEN SATURATION: 97 % | HEART RATE: 89 BPM | SYSTOLIC BLOOD PRESSURE: 126 MMHG | WEIGHT: 249 LBS | HEIGHT: 66 IN

## 2019-02-08 DIAGNOSIS — E78.2 MIXED HYPERLIPIDEMIA: ICD-10-CM

## 2019-02-08 DIAGNOSIS — I10 ESSENTIAL HYPERTENSION: Primary | ICD-10-CM

## 2019-02-08 DIAGNOSIS — E66.01 MORBID OBESITY DUE TO EXCESS CALORIES (HCC): ICD-10-CM

## 2019-02-08 PROCEDURE — 99214 OFFICE O/P EST MOD 30 MIN: CPT | Performed by: INTERNAL MEDICINE

## 2019-02-08 RX ORDER — IRBESARTAN 150 MG/1
150 TABLET ORAL
Qty: 90 TABLET | Refills: 1 | Status: SHIPPED | OUTPATIENT
Start: 2019-02-08 | End: 2019-03-05 | Stop reason: SDUPTHER

## 2019-02-08 RX ORDER — METOPROLOL SUCCINATE 50 MG/1
50 TABLET, EXTENDED RELEASE ORAL DAILY
Qty: 90 TABLET | Refills: 1 | Status: SHIPPED | OUTPATIENT
Start: 2019-02-08 | End: 2019-07-31 | Stop reason: SDUPTHER

## 2019-02-08 NOTE — PROGRESS NOTES
CARDIOLOGY OFFICE VISIT  [facility]  [address], [address]   Daniel [address] [place] [address], [facility] Dennis Marino  Tel: [phone]      NAME: Emilia Simpson  AGE: 49 y o  SEX: male  : 1965   MRN: [id number]      Chief Complaint:  Chief Complaint   Patient presents with    Follow-up     6 month - HTN/vertigo         History of Present Illness:   Patient comes for follow up s/p hospitalization at [facility] he is doing well from cardiac stand point and denies chest pain / pressure, SOB, palpitations, lightheadedness, syncope, swelling feet, orthopnea, PND, claudication  States he stopped taking the Imdur but it kept causing him severe headache  HTN -  Has been hypertensive for many years  Taking medications regularly  Denies lightheadedness, headache, medication side effects  HLP -  Has had hyperlipidemia for many years  Taking statin regularly along with diet control  Denies myalgia  PCP closely monitoring the blood work  Obesity - states he is trying to lose weight  Cardiac cath (2018) - According to Dr Romayne Daring "Cardiac catheterization showed near normal coronary arteries with DHRUV 2 sluggish flow  Chest pain could be due to microvascular disease or due to coronary spasm  Add Imdur  Consider Ranexa if Imdur is ineffective"  Since patient could not tolerate Imdur, Ranexa was offered  Past Medical History:  Past Medical History:   Diagnosis Date    Hypercholesterolemia     Hypertension     Obesity     Sexual dysfunction     Psychosexual dysfunction with inhibited sexual excitement          Past Surgical History:  Past Surgical History:   Procedure Laterality Date    BACK SURGERY      spinal fusion    CARDIAC CATHETERIZATION      CARDIAC CATHETERIZATION      EGD AND COLONOSCOPY N/A 6/15/2018    Procedure: EGD AND COLONOSCOPY;  Surgeon: Marianela Roth MD;  Location: [facility];   Service: Gastroenterology   Melody Palacios Family History:  Family History   Problem Relation Age of Onset    Atrial fibrillation Mother     COPD Mother     Coronary artery disease Mother    Marinelli Lung cancer Mother     Rheum arthritis Mother     Stroke Mother         Syndrome    Lung cancer Father     Prostate cancer Maternal Grandfather          Social History:  Social History     Social History    Marital status: /Civil Union     Spouse name: N/A    Number of children: N/A    Years of education: N/A     Social History Main Topics    Smoking status: Former Smoker     Types: Cigarettes     Quit date: 2/25/1998    Smokeless tobacco: Never Used    Alcohol use Yes      Comment: Occ alcohol use per [employer]     Drug use: No    Sexual activity: Yes     Partners: Female     Other Topics Concern    Not on file     Social History Narrative    No narrative on file         Active Problems:  Patient Active Problem List   Diagnosis    Hypertension    Hypercholesterolemia    Morbid obesity due to excess calories ([address] )    Chewing tobacco nicotine dependence with nicotine-induced disorder    Allergic rhinitis    Lumbar radiculopathy    Dehydration    Vertigo    Spondylosis of cervical region without myelopathy or radiculopathy    Screening for malignant neoplasm of colon    Irritant contact dermatitis due to other agents    Screening for prostate cancer    Screen for colon cancer    Chest pain    Gastroesophageal reflux disease without esophagitis    Dyspepsia    Encounter for screening colonoscopy    Bright red blood per rectum    Blood creatinine increased compared with prior measurement    Leukocytosis    GI bleed    Cervical strain, acute, initial encounter         The following portions of the patient's history were reviewed and updated as appropriate: past medical history, past surgical history, past family history,  past social history, current medications, allergies and problem list       Review of Systems:  Constitutional: Denies fever, chills, fatigue  Eyes: Denies eye redness, eye discharge, double vision  ENT: Denies hearing loss, tinnitus, sneezing, nasal discharge, sore throat   Respiratory: Denies cough, expectoration, hemoptysis, shortness of breath  Cardiovascular: Denies chest pain, palpitations, orthopnea, PND, lower extremity swelling  Gastrointestinal: Denies vomiting, hematemesis, diarrhea, bloody stools  Genito-Urinary: Denies dysuria, incontinence  Musculoskeletal: Denies back pain, joint pain, muscle pain  Neurologic: Denies confusion, lightheadedness, syncope, headache, focal weakness, sensory changes, seizures  Endocrine: Denies polyuria, polydipsia, temperature intolerance  Allergy and Immunology: Denies hives, insect bite sensitivity  Hematological and Lymphatic: Denies bleeding problems, swollen glands   Psychological: Denies depression, suicidal ideation, anxiety, panic  Dermatological: Denies pruritus, rash, skin lesion changes      Vitals:  Vitals:    02/08/19 1552   BP: 126/72   Pulse: 89   SpO2: 97%       Body mass index is 40 19 kg/m²  Weight (last 2 days)     Date/Time   Weight    02/08/19 1552  113 (249)                Physical Examination:  General: Patient is not in acute distress  Awake, alert, oriented in time, place and person  Responding to commands  Head: Normocephalic  Atraumatic  Eyes: Both pupils normal sized, round and reactive to light  Nonicteric  ENT: Normal external ear canals  Nares normal, no drainage  Lips and oral mucosa normal  Neck: Supple  JVP not raised  Trachea central  No thyromegaly  Lungs: Bilateral bronchovascular breath sounds with no crackles or rhonchi  Chest wall: No tenderness  Cardiovascular: RRR  S1 and S2 normal  No murmur, rub or gallop  Gastrointestinal: Abdomen soft, nontender  No guarding or rigidity  Liver and spleen not palpable  Bowel sounds present  Neurologic: Patient is awake, alert, oriented in time, place and person   Responding to command  Moving all extremities  Integumentary:  No skin rash  Lymphatic: No cervical lymphadenopathy  Back: Symmetric   No CVA tenderness  Extremities: No clubbing, cyanosis or edema      Laboratory Results:  CBC with diff:   Lab Results   Component Value Date    WBC 5 55 07/17/2018    RBC 4 60 07/17/2018    HGB 12 2 07/17/2018    HCT 39 7 07/17/2018    MCV 86 07/17/2018    MCH 26 5 (L) 07/17/2018    RDW 12 2 07/17/2018     07/17/2018       CMP:  Lab Results   Component Value Date    CREATININE 0 93 02/07/2019    CREATININE 0 77 10/29/2015    BUN 14 02/07/2019    BUN 15 10/29/2015     10/29/2015    K 3 6 02/07/2019    K 4 2 10/29/2015     02/07/2019     10/29/2015    CO2 29 02/07/2019    CO2 28 10/29/2015    GLUCOSE 82 10/29/2015    PROT 7 0 10/29/2015    ALKPHOS 62 02/07/2019    ALKPHOS 57 10/29/2015    ALT 35 02/07/2019    ALT 36 10/29/2015    AST 21 02/07/2019    AST 14 10/29/2015       Lab Results   Component Value Date    HGBA1C 5 5 02/26/2018    MG 2 0 06/15/2018    PHOS 3 1 06/15/2018       Lab Results   Component Value Date    TROPONINI <0 02 06/14/2018    TROPONINI <0 02 06/05/2018    TROPONINI <0 02 04/04/2018    CKTOTAL 56 02/07/2019    CKTOTAL 92 03/21/2017    CKTOTAL 52 08/22/2016    CKTOTAL 102 10/29/2015    CKTOTAL 123 05/29/2015    CKTOTAL 121 11/13/2014       Lipid Profile:   Lab Results   Component Value Date    CHOL 139 10/29/2015    CHOL 152 05/29/2015    CHOL 170 11/13/2014     Lab Results   Component Value Date    HDL 43 02/07/2019    HDL 43 02/26/2018    HDL 41 08/22/2016     Lab Results   Component Value Date    LDLCALC 38 02/07/2019    LDLCALC 130 (H) 02/26/2018    LDLCALC 79 08/22/2016     Lab Results   Component Value Date    TRIG 51 02/07/2019    TRIG 71 02/26/2018    TRIG 81 08/22/2016       Cardiac testing:   Results for orders placed during the hospital encounter of 02/25/18   Echo complete with contrast if indicated    Narrative [address] [facility] [address] [address]  [phone]    Transthoracic Echocardiogram  2D, M-mode, Doppler, and Color Doppler    Study date:  2018    Patient: Ben Juares  MR number: [id number]  Account number: [de-identified]  : 1965  Age: 47 years  Gender: Male  Status: Outpatient  Location: Bedside  Height: 66 in  Weight: 251 lb  BP: 153/ 96 mmHg    Indications: Chest pain, pressure nad tightness, Assess left ventricular function  Diagnoses: R07 9 - Chest pain, unspecified    Sonographer:   [facility] Marisela Richter  Interpreting Physician:  Amanda Lopez MD  Referring Physician:  Garland Ly MD  Group:  [facility]    SUMMARY    PROCEDURE INFORMATION:  This was a technically difficult study  Echocardiographic views were limited due to poor acoustic window availability, decreased penetration, and lung interference  LEFT VENTRICLE:  Systolic function was normal  Ejection fraction was estimated to be 60 %  There were no regional wall motion abnormalities  Left ventricular diastolic function parameters were normal     RIGHT VENTRICLE:  The size was normal   Systolic function was normal     MITRAL VALVE:  There was trace regurgitation  TRICUSPID VALVE:  There was trace regurgitation  Pulmonary artery systolic pressure was within the normal range  HISTORY: Symptoms: chest pain  PRIOR HISTORY: Risk factors: hypertension, hypercholesterolemia, and morbid obesity  PROCEDURE: The procedure was performed at the bedside  This was a routine study  The transthoracic approach was used  The study included complete 2D imaging, M-mode, complete spectral Doppler, and color Doppler  The heart rate was 69 bpm,  at the start of the study  Images were obtained from the parasternal, apical, subcostal, and suprasternal notch acoustic windows  Echocardiographic views were limited due to poor acoustic window availability, decreased penetration, and  lung interference   This was a technically difficult study  LEFT VENTRICLE: Size was normal  Systolic function was normal  Ejection fraction was estimated to be 60 %  There were no regional wall motion abnormalities  Wall thickness was normal  No evidence of apical thrombus  DOPPLER: Left  ventricular diastolic function parameters were normal     RIGHT VENTRICLE: The size was normal  Systolic function was normal  Wall thickness was normal     LEFT ATRIUM: Size was normal     RIGHT ATRIUM: Size was normal     MITRAL VALVE: Valve structure was normal  There was normal leaflet separation  DOPPLER: The transmitral velocity was within the normal range  There was no evidence for stenosis  There was trace regurgitation  AORTIC VALVE: The valve was trileaflet  Leaflets exhibited mildly increased thickness and normal cuspal separation  DOPPLER: Transaortic velocity was within the normal range  There was no evidence for stenosis  There was no significant  regurgitation  TRICUSPID VALVE: The valve structure was normal  There was normal leaflet separation  DOPPLER: The transtricuspid velocity was within the normal range  There was no evidence for stenosis  There was trace regurgitation  Pulmonary artery  systolic pressure was within the normal range  PULMONIC VALVE: Leaflets exhibited normal thickness, no calcification, and normal cuspal separation  DOPPLER: The transpulmonic velocity was within the normal range  There was no significant regurgitation  PERICARDIUM: There was no pericardial effusion  The pericardium was normal in appearance  AORTA: The root exhibited normal size  SYSTEMIC VEINS: IVC: The inferior vena cava was normal in size      SYSTEM MEASUREMENT TABLES    2D  %FS: 36 6 %  Ao Diam: 2 9 cm  EDV(Teich): 93 6 ml  EF(Teich): 66 5 %  ESV(Teich): 31 3 ml  IVSd: 1 cm  LA Area: 18 1 cm2  LA Diam: 4 cm  LVEDV MOD A4C: 130 5 ml  LVEF MOD A4C: 65 5 %  LVESV MOD A4C: 45 ml  LVIDd: 4 5 cm  LVIDs: 2 9 cm  LVLd A4C: 9 cm  LVLs A4C: 6 7 cm  LVPWd: 0 9 cm  RA Area: 19 3 cm2  RVIDd: 3 3 cm  SV MOD A4C: 85 5 ml  SV(Teich): 62 2 ml    CW  TR Vmax: 2 3 m/s  TR maxP 8 mmHg    MM  TAPSE: 2 3 cm    PW  E': 0 1 m/s  E/E': 8  MV A Tono: 0 8 m/s  MV Dec Le[place]: 3 6 m/s2  MV DecT: 227 5 ms  MV E Tono: 0 8 m/s  MV E/A Ratio: 1  MV PHT: 66 ms  MVA By PHT: 3 3 cm2    [facility] Accredited Echocardiography Laboratory    Prepared and electronically signed by    Robert Lizarraga MD  Signed [id number] 21:17:05         Results for orders placed during the hospital encounter of 18   NM myocardial perfusion spect (stress and/or rest)    Narrative [address]  [place], [place] [address]  [phone]    Rest/Stress Gated SPECT Myocardial Perfusion Imaging After Exercise    Patient: Teresita Macdonald  MR number: [id number]  Account number: [de-identified]  : 1965  Age: 47 years  Gender: Male  Status: Outpatient  Location: Stress lab  Height: 66 in  Weight: 250 lb  BP: 118/ 80 mmHg    Allergies: NO KNOWN ALLERGIES    Diagnosis: R07 9 - Chest pain, unspecified    Primary Physician:  Nayeli Pagan MD  RN:  Dami Knott  Interpreting Physician:  Robert Lizarraga MD  Referring Physician:  Kay Fox PA-C  Group:  [facility]  Report Prepared By[de-identified]  Dinah Warner    INDICATIONS: Detection of coronary artery disease  HISTORY: The patient is a 47year old  male  Chest pain status: recent onset chest pain, radiation to left arm with numbness and tingling  Coronary artery disease risk factors: dyslipidemia, hypertension, and smoking  Medications:  an ARB, a diuretic, aspirin, and a lipid lowering agent  PHYSICAL EXAM: Baseline physical exam screening: no wheezes audible, no loud murmur  REST ECG: Normal baseline ECG  PROCEDURE: The study was performed in the stress lab  Treadmill exercise testing was performed, using the Maikel protocol   Gated SPECT myocardial perfusion imaging was performed after stress and at rest  Systolic blood pressure was 118  mmHg, at the start of the study  Diastolic blood pressure was 80 mmHg, at the start of the study  The heart rate was 75 bpm, at the start of the study  Patient was not experiencing chest pain at the time of the injection of the  radiopharmaceutical  IV double checked  HARRISON PROTOCOL:  HR bpm SBP mmHg DBP mmHg Symptoms ST change Rhythm/conduct  Baseline 75 118 80 none none NSR, no ectopy  Stage 1 97 140 80 none -- --  Stage 2 117 144 82 none -- --  Stage 3 148 160 80 mild dyspnea, mild fatigue -- --  Recovery 1 78 178 78 subsiding -- --  Recovery 2 102 134 80 none -- --  No medications or fluids given  STRESS SUMMARY: Duration of exercise was 9 min  The patient exercised to protocol stage 3  Maximal work rate was 10 4 METs  Functional capacity was normal  Maximal heart rate during stress was 151 bpm ( 90 % of maximal predicted heart  rate)  The heart rate response to stress was normal  There was normal resting blood pressure with an appropriate response to stress  The rate-pressure product for the peak heart rate and blood pressure was 15477  There was no chest pain  during stress  The stress test was terminated due to achievement of maximal (symptom limited) exercise, mild dyspnea, and mild fatigue  Pre oxygen saturation: 98 %  Peak oxygen saturation: 98 %  The stress ECG was negative for ischemia and  normal  Arrhythmia during stress: isolated atrial premature beats and isolated premature ventricular beats  ISOTOPE ADMINISTRATION:  Resting isotope administration Stress isotope administration  Agent Sestamibi Sestamibi  Dose 11 mCi 33 mCi  Date 02/26/2018 02/26/2018  Injection-image interval 30 min 30 min    The radiopharmaceutical was injected one minute before the end of exercise  The radiopharmaceutical was injected at the peak effect of pharmacologic stress      MYOCARDIAL PERFUSION IMAGING:  The image quality was fair  Left ventricular size was normal     PERFUSION DEFECTS:  -  There was a moderate to large myocardial perfusion defect of the basal-mid inferior and inferoseptal wall and apical cap region which significantly improved on the supine stress images and was not seen on the prone stress images  indicating that it was likely a diaphragmatic attenuation artifact  GATED SPECT:  The calculated left ventricular ejection fraction was 61 %  Left ventricular ejection fraction was within normal limits by visual estimate  There was no diagnostic evidence for left ventricular regional abnormality  SUMMARY:  -  Stress results: Duration of exercise was 9 min  Target heart rate was achieved  There was no chest pain during stress  -  ECG conclusions: The stress ECG was negative for ischemia and normal  Arrhythmia during stress: isolated atrial premature beats and isolated premature ventricular beats  -  Perfusion imaging: There was a moderate to large myocardial perfusion defect of the basal-mid inferior and inferoseptal wall and apical cap region which significantly improved on the supine stress images and was not seen on the prone  stress images indicating that it was likely a diaphragmatic attenuation artifact   -  Gated SPECT: The calculated left ventricular ejection fraction was 61 %  Left ventricular ejection fraction was within normal limits by visual estimate  There was no diagnostic evidence for left ventricular regional abnormality  IMPRESSIONS: Normal study after maximal exercise  There was a moderate to large myocardial perfusion defect of the basal-mid inferior and inferoseptal wall and apical cap region which significantly improved on the supine stress images and  was not seen on the prone stress images indicating that it was likely a diaphragmatic attenuation artifact   Left ventricular systolic function was normal     Prepared and signed by    Fausto Sanchez MD  Signed 02/26/2018 16:49:00 Medications:    Current Outpatient Prescriptions:     aluminum hydroxide-magnesium carbonate (GAVISCON)  mg/15 mL oral suspension, Take 15 mL by mouth 4 (four) times daily (after meals and at bedtime), Disp: 1 Bottle, Rfl: 0    aspirin 81 mg chewable tablet, Chew 1 tablet (81 mg total) daily, Disp: , Rfl: 0    irbesartan (AVAPRO) 150 mg tablet, Take 1 tablet (150 mg total) by mouth daily at bedtime, Disp: 90 tablet, Rfl: 1    pantoprazole (PROTONIX) 40 mg tablet, Take 1 tablet (40 mg total) by mouth 2 (two) times a day before meals for 90 days, Disp: 180 tablet, Rfl: 3    rosuvastatin (CRESTOR) 5 mg tablet, Take 1 tablet (5 mg total) by mouth daily, Disp: 30 tablet, Rfl: 11    metoprolol succinate (TOPROL-XL) 50 mg 24 hr tablet, Take 1 tablet (50 mg total) by mouth daily, Disp: 90 tablet, Rfl: 1      Allergies:  No Known Allergies      Assessment and Plan:  1  Chest pain  Could be secondary to coronary vasospasm or microvascular disease  Cont ASA, statin  Beta-blocker added  Imdur caused severe headache so patient discontinued it  Ranexa to be offered if pt has CP  He has had no further episodes of CP since d/c from hospital    2  Essential hypertension  Irbesartan decrease from 300 mg to 150 mg daily  Add Toprol-XL 50 mg daily    3  Mixed hyperlipidemia  Continue statin and diet control  His PCP closely monitors his blood work    4  Morbid obesity due to excess calories ([address] )  Counseled to try to lose weight    Recommend aggressive risk factor modification and therapeutic lifestyle changes  Low-salt, low-calorie, low-fat, low-cholesterol diet with regular exercise and to optimize weight  I will defer the ordering and monitoring of necessity lab studies to you, but I am available and happy to review and manage any of the data at your request in the future  Discussed concepts of atherosclerosis, including signs and symptoms of cardiac disease  Previous studies were reviewed      Safety measures were reviewed  Questions were entertained and answered  Patient was advised to report any problems requiring medical attention  Follow-up with PCP and appropriate specialist and lab work as discussed  Return for follow up visit as scheduled or earlier, if needed  Thank you for allowing me to participate in the care and evaluation of your patient  Should you have any questions, please feel free to contact me        Fausto Sanchez MD  8/1/0128,4:09 PM

## 2019-03-05 ENCOUNTER — OFFICE VISIT (OUTPATIENT)
Dept: INTERNAL MEDICINE CLINIC | Facility: CLINIC | Age: 54
End: 2019-03-05
Payer: COMMERCIAL

## 2019-03-05 ENCOUNTER — APPOINTMENT (OUTPATIENT)
Dept: LAB | Facility: CLINIC | Age: 54
End: 2019-03-05
Payer: COMMERCIAL

## 2019-03-05 VITALS
BODY MASS INDEX: 40.5 KG/M2 | HEIGHT: 66 IN | OXYGEN SATURATION: 98 % | WEIGHT: 252 LBS | SYSTOLIC BLOOD PRESSURE: 142 MMHG | HEART RATE: 79 BPM | DIASTOLIC BLOOD PRESSURE: 84 MMHG

## 2019-03-05 DIAGNOSIS — E66.01 MORBID OBESITY DUE TO EXCESS CALORIES (HCC): Chronic | ICD-10-CM

## 2019-03-05 DIAGNOSIS — I10 ESSENTIAL HYPERTENSION: Primary | ICD-10-CM

## 2019-03-05 DIAGNOSIS — M12.9 ARTHRITIS/ARTHROPATHY OF MULTIPLE JOINTS: ICD-10-CM

## 2019-03-05 DIAGNOSIS — Z12.11 SCREEN FOR COLON CANCER: ICD-10-CM

## 2019-03-05 DIAGNOSIS — E78.00 HYPERCHOLESTEROLEMIA: Chronic | ICD-10-CM

## 2019-03-05 DIAGNOSIS — Z12.5 SCREENING FOR PROSTATE CANCER: ICD-10-CM

## 2019-03-05 PROBLEM — M54.81 CERVICO-OCCIPITAL NEURALGIA: Status: ACTIVE | Noted: 2018-10-23

## 2019-03-05 PROBLEM — M47.812 CERVICAL SPONDYLOSIS: Status: ACTIVE | Noted: 2018-10-23

## 2019-03-05 LAB
CRP SERPL QL: <3 MG/L
ERYTHROCYTE [SEDIMENTATION RATE] IN BLOOD: 9 MM/HOUR (ref 0–10)

## 2019-03-05 PROCEDURE — 86431 RHEUMATOID FACTOR QUANT: CPT

## 2019-03-05 PROCEDURE — 86617 LYME DISEASE ANTIBODY: CPT

## 2019-03-05 PROCEDURE — 86140 C-REACTIVE PROTEIN: CPT

## 2019-03-05 PROCEDURE — 86200 CCP ANTIBODY: CPT

## 2019-03-05 PROCEDURE — 86038 ANTINUCLEAR ANTIBODIES: CPT

## 2019-03-05 PROCEDURE — 86618 LYME DISEASE ANTIBODY: CPT

## 2019-03-05 PROCEDURE — 86430 RHEUMATOID FACTOR TEST QUAL: CPT

## 2019-03-05 PROCEDURE — 36415 COLL VENOUS BLD VENIPUNCTURE: CPT

## 2019-03-05 PROCEDURE — 85652 RBC SED RATE AUTOMATED: CPT

## 2019-03-05 PROCEDURE — 99214 OFFICE O/P EST MOD 30 MIN: CPT | Performed by: INTERNAL MEDICINE

## 2019-03-05 RX ORDER — IRBESARTAN 150 MG/1
300 TABLET ORAL
Qty: 90 TABLET | Refills: 3 | Status: SHIPPED | OUTPATIENT
Start: 2019-03-05 | End: 2019-03-06

## 2019-03-05 RX ORDER — IBUPROFEN 600 MG/1
600 TABLET ORAL EVERY 8 HOURS SCHEDULED
Qty: 90 TABLET | Refills: 1 | Status: SHIPPED | OUTPATIENT
Start: 2019-03-05 | End: 2019-08-20 | Stop reason: SDUPTHER

## 2019-03-05 NOTE — PROGRESS NOTES
Assessment/Plan:    Arthritis/arthropathy of multiple joints  Will rule out inflammatory or infectious arthritis    Screening for prostate cancer  psa and mireya at follow up     Screen for colon cancer  He had recent colonoscopy    Hypercholesterolemia  ldl at [facility]        Diagnoses and all orders for this visit:    Essential hypertension  -     Comprehensive metabolic panel; Future  -     irbesartan (AVAPRO) 150 mg tablet; Take 2 tablets (300 mg total) by mouth daily at bedtime    Morbid obesity due to excess calories (HCC)    Screening for prostate cancer  -     PSA, Total Screen; Future    Screen for colon cancer  -     Occult Blood, Fecal Immunochemical; Future    Hypercholesterolemia  -     Comprehensive metabolic panel; Future  -     CK; Future  -     Lipid Panel with Direct LDL reflex; Future    Arthritis/arthropathy of multiple joints  -     Sedimentation rate, automated; Future  -     C-reactive protein; Future  -     MARINO Screen w/ Reflex to Titer/Pattern; Future  -     RF Screen w/ Reflex to Titer; Future  -     Cyclic citrul peptide antibody, IgG; Future  -     Lyme Antibody Profile with reflex to WB; Future             Subjective:      Patient ID: Chao Jeffers is a 49 y o  male  Patient presents in  Follow up for his medical problems and to review recent lab work  He complains of lower back pain and upper chest and shoulder pain  He does do some lifting at work in the range of 20 lbs  He says the pain is as high as an 8/10  He took a 600 mg dose of ibuprofen which gave him relief   He had a stress test and heart cath which was normal  His mother had Rheumatoid arhtritis      The following portions of the patient's history were reviewed and updated as appropriate: allergies, current medications, past family history, past medical history, past social history, past surgical history and problem list     Review of Systems   Constitutional: Negative for activity change, appetite change, chills, diaphoresis, fatigue, fever and unexpected weight change  HENT: Negative for congestion, dental problem, drooling, ear discharge, ear pain, facial swelling, hearing loss, mouth sores, nosebleeds, postnasal drip, rhinorrhea, sinus pressure, sinus pain, sneezing, sore throat, tinnitus, trouble swallowing and voice change  Eyes: Negative for photophobia, pain, discharge, redness, itching and visual disturbance  Respiratory: Negative for apnea, cough, choking, chest tightness, shortness of breath, wheezing and stridor  Cardiovascular: Negative for chest pain, palpitations and leg swelling  Gastrointestinal: Negative for abdominal distention, abdominal pain, anal bleeding, blood in stool, constipation, diarrhea, nausea, rectal pain and vomiting  Endocrine: Negative for cold intolerance, heat intolerance, polydipsia, polyphagia and polyuria  Genitourinary: Negative for decreased urine volume, difficulty urinating, dysuria, enuresis, flank pain, frequency, genital sores, hematuria and urgency  Musculoskeletal: Negative for arthralgias, back pain, gait problem, joint swelling, myalgias, neck pain and neck stiffness  Chest wall and shoulder pain  Skin: Negative for color change, pallor, rash and wound  Allergic/Immunologic: Negative for environmental allergies, food allergies and immunocompromised state  Neurological: Negative for dizziness, tremors, seizures, syncope, facial asymmetry, speech difficulty, weakness, light-headedness, numbness and headaches  Hematological: Negative for adenopathy  Does not bruise/bleed easily  Psychiatric/Behavioral: Negative for agitation, self-injury, sleep disturbance and suicidal ideas  The patient is not nervous/anxious  Objective:      /84   Pulse 79   Ht 5' 6" (1 676 m)   Wt 114 kg (252 lb)   SpO2 98%   BMI 40 67 kg/m²          Physical Exam   Constitutional: He is oriented to person, place, and time   He appears well-developed and well-nourished  No distress  HENT:   Head: Normocephalic and atraumatic  Right Ear: External ear normal    Left Ear: External ear normal    Mouth/Throat: Oropharynx is clear and moist    Eyes: Pupils are equal, round, and reactive to light  Conjunctivae and EOM are normal  No scleral icterus  Neck: Normal range of motion  Neck supple  No JVD present  No tracheal deviation present  No thyromegaly present  Cardiovascular: Normal rate, regular rhythm and intact distal pulses  Exam reveals no gallop and no friction rub  No murmur heard  Pulmonary/Chest: Effort normal and breath sounds normal  No respiratory distress  He has no wheezes  He has no rales  He exhibits no tenderness  Abdominal: Soft  Bowel sounds are normal  He exhibits no distension and no mass  There is no tenderness  There is no rebound and no guarding  Musculoskeletal: Normal range of motion  He exhibits tenderness  He exhibits no edema or deformity  Tender in the trapezius and chest wall muscles  Lymphadenopathy:     He has no cervical adenopathy  Neurological: He is alert and oriented to person, place, and time  He has normal reflexes  No cranial nerve deficit  Coordination normal    Skin: Skin is warm and dry  No rash noted  He is not diaphoretic  No erythema  No pallor  Psychiatric: He has a normal mood and affect   His behavior is normal  Judgment and thought content normal

## 2019-03-06 ENCOUNTER — TELEPHONE (OUTPATIENT)
Dept: INTERNAL MEDICINE CLINIC | Facility: CLINIC | Age: 54
End: 2019-03-06

## 2019-03-06 ENCOUNTER — APPOINTMENT (OUTPATIENT)
Dept: LAB | Facility: CLINIC | Age: 54
End: 2019-03-06
Payer: COMMERCIAL

## 2019-03-06 DIAGNOSIS — R76.8 ELEVATED RHEUMATOID FACTOR: Primary | ICD-10-CM

## 2019-03-06 DIAGNOSIS — Z12.11 SCREEN FOR COLON CANCER: ICD-10-CM

## 2019-03-06 DIAGNOSIS — I10 ESSENTIAL HYPERTENSION: ICD-10-CM

## 2019-03-06 LAB
CRYOGLOB RF SER-ACNC: ABNORMAL [IU]/ML
HEMOCCULT STL QL IA: POSITIVE
RHEUMATOID FACT SER QL LA: POSITIVE
RYE IGE QN: NEGATIVE

## 2019-03-06 PROCEDURE — G0328 FECAL BLOOD SCRN IMMUNOASSAY: HCPCS

## 2019-03-06 RX ORDER — OLMESARTAN MEDOXOMIL 40 MG/1
40 TABLET ORAL DAILY
Qty: 30 TABLET | Refills: 2 | Status: SHIPPED | OUTPATIENT
Start: 2019-03-06 | End: 2019-06-02 | Stop reason: SDUPTHER

## 2019-03-06 NOTE — TELEPHONE ENCOUNTER
I have sent to the pharmacy olmesartan 40 mg daily  Even though 40 mg is a much lower dose than what he was previously taking in irbesartan (300mg), they are equivalent to each other

## 2019-03-06 NOTE — TELEPHONE ENCOUNTER
----- [facility] 3/6/2019 10:10 AM EST -----  Please call the patient and let him know that his rheumatoid factor is positive  He will need to be evaluated by a rheumatologist   If he knows of 1 that he would like to see, let us know, otherwise we will make a general referral to Rheumatology    Please let us know

## 2019-03-06 NOTE — TELEPHONE ENCOUNTER
[facility] on [address] told pt irbesartan has been recalled, please send a different rx    Please advise, call back

## 2019-03-07 ENCOUNTER — TELEPHONE (OUTPATIENT)
Dept: GASTROENTEROLOGY | Facility: CLINIC | Age: 54
End: 2019-03-07

## 2019-03-07 ENCOUNTER — TELEPHONE (OUTPATIENT)
Dept: INTERNAL MEDICINE CLINIC | Facility: CLINIC | Age: 54
End: 2019-03-07

## 2019-03-07 LAB
B BURGDOR IGG SER IA-ACNC: 3.21
B BURGDOR IGM SER IA-ACNC: 0.19
CCP IGA+IGG SERPL IA-ACNC: 9 UNITS (ref 0–19)

## 2019-03-07 NOTE — TELEPHONE ENCOUNTER
ptn was in the hospital at [facility] and saw Dr Daron Denver and has blood in his stool  Dr Daron Denver wants him to be seen as soon as possible

## 2019-03-07 NOTE — TELEPHONE ENCOUNTER
----- [facility] 3[facility]7/2019  8:51 AM EST -----  Please let the patient know that his stool was positive for blood  He will need to see GI for further evaluation  If he has a GI doctor, he needs to make an appointment with them  If he does not and needs a referral, let us know

## 2019-03-08 LAB
B BURGDOR IGG PATRN SER IB-IMP: POSITIVE
B BURGDOR IGM PATRN SER IB-IMP: NEGATIVE
B BURGDOR18KD IGG SER QL IB: ABNORMAL
B BURGDOR23KD IGG SER QL IB: PRESENT
B BURGDOR23KD IGM SER QL IB: ABNORMAL
B BURGDOR28KD IGG SER QL IB: PRESENT
B BURGDOR30KD IGG SER QL IB: PRESENT
B BURGDOR39KD IGG SER QL IB: ABNORMAL
B BURGDOR39KD IGM SER QL IB: ABNORMAL
B BURGDOR41KD IGG SER QL IB: PRESENT
B BURGDOR41KD IGM SER QL IB: ABNORMAL
B BURGDOR45KD IGG SER QL IB: PRESENT
B BURGDOR58KD IGG SER QL IB: ABNORMAL
B BURGDOR66KD IGG SER QL IB: ABNORMAL
B BURGDOR93KD IGG SER QL IB: ABNORMAL

## 2019-03-09 DIAGNOSIS — A69.23 LYME ARTHRITIS (HCC): Primary | ICD-10-CM

## 2019-03-09 RX ORDER — DOXYCYCLINE HYCLATE 100 MG/1
100 CAPSULE ORAL EVERY 12 HOURS SCHEDULED
Qty: 42 CAPSULE | Refills: 0 | Status: SHIPPED | OUTPATIENT
Start: 2019-03-09 | End: 2019-03-30

## 2019-03-12 ENCOUNTER — OFFICE VISIT (OUTPATIENT)
Dept: GASTROENTEROLOGY | Facility: CLINIC | Age: 54
End: 2019-03-12
Payer: COMMERCIAL

## 2019-03-12 VITALS
SYSTOLIC BLOOD PRESSURE: 130 MMHG | DIASTOLIC BLOOD PRESSURE: 90 MMHG | BODY MASS INDEX: 40.98 KG/M2 | HEART RATE: 64 BPM | WEIGHT: 255 LBS | HEIGHT: 66 IN

## 2019-03-12 DIAGNOSIS — R19.5 POSITIVE FIT (FECAL IMMUNOCHEMICAL TEST): Primary | ICD-10-CM

## 2019-03-12 PROCEDURE — 99214 OFFICE O/P EST MOD 30 MIN: CPT | Performed by: INTERNAL MEDICINE

## 2019-03-12 NOTE — PROGRESS NOTES
[facility]      Chief Complaint: positive FIT test    HPI:  Stephanie Mary is a 49 y o   male with a hx of ischemic colitis here for evaluation and treatment regarding a positive FIT test performed on 3/6/19  He also gets a lot of epistaxis and occasional bright red blood in his stool which he notices after he wipes  Negative for melena  No weight loss  No change in stool caliber  No change in bowel habits  No bleeding gums  He takes baby aspirin daily  Recent EGD revealed some mild gastritis, and recent colonoscopy revealed some colitis of the splenic flexure and small polyp  No other GI symptomatology  Recently diagnosed with rheumatoid arthritis  Patient does take nonsteroidals and  Takes both aspirin and ibuprofen  Review of Systems:   Constitutional: No fever or chills, feels well, no tiredness, no recent weight gain or weight loss  HENT: No complaints of earache, no hearing loss, no nosebleeds, no nasal discharge, no sore throat, no hoarseness  Eyes: No complaints of eye pain, no red eyes, no discharge from eyes, no itchy eyes  Cardiovascular: No complaints of slow heart rate, no fast heart rate, no chest pain, no palpitations, no leg claudication, no lower extremity edema  Respiratory: No complaints of shortness of breath, no wheezing, no cough, no SOB on exertion, no orthopnea  Gastrointestinal: As noted in HPI  Genitourinary: No complaints of dysuria, no incontinence, no hesitancy, no nocturia  Musculoskeletal:   Positive arthralgias, no myalgias, no joint swelling or stiffness, no limb pain or swelling  Neurological: No complaints of headache, no confusion, no convulsions, no numbness or tingling, no dizziness or fainting, no limb weakness, no difficulty walking  Skin: No complaints of skin rash or skin lesions, no itching, no skin wound, no dry skin  Hematological/Lymphatic: No complaints of swollen glands, does not bleed easy  Allergic/Immunologic: No immunocompromised state  Endocrine:  No complaints of polyuria, no polydipsia  Psychiatric/Behavioral: is not suicidal, no sleep disturbances, no anxiety or depression, no change in personality, no emotional problems  Historical Information   Past Medical History:   Diagnosis Date    Hypercholesterolemia     Hypertension     Obesity     Sexual dysfunction     Psychosexual dysfunction with inhibited sexual excitement      Past Surgical History:   Procedure Laterality Date    BACK SURGERY      spinal fusion    CARDIAC CATHETERIZATION      CARDIAC CATHETERIZATION      EGD AND COLONOSCOPY N/A 6/15/2018    Procedure: EGD AND COLONOSCOPY;  Surgeon: Marianela Roth MD;  Location: [facility]; Service: Gastroenterology    SEPTOPLASTY       Social History   Social History     Substance and Sexual Activity   Alcohol Use Yes    Comment: Occ alcohol use per [employer]      Social History     Substance and Sexual Activity   Drug Use No     Social History     Tobacco Use   Smoking Status Former Smoker    Types: Cigarettes    Last attempt to quit: 1998    Years since quittin 0   Smokeless Tobacco Never Used     Family History   Problem Relation Age of Onset    Atrial fibrillation Mother     COPD Mother     Coronary artery disease Mother     Lung cancer Mother     Rheum arthritis Mother     Stroke Mother         Syndrome    Lung cancer Father     Prostate cancer Maternal Grandfather          Current Medications: has a current medication list which includes the following prescription(s): aluminum hydroxide-magnesium carbonate, aspirin, doxycycline hyclate, ibuprofen, metoprolol succinate, olmesartan, pantoprazole, and rosuvastatin          Vital Signs: /90   Pulse 64   Ht 5' 6" (1 676 m)   Wt 116 kg (255 lb)   BMI 41 16 kg/m²       Physical Exam:   Constitutional  General Appearance: No acute distress, well appearing and well nourished  Head  Normocephalic  Eyes  Conjunctivae and lids: No swelling, erythema, or discharge  Pupils and irises: Equal, round and reactive to light  Ears, Nose, Mouth, and Throat  External inspection of ears and nose: Normal  Nasal mucosa, septum and turbinates: Normal without edema or erythema/   Oropharynx: Normal with no erythema, edema, exudate or lesions  Neck  Normal range of motion  Neck supple  Cardiovascular  Auscultation of the heart: Normal rate and rhythm, normal S1 and S2 without murmurs  Examination of the extremities for edema and/or varicosities: Normal  Pulmonary/Chest  Respiratory effort: No increased work of breathing or signs of respiratory distress  Auscultation of lungs: Clear to auscultation, equal breath sounds bilaterally, no wheezes, rales, no rhonchi  Abdomen  Abdomen: Non-tender, no masses  Liver and spleen: No hepatomegaly or splenomegaly  Musculoskeletal  Gait and station: normal   Digits and Nails: normal without clubbing or cyanosis  Inspection/palpation of joints, bones, and muscles: Normal  Neurological  No nystagmus or asterixis  Skin  Skin and subcutaneous tissue: Normal without rashes or lesions  Lymphatic  Palpation of the lymph nodes in neck: No lymphadenopathy     Psychiatric  Orientation to person, place and time: Normal   Mood and affect: Normal          Labs:  Lab Results   Component Value Date    ALT 35 02/07/2019    AST 21 02/07/2019    BUN 14 02/07/2019    CALCIUM 8 8 02/07/2019     02/07/2019    CHOL 139 10/29/2015    CO2 29 02/07/2019    CREATININE 0 93 02/07/2019    HDL 43 02/07/2019    HCT 39 7 07/17/2018    HGB 12 2 07/17/2018    HGBA1C 5 5 02/26/2018    MG 2 0 06/15/2018    PHOS 3 1 06/15/2018     07/17/2018    K 3 6 02/07/2019    PSA 0 3 07/17/2018     10/29/2015    TRIG 51 02/07/2019    WBC 5 55 07/17/2018         X-Rays & Procedures:   No orders to display ______________________________________________________________________      Assessment & Plan:     Diagnoses and all orders for this visit:    1  Positive FIT (fecal immunochemical test)    I will schedule him for a repeat colonoscopy and EGD  Risks and benefits of the procedures were discussed  Risks include but aren't limited to bleeding, perforation, missed lesion, and infection  Patient is agreeable to the procedures  Bowel prep instructions given  These will be followed by capsule test             Attestation:   By signing my name below, I, Marisa Suarez, attest that this documentation has been prepared under the direction and in the presence of Katty Mena MD  Electronically Signed: Darius Jules  3/12/19      Katty RIZZO, personally performed the services described in this documentation  All medical record entries made by the sairaibelise were at my direction and in my presence  I have reviewed the chart and discharge instructions and agree that the record reflects my personal performance and is accurate and complete   Katty Mena MD  3/12/19

## 2019-03-13 ENCOUNTER — OFFICE VISIT (OUTPATIENT)
Dept: INTERNAL MEDICINE CLINIC | Facility: CLINIC | Age: 54
End: 2019-03-13
Payer: COMMERCIAL

## 2019-03-13 VITALS
WEIGHT: 252 LBS | SYSTOLIC BLOOD PRESSURE: 140 MMHG | TEMPERATURE: 97.8 F | DIASTOLIC BLOOD PRESSURE: 88 MMHG | OXYGEN SATURATION: 97 % | BODY MASS INDEX: 40.5 KG/M2 | HEIGHT: 66 IN | HEART RATE: 60 BPM

## 2019-03-13 DIAGNOSIS — I10 ESSENTIAL HYPERTENSION: ICD-10-CM

## 2019-03-13 DIAGNOSIS — E66.01 MORBID OBESITY DUE TO EXCESS CALORIES (HCC): Chronic | ICD-10-CM

## 2019-03-13 DIAGNOSIS — K21.9 GASTROESOPHAGEAL REFLUX DISEASE WITHOUT ESOPHAGITIS: Primary | ICD-10-CM

## 2019-03-13 DIAGNOSIS — R19.5 POSITIVE FIT (FECAL IMMUNOCHEMICAL TEST): ICD-10-CM

## 2019-03-13 DIAGNOSIS — M12.9 ARTHRITIS/ARTHROPATHY OF MULTIPLE JOINTS: ICD-10-CM

## 2019-03-13 PROCEDURE — 99214 OFFICE O/P EST MOD 30 MIN: CPT | Performed by: PHYSICIAN ASSISTANT

## 2019-03-13 NOTE — PROGRESS NOTES
Assessment/Plan:  His physical exam is unremarkable is blood pressure is adequately controlled  His vague symptoms may be from treating his Lyme disease       There are no diagnoses linked to this encounter  No problem-specific Assessment & Plan notes found for this encounter  Subjective:      Patient ID: Jose Henderson is a 49 y o  male  His blood pressure has been a bit on the high side for the past few weeks he has had to change his Arb because of recalls  He is tolerating his pills no problem 4 days ago he was started on doxycycline for positive IgG Lyme titers since then he has been feeling slightly queasy and tingly in his upper chest and not quite right  No chest pain shortness of breath palpitations dizziness nausea vomiting diarrhea  No fevers or sweats seen at GI yesterday blood pressure was high normal   He is here today to have his blood pressure checked      The following portions of the patient's history were reviewed and updated as appropriate:   He has a past medical history of Hypercholesterolemia, Hypertension, Obesity, and Sexual dysfunction  ,  does not have any pertinent problems on file  ,   has a past surgical history that includes Back surgery; Septoplasty; Cardiac catheterization; Cardiac catheterization; and EGD AND COLONOSCOPY (N/A, 6/15/2018)  ,  family history includes Atrial fibrillation in his mother; COPD in his mother; Coronary artery disease in his mother; Lung cancer in his father and mother; Prostate cancer in his maternal grandfather; Rheum arthritis in his mother; Stroke in his mother  ,   reports that he quit smoking about 21 years ago  His smoking use included cigarettes  He has a 2 50 pack-year smoking history  He quit smokeless tobacco use about 2 years ago  His smokeless tobacco use included chew  He reports that he drank alcohol  He reports that he does not use drugs  ,  has No Known Allergies     Current Outpatient Medications   Medication Sig Dispense Refill    aluminum hydroxide-magnesium carbonate (GAVISCON)  mg/15 mL oral suspension Take 15 mL by mouth 4 (four) times daily (after meals and at bedtime) 1 Bottle 0    aspirin 81 mg chewable tablet Chew 1 tablet (81 mg total) daily  0    doxycycline hyclate (VIBRAMYCIN) 100 mg capsule Take 1 capsule (100 mg total) by mouth every 12 (twelve) hours for 21 days 42 capsule 0    ibuprofen (MOTRIN) 600 mg tablet Take 1 tablet (600 mg total) by mouth every 8 (eight) hours 90 tablet 1    metoprolol succinate (TOPROL-XL) 50 mg 24 hr tablet Take 1 tablet (50 mg total) by mouth daily 90 tablet 1    olmesartan (BENICAR) 40 mg tablet Take 1 tablet (40 mg total) by mouth daily 30 tablet 2    pantoprazole (PROTONIX) 40 mg tablet Take 1 tablet (40 mg total) by mouth 2 (two) times a day before meals for 90 days 180 tablet 3    rosuvastatin (CRESTOR) 5 mg tablet Take 1 tablet (5 mg total) by mouth daily 30 tablet 11     No current facility-administered medications for this visit  Review of Systems   Constitutional: Negative for activity change, appetite change, chills, diaphoresis, fatigue, fever and unexpected weight change  HENT: Negative for congestion, dental problem, drooling, ear discharge, ear pain, facial swelling, hearing loss, nosebleeds, postnasal drip, rhinorrhea, sinus pressure, sinus pain, sneezing, sore throat, tinnitus, trouble swallowing and voice change  Eyes: Negative for photophobia, pain, discharge, redness, itching and visual disturbance  Respiratory: Negative for apnea, cough, choking, chest tightness, shortness of breath, wheezing and stridor  Cardiovascular: Negative for chest pain, palpitations and leg swelling  Gastrointestinal: Negative for abdominal distention, abdominal pain, anal bleeding, blood in stool, constipation, diarrhea, nausea, rectal pain and vomiting  Endocrine: Negative for cold intolerance, heat intolerance, polydipsia, polyphagia and polyuria  Genitourinary: Negative for decreased urine volume, difficulty urinating, dysuria, enuresis, flank pain, frequency, genital sores, hematuria and urgency  Musculoskeletal: Negative for arthralgias, back pain, gait problem, joint swelling, myalgias, neck pain and neck stiffness  Skin: Negative for color change, pallor, rash and wound  Neurological: Negative for dizziness, tremors, seizures, syncope, facial asymmetry, speech difficulty, weakness, light-headedness, numbness and headaches  Hematological: Negative for adenopathy  Does not bruise/bleed easily  Psychiatric/Behavioral: Negative for agitation, behavioral problems, confusion, decreased concentration, dysphoric mood, hallucinations, self-injury, sleep disturbance and suicidal ideas  The patient is not nervous/anxious and is not hyperactive  Objective:  Vitals:    03/13/19 1459   BP: 140/88   BP Location: Left arm   Patient Position: Sitting   Pulse: 60   Temp: 97 8 °F (36 6 °C)   SpO2: 97%   Weight: 114 kg (252 lb)   Height: 5' 6" (1 676 m)     Body mass index is 40 67 kg/m²  Physical Exam   Constitutional: He is oriented to person, place, and time  He appears well-developed  Obese   HENT:   Head: Normocephalic  Right Ear: External ear normal    Left Ear: External ear normal    Mouth/Throat: Oropharynx is clear and moist    Eyes: Pupils are equal, round, and reactive to light  Conjunctivae are normal    Neck: Neck supple  No thyromegaly present  Cardiovascular: Normal rate, regular rhythm, normal heart sounds and intact distal pulses  Exam reveals no gallop and no friction rub  No murmur heard  Pulmonary/Chest: Effort normal and breath sounds normal  No stridor  No respiratory distress  He has no wheezes  He has no rales  He exhibits no tenderness  Abdominal: Soft  Bowel sounds are normal    Musculoskeletal: Normal range of motion  He exhibits no edema  Lymphadenopathy:     He has no cervical adenopathy     Neurological: He is alert and oriented to person, place, and time  He has normal reflexes  Skin: Skin is warm and dry

## 2019-03-19 ENCOUNTER — OFFICE VISIT (OUTPATIENT)
Dept: INTERNAL MEDICINE CLINIC | Facility: CLINIC | Age: 54
End: 2019-03-19
Payer: COMMERCIAL

## 2019-03-19 VITALS
OXYGEN SATURATION: 97 % | WEIGHT: 256 LBS | DIASTOLIC BLOOD PRESSURE: 100 MMHG | HEART RATE: 68 BPM | BODY MASS INDEX: 41.14 KG/M2 | SYSTOLIC BLOOD PRESSURE: 160 MMHG | HEIGHT: 66 IN

## 2019-03-19 DIAGNOSIS — I10 ESSENTIAL HYPERTENSION: Primary | ICD-10-CM

## 2019-03-19 DIAGNOSIS — R76.8 RHEUMATOID FACTOR POSITIVE: ICD-10-CM

## 2019-03-19 DIAGNOSIS — A69.20 LYME DISEASE: ICD-10-CM

## 2019-03-19 PROCEDURE — 99213 OFFICE O/P EST LOW 20 MIN: CPT | Performed by: INTERNAL MEDICINE

## 2019-03-19 RX ORDER — AMLODIPINE BESYLATE 5 MG/1
5 TABLET ORAL DAILY
Qty: 30 TABLET | Refills: 5 | Status: SHIPPED | OUTPATIENT
Start: 2019-03-19 | End: 2019-04-01 | Stop reason: SDUPTHER

## 2019-03-19 NOTE — ASSESSMENT & PLAN NOTE
Weakly positive rheumatoid factor with nl sed rate and crp and ccp  Despite family hx I think this may be a false positive and will hold off on rheumatology eval and instead recheck RF and sed rate in 8 wks after he has been treated for Lyme disease

## 2019-03-19 NOTE — PROGRESS NOTES
Assessment/Plan:    Rheumatoid factor positive  Weakly positive rheumatoid factor with nl sed rate and crp and ccp  Despite family hx I think this may be a false positive and will hold off on rheumatology eval and instead recheck RF and sed rate in 8 wks after he has been treated for Lyme disease  Lyme disease  On 3 wks of doxycycline  Hypertension  Poorly controlled  Will add amlodipine 5 mg daily       Diagnoses and all orders for this visit:    Essential hypertension    Rheumatoid factor positive    Lyme disease          Subjective:      Patient ID: King Patrick is a 49 y o  male  Patient presents in follow up for his medical problems and to review recent labs  He is positive for lyme disease and is on doxycycline  He has some slight improvement in his aches and pain  The following portions of the patient's history were reviewed and updated as appropriate: allergies, current medications, past family history, past medical history, past social history, past surgical history and problem list     Review of Systems   Constitutional: Negative for activity change, appetite change, chills, diaphoresis, fatigue, fever and unexpected weight change  HENT: Negative for congestion, dental problem, drooling, ear discharge, ear pain, facial swelling, hearing loss, mouth sores, nosebleeds, postnasal drip, rhinorrhea, sinus pressure, sinus pain, sneezing, sore throat, tinnitus, trouble swallowing and voice change  Eyes: Negative for photophobia, pain, discharge, redness, itching and visual disturbance  Respiratory: Negative for apnea, cough, choking, chest tightness, shortness of breath, wheezing and stridor  Cardiovascular: Negative for chest pain, palpitations and leg swelling  Gastrointestinal: Negative for abdominal distention, abdominal pain, anal bleeding, blood in stool, constipation, diarrhea, nausea, rectal pain and vomiting     Endocrine: Negative for cold intolerance, heat intolerance, polydipsia, polyphagia and polyuria  Genitourinary: Negative for decreased urine volume, difficulty urinating, dysuria, enuresis, flank pain, frequency, genital sores, hematuria and urgency  Musculoskeletal: Positive for arthralgias  Negative for back pain, gait problem, joint swelling, myalgias, neck pain and neck stiffness  Skin: Negative for color change, pallor, rash and wound  Allergic/Immunologic: Negative for environmental allergies, food allergies and immunocompromised state  Neurological: Negative for dizziness, tremors, seizures, syncope, facial asymmetry, speech difficulty, weakness, light-headedness, numbness and headaches  Hematological: Negative for adenopathy  Does not bruise/bleed easily  Psychiatric/Behavioral: Negative for agitation, self-injury, sleep disturbance and suicidal ideas  The patient is not nervous/anxious  Objective:      /100   Pulse 68   Ht 5' 6" (1 676 m)   Wt 116 kg (256 lb)   SpO2 97%   BMI 41 32 kg/m²          Physical Exam   Constitutional: He is oriented to person, place, and time  He appears well-developed and well-nourished  No distress  HENT:   Head: Normocephalic and atraumatic  Right Ear: External ear normal    Left Ear: External ear normal    Mouth/Throat: Oropharynx is clear and moist    Eyes: Pupils are equal, round, and reactive to light  Conjunctivae and EOM are normal  No scleral icterus  Neck: Normal range of motion  Neck supple  No JVD present  No tracheal deviation present  No thyromegaly present  Cardiovascular: Normal rate, regular rhythm and intact distal pulses  Exam reveals no gallop and no friction rub  No murmur heard  Pulmonary/Chest: Effort normal and breath sounds normal  No respiratory distress  He has no wheezes  He has no rales  He exhibits no tenderness  Abdominal: Soft  Bowel sounds are normal  He exhibits no distension and no mass  There is no tenderness  There is no rebound and no guarding  Musculoskeletal: Normal range of motion  He exhibits tenderness  He exhibits no edema or deformity  Lymphadenopathy:     He has no cervical adenopathy  Neurological: He is alert and oriented to person, place, and time  He has normal reflexes  No cranial nerve deficit  Coordination normal    Skin: Skin is warm and dry  No rash noted  He is not diaphoretic  No erythema  No pallor  Psychiatric: He has a normal mood and affect   His behavior is normal  Judgment and thought content normal

## 2019-03-25 ENCOUNTER — TELEPHONE (OUTPATIENT)
Dept: INTERNAL MEDICINE CLINIC | Facility: CLINIC | Age: 54
End: 2019-03-25

## 2019-03-25 ENCOUNTER — ANESTHESIA EVENT (OUTPATIENT)
Dept: PERIOP | Facility: HOSPITAL | Age: 54
End: 2019-03-25
Payer: COMMERCIAL

## 2019-03-25 NOTE — TELEPHONE ENCOUNTER
Patient saw Álvaro Crandall 3/19,he perscribed him AMLODIPINE 5mg    Dr Mccauley told patient to call the office if pt doesn't feel as if it is working  Patient thinks his bp is a little high 140/90 and is having headaches    He would like a call back to see what he should do    Patient call back # [phone]    Patient has an appt denise/Genesis on 4/23

## 2019-03-25 NOTE — TELEPHONE ENCOUNTER
One blood pressure reading is really not enough to say  140/90 is not great but it is better than it was when he saw Dr Sarah Loredo last week  Patient should check his blood pressure every day and call with readings for at least 5 days  If he has 5 days of blood pressure readings already recorded, let me know what they are since his visit with Dr Sarah Loredo

## 2019-03-26 ENCOUNTER — HOSPITAL ENCOUNTER (OUTPATIENT)
Facility: HOSPITAL | Age: 54
Setting detail: OUTPATIENT SURGERY
Discharge: HOME | End: 2019-03-26
Attending: INTERNAL MEDICINE | Admitting: INTERNAL MEDICINE
Payer: COMMERCIAL

## 2019-03-26 ENCOUNTER — ANESTHESIA (OUTPATIENT)
Dept: PERIOP | Facility: HOSPITAL | Age: 54
End: 2019-03-26
Payer: COMMERCIAL

## 2019-03-26 ENCOUNTER — TELEPHONE (OUTPATIENT)
Dept: GASTROENTEROLOGY | Facility: CLINIC | Age: 54
End: 2019-03-26

## 2019-03-26 VITALS
HEART RATE: 51 BPM | WEIGHT: 240.74 LBS | HEIGHT: 66 IN | SYSTOLIC BLOOD PRESSURE: 111 MMHG | BODY MASS INDEX: 38.69 KG/M2 | DIASTOLIC BLOOD PRESSURE: 70 MMHG | TEMPERATURE: 97.5 F | OXYGEN SATURATION: 98 % | RESPIRATION RATE: 21 BRPM

## 2019-03-26 DIAGNOSIS — R19.5 POSITIVE FIT (FECAL IMMUNOCHEMICAL TEST): ICD-10-CM

## 2019-03-26 PROCEDURE — 88305 TISSUE EXAM BY PATHOLOGIST: CPT | Performed by: PATHOLOGY

## 2019-03-26 PROCEDURE — 45378 DIAGNOSTIC COLONOSCOPY: CPT | Performed by: INTERNAL MEDICINE

## 2019-03-26 PROCEDURE — 88313 SPECIAL STAINS GROUP 2: CPT | Performed by: PATHOLOGY

## 2019-03-26 PROCEDURE — 43239 EGD BIOPSY SINGLE/MULTIPLE: CPT | Performed by: INTERNAL MEDICINE

## 2019-03-26 RX ORDER — PROPOFOL 10 MG/ML
INJECTION, EMULSION INTRAVENOUS AS NEEDED
Status: DISCONTINUED | OUTPATIENT
Start: 2019-03-26 | End: 2019-03-26 | Stop reason: SURG

## 2019-03-26 RX ORDER — SODIUM CHLORIDE, SODIUM LACTATE, POTASSIUM CHLORIDE, CALCIUM CHLORIDE 600; 310; 30; 20 MG/100ML; MG/100ML; MG/100ML; MG/100ML
125 INJECTION, SOLUTION INTRAVENOUS CONTINUOUS
Status: DISCONTINUED | OUTPATIENT
Start: 2019-03-26 | End: 2019-03-26 | Stop reason: HOSPADM

## 2019-03-26 RX ORDER — SODIUM CHLORIDE, SODIUM LACTATE, POTASSIUM CHLORIDE, CALCIUM CHLORIDE 600; 310; 30; 20 MG/100ML; MG/100ML; MG/100ML; MG/100ML
INJECTION, SOLUTION INTRAVENOUS CONTINUOUS PRN
Status: DISCONTINUED | OUTPATIENT
Start: 2019-03-26 | End: 2019-03-26 | Stop reason: SURG

## 2019-03-26 RX ADMIN — PROPOFOL 20 MG: 10 INJECTION, EMULSION INTRAVENOUS at 10:48

## 2019-03-26 RX ADMIN — PROPOFOL 50 MG: 10 INJECTION, EMULSION INTRAVENOUS at 10:39

## 2019-03-26 RX ADMIN — PROPOFOL 20 MG: 10 INJECTION, EMULSION INTRAVENOUS at 10:42

## 2019-03-26 RX ADMIN — SODIUM CHLORIDE, SODIUM LACTATE, POTASSIUM CHLORIDE, AND CALCIUM CHLORIDE: .6; .31; .03; .02 INJECTION, SOLUTION INTRAVENOUS at 09:59

## 2019-03-26 RX ADMIN — PROPOFOL 100 MG: 10 INJECTION, EMULSION INTRAVENOUS at 10:36

## 2019-03-26 RX ADMIN — PROPOFOL 50 MG: 10 INJECTION, EMULSION INTRAVENOUS at 10:37

## 2019-03-26 RX ADMIN — PROPOFOL 20 MG: 10 INJECTION, EMULSION INTRAVENOUS at 10:45

## 2019-03-26 NOTE — TELEPHONE ENCOUNTER
Have the patient increase his amlodipine to 7 5 mg daily  He should cut about 5-10 pills in half  He will have 10-20 half doses  He should take 1-1/2 every day at the same time he was taking the 1 pill  The other medicine should remain the same  Continue to check blood pressure  Report a week of readings to the office in a week  If they do not respond to this med change, then he needs to come in for blood pressure check  We also should check his machine and he should bring his machine in at that time  If the blood pressure readings do respond to this dose change, he can continue and wait until his appointment in April with Dr Conner Medeiros       At that time he should bring the machine in so we can check it and make sure it is accurate

## 2019-03-26 NOTE — ANESTHESIA PREPROCEDURE EVALUATION
Review of Systems/Medical History  Patient summary reviewed        Cardiovascular  EKG reviewed, Hyperlipidemia, Hypertension ,    Pulmonary  Negative pulmonary ROS        GI/Hepatic    GERD ,        Negative  ROS        Endo/Other    Obesity  morbid obesity   GYN  Negative gynecology ROS          Hematology  Negative hematology ROS      Musculoskeletal    Arthritis     Neurology  Negative neurology ROS      Psychology   Negative psychology ROS              Physical Exam    Airway    Mallampati score: II  TM Distance: >3 FB  Neck ROM: full     Dental       Cardiovascular  Cardiovascular exam normal    Pulmonary  Pulmonary exam normal     Other Findings        Anesthesia Plan  ASA Score- 3     Anesthesia Type- IV sedation with anesthesia with ASA Monitors  Additional Monitors:   Airway Plan:         Plan Factors-    Induction- intravenous  Postoperative Plan-     Informed Consent- Anesthetic plan and risks discussed with patient  I personally reviewed this patient with the CRNA  Discussed and agreed on the Anesthesia Plan with the CRNA  Dorene Curiel

## 2019-03-26 NOTE — OP NOTE
Postoperative Note  PATIENT NAME: Erik Muro  : 1965  MRN: [id number]  [facility]    Surgery Date: 3/26/2019    POST-OP DIAGNOSIS: See the impression below    SEDATION: Monitored anesthesia care, check anesthesia records    PHYSICAL EXAM:  Vitals:    19 0945   BP: 141/91   Pulse: 59   Resp: 16   Temp: 97 7 °F (36 5 °C)   SpO2: 97%     Body mass index is 38 86 kg/m²  General: NAD  Heart: S1 & S2 normal, RRR  Lungs: CTA, No rales or rhonchi  Abdomen: Soft, nontender, nondistended, good bowel sounds    CONSENT:  Informed consent was obtained for the procedure, including sedation after explaining the risks and benefits of the procedure  Risks including but not limited to bleeding, perforation, infection, aspiration were discussed in detail  Also explained about less than 100%$ sensitivity with the exam and other alternatives  DESCRIPTION:   Procedure:  Esophagogastroduodenoscopy with biopsy    Indications:  60-year-old male with chronic GERD    ASA 2    Estimated blood loss: Insignificant    Premedicated with mac anesthesia    Patient is identified by me  He is examined prior to the procedure and found to be in stable condition  He is attached to the cardiac monitor and pulse oximeter and placed in left lateral position  After adequate intravenous sedation the Olympus video gastroscope was inserted under direct vision into the esophagus  The esophageal mucosa is completely normal throughout its length with a normal Z-line at 38 cm  The stomach is entered  The body is normal   The pre-pyloric gastric antrum has diffuse intense erythema and superficial erosion  Pylorus is normal   The duodenum was cannulated into the 3rd portion and is normal   Retroversion reveals a normal GE junction fundus and cardia  Biopsies taken of the antrum with excellent hemostasis    He tolerated the procedure well and was stable throughout    My impression:  Moderate antral erosive gastritis, status post biopsy    RECOMMENDATIONS:  Follow up biopsy results in 1 week  Continue current medication with pantoprazole    COMPLICATIONS:  None; patient tolerated the procedure well  DISPOSITION: PACU  CONDITION: Stable    Jelani Prakash MD  3/26/2019,10:41 AM        Portions of the record may have been created with voice recognition software   Occasional wrong word or "sound a like" substitutions may have occurred due to the inherent limitations of voice recognition software   Read the chart carefully and recognize, using context, where substitutions have occurred

## 2019-03-26 NOTE — TELEPHONE ENCOUNTER
Patient is going to call back with his BP readings when he gets home, they are stored on his machine

## 2019-03-26 NOTE — DISCHARGE INSTRUCTIONS

## 2019-03-26 NOTE — TELEPHONE ENCOUNTER
Pt called back with readings, all in sitting position       3/20  5am 169/105  330pm 168/103    3/21   5am 156/104  630pm 159/96    3/22  530am 150/96  8pm 156/92    3/23  530am 151/98  4pm 140/92    3/24  730am 144/93  730pm 119/78    3/25  4am 140/90  545pm 155/94    States he is getting headaches on an off during the day, sometimes becomes dizzy   He is currently taking amlodipine 5mg JOHNNY, metoprolol succinate 50mg JOHNNY and olmesartan 40mg JOHNNY     [phone]

## 2019-03-26 NOTE — ANESTHESIA POSTPROCEDURE EVALUATION
Post-Op Assessment Note    CV Status:  Stable  Pain Score: 0    Pain management: adequate     Mental Status:  Alert and awake   Hydration Status:  Stable   PONV Controlled:  None   Airway Patency:  Patent and adequate   Post Op Vitals Reviewed: Yes      Staff: Anesthesiologist, CRNA           BP   94/65   Temp      Pulse  62   Resp   18   SpO2   98%

## 2019-03-26 NOTE — OP NOTE
Postoperative Note  PATIENT NAME: Delmy Clifford  : 1965  MRN: [id number]  [facility]    Surgery Date: 3/26/2019    POST-OP DIAGNOSIS: See the impression below    SEDATION: Monitored anesthesia care, check anesthesia records    PHYSICAL EXAM:  Vitals:    19 0945   BP: 141/91   Pulse: 59   Resp: 16   Temp: 97 7 °F (36 5 °C)   SpO2: 97%     Body mass index is 38 86 kg/m²  General: NAD  Heart: S1 & S2 normal, RRR  Lungs: CTA, No rales or rhonchi  Abdomen: Soft, nontender, nondistended, good bowel sounds    CONSENT:  Informed consent was obtained for the procedure, including sedation after explaining the risks and benefits of the procedure  Risks including but not limited to bleeding, perforation, infection, aspiration were discussed in detail  Also explained about less than 100%$ sensitivity with the exam and other alternatives  DESCRIPTION:   Procedure:  Fiberoptic colonoscopy    Indications:  Abnormal CT scan of the colon  Positive fit test   Patient's diarrhea has resolved    ASA 2    Estimated blood loss:  None    Premedicated with mac anesthesia    Patient is identified by me  He is examined prior to the procedure and found to be in stable condition  He is attached to the cardiac monitor and pulse oximeter and placed in the left lateral position  After adequate intravenous sedation the Olympus video colonoscope was inserted and passed easily to the cecum  The ileocecal valve and the appendiceal orifice are identified  The valve was cannulated and the terminal ileum is examined for approximately 10 cm  The scope was slowly withdrawn with excellent circumferential visualization of the mucosa  Preparation is excellent  Terminal was normal   There are several small diverticula in the ascending colon  The colonic mucosa is otherwise completely normal with no other inflammatory neoplastic or vascular lesions noted    Retroversion is normal   He tolerated the procedure well and was stable throughout  My impression:  Mild ascending colon diverticulosis    RECOMMENDATIONS:  Follow-up colonoscopy in 10 years  Every 3 year colo guard test with primary care physician  PillCam    COMPLICATIONS:  None; patient tolerated the procedure well  DISPOSITION: PACU  CONDITION: Stable    Jefferson James MD  3/26/2019,10:50 AM        Portions of the record may have been created with voice recognition software   Occasional wrong word or "sound a like" substitutions may have occurred due to the inherent limitations of voice recognition software   Read the chart carefully and recognize, using context, where substitutions have occurred

## 2019-03-26 NOTE — DISCHARGE INSTR - AVS FIRST PAGE
Postoperative Note  PATIENT NAME: Judah Warner  : 1965  MRN: [id number]  [facility]    Surgery Date: 3/26/2019    POST-OP DIAGNOSIS: See the impression below    SEDATION: Monitored anesthesia care, check anesthesia records    PHYSICAL EXAM:  Vitals:    19 0945   BP: 141/91   Pulse: 59   Resp: 16   Temp: 97 7 °F (36 5 °C)   SpO2: 97%     Body mass index is 38 86 kg/m²  General: NAD  Heart: S1 & S2 normal, RRR  Lungs: CTA, No rales or rhonchi  Abdomen: Soft, nontender, nondistended, good bowel sounds    CONSENT:  Informed consent was obtained for the procedure, including sedation after explaining the risks and benefits of the procedure  Risks including but not limited to bleeding, perforation, infection, aspiration were discussed in detail  Also explained about less than 100%$ sensitivity with the exam and other alternatives  DESCRIPTION:   Procedure:  Esophagogastroduodenoscopy with biopsy    Indications:  15-year-old male with chronic GERD    ASA 2    Estimated blood loss: Insignificant    Premedicated with mac anesthesia    Patient is identified by me  He is examined prior to the procedure and found to be in stable condition  He is attached to the cardiac monitor and pulse oximeter and placed in left lateral position  After adequate intravenous sedation the Olympus video gastroscope was inserted under direct vision into the esophagus  The esophageal mucosa is completely normal throughout its length with a normal Z-line at 38 cm  The stomach is entered  The body is normal   The pre-pyloric gastric antrum has diffuse intense erythema and superficial erosion  Pylorus is normal   The duodenum was cannulated into the 3rd portion and is normal   Retroversion reveals a normal GE junction fundus and cardia  Biopsies taken of the antrum with excellent hemostasis    He tolerated the procedure well and was stable throughout    My impression:  Moderate antral erosive gastritis, status post biopsy    RECOMMENDATIONS:  Follow up biopsy results in 1 week  Continue current medication with pantoprazole    COMPLICATIONS:  None; patient tolerated the procedure well  DISPOSITION: PACU  CONDITION: Stable    Nohemi Huff MD  3/26/2019,10:41 AM        Portions of the record may have been created with voice recognition software   Occasional wrong word or "sound a like" substitutions may have occurred due to the inherent limitations of voice recognition software   Read the chart carefully and recognize, using context, where substitutions have occurred  Postoperative Note  PATIENT NAME: Darvin Spann  : 1965  MRN: [id number]  [facility]    Surgery Date: 3/26/2019    POST-OP DIAGNOSIS: See the impression below    SEDATION: Monitored anesthesia care, check anesthesia records    PHYSICAL EXAM:  Vitals:    19 0945   BP: 141/91   Pulse: 59   Resp: 16   Temp: 97 7 °F (36 5 °C)   SpO2: 97%     Body mass index is 38 86 kg/m²  General: NAD  Heart: S1 & S2 normal, RRR  Lungs: CTA, No rales or rhonchi  Abdomen: Soft, nontender, nondistended, good bowel sounds    CONSENT:  Informed consent was obtained for the procedure, including sedation after explaining the risks and benefits of the procedure  Risks including but not limited to bleeding, perforation, infection, aspiration were discussed in detail  Also explained about less than 100%$ sensitivity with the exam and other alternatives  DESCRIPTION:   Procedure:  Fiberoptic colonoscopy    Indications:  Abnormal CT scan of the colon  Positive fit test   Patient's diarrhea has resolved    ASA 2    Estimated blood loss:  None    Premedicated with mac anesthesia    Patient is identified by me  He is examined prior to the procedure and found to be in stable condition  He is attached to the cardiac monitor and pulse oximeter and placed in the left lateral position    After adequate intravenous sedation the Olympus video colonoscope was inserted and passed easily to the cecum  The ileocecal valve and the appendiceal orifice are identified  The valve was cannulated and the terminal ileum is examined for approximately 10 cm  The scope was slowly withdrawn with excellent circumferential visualization of the mucosa  Preparation is excellent  Terminal was normal   There are several small diverticula in the ascending colon  The colonic mucosa is otherwise completely normal with no other inflammatory neoplastic or vascular lesions noted  Retroversion is normal   He tolerated the procedure well and was stable throughout  My impression:  Mild ascending colon diverticulosis    RECOMMENDATIONS:  Follow-up colonoscopy in 10 years  Every 3 year colo guard test with primary care physician  PillCam    COMPLICATIONS:  None; patient tolerated the procedure well  DISPOSITION: PACU  CONDITION: Stable    Shruthi Junior MD  3/26/2019,10:50 AM        Portions of the record may have been created with voice recognition software   Occasional wrong word or "sound a like" substitutions may have occurred due to the inherent limitations of voice recognition software   Read the chart carefully and recognize, using context, where substitutions have occurred

## 2019-03-31 ENCOUNTER — TELEPHONE (OUTPATIENT)
Dept: OTHER | Facility: OTHER | Age: 54
End: 2019-03-31

## 2019-03-31 NOTE — TELEPHONE ENCOUNTER
Maria L Mcleod 1965  CONFIDENTIALTY NOTICE: This fax transmission is intended only for the addressee  It contains information that is legally privileged,  confidential or otherwise protected from use or disclosure  If you are not the intended recipient, you are strictly prohibited from reviewing,  disclosing, copying using or disseminating any of this information or taking any action in reliance on or regarding this information  If you have  received this fax in error, please notify us immediately by telephone so that we can arrange for its return to us  Page:   Call Id: [id number]  Health Call  Standard Call Report  Health Call  Patient Name: Maria L Mcleod  Gender: Male  : 1965  Age: 49 Y 2 M 2 D  Return Phone  Number: [phone] (Home)  Address: [place] [address]  City/State/[facility]: [place] [address]  Practice Name: [address] [place] [address] 217 Charged:  Physician:  [address] [place] [address] Name:  Relationship To  Patient: Self  Return Phone Number: [phone] (Home)  Presenting Problem: "My blood pressure is 151/95 and  would like some care advice because  my BP meds were just adjusted "  Service Type: Triage  Charged Service 1: Grisel Huang U  38  Name and  Number:  Nurse Assessment  Nurse: Geraldine Macias RN, Cortney Hastings Date/Time: 3/31/2019 8:07:25 AM  Type of assessment required:  ---General (Adult or Child)  Duration of Current S/S  ---Today  Location/Radiation  ---Blood pressure  Temperature (F) and route:  ---Denies fever  Symptom Specific Meds (Dose/Time):  ---Does not take his BP meds in the day time  States he takes them during the evening  Other S/S  ---Blood pressure this morning was 151/95  No symptoms at this time  Is concerned  because he states his BP meds were just readjusted and BP has been in the 150s or a bit  higher  Pain Scale on scale of 1-10, 10 being the worst:  ---No pain    Symptom progression:  ---same  Intake and Output  Maria L Mcleod 1965  CONFIDENTIALTY NOTICE: This fax transmission is intended only for the addressee  It contains information that is legally privileged,  confidential or otherwise protected from use or disclosure  If you are not the intended recipient, you are strictly prohibited from reviewing,  disclosing, copying using or disseminating any of this information or taking any action in reliance on or regarding this information  If you have  received this fax in error, please notify us immediately by telephone so that we can arrange for its return to us  Page: 2 of 2  Call Id: [id number]  Nurse Assessment  ---WNL / WNL  Last Exam/Treatment:  ---Last office visit was on 3/19/2019 for high BP  Protocols  Protocol Title Nurse Date/Time  High Blood Pressure TAM Davey, Tank Cuevas 3/31/2019 8:06:37 AM  Question Caller Affirmed  Disp  Time Disposition Final User  3/31/2019 8:17:45 AM See PCP within Priti Mondragon 104, TAM, Tank Cuevas  3/31/2019 8:18:31 AM RN Triaged Yes Cristian Felton, TAM, [facility] Advice Given Per Protocol  SEE PCP WITHIN 2 WEEKS: * You need an evaluation for this ongoing problem within the next 2 weeks  Call your doctor during  regular office hours and make an appointment  REASSURANCE: * Your blood pressure is elevated but you have told me that you are not  having any symptoms  * You should see your doctor and have your blood pressure checked within 2 weeks  * You might need to have an  adjustment in your medication(s)  HYPERTENSION MEDICATIONS: * Untreated hypertension may cause damage to the heart, brain,  kidneys, and eyes  * It is important to take prescribed medications as directed  * The goal of blood pressure treatment for most people  with hypertension is to keep the blood pressure under 140/90  For people that are 60 years or older, your doctor may instead want to keep  the blood pressure under 150/90   LIFESTYLE MODIFICATIONS - The following things can help you reduce your blood pressure: *  EAT HEALTHY: Eat a diet rich in fresh fruits and vegetables, dietary fiber, non-animal protein (e g , soy), and low-fat dairy products  Avoid foods with a high content of saturated fat or cholesterol  * DECREASE SODIUM INTAKE: Aim to eat less than 2 4 g (100 mmol)  of sodium each day  Unfortunately 75% of the salt in the average person's diet is in pre-processed foods  * LIMIT ALCOHOL: Limit  alcohol to 0-2 standard drinks each day  Men should have less than 14 dinks per week  Women should have less than 9 drinks per week  A drink is 1 5 oz hard liquor (one shot or jigger; 45 ml), 5 oz wine (small glass; 150 ml), 12 oz beer (one can; 360 ml)  * EXERCISE, BE  MORE PHYSICALLY ACTIVE: Do at least 30 minutes of aerobic exercise (e g , brisk walking) most days of the week  Other examples  of aerobic activities cycling, jogging, and swimming  * REDUCE WEIGHT AND WAIST LINE: It is important to maintain a normal  body weight  The goal should be a BMI (body mass index) under 25 for men and women, a waist circumference under 40 inches (102 cm)  in men, and a waist circumference under 35 inches (88 cm) in women  * REDUCE STRESS: Find activities that help reduce your stress  Examples might include meditation, yoga, or even a restful walk in a park  CALL BACK IF: * Weakness or numbness of the face, arm or  leg on one side of the body occurs * Difficulty walking, difficulty talking, or severe headache occurs * Chest pain or difficulty breathing  occurs * Your blood pressure is over 160/100 * You become worse  CARE ADVICE given per High Blood Pressure (Adult) guideline    Caller Understands: Yes  Caller Disagree/Comply: Comply  PreDisposition: Unsure

## 2019-04-01 ENCOUNTER — TELEPHONE (OUTPATIENT)
Dept: INTERNAL MEDICINE CLINIC | Facility: CLINIC | Age: 54
End: 2019-04-01

## 2019-04-01 ENCOUNTER — OFFICE VISIT (OUTPATIENT)
Dept: INTERNAL MEDICINE CLINIC | Facility: CLINIC | Age: 54
End: 2019-04-01
Payer: COMMERCIAL

## 2019-04-01 VITALS
WEIGHT: 249 LBS | OXYGEN SATURATION: 97 % | HEART RATE: 76 BPM | BODY MASS INDEX: 40.19 KG/M2 | SYSTOLIC BLOOD PRESSURE: 134 MMHG | DIASTOLIC BLOOD PRESSURE: 88 MMHG

## 2019-04-01 DIAGNOSIS — I10 ESSENTIAL HYPERTENSION: ICD-10-CM

## 2019-04-01 PROCEDURE — 3079F DIAST BP 80-89 MM HG: CPT | Performed by: PHYSICIAN ASSISTANT

## 2019-04-01 PROCEDURE — 99214 OFFICE O/P EST MOD 30 MIN: CPT | Performed by: PHYSICIAN ASSISTANT

## 2019-04-01 PROCEDURE — 3075F SYST BP GE 130 - 139MM HG: CPT | Performed by: PHYSICIAN ASSISTANT

## 2019-04-01 RX ORDER — AMLODIPINE BESYLATE 10 MG/1
10 TABLET ORAL DAILY
Qty: 30 TABLET | Refills: 2 | Status: SHIPPED | OUTPATIENT
Start: 2019-04-01 | End: 2019-07-02 | Stop reason: SDUPTHER

## 2019-04-02 ENCOUNTER — CLINICAL SUPPORT (OUTPATIENT)
Dept: GASTROENTEROLOGY | Facility: CLINIC | Age: 54
End: 2019-04-02
Payer: COMMERCIAL

## 2019-04-02 DIAGNOSIS — R19.5 ABNORMAL FECES: ICD-10-CM

## 2019-04-02 PROCEDURE — 91110 GI TRC IMG INTRAL ESOPH-ILE: CPT | Performed by: INTERNAL MEDICINE

## 2019-04-03 ENCOUNTER — TELEPHONE (OUTPATIENT)
Dept: GASTROENTEROLOGY | Facility: CLINIC | Age: 54
End: 2019-04-03

## 2019-04-09 ENCOUNTER — OFFICE VISIT (OUTPATIENT)
Dept: RHEUMATOLOGY | Facility: CLINIC | Age: 54
End: 2019-04-09
Payer: COMMERCIAL

## 2019-04-09 ENCOUNTER — APPOINTMENT (OUTPATIENT)
Dept: RADIOLOGY | Facility: CLINIC | Age: 54
End: 2019-04-09
Payer: COMMERCIAL

## 2019-04-09 VITALS
BODY MASS INDEX: 40.34 KG/M2 | SYSTOLIC BLOOD PRESSURE: 130 MMHG | DIASTOLIC BLOOD PRESSURE: 82 MMHG | WEIGHT: 251 LBS | HEART RATE: 54 BPM | HEIGHT: 66 IN

## 2019-04-09 DIAGNOSIS — M48.061 SPINAL STENOSIS OF LUMBAR REGION, UNSPECIFIED WHETHER NEUROGENIC CLAUDICATION PRESENT: ICD-10-CM

## 2019-04-09 DIAGNOSIS — M25.50 DIFFUSE ARTHRALGIA: ICD-10-CM

## 2019-04-09 DIAGNOSIS — R76.8 ELEVATED RHEUMATOID FACTOR: Primary | ICD-10-CM

## 2019-04-09 DIAGNOSIS — R76.8 ELEVATED RHEUMATOID FACTOR: ICD-10-CM

## 2019-04-09 PROCEDURE — 99244 OFF/OP CNSLTJ NEW/EST MOD 40: CPT | Performed by: INTERNAL MEDICINE

## 2019-04-09 PROCEDURE — 73130 X-RAY EXAM OF HAND: CPT

## 2019-04-09 PROCEDURE — 73630 X-RAY EXAM OF FOOT: CPT

## 2019-04-09 PROCEDURE — 73562 X-RAY EXAM OF KNEE 3: CPT

## 2019-04-11 ENCOUNTER — TELEPHONE (OUTPATIENT)
Dept: OBGYN CLINIC | Facility: CLINIC | Age: 54
End: 2019-04-11

## 2019-04-19 ENCOUNTER — TELEPHONE (OUTPATIENT)
Dept: INTERNAL MEDICINE CLINIC | Facility: CLINIC | Age: 54
End: 2019-04-19

## 2019-04-19 ENCOUNTER — APPOINTMENT (OUTPATIENT)
Dept: LAB | Facility: CLINIC | Age: 54
End: 2019-04-19
Payer: COMMERCIAL

## 2019-04-19 DIAGNOSIS — R76.8 RHEUMATOID FACTOR POSITIVE: ICD-10-CM

## 2019-04-19 LAB — ERYTHROCYTE [SEDIMENTATION RATE] IN BLOOD: 9 MM/HOUR (ref 0–10)

## 2019-04-19 PROCEDURE — 85652 RBC SED RATE AUTOMATED: CPT

## 2019-04-19 PROCEDURE — 36415 COLL VENOUS BLD VENIPUNCTURE: CPT

## 2019-04-19 PROCEDURE — 86430 RHEUMATOID FACTOR TEST QUAL: CPT

## 2019-04-22 LAB — RHEUMATOID FACT SER QL LA: NEGATIVE

## 2019-04-23 ENCOUNTER — OFFICE VISIT (OUTPATIENT)
Dept: INTERNAL MEDICINE CLINIC | Facility: CLINIC | Age: 54
End: 2019-04-23
Payer: COMMERCIAL

## 2019-04-23 VITALS
WEIGHT: 253.8 LBS | DIASTOLIC BLOOD PRESSURE: 78 MMHG | BODY MASS INDEX: 40.96 KG/M2 | SYSTOLIC BLOOD PRESSURE: 120 MMHG | HEART RATE: 63 BPM | OXYGEN SATURATION: 95 %

## 2019-04-23 DIAGNOSIS — E66.01 MORBID OBESITY WITH BMI OF 40.0-44.9, ADULT (HCC): ICD-10-CM

## 2019-04-23 DIAGNOSIS — Z98.1 S/P LUMBAR FUSION: Chronic | ICD-10-CM

## 2019-04-23 DIAGNOSIS — Z12.5 SCREENING FOR PROSTATE CANCER: ICD-10-CM

## 2019-04-23 DIAGNOSIS — E78.00 HYPERCHOLESTEROLEMIA: Chronic | ICD-10-CM

## 2019-04-23 DIAGNOSIS — I10 ESSENTIAL HYPERTENSION: Primary | Chronic | ICD-10-CM

## 2019-04-23 DIAGNOSIS — G89.29 CHRONIC BILATERAL LOW BACK PAIN WITHOUT SCIATICA: Chronic | ICD-10-CM

## 2019-04-23 DIAGNOSIS — M54.50 CHRONIC BILATERAL LOW BACK PAIN WITHOUT SCIATICA: Chronic | ICD-10-CM

## 2019-04-23 PROBLEM — A69.20 LYME DISEASE: Status: RESOLVED | Noted: 2019-03-19 | Resolved: 2019-04-23

## 2019-04-23 PROBLEM — R76.8 RHEUMATOID FACTOR POSITIVE: Status: RESOLVED | Noted: 2019-03-19 | Resolved: 2019-04-23

## 2019-04-23 PROBLEM — R42 VERTIGO: Status: RESOLVED | Noted: 2018-04-05 | Resolved: 2019-04-23

## 2019-04-23 PROBLEM — S16.1XXA CERVICAL STRAIN, ACUTE, INITIAL ENCOUNTER: Status: RESOLVED | Noted: 2018-08-07 | Resolved: 2019-04-23

## 2019-04-23 PROBLEM — R79.89 BLOOD CREATININE INCREASED COMPARED WITH PRIOR MEASUREMENT: Status: RESOLVED | Noted: 2018-06-14 | Resolved: 2019-04-23

## 2019-04-23 PROBLEM — R07.9 CHEST PAIN: Status: RESOLVED | Noted: 2018-06-05 | Resolved: 2019-04-23

## 2019-04-23 PROBLEM — D72.829 LEUKOCYTOSIS: Status: RESOLVED | Noted: 2018-06-14 | Resolved: 2019-04-23

## 2019-04-23 PROBLEM — Z12.11 ENCOUNTER FOR SCREENING COLONOSCOPY: Status: RESOLVED | Noted: 2018-06-12 | Resolved: 2019-04-23

## 2019-04-23 PROBLEM — K62.5 BRIGHT RED BLOOD PER RECTUM: Status: RESOLVED | Noted: 2018-06-14 | Resolved: 2019-04-23

## 2019-04-23 PROBLEM — R19.5 POSITIVE FIT (FECAL IMMUNOCHEMICAL TEST): Status: RESOLVED | Noted: 2019-03-12 | Resolved: 2019-04-23

## 2019-04-23 PROBLEM — F17.229 CHEWING TOBACCO NICOTINE DEPENDENCE WITH NICOTINE-INDUCED DISORDER: Status: RESOLVED | Noted: 2018-02-25 | Resolved: 2019-04-23

## 2019-04-23 PROBLEM — K21.9 GASTROESOPHAGEAL REFLUX DISEASE WITHOUT ESOPHAGITIS: Chronic | Status: ACTIVE | Noted: 2018-06-06

## 2019-04-23 PROBLEM — M12.9 ARTHRITIS/ARTHROPATHY OF MULTIPLE JOINTS: Status: RESOLVED | Noted: 2019-03-05 | Resolved: 2019-04-23

## 2019-04-23 PROBLEM — Z12.11 SCREEN FOR COLON CANCER: Status: RESOLVED | Noted: 2018-05-29 | Resolved: 2019-04-23

## 2019-04-23 PROBLEM — M47.812 SPONDYLOSIS OF CERVICAL REGION WITHOUT MYELOPATHY OR RADICULOPATHY: Status: RESOLVED | Noted: 2018-04-05 | Resolved: 2019-04-23

## 2019-04-23 PROBLEM — R10.13 DYSPEPSIA: Status: RESOLVED | Noted: 2018-06-12 | Resolved: 2019-04-23

## 2019-04-23 PROBLEM — E86.0 DEHYDRATION: Status: RESOLVED | Noted: 2018-03-27 | Resolved: 2019-04-23

## 2019-04-23 PROBLEM — K92.2 GI BLEED: Status: RESOLVED | Noted: 2018-06-14 | Resolved: 2019-04-23

## 2019-04-23 PROBLEM — M47.812 CERVICAL SPONDYLOSIS: Chronic | Status: ACTIVE | Noted: 2018-10-23

## 2019-04-23 PROBLEM — M54.81 CERVICO-OCCIPITAL NEURALGIA: Status: RESOLVED | Noted: 2018-10-23 | Resolved: 2019-04-23

## 2019-04-23 PROBLEM — L24.89 IRRITANT CONTACT DERMATITIS DUE TO OTHER AGENTS: Status: RESOLVED | Noted: 2018-05-08 | Resolved: 2019-04-23

## 2019-04-23 PROCEDURE — 3074F SYST BP LT 130 MM HG: CPT | Performed by: INTERNAL MEDICINE

## 2019-04-23 PROCEDURE — 3078F DIAST BP <80 MM HG: CPT | Performed by: INTERNAL MEDICINE

## 2019-04-23 PROCEDURE — 99214 OFFICE O/P EST MOD 30 MIN: CPT | Performed by: INTERNAL MEDICINE

## 2019-05-07 ENCOUNTER — APPOINTMENT (OUTPATIENT)
Dept: LAB | Facility: HOSPITAL | Age: 54
End: 2019-05-07
Attending: ORTHOPAEDIC SURGERY
Payer: COMMERCIAL

## 2019-05-07 ENCOUNTER — OFFICE VISIT (OUTPATIENT)
Dept: OBGYN CLINIC | Facility: CLINIC | Age: 54
End: 2019-05-07
Payer: COMMERCIAL

## 2019-05-07 VITALS
SYSTOLIC BLOOD PRESSURE: 138 MMHG | DIASTOLIC BLOOD PRESSURE: 87 MMHG | HEART RATE: 60 BPM | WEIGHT: 255 LBS | HEIGHT: 66 IN | BODY MASS INDEX: 40.98 KG/M2

## 2019-05-07 DIAGNOSIS — M25.561 RIGHT KNEE PAIN, UNSPECIFIED CHRONICITY: ICD-10-CM

## 2019-05-07 DIAGNOSIS — M25.562 LEFT KNEE PAIN, UNSPECIFIED CHRONICITY: ICD-10-CM

## 2019-05-07 DIAGNOSIS — M25.561 RIGHT KNEE PAIN, UNSPECIFIED CHRONICITY: Primary | ICD-10-CM

## 2019-05-07 DIAGNOSIS — M54.16 LUMBAR RADICULITIS: ICD-10-CM

## 2019-05-07 LAB
CRP SERPL QL: <3 MG/L
ERYTHROCYTE [SEDIMENTATION RATE] IN BLOOD: 6 MM/HOUR (ref 0–10)

## 2019-05-07 PROCEDURE — 86618 LYME DISEASE ANTIBODY: CPT

## 2019-05-07 PROCEDURE — 99203 OFFICE O/P NEW LOW 30 MIN: CPT | Performed by: ORTHOPAEDIC SURGERY

## 2019-05-07 PROCEDURE — 86140 C-REACTIVE PROTEIN: CPT

## 2019-05-07 PROCEDURE — 86617 LYME DISEASE ANTIBODY: CPT

## 2019-05-07 PROCEDURE — 36415 COLL VENOUS BLD VENIPUNCTURE: CPT

## 2019-05-07 PROCEDURE — 85652 RBC SED RATE AUTOMATED: CPT

## 2019-05-07 RX ORDER — PREDNISONE 20 MG/1
TABLET ORAL
Qty: 12 TABLET | Refills: 0 | Status: SHIPPED | OUTPATIENT
Start: 2019-05-07 | End: 2019-06-05 | Stop reason: ALTCHOICE

## 2019-05-08 LAB
B BURGDOR IGG SER IA-ACNC: 3.27
B BURGDOR IGM SER IA-ACNC: 0.22

## 2019-06-02 DIAGNOSIS — I10 ESSENTIAL HYPERTENSION: ICD-10-CM

## 2019-06-03 RX ORDER — OLMESARTAN MEDOXOMIL 40 MG/1
TABLET ORAL
Qty: 30 TABLET | Refills: 5 | Status: SHIPPED | OUTPATIENT
Start: 2019-06-03 | End: 2019-11-26 | Stop reason: SDUPTHER

## 2019-06-04 ENCOUNTER — TELEPHONE (OUTPATIENT)
Dept: OBGYN CLINIC | Facility: HOSPITAL | Age: 54
End: 2019-06-04

## 2019-06-04 ENCOUNTER — OFFICE VISIT (OUTPATIENT)
Dept: OBGYN CLINIC | Facility: CLINIC | Age: 54
End: 2019-06-04
Payer: COMMERCIAL

## 2019-06-04 ENCOUNTER — TELEPHONE (OUTPATIENT)
Dept: INFECTIOUS DISEASES | Facility: CLINIC | Age: 54
End: 2019-06-04

## 2019-06-04 VITALS
DIASTOLIC BLOOD PRESSURE: 100 MMHG | SYSTOLIC BLOOD PRESSURE: 151 MMHG | HEART RATE: 60 BPM | HEIGHT: 66 IN | BODY MASS INDEX: 40.98 KG/M2 | WEIGHT: 255 LBS

## 2019-06-04 DIAGNOSIS — M17.0 BILATERAL PRIMARY OSTEOARTHRITIS OF KNEE: Primary | ICD-10-CM

## 2019-06-04 DIAGNOSIS — A69.20 LYME DISEASE: Primary | ICD-10-CM

## 2019-06-04 PROCEDURE — 99213 OFFICE O/P EST LOW 20 MIN: CPT | Performed by: ORTHOPAEDIC SURGERY

## 2019-06-05 ENCOUNTER — OFFICE VISIT (OUTPATIENT)
Dept: INFECTIOUS DISEASES | Facility: CLINIC | Age: 54
End: 2019-06-05
Payer: COMMERCIAL

## 2019-06-05 VITALS
BODY MASS INDEX: 41.16 KG/M2 | HEIGHT: 66 IN | RESPIRATION RATE: 18 BRPM | HEART RATE: 69 BPM | TEMPERATURE: 97.7 F | DIASTOLIC BLOOD PRESSURE: 70 MMHG | SYSTOLIC BLOOD PRESSURE: 122 MMHG

## 2019-06-05 DIAGNOSIS — R52 BODY ACHES: ICD-10-CM

## 2019-06-05 DIAGNOSIS — Z00.00 HEALTHCARE MAINTENANCE: ICD-10-CM

## 2019-06-05 DIAGNOSIS — Z72.820 POOR SLEEP: ICD-10-CM

## 2019-06-05 DIAGNOSIS — A69.20 LYME DISEASE: Primary | ICD-10-CM

## 2019-06-05 PROCEDURE — 99243 OFF/OP CNSLTJ NEW/EST LOW 30: CPT | Performed by: INTERNAL MEDICINE

## 2019-06-18 DIAGNOSIS — E78.00 HYPERCHOLESTEROLEMIA: Chronic | ICD-10-CM

## 2019-06-18 RX ORDER — ROSUVASTATIN CALCIUM 5 MG/1
5 TABLET, COATED ORAL DAILY
Qty: 90 TABLET | Refills: 3 | Status: SHIPPED | OUTPATIENT
Start: 2019-06-18 | End: 2020-06-08 | Stop reason: SDUPTHER

## 2019-07-02 DIAGNOSIS — I10 ESSENTIAL HYPERTENSION: ICD-10-CM

## 2019-07-02 RX ORDER — AMLODIPINE BESYLATE 10 MG/1
TABLET ORAL
Qty: 30 TABLET | Refills: 5 | Status: SHIPPED | OUTPATIENT
Start: 2019-07-02 | End: 2019-07-03 | Stop reason: SDUPTHER

## 2019-07-03 DIAGNOSIS — I10 ESSENTIAL HYPERTENSION: ICD-10-CM

## 2019-07-03 RX ORDER — AMLODIPINE BESYLATE 10 MG/1
10 TABLET ORAL DAILY
Qty: 90 TABLET | Refills: 1 | Status: SHIPPED | OUTPATIENT
Start: 2019-07-03 | End: 2020-07-02 | Stop reason: SDUPTHER

## 2019-07-03 NOTE — TELEPHONE ENCOUNTER
Message for Dr Dolores Brody:    Only has enough BP med for today and tomorrow  Told him we are out of the office tomorrow  amLODIPINE (NORVASC), 10mg tablet    Also told Joe Guerrero that it might take 24 for 48 hrs for it to get filled

## 2019-07-10 ENCOUNTER — TELEPHONE (OUTPATIENT)
Dept: INTERNAL MEDICINE CLINIC | Facility: CLINIC | Age: 54
End: 2019-07-10

## 2019-07-10 NOTE — TELEPHONE ENCOUNTER
They requested pt's lyme test results    Which have been faxed & confirmed received  Melanie Elizalde

## 2019-07-31 DIAGNOSIS — I10 ESSENTIAL HYPERTENSION: ICD-10-CM

## 2019-07-31 RX ORDER — METOPROLOL SUCCINATE 50 MG/1
50 TABLET, EXTENDED RELEASE ORAL DAILY
Qty: 90 TABLET | Refills: 3 | Status: SHIPPED | OUTPATIENT
Start: 2019-07-31 | End: 2020-07-15 | Stop reason: SDUPTHER

## 2019-08-20 DIAGNOSIS — M12.9 ARTHRITIS/ARTHROPATHY OF MULTIPLE JOINTS: ICD-10-CM

## 2019-08-20 RX ORDER — IBUPROFEN 600 MG/1
600 TABLET ORAL EVERY 8 HOURS SCHEDULED
Qty: 90 TABLET | Refills: 1 | Status: SHIPPED | OUTPATIENT
Start: 2019-08-20

## 2019-10-29 ENCOUNTER — OFFICE VISIT (OUTPATIENT)
Dept: INTERNAL MEDICINE CLINIC | Facility: CLINIC | Age: 54
End: 2019-10-29
Payer: COMMERCIAL

## 2019-10-29 VITALS
HEART RATE: 60 BPM | OXYGEN SATURATION: 97 % | DIASTOLIC BLOOD PRESSURE: 72 MMHG | SYSTOLIC BLOOD PRESSURE: 118 MMHG | BODY MASS INDEX: 42.77 KG/M2 | WEIGHT: 265 LBS

## 2019-10-29 DIAGNOSIS — Z23 NEED FOR INFLUENZA VACCINATION: ICD-10-CM

## 2019-10-29 DIAGNOSIS — E78.00 HYPERCHOLESTEROLEMIA: Chronic | ICD-10-CM

## 2019-10-29 DIAGNOSIS — I10 ESSENTIAL HYPERTENSION: Primary | Chronic | ICD-10-CM

## 2019-10-29 DIAGNOSIS — E66.01 MORBID OBESITY DUE TO EXCESS CALORIES (HCC): Chronic | ICD-10-CM

## 2019-10-29 PROCEDURE — 3078F DIAST BP <80 MM HG: CPT | Performed by: INTERNAL MEDICINE

## 2019-10-29 PROCEDURE — 90682 RIV4 VACC RECOMBINANT DNA IM: CPT | Performed by: INTERNAL MEDICINE

## 2019-10-29 PROCEDURE — 99214 OFFICE O/P EST MOD 30 MIN: CPT | Performed by: INTERNAL MEDICINE

## 2019-10-29 PROCEDURE — 90471 IMMUNIZATION ADMIN: CPT | Performed by: INTERNAL MEDICINE

## 2019-10-29 PROCEDURE — 3074F SYST BP LT 130 MM HG: CPT | Performed by: INTERNAL MEDICINE

## 2019-10-29 RX ORDER — DOXYCYCLINE 100 MG/1
100 CAPSULE ORAL 2 TIMES DAILY WITH MEALS
Refills: 0 | COMMUNITY
Start: 2019-10-01 | End: 2020-05-05 | Stop reason: CLARIF

## 2019-10-29 NOTE — PATIENT INSTRUCTIONS

## 2019-10-29 NOTE — ASSESSMENT & PLAN NOTE
Body mass index is 42 77 kg/m²  Discussed the patient's BMI with him  The BMI is above normal  Nutrition recommendations include reducing portion sizes, decreasing overall calorie intake, 3-5 servings of fruits/vegetables daily, moderation in carbohydrate intake and increasing intake of lean protein  Exercise recommendations include exercising 3-5 times per week and joining a gym

## 2019-10-29 NOTE — PROGRESS NOTES
INTERNAL MEDICINE FOLLOW-UP OFFICE VISIT  [facility] - [facility]    NAME: Maria M Hook  AGE: 49 y o  SEX: male  : 1965     DATE: 10/29/2019     Assessment and Plan:     1  Essential hypertension  Assessment & Plan:  Blood pressure is controlled  Continue anti-hypertensives  Orders:  -     Comprehensive metabolic panel; Future    2  Hypercholesterolemia  Assessment & Plan:  Continue statin as prescribed  Will check lipid panel  Orders:  -     Lipid panel; Future    3  Morbid obesity due to excess calories [facility])  Assessment & Plan: Body mass index is 42 77 kg/m²  Discussed the patient's BMI with him  The BMI is above normal  Nutrition recommendations include reducing portion sizes, decreasing overall calorie intake, 3-5 servings of fruits/vegetables daily, moderation in carbohydrate intake and increasing intake of lean protein  Exercise recommendations include exercising 3-5 times per week and joining a gym  4  Need for influenza vaccination  -     influenza vaccine, 1060-6742, quadrivalent, recombinant, PF, 0 5 mL, for patients 18 yr+ (FLUBLOK)    Return in about 6 months (around 2020) for Follow-up  Chief Complaint:     Chief Complaint   Patient presents with    Follow-up     6 month and labs- 2019      History of Present Illness:     Has been seeing outside physician due to continued lyme symptoms - saw orthopedics, ID, and rheumatology previously and has continued to struggle with joint pain and muscle pains since being diagnosed with lyme  Hypertension - take anti-hypertensives as prescribed  No cardiac symptoms  Trying to eat more vegetarian to see if it helps his energy level  HLD - cholesterol has been well controlled on crestor  Review of Systems:     Review of Systems   Constitutional: Negative for activity change, appetite change and fatigue     Respiratory: Negative for apnea, cough, chest tightness, shortness of breath and wheezing  Cardiovascular: Negative for chest pain, palpitations and leg swelling  Gastrointestinal: Negative for abdominal distention, abdominal pain, blood in stool, constipation, diarrhea, nausea and vomiting  Musculoskeletal: Positive for arthralgias and myalgias  Negative for back pain, gait problem and joint swelling  Skin: Negative for rash and wound  Neurological: Negative for dizziness, weakness, light-headedness, numbness and headaches  Psychiatric/Behavioral: Negative for behavioral problems, confusion, hallucinations, sleep disturbance and suicidal ideas  The patient is not nervous/anxious  Problem List:     Patient Active Problem List   Diagnosis    Hypertension    Hypercholesterolemia    Morbid obesity due to excess calories (HCC)    Gastroesophageal reflux disease without esophagitis    Cervical spondylosis    S/P lumbar fusion    Chronic bilateral low back pain without sciatica      Objective:     /72 (BP Location: Left arm, Patient Position: Sitting, Cuff Size: Standard)   Pulse 60   Wt 120 kg (265 lb) Comment: w/ shoes denied off  SpO2 97%   BMI 42 77 kg/m²     Physical Exam   Constitutional: He is oriented to person, place, and time  He appears well-developed and well-nourished  No distress  Morbid obesity   Eyes: Conjunctivae are normal  Right eye exhibits no discharge  Left eye exhibits no discharge  No scleral icterus  Neck: Neck supple  No JVD present  No thyromegaly present  Cardiovascular: Normal rate, regular rhythm and normal heart sounds  No murmur heard  Pulmonary/Chest: Effort normal and breath sounds normal  No respiratory distress  He has no wheezes  He has no rales  He exhibits no tenderness  Abdominal: Soft  Bowel sounds are normal  He exhibits no distension  There is no tenderness  Musculoskeletal: He exhibits no edema  Lymphadenopathy:     He has no cervical adenopathy     Neurological: He is alert and oriented to person, place, and time  Skin: Skin is warm and dry  He is not diaphoretic  Psychiatric: He has a normal mood and affect  His behavior is normal    Vitals reviewed        James Cabral DO  [facility] [address] [place] [address]

## 2019-11-26 DIAGNOSIS — I10 ESSENTIAL HYPERTENSION: ICD-10-CM

## 2019-11-26 RX ORDER — OLMESARTAN MEDOXOMIL 40 MG/1
40 TABLET ORAL DAILY
Qty: 90 TABLET | Refills: 3 | Status: SHIPPED | OUTPATIENT
Start: 2019-11-26 | End: 2020-10-16 | Stop reason: SDUPTHER

## 2020-01-14 DIAGNOSIS — K21.9 GASTROESOPHAGEAL REFLUX DISEASE WITHOUT ESOPHAGITIS: ICD-10-CM

## 2020-01-14 RX ORDER — PANTOPRAZOLE SODIUM 40 MG/1
40 TABLET, DELAYED RELEASE ORAL
Qty: 180 TABLET | Refills: 3 | Status: SHIPPED | OUTPATIENT
Start: 2020-01-14 | End: 2021-03-29 | Stop reason: SDUPTHER

## 2020-04-29 ENCOUNTER — APPOINTMENT (OUTPATIENT)
Dept: LAB | Facility: CLINIC | Age: 55
End: 2020-04-29
Payer: COMMERCIAL

## 2020-04-29 DIAGNOSIS — I10 ESSENTIAL HYPERTENSION: Chronic | ICD-10-CM

## 2020-04-29 DIAGNOSIS — E78.00 HYPERCHOLESTEROLEMIA: Chronic | ICD-10-CM

## 2020-04-29 LAB
ALBUMIN SERPL BCP-MCNC: 3.6 G/DL (ref 3.5–5)
ALP SERPL-CCNC: 53 U/L (ref 46–116)
ALT SERPL W P-5'-P-CCNC: 30 U/L (ref 12–78)
ANION GAP SERPL CALCULATED.3IONS-SCNC: 2 MMOL/L (ref 4–13)
AST SERPL W P-5'-P-CCNC: 16 U/L (ref 5–45)
BILIRUB SERPL-MCNC: 0.62 MG/DL (ref 0.2–1)
BUN SERPL-MCNC: 13 MG/DL (ref 5–25)
CALCIUM SERPL-MCNC: 8.6 MG/DL (ref 8.3–10.1)
CHLORIDE SERPL-SCNC: 109 MMOL/L (ref 100–108)
CHOLEST SERPL-MCNC: 125 MG/DL (ref 50–200)
CO2 SERPL-SCNC: 30 MMOL/L (ref 21–32)
CREAT SERPL-MCNC: 0.83 MG/DL (ref 0.6–1.3)
GFR SERPL CREATININE-BSD FRML MDRD: 100 ML/MIN/1.73SQ M
GLUCOSE P FAST SERPL-MCNC: 93 MG/DL (ref 65–99)
HDLC SERPL-MCNC: 53 MG/DL
LDLC SERPL CALC-MCNC: 62 MG/DL (ref 0–100)
NONHDLC SERPL-MCNC: 72 MG/DL
POTASSIUM SERPL-SCNC: 3.7 MMOL/L (ref 3.5–5.3)
PROT SERPL-MCNC: 6.8 G/DL (ref 6.4–8.2)
SODIUM SERPL-SCNC: 141 MMOL/L (ref 136–145)
TRIGL SERPL-MCNC: 52 MG/DL

## 2020-04-29 PROCEDURE — 80053 COMPREHEN METABOLIC PANEL: CPT

## 2020-04-29 PROCEDURE — 80061 LIPID PANEL: CPT

## 2020-04-29 PROCEDURE — 36415 COLL VENOUS BLD VENIPUNCTURE: CPT

## 2020-05-04 ENCOUNTER — TELEPHONE (OUTPATIENT)
Dept: INTERNAL MEDICINE CLINIC | Facility: CLINIC | Age: 55
End: 2020-05-04

## 2020-05-05 ENCOUNTER — TELEMEDICINE (OUTPATIENT)
Dept: INTERNAL MEDICINE CLINIC | Facility: CLINIC | Age: 55
End: 2020-05-05
Payer: COMMERCIAL

## 2020-05-05 ENCOUNTER — TELEPHONE (OUTPATIENT)
Dept: INTERNAL MEDICINE CLINIC | Facility: CLINIC | Age: 55
End: 2020-05-05

## 2020-05-05 VITALS
DIASTOLIC BLOOD PRESSURE: 86 MMHG | WEIGHT: 252 LBS | HEIGHT: 66 IN | SYSTOLIC BLOOD PRESSURE: 136 MMHG | TEMPERATURE: 96.2 F | BODY MASS INDEX: 40.5 KG/M2

## 2020-05-05 DIAGNOSIS — I10 ESSENTIAL HYPERTENSION: Primary | Chronic | ICD-10-CM

## 2020-05-05 DIAGNOSIS — L24.89 IRRITANT CONTACT DERMATITIS DUE TO OTHER AGENTS: ICD-10-CM

## 2020-05-05 DIAGNOSIS — E78.00 HYPERCHOLESTEROLEMIA: Chronic | ICD-10-CM

## 2020-05-05 DIAGNOSIS — Z11.4 SCREENING FOR HIV (HUMAN IMMUNODEFICIENCY VIRUS): ICD-10-CM

## 2020-05-05 DIAGNOSIS — E66.01 MORBID OBESITY DUE TO EXCESS CALORIES (HCC): Chronic | ICD-10-CM

## 2020-05-05 PROCEDURE — 3079F DIAST BP 80-89 MM HG: CPT | Performed by: INTERNAL MEDICINE

## 2020-05-05 PROCEDURE — 3075F SYST BP GE 130 - 139MM HG: CPT | Performed by: INTERNAL MEDICINE

## 2020-05-05 PROCEDURE — 3008F BODY MASS INDEX DOCD: CPT | Performed by: INTERNAL MEDICINE

## 2020-05-05 PROCEDURE — 99214 OFFICE O/P EST MOD 30 MIN: CPT | Performed by: INTERNAL MEDICINE

## 2020-05-05 RX ORDER — DIFLORASONE DIACETATE 0.5 MG/G
CREAM TOPICAL 2 TIMES DAILY
Qty: 30 G | Refills: 3 | Status: SHIPPED | OUTPATIENT
Start: 2020-05-05 | End: 2021-02-17 | Stop reason: CLARIF

## 2020-06-08 DIAGNOSIS — E78.00 HYPERCHOLESTEROLEMIA: Chronic | ICD-10-CM

## 2020-06-08 RX ORDER — ROSUVASTATIN CALCIUM 5 MG/1
5 TABLET, COATED ORAL DAILY
Qty: 90 TABLET | Refills: 3 | Status: SHIPPED | OUTPATIENT
Start: 2020-06-08 | End: 2021-06-05

## 2020-07-02 DIAGNOSIS — I10 ESSENTIAL HYPERTENSION: ICD-10-CM

## 2020-07-02 RX ORDER — AMLODIPINE BESYLATE 10 MG/1
10 TABLET ORAL DAILY
Qty: 90 TABLET | Refills: 1 | Status: SHIPPED | OUTPATIENT
Start: 2020-07-02 | End: 2020-10-16 | Stop reason: SDUPTHER

## 2020-07-15 ENCOUNTER — TELEPHONE (OUTPATIENT)
Dept: CARDIOLOGY CLINIC | Facility: CLINIC | Age: 55
End: 2020-07-15

## 2020-07-15 DIAGNOSIS — I10 ESSENTIAL HYPERTENSION: ICD-10-CM

## 2020-07-15 RX ORDER — METOPROLOL SUCCINATE 50 MG/1
50 TABLET, EXTENDED RELEASE ORAL DAILY
Qty: 30 TABLET | Refills: 0 | Status: SHIPPED | OUTPATIENT
Start: 2020-07-15 | End: 2020-08-17 | Stop reason: SDUPTHER

## 2020-08-17 DIAGNOSIS — I10 ESSENTIAL HYPERTENSION: ICD-10-CM

## 2020-08-17 RX ORDER — METOPROLOL SUCCINATE 50 MG/1
50 TABLET, EXTENDED RELEASE ORAL DAILY
Qty: 90 TABLET | Refills: 0 | Status: SHIPPED | OUTPATIENT
Start: 2020-08-17 | End: 2020-10-16 | Stop reason: SDUPTHER

## 2020-10-16 DIAGNOSIS — I10 ESSENTIAL HYPERTENSION: ICD-10-CM

## 2020-10-16 RX ORDER — AMLODIPINE BESYLATE 10 MG/1
10 TABLET ORAL DAILY
Qty: 90 TABLET | Refills: 3 | Status: SHIPPED | OUTPATIENT
Start: 2020-10-16 | End: 2021-12-13

## 2020-10-16 RX ORDER — METOPROLOL SUCCINATE 50 MG/1
50 TABLET, EXTENDED RELEASE ORAL DAILY
Qty: 90 TABLET | Refills: 3 | Status: SHIPPED | OUTPATIENT
Start: 2020-10-16 | End: 2021-11-02

## 2020-10-16 RX ORDER — OLMESARTAN MEDOXOMIL 40 MG/1
40 TABLET ORAL DAILY
Qty: 90 TABLET | Refills: 3 | Status: SHIPPED | OUTPATIENT
Start: 2020-10-16 | End: 2021-11-02

## 2020-11-03 ENCOUNTER — OFFICE VISIT (OUTPATIENT)
Dept: CARDIOLOGY CLINIC | Facility: CLINIC | Age: 55
End: 2020-11-03
Payer: COMMERCIAL

## 2020-11-03 VITALS
HEIGHT: 66 IN | BODY MASS INDEX: 40.5 KG/M2 | DIASTOLIC BLOOD PRESSURE: 80 MMHG | TEMPERATURE: 98.7 F | SYSTOLIC BLOOD PRESSURE: 136 MMHG | HEART RATE: 66 BPM | OXYGEN SATURATION: 97 % | WEIGHT: 252 LBS

## 2020-11-03 DIAGNOSIS — I10 ESSENTIAL HYPERTENSION: Primary | ICD-10-CM

## 2020-11-03 DIAGNOSIS — E66.01 MORBID OBESITY DUE TO EXCESS CALORIES (HCC): ICD-10-CM

## 2020-11-03 DIAGNOSIS — E78.2 MIXED HYPERLIPIDEMIA: ICD-10-CM

## 2020-11-03 PROCEDURE — 1036F TOBACCO NON-USER: CPT | Performed by: INTERNAL MEDICINE

## 2020-11-03 PROCEDURE — 3008F BODY MASS INDEX DOCD: CPT | Performed by: INTERNAL MEDICINE

## 2020-11-03 PROCEDURE — 3075F SYST BP GE 130 - 139MM HG: CPT | Performed by: INTERNAL MEDICINE

## 2020-11-03 PROCEDURE — 3079F DIAST BP 80-89 MM HG: CPT | Performed by: INTERNAL MEDICINE

## 2020-11-03 PROCEDURE — 99214 OFFICE O/P EST MOD 30 MIN: CPT | Performed by: INTERNAL MEDICINE

## 2020-11-03 RX ORDER — DOXYCYCLINE 100 MG/1
100 CAPSULE ORAL 2 TIMES DAILY
COMMUNITY
Start: 2020-10-15 | End: 2021-04-26 | Stop reason: ALTCHOICE

## 2021-01-12 ENCOUNTER — LAB (OUTPATIENT)
Dept: LAB | Facility: CLINIC | Age: 56
End: 2021-01-12
Payer: COMMERCIAL

## 2021-01-12 ENCOUNTER — TRANSCRIBE ORDERS (OUTPATIENT)
Dept: LAB | Facility: CLINIC | Age: 56
End: 2021-01-12

## 2021-01-12 DIAGNOSIS — A69.20 LYME DISEASE: ICD-10-CM

## 2021-01-12 DIAGNOSIS — A69.20 LYME DISEASE: Primary | ICD-10-CM

## 2021-01-12 DIAGNOSIS — Z11.4 SCREENING FOR HIV (HUMAN IMMUNODEFICIENCY VIRUS): ICD-10-CM

## 2021-01-12 DIAGNOSIS — E66.01 MORBID OBESITY DUE TO EXCESS CALORIES (HCC): Chronic | ICD-10-CM

## 2021-01-12 DIAGNOSIS — I10 ESSENTIAL HYPERTENSION: Chronic | ICD-10-CM

## 2021-01-12 DIAGNOSIS — E78.00 HYPERCHOLESTEROLEMIA: Chronic | ICD-10-CM

## 2021-01-12 LAB
ANION GAP SERPL CALCULATED.3IONS-SCNC: 2 MMOL/L (ref 4–13)
BUN SERPL-MCNC: 14 MG/DL (ref 5–25)
CALCIUM SERPL-MCNC: 8.7 MG/DL (ref 8.3–10.1)
CHLORIDE SERPL-SCNC: 108 MMOL/L (ref 100–108)
CHOLEST SERPL-MCNC: 131 MG/DL (ref 50–200)
CO2 SERPL-SCNC: 30 MMOL/L (ref 21–32)
CREAT SERPL-MCNC: 0.72 MG/DL (ref 0.6–1.3)
ERYTHROCYTE [DISTWIDTH] IN BLOOD BY AUTOMATED COUNT: 12.5 % (ref 11.6–15.1)
GFR SERPL CREATININE-BSD FRML MDRD: 106 ML/MIN/1.73SQ M
GLUCOSE P FAST SERPL-MCNC: 89 MG/DL (ref 65–99)
HCT VFR BLD AUTO: 46 % (ref 36.5–49.3)
HDLC SERPL-MCNC: 48 MG/DL
HGB BLD-MCNC: 14.8 G/DL (ref 12–17)
LDLC SERPL CALC-MCNC: 71 MG/DL (ref 0–100)
MCH RBC QN AUTO: 27.6 PG (ref 26.8–34.3)
MCHC RBC AUTO-ENTMCNC: 32.2 G/DL (ref 31.4–37.4)
MCV RBC AUTO: 86 FL (ref 82–98)
NONHDLC SERPL-MCNC: 83 MG/DL
PLATELET # BLD AUTO: 152 THOUSANDS/UL (ref 149–390)
PMV BLD AUTO: 8.9 FL (ref 8.9–12.7)
POTASSIUM SERPL-SCNC: 3.8 MMOL/L (ref 3.5–5.3)
RBC # BLD AUTO: 5.36 MILLION/UL (ref 3.88–5.62)
SODIUM SERPL-SCNC: 140 MMOL/L (ref 136–145)
T3 SERPL-MCNC: 0.9 NG/ML (ref 0.6–1.8)
T4 SERPL-MCNC: 9.4 UG/DL (ref 4.7–13.3)
TRIGL SERPL-MCNC: 59 MG/DL
TSH SERPL DL<=0.05 MIU/L-ACNC: 1.46 UIU/ML (ref 0.36–3.74)
WBC # BLD AUTO: 5.82 THOUSAND/UL (ref 4.31–10.16)

## 2021-01-12 PROCEDURE — 85027 COMPLETE CBC AUTOMATED: CPT

## 2021-01-12 PROCEDURE — 86618 LYME DISEASE ANTIBODY: CPT

## 2021-01-12 PROCEDURE — 86753 PROTOZOA ANTIBODY NOS: CPT

## 2021-01-12 PROCEDURE — 87389 HIV-1 AG W/HIV-1&-2 AB AG IA: CPT

## 2021-01-12 PROCEDURE — 84480 ASSAY TRIIODOTHYRONINE (T3): CPT

## 2021-01-12 PROCEDURE — 86038 ANTINUCLEAR ANTIBODIES: CPT

## 2021-01-12 PROCEDURE — 84443 ASSAY THYROID STIM HORMONE: CPT

## 2021-01-12 PROCEDURE — 80048 BASIC METABOLIC PNL TOTAL CA: CPT

## 2021-01-12 PROCEDURE — 86617 LYME DISEASE ANTIBODY: CPT

## 2021-01-12 PROCEDURE — 84436 ASSAY OF TOTAL THYROXINE: CPT

## 2021-01-12 PROCEDURE — 86666 EHRLICHIA ANTIBODY: CPT | Performed by: NURSE PRACTITIONER

## 2021-01-12 PROCEDURE — 36415 COLL VENOUS BLD VENIPUNCTURE: CPT

## 2021-01-12 PROCEDURE — 80061 LIPID PANEL: CPT

## 2021-01-12 PROCEDURE — 86757 RICKETTSIA ANTIBODY: CPT

## 2021-01-13 LAB
B BURGDOR IGG+IGM SER-ACNC: 447
HIV 1+2 AB+HIV1 P24 AG SERPL QL IA: NORMAL
RYE IGE QN: NEGATIVE

## 2021-01-14 LAB
B BURGDOR IGG PATRN SER IB-IMP: POSITIVE
B BURGDOR IGM PATRN SER IB-IMP: NEGATIVE
B BURGDOR18KD IGG SER QL IB: ABNORMAL
B BURGDOR23KD IGG SER QL IB: PRESENT
B BURGDOR23KD IGM SER QL IB: ABNORMAL
B BURGDOR28KD IGG SER QL IB: PRESENT
B BURGDOR30KD IGG SER QL IB: PRESENT
B BURGDOR39KD IGG SER QL IB: ABNORMAL
B BURGDOR39KD IGM SER QL IB: ABNORMAL
B BURGDOR41KD IGG SER QL IB: PRESENT
B BURGDOR41KD IGM SER QL IB: ABNORMAL
B BURGDOR45KD IGG SER QL IB: PRESENT
B BURGDOR58KD IGG SER QL IB: PRESENT
B BURGDOR66KD IGG SER QL IB: PRESENT
B BURGDOR93KD IGG SER QL IB: ABNORMAL
R RICKETTSI IGG SER QL IA: NEGATIVE

## 2021-01-18 LAB — MISCELLANEOUS LAB TEST RESULT: NORMAL

## 2021-01-22 ENCOUNTER — OFFICE VISIT (OUTPATIENT)
Dept: OBGYN CLINIC | Facility: CLINIC | Age: 56
End: 2021-01-22
Payer: COMMERCIAL

## 2021-01-22 VITALS
HEART RATE: 62 BPM | DIASTOLIC BLOOD PRESSURE: 75 MMHG | WEIGHT: 252.6 LBS | HEIGHT: 66 IN | BODY MASS INDEX: 40.6 KG/M2 | SYSTOLIC BLOOD PRESSURE: 136 MMHG

## 2021-01-22 DIAGNOSIS — M72.2 PLANTAR FASCIITIS, BILATERAL: Primary | ICD-10-CM

## 2021-01-22 DIAGNOSIS — M76.829 POSTERIOR TIBIALIS MUSCLE DYSFUNCTION: ICD-10-CM

## 2021-01-22 PROCEDURE — 99213 OFFICE O/P EST LOW 20 MIN: CPT | Performed by: ORTHOPAEDIC SURGERY

## 2021-01-22 NOTE — PATIENT INSTRUCTIONS
Weightbear as tolerated right lower extremity  Call physical therapy and referral to Dr Michael Anglin

## 2021-01-22 NOTE — PROGRESS NOTES
Orthopaedics Office Visit - New Patient Visit    ASSESSMENT/PLAN:    Assessment:   Plantar fasciitis, posterior tibialis dysfunction bl  Difficulty with single heel rise both sides  Medial callus R foot    Plan:   Patient does have multifactorial symptoms related to plantar fasciitis and posterior tibialis dysfunction,  He does have hindfoot valgus and weakness posterior tib  Will refer to Garrick Villarreal to discuss custom orthotics, some PT and also refer to Dr Lachman to see if he recommends any further intervention  To Do Next Visit:  Cait Kunz , Dr Zachariah Justice referral    _____________________________________________________  CHIEF COMPLAINT:  Chief Complaint   Patient presents with    Left Foot - Pain    Right Foot - Pain         SUBJECTIVE:  Amando Bullock is a 48 y o  male who presents for evaluation of bilateral foot pain  [facility] and had custom shoes, orthotics, inserts, night splints, cortisone injections for primarily plantar fasciitis which helped give short term relief  He does have imaging 2019 small calcaneal spurring, MTP arthritis  He stands/walks 9 hrs a day and as day goes on foot pain increases  Pain medial ankle-foot into heel region underneath foot  He is not sure what else to do to help alleviate chronic foot pain  He does have history of lyme, currently on medication     Pain localized to medial and plantar aspect of feet witho utradiation proximally or distally  Denies motor or sensor ydeficits        PAST MEDICAL HISTORY:  Past Medical History:   Diagnosis Date    GERD (gastroesophageal reflux disease)     Hypercholesterolemia     Hypertension     Lyme disease 3/19/2019    Obesity     Sexual dysfunction     Psychosexual dysfunction with inhibited sexual excitement        PAST SURGICAL HISTORY:  Past Surgical History:   Procedure Laterality Date    BACK SURGERY      spinal fusion, LUMBAR    CARDIAC CATHETERIZATION      CARDIAC CATHETERIZATION      CARDIAC CATHETERIZATION      EGD AND COLONOSCOPY N/A 6/15/2018    Procedure: EGD AND COLONOSCOPY;  Surgeon: Kym Cedillo MD;  [facility]; Service: Gastroenterology    MI COLONOSCOPY FLX DX W/COLLJ [facility] WHEN PFRMD N/A 3/26/2019    Procedure: COLONOSCOPY;  Surgeon: Aidan Cano MD;  [facility]; Service: Gastroenterology    MI ESOPHAGOGASTRODUODENOSCOPY TRANSORAL DIAGNOSTIC N/A 3/26/2019    Procedure: ESOPHAGOGASTRODUODENOSCOPY (EGD); Surgeon: Aidan Cano MD;  [facility];   Service: Gastroenterology    SEPTOPLASTY         FAMILY HISTORY:  Family History   Problem Relation Age of Onset    Atrial fibrillation Mother     COPD Mother     Coronary artery disease Mother     Lung cancer Mother     Rheum arthritis Mother     Stroke Mother         Syndrome    Lung cancer Father     Prostate cancer Maternal Grandfather     Heart defect Daughter     Arthritis Family        SOCIAL HISTORY:  Social History     Tobacco Use    Smoking status: Former Smoker     Packs/day: 0 25     Years: 10 00     Pack years: 2 50     Types: Cigarettes     Quit date: 1998     Years since quittin 9    Smokeless tobacco: Former User     Types: Chew     Quit date: 3/13/2017   Substance Use Topics    Alcohol use: Yes     Frequency: Monthly or less     Drinks per session: 1 or 2     Binge frequency: Never     Comment: Occ alcohol use per [employer]     Drug use: Yes     Types: Marijuana     Comment: medical       MEDICATIONS:    Current Outpatient Medications:     aluminum hydroxide-magnesium carbonate (GAVISCON)  mg/15 mL oral suspension, Take 15 mL by mouth 4 (four) times daily (after meals and at bedtime) (Patient taking differently: Take 15 mL by mouth as needed ), Disp: 1 Bottle, Rfl: 0    amLODIPine (NORVASC) 10 mg tablet, Take 1 tablet (10 mg total) by mouth daily, Disp: 90 tablet, Rfl: 3    aspirin 81 mg chewable tablet, Chew 1 tablet (81 mg total) daily, Disp: , Rfl: 0    diflorasone (PSORCON) 0 05 % cream, Apply topically 2 (two) times a day, Disp: 30 g, Rfl: 3    doxycycline monohydrate (MONODOX) 100 mg capsule, , Disp: , Rfl:     ibuprofen (MOTRIN) 600 mg tablet, Take 1 tablet (600 mg total) by mouth every 8 (eight) hours (Patient taking differently: Take 600 mg by mouth as needed ), Disp: 90 tablet, Rfl: 1    metoprolol succinate (TOPROL-XL) 50 mg 24 hr tablet, Take 1 tablet (50 mg total) by mouth daily, Disp: 90 tablet, Rfl: 3    olmesartan (BENICAR) 40 mg tablet, Take 1 tablet (40 mg total) by mouth daily, Disp: 90 tablet, Rfl: 3    rosuvastatin (CRESTOR) 5 mg tablet, Take 1 tablet (5 mg total) by mouth daily, Disp: 90 tablet, Rfl: 3    pantoprazole (PROTONIX) 40 mg tablet, Take 1 tablet (40 mg total) by mouth 2 (two) times a day before meals, Disp: 180 tablet, Rfl: 3    ALLERGIES:  No Known Allergies    REVIEW OF SYSTEMS:  MSK: bilateral foot pain  Neuro: normal   Pertinent items are otherwise noted in HPI  A comprehensive review of systems was otherwise negative  LABS:  HgA1c:   Lab Results   Component Value Date    HGBA1C 5 5 02/26/2018     BMP:   Lab Results   Component Value Date    GLUCOSE 82 10/29/2015    CALCIUM 8 7 01/12/2021     10/29/2015    K 3 8 01/12/2021    CO2 30 01/12/2021     01/12/2021    BUN 14 01/12/2021    CREATININE 0 72 01/12/2021     CBC: No components found for: CBC    _____________________________________________________  PHYSICAL EXAMINATION:  Vital signs: There were no vitals taken for this visit  General: No acute distress, awake and alert  Psychiatric: Mood and affect appear appropriate  HEENT: Trachea Midline, No torticollis, no apparent facial trauma  Cardiovascular: No audible murmurs;  Extremities appear perfused  Pulmonary: No audible wheezing or stridor  Skin: No open lesions; see further details (if any) below    MUSCULOSKELETAL EXAMINATION:  Extremities:    RLE:  No erythema, no swelling  TTP mild plantar fascia insertion   Full ankle and foot motion   5/5 dorsiflexion, plantarflexion, inversion, eversion, posterior tib 4/5   Negative anterior drawer  Slight calcaneal valgus   Pain with single leg raise limited  Foot and toes warm and perfused   Sensation intact all digits of feet     LLE:  No erythema, no swelling  TTP mild plantar fascia insertion   Full ankle and foot motion   5/5 dorsiflexion, plantarflexion, inversion, eversion, posterior tib 4/5   Negative anterior drawer  Slight calcaneal valgus   Pain with single leg raise limited  Foot and toes warm and perfused   Sensation intact all digits of feet   _____________________________________________________  STUDIES REVIEWED:  I personally reviewed the images and interpretation is as follows:  XR bilateral feet 2019 1st MTP joint changes on right, small calcaneal spurring bilaterally    PROCEDURES PERFORMED:  Procedures        Scribe Attestation    I,:  Cl Ratliff am acting as a scribe while in the presence of the attending physician :       I,:  Presley Linares MD personally performed the services described in this documentation    as scribed in my presence :

## 2021-01-29 ENCOUNTER — OFFICE VISIT (OUTPATIENT)
Dept: OBGYN CLINIC | Facility: CLINIC | Age: 56
End: 2021-01-29
Payer: COMMERCIAL

## 2021-01-29 ENCOUNTER — APPOINTMENT (OUTPATIENT)
Dept: RADIOLOGY | Facility: AMBULARY SURGERY CENTER | Age: 56
End: 2021-01-29
Attending: ORTHOPAEDIC SURGERY
Payer: COMMERCIAL

## 2021-01-29 VITALS
SYSTOLIC BLOOD PRESSURE: 162 MMHG | BODY MASS INDEX: 40.5 KG/M2 | HEART RATE: 57 BPM | DIASTOLIC BLOOD PRESSURE: 98 MMHG | HEIGHT: 66 IN | WEIGHT: 252 LBS

## 2021-01-29 DIAGNOSIS — M76.829 POSTERIOR TIBIALIS MUSCLE DYSFUNCTION: ICD-10-CM

## 2021-01-29 DIAGNOSIS — M72.2 PLANTAR FASCIITIS, BILATERAL: ICD-10-CM

## 2021-01-29 PROCEDURE — 3008F BODY MASS INDEX DOCD: CPT | Performed by: ORTHOPAEDIC SURGERY

## 2021-01-29 PROCEDURE — 1036F TOBACCO NON-USER: CPT | Performed by: ORTHOPAEDIC SURGERY

## 2021-01-29 PROCEDURE — 99213 OFFICE O/P EST LOW 20 MIN: CPT | Performed by: ORTHOPAEDIC SURGERY

## 2021-01-29 PROCEDURE — 3077F SYST BP >= 140 MM HG: CPT | Performed by: ORTHOPAEDIC SURGERY

## 2021-01-29 PROCEDURE — 3080F DIAST BP >= 90 MM HG: CPT | Performed by: ORTHOPAEDIC SURGERY

## 2021-01-29 PROCEDURE — 73630 X-RAY EXAM OF FOOT: CPT

## 2021-01-29 NOTE — PATIENT INSTRUCTIONS
Plantar Fasciitis   If your first few steps out of bed in the morning cause severe pain in the heel of your foot, you may have plantar fasciitis (fashee-EYE-tiss), an overuse injury that affects the sole of the foot  A diagnosis of plantar fasciitis means you have inflamed the tough, fibrous band of tissue (fascia) connecting your heel bone to the base of your toes  You're more likely to develop the condition if you're female, overweight or have a job that requires a lot of walking or standing on hard surfaces  You're also at risk if you walk or run for exercise, especially if you have tight calf muscles that limit how far you can flex your ankles  People with very flat feet or very high arches also are more prone to plantar fasciitis  The condition typically starts gradually with mild pain at the heel bone often referred to as a stone bruise  You're more likely to feel it after (not during) exercise  The pain classically occurs right after getting up in the morning and after a period of sitting  If you don't treat plantar fasciitis, it may become a chronic condition  You may not be able to keep up your level of activity, and you may develop symptoms of foot, knee, hip and back problems because plantar fasciitis can change the way you walk  Treatment  1  Most important- activity modification (reducing the number of steps you take each day, avoiding offending activities and performing any weightbearing activities in supportive shoes)  2  Gel heel cups  3  Physical Therapy  4  Night splints (if you have morning pain)  Stretching is the best treatment for plantar fasciitis  This is typically done with a Physical therapist  It may help to try to keep weight off your foot until the initial inflammation goes away  You can also apply ice to the sore area for 20 minutes three or four times a day to relieve your symptoms   Nonsteroidal anti-inflammatory medication such as ibuprofen or naproxen can be helpful on the bad days  Home exercises to stretch your Achilles tendon and plantar fascia are the mainstay of treatment and reduce the chance of recurrence  In one exercise, you lean forward against a wall with one knee straight and heel on the ground  Your other knee is bent  Your heel cord and foot arch stretch as you lean  Hold for 10 seconds, relax and straighten up  Repeat 20 times for each sore heel  It is important to keep the knee fully extended on the side being stretched  In another exercise, you lean forward onto a countertop, spreading your feet apart with one foot in front of the other  Flex your knees and squat down, keeping your heels on the ground as long as possible  Your heel cords and foot arches will stretch as the heels come up in the stretch  Hold for 10 seconds, relax and straighten up  Repeat 20 times  About 90 percent of people with plantar fasciitis improve significantly after two months of initial treatment  You may be advised to use shoes with shock-absorbing soles or fitted with an off-the-shelf shoe insert device like a rubber heel pad  Your foot may be taped into a specific position  If your plantar fasciitis continues after a few months of conservative treatment, your doctor may inject your heel with steroidal anti-inflammatory medication  If you still have symptoms, you may need to wear a walking cast for two to three weeks or a positional splint when you sleep  In a few cases, surgery is needed for chronically contracted tissue  Plantar Fascia-Specific Stretching Program  1  Cross your affected leg over your other leg  2  Using the hand on your affected side, take hold of your affected foot and pull your toes back towards shin  This creates tension/stretch in the arch of the foot/plantar fascia  3  Check for the appropriate stretch position by gently rubbing the thumb of your unaffected side left to right over the arch of the affected foot   The plantar fascia should feel firm, like a guitar string  4  Hold the stretch for a count of 10  A set is 10 repetitions  Perform at least three sets of stretches per day  You cannot perform the stretch too often  The most important times to stretch are before taking the first step in the morning and before standing after a period of prolonged sitting  Anti-inflammatory Medication  Anti-inflammatory medications can help decrease the inflammation in the arch and heel of your foot  These medications include Advil®, Motrin®, Ibuprofen, and Aleve®  1  Use the medication as directed on the package  If you tolerate it well, take it daily for two weeks then discontinue for one week  If symptoms worsen or return, resume for two weeks, then stop  2  You should eat when taking these medications, as they can be hard on your stomach  Arch Support  1  Over the counter inserts [facility]'s) provide added arch support and soft cushion  These are typically not needed but can be tried if desired  2  Based on the individual needs of your foot, custom inserts are rarely ever necessary   Additional Stretch: Achilles Tendon Stretch  1  Place a shoe insert under your affected foot  2  Place your affected leg behind your unaffected leg with the toes of your back foot pointed towards the heel of your other foot  3  Lean into the wall  4  Bend your front knee while keeping your back leg straight with your heel firmly on the ground  5  Hold the stretch for a count of 10  A set is 10 repetitions  6  Perform the stretch at least three times a day  Progressive Flatfoot (Posterior Tibial Tendon Dysfunction)     What is progressive flatfoot? Tendons connect muscles to bones and stretch across joints, enabling you to bend those joints  One of the most important tendons in the lower leg is the posterior tibial tendon   This tendon starts in the calf, stretches down behind the inside of the ankle and attaches to bones in the middle of the foot    The posterior tibial tendon helps hold up your arch and provides support as you step off on your toes when walking  If this tendon becomes inflamed, overstretched or torn, you may experience pain on the inner ankle and gradually lose the inner arch on the bottom of your foot, leading to flatfoot    What are the signs of progressive flatfoot? · Pain and swelling on the inside of the ankle  · Loss of the arch and the development of a flat foot  · Gradual development of pain on the outer side of the ankle or foot  · Weakness and an inability to stand on the toes  · Tenderness over the midfoot, especially when under stress during activity     What causes progressive flatfoot?   Progressive flatfoot often occurs in women over 48years of age and may be due to an inherent abnormality of the tendon  But there are several other risk factors, including:    · Obesity  · Diabetes  · Hypertension  · Previous surgery or trauma, such as an ankle fracture on the inner side of the foot  · Local steroid injections  · Inflammatory diseases such as Kael's syndrome, rheumatoid arthritis, spondylosing arthropathy and psoriasis  · Athletes who are involved in sports such as basketball, tennis, soccer or hockey may tear the posterior tibial tendon  The tendon may also become inflamed if excessive force is placed on the foot, such as when running on a banked track or road     How is progressive flatfoot diagnosed? The diagnosis is based on both a history and a physical examination  Your physician may ask you to stand on your bare feet facing away from him/her to view how your foot functions  As the condition progresses, the front of the affected foot will start to slide to the outside  From behind, it will look as though you have "too many toes" showing  You may also be asked to stand on your toes or to do a single heel rise   You will stand with your hands on the wall, lift the unaffected foot off the ground, and raise up on the toes of the other foot  Normally, the heel will rotate inward; the absence of this sign indicates posterior tibial tendon dysfunction  Your doctor may request X-rays, an ultrasound or an MRI of the foot     What are treatment options?   Without treatment, the flatfoot that develops from posterior tibial tendon dysfunction eventually becomes rigid  Arthritis develops in the hindfoot  Pain increases and spreads to the outer side of the ankle  The way you walk may be affected and wearing shoes may be difficult    The treatment your doctor recommends will depend on how far the condition has progressed  In the early stages, posterior tibial tendon dysfunction can be treated with rest, nonsteroidal anti-inflammatory drugs such as aspirin or ibuprofen, and immobilization of the foot for six to eight weeks with a rigid below-knee cast or boot to prevent overuse  After the cast is removed, shoe inserts such as a heel wedge or arch support may be helpful  If the condition is advanced, your doctor may recommend that you use a custom-made ankle-foot orthosis or support    If conservative treatments don't work, your doctor may recommend surgery  Several procedures can be used to treat progressive flatfoot; often more than one procedure is performed at the same time  Your doctor will recommend a specific course of treatment based on your individual case  Surgical options include:   · Tenosynovectomy  In this procedure, the surgeon will clean away (debride) and remove (excise) any inflamed tissue surrounding the tendon  · Osteotomy: This procedure changes the alignment of the heel bone (calcaneus)  The surgeon may sometimes have to remove a portion of the bone  · Tendon transfer: This procedure uses some fibers from another tendon (the flexor digitorum longus, which helps bend the toes) to repair the damaged posterior tibial tendon  · Lateral column lengthening:  In this procedure, the surgeon removes a small wedge-shaped piece of bone from either your hip or that of a cadaver and places it into the outside of the calcaneus  This helps realign the bones and recreates the arch  · Arthrodesis: This procedure welds (fuses) one or more bones together, eliminating movement in the joint  This stabilizes the hindfoot and prevents the condition from progressing further  ?    [employer], [employer], [employer] are good brands but I recommend going to a dedicate shoe store (not [employer] or [employer] ) At these types of stores, they have experts that can fit you for shoes appropriate for your foot problem  [facility]  [address] [facility], [address] [facility] [address], [address] [facility]  [address] [facility], [place], [address] [place] [address]     [employer]  [address] [facility] [address], [place], [address] [place] [address] Armando GOMEZ

## 2021-01-29 NOTE — PROGRESS NOTES
LEILANI Carlos  Attending, Orthopaedic Surgery  Foot and [facility] [address] [facility]      ORTHOPAEDIC FOOT AND ANKLE CLINIC VISIT     Assessment:     Encounter Diagnoses   Name Primary?  Plantar fasciitis, bilateral     Posterior tibialis muscle dysfunction             Plan:   · The patient verbalized understanding of exam findings and treatment plan  We engaged in the shared decision-making process and treatment options were discussed at length with the patient  Surgical and conservative management discussed today along with risks and benefits   Activity modification (recommend decreasing activity by at least 40% until significant improvement in symptoms)   Visco (silicone gel) heel cups to be worn in all shoes all the time   Physical therapy for PFSSP and ASSP, avoid any aggressive strengthening   90% of patients have resolution of symptoms by 9 months with above protocol, 95% by 2 years   Night splints for morning pain   Advised to take Aleve, rest, ice massage, heat and elevation advised   Return if symptoms worsen or fail to improve  History of Present Illness:   Chief Complaint:   Chief Complaint   Patient presents with    Left Foot - Pain    Right Foot - Pain     Rukhsana Luna is a 55 y o  male who is being seen for bilateral plantar medial heel pain  This pain began 24 months ago and has not improved  Pain is localized at plantar medial heel with minimal radiating and described as sharp and severe  Patient denies numbness, tingling or radicular pain  Denies history of neuropathy  Patient does not smoke, does not have diabetes and does not take blood thinners  Patient denies family history of anesthesia complications and has not had any complications with anesthesia       Pain/symptom timing:  Worse during the day when active  Pain/symptom context:  Worse with activites and work  Pain/symptom modifying factors:  Rest makes better, activities make worse  Pain/symptom associated signs/symptoms: none    Prior treatment   · NSAIDsYes    · Injections Yes   · Bracing/Orthotics Yes   · Physical Therapy No     Orthopedic Surgical History:   See below    Past Medical, Surgical and Social History:  Past Medical History:  has a past medical history of GERD (gastroesophageal reflux disease), Hypercholesterolemia, Hypertension, Lyme disease (3/19/2019), Obesity, and Sexual dysfunction  Problem List: does not have any pertinent problems on file  Past Surgical History:  has a past surgical history that includes Septoplasty; Cardiac catheterization; Cardiac catheterization; EGD AND COLONOSCOPY (N/A, 6/15/2018); Cardiac catheterization; Back surgery; pr colonoscopy flx dx w/collj spec when pfrmd (N/A, 3/26/2019); and pr esophagogastroduodenoscopy transoral diagnostic (N/A, 3/26/2019)  Family History: family history includes Arthritis in his family; Atrial fibrillation in his mother; COPD in his mother; Coronary artery disease in his mother; Heart defect in his daughter; Lung cancer in his father and mother; Prostate cancer in his maternal grandfather; Rheum arthritis in his mother; Stroke in his mother  Social History:  reports that he quit smoking about 22 years ago  His smoking use included cigarettes  He has a 2 50 pack-year smoking history  He quit smokeless tobacco use about 3 years ago  His smokeless tobacco use included chew  He reports current alcohol use  He reports current drug use  Drug: Marijuana  Current Medications: has a current medication list which includes the following prescription(s): aluminum hydroxide-magnesium carbonate, amlodipine, aspirin, diflorasone, doxycycline monohydrate, ibuprofen, metoprolol succinate, olmesartan, rosuvastatin, and pantoprazole  Allergies: has No Known Allergies       Review of Systems:  General- denies fever/chills  HEENT- denies hearing loss or sore throat  Eyes- denies eye pain or visual disturbances, denies red eyes  Respiratory- denies cough or SOB  Cardio- denies chest pain or palpitations  GI- denies abdominal pain  Endocrine- denies urinary frequency  Urinary- denies pain with urination  Musculoskeletal- Negative except noted above  Skin- denies rashes or wounds  Neurological- denies dizziness or headache  Psychiatric- denies anxiety or difficulty concentrating    Physical Exam:   /98   Pulse 57   Ht 5' 6" (1 676 m)   Wt 114 kg (252 lb)   BMI 40 67 kg/m²   General/Constitutional: No apparent distress: well-nourished and well developed  Eyes: normal ocular motion  Lymphatic: No appreciable lymphadenopathy  Respiratory: Non-labored breathing  Vascular: No edema, swelling or tenderness, except as noted in detailed exam   Integumentary: No impressive skin lesions present, except as noted in detailed exam   Neuro: No ataxia or tremors noted  Psych: Normal mood and affect, oriented to person, place and time  Appropriate affect  Musculoskeletal: Normal, except as noted in detailed exam and in HPI  Examination    bilateral    Gait Normal   Musculoskeletal Tender to palpation at plantar medial heel    Skin Normal       Nails Normal    Range of Motion  10 degrees dorsiflexion, 30 degrees plantarflexion  Subtalar motion: normal    Stability Stable    Muscle Strength 5/5 tibialis anterior  5/5 gastrocnemius-soleus  5/5 posterior tibialis  5/5 peroneal/eversion strength  5/5 EHL  5/5 FHL    Neurologic Normal    Sensation  Intact to light touch throughout sural, saphenous, superficial peroneal, deep peroneal and medial/lateral plantar nerve distributions  [place]-Herb 5 07 filament (10g) testing  deferred  Cardiovascular Brisk capillary refill < 2 seconds,intact DP and PT pulses    Special Tests None      Imaging Studies:   3 views of the left foot were taken, reviewed and interpreted independently that demonstrate minimal plantar heel spur, minimal degenerative changes  Reviewed by me personally          Krys Enrique Sofiya Allen MD  Foot & Ankle Surgery   Department [address]      I personally performed the service  Virgel Lefevre Lachman, MD

## 2021-02-03 ENCOUNTER — APPOINTMENT (EMERGENCY)
Dept: RADIOLOGY | Facility: HOSPITAL | Age: 56
End: 2021-02-03
Payer: COMMERCIAL

## 2021-02-03 ENCOUNTER — HOSPITAL ENCOUNTER (EMERGENCY)
Facility: HOSPITAL | Age: 56
Discharge: HOME | End: 2021-02-03
Attending: EMERGENCY MEDICINE | Admitting: EMERGENCY MEDICINE
Payer: COMMERCIAL

## 2021-02-03 VITALS
BODY MASS INDEX: 40.5 KG/M2 | HEART RATE: 79 BPM | DIASTOLIC BLOOD PRESSURE: 75 MMHG | TEMPERATURE: 98.1 F | HEIGHT: 66 IN | WEIGHT: 252 LBS | OXYGEN SATURATION: 100 % | SYSTOLIC BLOOD PRESSURE: 153 MMHG | RESPIRATION RATE: 18 BRPM

## 2021-02-03 DIAGNOSIS — S46.811A STRAIN OF RIGHT TRAPEZIUS MUSCLE: ICD-10-CM
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
## 2021-02-03 DIAGNOSIS — S40.011A CONTUSION OF RIGHT SHOULDER, INITIAL ENCOUNTER: Primary | ICD-10-CM

## 2021-02-03 PROCEDURE — 99283 EMERGENCY DEPT VISIT LOW MDM: CPT

## 2021-02-03 PROCEDURE — 73060 X-RAY EXAM OF HUMERUS: CPT

## 2021-02-03 PROCEDURE — 73030 X-RAY EXAM OF SHOULDER: CPT

## 2021-02-03 PROCEDURE — 99284 EMERGENCY DEPT VISIT MOD MDM: CPT | Performed by: EMERGENCY MEDICINE

## 2021-02-03 RX ORDER — IBUPROFEN 400 MG/1
400 TABLET ORAL ONCE
Status: COMPLETED | OUTPATIENT
Start: 2021-02-03 | End: 2021-02-03

## 2021-02-03 RX ORDER — LIDOCAINE 50 MG/G
1 PATCH TOPICAL ONCE
Status: DISCONTINUED | OUTPATIENT
Start: 2021-02-03 | End: 2021-02-03 | Stop reason: HOSPADM

## 2021-02-03 RX ADMIN — IBUPROFEN 400 MG: 400 TABLET, FILM COATED ORAL at 08:22

## 2021-02-03 RX ADMIN — LIDOCAINE 5% 1 PATCH: 700 PATCH TOPICAL at 08:22

## 2021-02-03 NOTE — ED NOTES
Patient transported to [address] [place] [address], [facility]     Harold Burkitt, RN  02/03/21 9002

## 2021-02-03 NOTE — ED PROVIDER NOTES
History  Chief Complaint   Patient presents with    Shoulder Injury     Pt reports slipping and falling on ice yesterday, landing on his right shoulder and upper arm  Patient is a 16-year-old male with past medical history of hypertension, hyperlipidemia, plantar fasciitis, Lyme disease, GERD, presents to the emergency department complaining of right shoulder and upper arm pain after he fell yesterday  Patient reports that he slipped on snow and ice yesterday falling onto his right shoulder and upper arm  He denies any head strike or loss of consciousness  Patient reports he feels pain from his right trapezius region down into his right shoulder and upper arm  He has not noticed any bruising or significant swelling  Pain worsened when he tries to raise the shoulder  Denies any weakness or paresthesia in the right upper extremity  Patient takes low-dose aspirin daily but denies being on any other blood thinners  Rest of review of systems is negative  No headaches, dizziness, change in vision or hearing, tinnitus, neck or back pain, chest pain, palpitations, dyspnea or pain with breathing, abdominal pain or distention, nausea, vomiting, change in bowel habits, cough or URI symptoms, urinary symptoms, skin rash or color change, lower extremity pain, focal neurologic deficits  Patient is right-hand dominant  History provided by:  Patient   used: No    Shoulder Injury  Associated symptoms: no back pain, no fever and no neck pain        Prior to Admission Medications   Prescriptions Last Dose Informant Patient Reported?  Taking?   aluminum hydroxide-magnesium carbonate (GAVISCON)  mg/15 mL oral suspension  Self No No   Sig: Take 15 mL by mouth 4 (four) times daily (after meals and at bedtime)   Patient taking differently: Take 15 mL by mouth as needed    amLODIPine (NORVASC) 10 mg tablet  Self No No   Sig: Take 1 tablet (10 mg total) by mouth daily   aspirin 81 mg chewable tablet  Self No No   Sig: Chew 1 tablet (81 mg total) daily   diflorasone (PSORCON) 0 05 % cream  Self No No   Sig: Apply topically 2 (two) times a day   doxycycline monohydrate (MONODOX) 100 mg capsule  Self Yes No   ibuprofen (MOTRIN) 600 mg tablet  Self No No   Sig: Take 1 tablet (600 mg total) by mouth every 8 (eight) hours   Patient taking differently: Take 600 mg by mouth as needed    metoprolol succinate (TOPROL-XL) 50 mg 24 hr tablet  Self No No   Sig: Take 1 tablet (50 mg total) by mouth daily   olmesartan (BENICAR) 40 mg tablet  Self No No   Sig: Take 1 tablet (40 mg total) by mouth daily   pantoprazole (PROTONIX) 40 mg tablet  Self No No   Sig: Take 1 tablet (40 mg total) by mouth 2 (two) times a day before meals   rosuvastatin (CRESTOR) 5 mg tablet  Self No No   Sig: Take 1 tablet (5 mg total) by mouth daily      Facility-Administered Medications: None       Past Medical History:   Diagnosis Date    GERD (gastroesophageal reflux disease)     Hypercholesterolemia     Hypertension     Lyme disease 3/19/2019    Obesity     Sexual dysfunction     Psychosexual dysfunction with inhibited sexual excitement        Past Surgical History:   Procedure Laterality Date    BACK SURGERY      spinal fusion, LUMBAR    CARDIAC CATHETERIZATION      CARDIAC CATHETERIZATION      CARDIAC CATHETERIZATION      EGD AND COLONOSCOPY N/A 6/15/2018    Procedure: EGD AND COLONOSCOPY;  Surgeon: Jasen Tapia MD;  Location: [facility]; Service: Gastroenterology    AR COLONOSCOPY FLX DX W/[facility] WHEN PFRMD N/A 3/26/2019    Procedure: COLONOSCOPY;  Surgeon: Isabelle Pritchett MD;  [facility]; Service: Gastroenterology    AR ESOPHAGOGASTRODUODENOSCOPY TRANSORAL DIAGNOSTIC N/A 3/26/2019    Procedure: ESOPHAGOGASTRODUODENOSCOPY (EGD); Surgeon: Isabelle Pritchett MD;  [facility];   Service: Gastroenterology    SEPTOPLASTY         Family History   Problem Relation Age of Onset    Atrial fibrillation Mother     COPD Mother     Coronary artery disease Mother     Lung cancer Mother     Rheum arthritis Mother     Stroke Mother         Syndrome    Lung cancer Father     Prostate cancer Maternal Grandfather     Heart defect Daughter     Arthritis Family      I have reviewed and agree with the history as documented  E-Cigarette/Vaping    E-Cigarette Use Never User      E-Cigarette/Vaping Substances    Nicotine No     THC No     CBD No     Flavoring No     Other No     Unknown No      Social History     Tobacco Use    Smoking status: Former Smoker     Packs/day: 0 25     Years: 10 00     Pack years: 2 50     Types: Cigarettes     Quit date: 1998     Years since quittin 9    Smokeless tobacco: Former User     Types: Chew     Quit date: 3/13/2017   Substance Use Topics    Alcohol use: Yes     Frequency: Monthly or less     Drinks per session: 1 or 2     Binge frequency: Never     Comment: Occ alcohol use per [employer]     Drug use: Yes     Types: Marijuana     Comment: medical       Review of Systems   Constitutional: Negative for chills and fever  HENT: Negative for congestion, ear pain, hearing loss, rhinorrhea, sore throat and tinnitus  Eyes: Negative for photophobia, pain and visual disturbance  Respiratory: Negative for cough, chest tightness, shortness of breath and wheezing  Cardiovascular: Negative for chest pain and palpitations  Gastrointestinal: Negative for abdominal pain, constipation, diarrhea, nausea and vomiting  Genitourinary: Negative for dysuria, flank pain, frequency and hematuria  Musculoskeletal: Positive for arthralgias  Negative for back pain, joint swelling, neck pain and neck stiffness  +Right shoulder and upper arm pain s/p injury  Skin: Negative for color change, pallor, rash and wound  Allergic/Immunologic: Negative for immunocompromised state     Neurological: Negative for dizziness, syncope, weakness, light-headedness, numbness and headaches  Hematological: Negative for adenopathy  Does not bruise/bleed easily  Psychiatric/Behavioral: Negative for confusion and decreased concentration  All other systems reviewed and are negative  Physical Exam  Physical Exam  Vitals signs and nursing note reviewed  Constitutional:       General: He is not in acute distress  Appearance: Normal appearance  He is well-developed  He is not ill-appearing, toxic-appearing or diaphoretic  HENT:      Head: Normocephalic and atraumatic  Right Ear: External ear normal       Left Ear: External ear normal       Mouth/Throat:      Comments: Orpharyngeal exam deferred at this time due to risk of exposure to COVID-19 during current pandemic  Patient has no oropharyngeal complaints  Eyes:      Extraocular Movements: Extraocular movements intact  Conjunctiva/sclera: Conjunctivae normal    Neck:      Musculoskeletal: Normal range of motion and neck supple  No neck rigidity or muscular tenderness  Vascular: No JVD  Cardiovascular:      Rate and Rhythm: Normal rate and regular rhythm  Pulses: Normal pulses  Heart sounds: Normal heart sounds  No murmur  No friction rub  No gallop  Pulmonary:      Effort: Pulmonary effort is normal  No respiratory distress  Breath sounds: Normal breath sounds  No wheezing, rhonchi or rales  Chest:      Chest wall: No tenderness  Abdominal:      General: There is no distension  Palpations: Abdomen is soft  Tenderness: There is no abdominal tenderness  There is no guarding or rebound  Musculoskeletal:         General: Tenderness present  No swelling or deformity  Comments: No midline cervical, thoracic or lumbar spine tenderness  No step-offs  RIGHT UPPER EXTREMITY: Mild tenderness in the right trapezius musculature with hypertonicity  Minimal tenderness over the distal clavicle/AC joint    No significant bony tenderness but the shoulder joint, upper arm, elbow, forearm, wrist or hand of the right upper extremity  Full range of motion right wrist, elbow joints  Decreased range of motion with shoulder flexion and abduction due to pain  ALL OTHER EXTREMITIES:  Atraumatic, have full range of motion and are nontender  Skin:     General: Skin is warm and dry  Coloration: Skin is not pale  Findings: No erythema or rash  Neurological:      General: No focal deficit present  Mental Status: He is alert and oriented to person, place, and time  Sensory: No sensory deficit  Motor: No weakness  Comments: 5/5 strength in all 4 extremities  Psychiatric:         Mood and Affect: Mood normal          Behavior: Behavior normal          Vital Signs  ED Triage Vitals [02/03/21 0703]   Temperature Pulse Respirations Blood Pressure SpO2   98 1 °F (36 7 °C) 79 18 153/75 100 %      Temp Source Heart Rate Source Patient Position - Orthostatic VS BP Location FiO2 (%)   Oral Monitor Sitting Left arm --      Pain Score       8         Vitals:    02/03/21 0703   BP: 153/75   BP Location: Left arm   Pulse: 79   Resp: 18   Temp: 98 1 °F (36 7 °C)   TempSrc: Oral   SpO2: 100%   Weight: 114 kg (252 lb)   Height: 5' 6" (1 676 m)       Visual Acuity      ED Medications  Medications   ibuprofen (MOTRIN) tablet 400 mg (has no administration in time range)   lidocaine (LIDODERM) 5 % patch 1 patch (has no administration in time range)       Diagnostic Studies  Results Reviewed     None                 XR shoulder 2+ views RIGHT   ED Interpretation by Yolanda Haque DO (02/03 0813)   No acute osseous abnormality  XR humerus RIGHT   ED Interpretation by Yolanda Haque DO (02/03 0814)   No acute osseous abnormality         by Brandy Jim MD (17/09 1026)                 Procedures  Procedures         ED Course                             SBIRT 20yo+      Most Recent Value   SBIRT (24 yo +)   In order to provide better care to our patients, we are screening all of our patients for alcohol and drug use  Would it be okay to ask you these screening questions? Yes Filed at: 02/03/2021 9596   Initial Alcohol Screen: US AUDIT-C    1  How often do you have a drink containing alcohol?  0 Filed at: 02/03/2021 0752   3a  Male UNDER 65: How often do you have five or more drinks on one occasion? 0 Filed at: 02/03/2021 0752   3b  FEMALE Any Age, or MALE 65+: How often do you have 4 or more drinks on one occassion? 0 Filed at: 02/03/2021 0752   Audit-C Score  0 Filed at: 02/03/2021 5427   TALAT: How many times in the past year have you    Used an illegal drug or used a prescription medication for non-medical reasons? Never Filed at: 02/03/2021 1331                    [facility]  Number of Diagnoses or Management Options  Diagnosis management comments: 20-year-old male presents status post slip and fall on ice yesterday landing on his right upper arm  Will obtain x-rays of the right humerus and shoulder joint  Overall low suspicion for acute fracture and likely this is more of a muscle strain, possible soft tissue contusion  Will give ibuprofen and Lidoderm patch for pain relief  Will ultimately refer to Sports Medicine for follow-up  Discussed ED return parameters  Amount and/or Complexity of Data Reviewed  Tests in the radiology section of CPT®: ordered and reviewed  Independent visualization of images, tracings, or specimens: yes        Disposition  Final diagnoses:   Contusion of right shoulder, initial encounter   Strain of right trapezius muscle   Fall from slipping on ice     Time reflects when diagnosis was documented in both MDM as applicable and the Disposition within this note     Time User Action Codes Description Comment    2/3/2021  8:16 AM Deidre UNDERWOOD Add [S40 011A] Contusion of right shoulder, initial encounter     2/3/2021  8:16 AM Judah Bey Add [Q63 258C] Strain of right trapezius muscle     2/3/2021  8:16 AM Deidre UNDERWOOD Add [W00  9XXA] Fall from slipping on ice ED Disposition     ED Disposition Condition Date/Time Comment    Discharge Stable Wed Feb 3, 2021  8:16 AM [employer] [facility] discharge to home/self care  Follow-up Information     Follow up With Specialties Details Why Contact Info Additional [id number] [facility],  Internal Medicine Schedule an appointment as soon as possible for a visit   [address] [facility] [address] [facility], [address] Schedule an appointment as soon as possible for a visit   [phone]  [address]       [address] [facility] Emergency Department Emergency Medicine Go to  If symptoms worsen [address] [facility] 109 [facility] Emergency Department, [address] [place], [place], [address]          Patient's Medications   Discharge Prescriptions    No medications on file     No discharge procedures on file      PDMP Review     None          ED Provider  Electronically Signed by           Frances Ellison DO  02/03/21 9993

## 2021-02-03 NOTE — DISCHARGE INSTRUCTIONS
Muscle Strain   WHAT YOU NEED TO KNOW:   A muscle strain is a twist, pull, or tear of a muscle or tendon  A tendon is a strong elastic tissue that connects a muscle to a bone  Signs of a strained muscle include bruising and swelling over the area, pain with movement, and loss of strength  DISCHARGE INSTRUCTIONS:   Seek care immediately or call 911 if:   · You suddenly cannot feel or move your injured muscle  Contact your healthcare provider if:   · Your pain and swelling worsen or do not go away  · You have questions or concerns about your condition or care  Medicines:   · NSAIDs , such as ibuprofen, help decrease swelling, pain, and fever  This medicine is available with or without a doctor's order  NSAIDs can cause stomach bleeding or kidney problems in certain people  If you take blood thinner medicine, always ask your healthcare provider if NSAIDs are safe for you  Always read the medicine label and follow directions  · Muscle relaxers  help decrease pain and muscle spasms  · Take your medicine as directed  Contact your healthcare provider if you think your medicine is not helping or if you have side effects  Tell him or her if you are allergic to any medicine  Keep a list of the medicines, vitamins, and herbs you take  Include the amounts, and when and why you take them  Bring the list or the pill bottles to follow-up visits  Carry your medicine list with you in case of an emergency  Follow up with your healthcare provider as directed: Your healthcare provider may suggest that you have a follow-up visit before you go back to your usual activity  Write down your questions so you remember to ask them during your visits  Self-care:   · 3 to 7 days after the injury:  Use Rest, Ice, Compression, and Elevation (RICE) to help stop bruising and decrease pain and swelling  ? Rest:  Rest your muscle to allow your injury to heal  When the pain decreases, begin normal, slow movements   For mild and moderate muscle strains, you should rest your muscles for about 2 days  However, if you have a severe muscle strain, you should rest for 10 to 14 days  You may need to use crutches to walk if your muscle strain is in your legs or lower body  ? Ice:  Put an ice pack on the injured area  Put a towel between the ice pack and your skin  Do not put the ice pack directly on your skin  You can use a package of frozen peas instead of an ice pack  ? Compression:  You may need to wrap an elastic bandage around the area to decrease swelling  It should be tight enough for you to feel support  Do not wrap it too tightly  ? Elevation:  Keep the injured muscle raised above your heart if possible  For example, if you have a strain of your lower leg muscle, lie down and prop your leg up on pillows  This helps decrease pain and swelling  · 3 to 21 days after the injury:  Start to slowly and regularly exercise your strained muscle  This will help it heal  If you feel pain, decrease how hard you are exercising  · 1 to 6 weeks after the injury:  Stretch the injured muscle  Hold the stretch for about 30 seconds  Do this 4 times a day  You may stretch the muscle until you feel a slight pull  Stop stretching if you feel pain  · 2 weeks to 6 months after the injury:  The goal of this phase is to return to the activity you were doing before the injury happened, without hurting the muscle again  · 3 weeks to 6 months after the injury:  Keep stretching and strengthening your muscles to avoid injury  Slowly increase the time and distance that you exercise  You may still have signs and symptoms of muscle strain 6 months after the injury, even if you do things to help it heal  In this case, you may need surgery on the muscle  Prevent muscle strains:   · Always wear proper shoes when you play sports:  Replace your old running shoes with new ones often if you are a runner   Use special shoe inserts or arch supports to correct leg or foot problems  Ask your healthcare provider for more information on shoe supports  · Do warm up and cool down exercises:  Do stretching exercises before you work out or do sports activities  These exercises will help loosen and decrease stress on your muscles  Cool down and stretch after your workout  Do not stop and rest after a workout without cooling down first     · Keep your muscles strong with strength training exercises:  Exercises such as weight lifting and stretching exercises help keep your muscles flexible and strong  A physical therapist or  may help you with these exercises  · Slowly start your exercise or sports training program:  Follow your healthcare provider's advice on when to start exercising  Slowly increase time, distance, and how often you train  Sudden increases in how often you train may cause you to injure your muscle again  © Copyright [address] Information is for End User's use only and may not be sold, redistributed or otherwise used for commercial purposes  All illustrations and images included in CareNotes® are the copyrighted property of A D A M , Inc  or [facility] Cindi Newman   The above information is an  only  It is not intended as medical advice for individual conditions or treatments  Talk to your doctor, nurse or pharmacist before following any medical regimen to see if it is safe and effective for you  Contusion in Adults   WHAT YOU NEED TO KNOW:   A contusion is a bruise that appears on your skin after an injury  A bruise happens when small blood vessels tear but skin does not  Blood leaks into nearby tissue, such as soft tissue or muscle  DISCHARGE INSTRUCTIONS:   Seek care immediately if:   · You have new trouble moving the injured area  · You have tingling or numbness in or near the injured area  · Your hand or foot below the bruise gets cold or turns pale      Call your doctor if:   · You find a new lump in the injured area  · Your symptoms do not improve with treatment after 4 to 5 days  · You have questions or concerns about your condition or care  Medicines: You may need any of the following:  · NSAIDs , such as ibuprofen, help decrease swelling, pain, and fever  This medicine is available with or without a doctor's order  NSAIDs can cause stomach bleeding or kidney problems in certain people  If you take blood thinner medicine, always ask your healthcare provider if NSAIDs are safe for you  Always read the medicine label and follow directions  · Prescription pain medicine  may be given  Ask your healthcare provider how to take this medicine safely  Some prescription pain medicines contain acetaminophen  Do not take other medicines that contain acetaminophen without talking to your healthcare provider  Too much acetaminophen may cause liver damage  Prescription pain medicine may cause constipation  Ask your healthcare provider how to prevent or treat constipation  · Take your medicine as directed  Contact your healthcare provider if you think your medicine is not helping or if you have side effects  Tell him or her if you are allergic to any medicine  Keep a list of the medicines, vitamins, and herbs you take  Include the amounts, and when and why you take them  Bring the list or the pill bottles to follow-up visits  Carry your medicine list with you in case of an emergency  Help a contusion heal:   · Rest the injured area  or use it less than usual  If you bruised your leg or foot, you may need crutches or a cane to help you walk  This will help you keep weight off your injured body part  · Apply ice  to decrease swelling and pain  Ice may also help prevent tissue damage  Use an ice pack, or put crushed ice in a plastic bag  Cover it with a towel and place it on your bruise for 15 to 20 minutes every hour or as directed      · Use compression  to support the area and decrease swelling  Wrap an elastic bandage around the area over the bruised muscle  Make sure the bandage is not too tight  You should be able to fit 1 finger between the bandage and your skin  · Elevate (raise) your injured body part  above the level of your heart to help decrease pain and swelling  Use pillows, blankets, or rolled towels to elevate the area as often as you can  · Do not drink alcohol  as directed  Alcohol may slow healing  · Do not stretch injured muscles  right after your injury  Ask your healthcare provider when and how you may safely stretch after your injury  Gentle stretches can help increase your flexibility  · Do not massage the area or put heating pads  on the bruise right after your injury  Heat and massage may slow healing  Your healthcare provider may tell you to apply heat after several days  At that time, heat will start to help the injury heal     Prevent another contusion:   · Stretch and warm up before you play sports or exercise  · Wear protective gear when you play sports  Examples are shin guards and padding  · If you begin a new physical activity, start slowly to give your body a chance to adjust     Follow up with your doctor as directed:  Write down your questions so you remember to ask them during your visits  © Copyright [address] [place] [address] Drive Information is for End User's use only and may not be sold, redistributed or otherwise used for commercial purposes  All illustrations and images included in CareNotes® are the copyrighted property of [employer]  or [facility] Cindi Newman   The above information is an  only  It is not intended as medical advice for individual conditions or treatments  Talk to your doctor, nurse or pharmacist before following any medical regimen to see if it is safe and effective for you

## 2021-02-12 PROBLEM — E66.01 MORBID OBESITY WITH BMI OF 40.0-44.9, ADULT (HCC): Status: ACTIVE | Noted: 2021-02-12

## 2021-02-12 PROBLEM — E66.01 MORBID OBESITY WITH BMI OF 40.0-44.9, ADULT (HCC): Status: RESOLVED | Noted: 2021-02-12 | Resolved: 2021-02-12

## 2021-02-16 ENCOUNTER — EVALUATION (OUTPATIENT)
Dept: PHYSICAL THERAPY | Facility: CLINIC | Age: 56
End: 2021-02-16
Payer: COMMERCIAL

## 2021-02-16 DIAGNOSIS — M76.829 POSTERIOR TIBIALIS MUSCLE DYSFUNCTION: ICD-10-CM

## 2021-02-16 DIAGNOSIS — M72.2 PLANTAR FASCIITIS, BILATERAL: ICD-10-CM

## 2021-02-16 PROCEDURE — 97110 THERAPEUTIC EXERCISES: CPT | Performed by: PHYSICAL THERAPIST

## 2021-02-16 PROCEDURE — 97161 PT EVAL LOW COMPLEX 20 MIN: CPT | Performed by: PHYSICAL THERAPIST

## 2021-02-16 NOTE — PROGRESS NOTES
PT Evaluation     Today's date: 2021  Patient name: Lori Montero  : 1965  MRN: [id number]  Referring provider: Jhon Sibley MD  Dx:   Encounter Diagnosis     ICD-10-CM    1  Plantar fasciitis, bilateral  M72 2 Ambulatory referral to Physical Therapy   2  Posterior tibialis muscle dysfunction  M76 [id number] Ambulatory referral to Physical Therapy                  Assessment  Assessment details: Patient is a 54 y/o male with chief complaints of bilateral plantar fascia pain that began 1 5 years ago  Patient presents with decreased functional mobility due to increased heel pain, decreased DF ROM, decreased arch strength, altered stance and gait mechanics associated with B plantar fasciitis  Patient will benefit from skilled physical therapy to address impairment and improve functional mobility  PT needed to allow for return to maximal function and improve quality of life  Impairments: abnormal or restricted ROM, activity intolerance, impaired physical strength, lacks appropriate home exercise program and pain with function  Barriers to therapy: Chronic history of pain   High copay   Understanding of Dx/Px/POC: good   Prognosis: good    Goals  STG within 4 weeks:   1  Patient to be independent in HEP  2  Reduce pain by 50% to improve quality of life  3  Improve DF ROM to 10 degrees bilaterally  LTG within 8 weeks:   1  Patient to be independent in ADLs/IADLs without difficulty  2  Patient to be able to perform reciprocal stairs  3  Patient to be able to perform independent community ambulation without pain  Plan  Plan details: Provided patient with info on PT that makes custom orthotics     Patient would benefit from: skilled physical therapy and PT eval  Planned modality interventions: cryotherapy, hydrotherapy and unattended electrical stimulation  Planned therapy interventions: therapeutic training, therapeutic exercise, therapeutic activities, stretching, strengthening, postural training, patient education, neuromuscular re-education, manual therapy, joint mobilization, IADL retraining, activity modification, ADL retraining, ADL training, body mechanics training, flexibility, functional ROM exercises, gait training, graded activity, graded exercise, graded motor, home exercise program, Abebe taping and balance/weight bearing training  Frequency: 2x week  Duration in weeks: 8  Plan of Care beginning date: 2021  Plan of Care expiration date: 2021  Treatment plan discussed with: patient        Subjective Evaluation    History of Present Illness  Onset date: chronic history since 2019   Mechanism of injury: Patient is a 56 y/o male with chief complaints of bilateral plantar fascia pain  He states pain began in 2019 and he tried injections, special shoes, and inserts without relief  He states around the time pain began, he started a new job which required him to stand for long periods of time on a hard surface  He was seen recently by ortho and prescribed PT, custom orthotics, and night splints  Patient states the night splints are helping his pain  He has not made an appointment with the PT that makes custom orthotics yet  He is referred for evaluation and treatment of bilateral feet  Pain  Current pain ratin  At best pain ratin  At worst pain ratin  Quality: sharp  Progression: improved    Social Support  Steps to enter house: yes  Stairs in house: no   L[facility] in: [facility]    Employment status: working (- full time )  Exercise history: None   Life stress: medium       Diagnostic Tests  Abnormal x-ray: calcaneal spurs   Treatments  Previous treatment: injection treatment (shoes, inserts )  Current treatment comments: gel heel cups, night splints        Patient Goals  Patient goals for therapy: decreased pain          Objective     Active Range of Motion   Left Ankle/Foot   Dorsiflexion (ke): 5 degrees   Plantar flexion: 45 degrees   Inversion: 35 degrees Eversion: 15 degrees     Right Ankle/Foot   Dorsiflexion (ke): 5 degrees   Plantar flexion: 45 degrees   Inversion: 35 degrees   Eversion: 15 degrees     Strength/Myotome Testing     Left Ankle/Foot   Normal strength    Right Ankle/Foot   Normal strength    General Comments: Ankle/Foot Comments   Pain with palpation of right posterior tibialis tendon, no pain with left     Pain with palpation of bilateral PF near heel, throughout arch on right foot       Flowsheet Rows      Most Recent Value   PT/OT G-Codes   Current Score  55   Projected Score  67             Precautions: HTN, high cholesterol     Increased time spent on patient education with diagnosis, prognosis, goals of therapy, progression of therapy, and plan of care  All questions answered  Patient instructed to call clinic with questions or concerns  Written HEP provided to patient       Manuals 2/16            IASTM B PF NV                                                   Neuro Re-Ed             Arch cupping 5"x10                                                                                           Ther Ex             Gastroc stretch w towel 5x20" ea             Soleus stretch w towel 5 x20" ea            Standing gastroc & soleus stretch HEP instruction            Golf ball press HEP instruction            Great toe stretch 5x20"                                                    Ther Activity                                       Gait Training                                       Modalities             Cold pack instruction for HEP

## 2021-02-17 ENCOUNTER — OFFICE VISIT (OUTPATIENT)
Dept: INTERNAL MEDICINE CLINIC | Facility: CLINIC | Age: 56
End: 2021-02-17
Payer: COMMERCIAL

## 2021-02-17 VITALS
HEART RATE: 72 BPM | DIASTOLIC BLOOD PRESSURE: 72 MMHG | TEMPERATURE: 97.5 F | BODY MASS INDEX: 42.68 KG/M2 | SYSTOLIC BLOOD PRESSURE: 122 MMHG | OXYGEN SATURATION: 98 % | WEIGHT: 264.4 LBS

## 2021-02-17 DIAGNOSIS — E78.00 HYPERCHOLESTEROLEMIA: Chronic | ICD-10-CM

## 2021-02-17 DIAGNOSIS — Z00.00 ADULT GENERAL MEDICAL EXAMINATION: Primary | ICD-10-CM

## 2021-02-17 DIAGNOSIS — E66.01 MORBID OBESITY WITH BMI OF 40.0-44.9, ADULT (HCC): Chronic | ICD-10-CM

## 2021-02-17 DIAGNOSIS — I10 ESSENTIAL HYPERTENSION: Chronic | ICD-10-CM

## 2021-02-17 PROCEDURE — 99396 PREV VISIT EST AGE 40-64: CPT | Performed by: INTERNAL MEDICINE

## 2021-02-17 PROCEDURE — 3074F SYST BP LT 130 MM HG: CPT | Performed by: INTERNAL MEDICINE

## 2021-02-17 PROCEDURE — 1036F TOBACCO NON-USER: CPT | Performed by: INTERNAL MEDICINE

## 2021-02-17 PROCEDURE — 3078F DIAST BP <80 MM HG: CPT | Performed by: INTERNAL MEDICINE

## 2021-02-17 PROCEDURE — 3725F SCREEN DEPRESSION PERFORMED: CPT | Performed by: INTERNAL MEDICINE

## 2021-02-17 NOTE — PATIENT INSTRUCTIONS

## 2021-02-17 NOTE — PROGRESS NOTES
ADULT ANNUAL [address]     NAME: Yaneth Jimenez  AGE: 55 y o  SEX: male  : 1965     DATE: 2021     Assessment and Plan:     Problem List Items Addressed This Visit        Cardiovascular and Mediastinum    Hypertension (Chronic)     Diet/exercise  Continue anti-hypertensives  Relevant Orders    Basic metabolic panel    CBC       Other    Morbid obesity with BMI of 40 0-44 9, adult (HCC) (Chronic)    Hypercholesterolemia (Chronic)     Cholesterol well controlled  Continue statin  Relevant Orders    Lipid panel      Other Visit Diagnoses     Adult general medical examination    -  Primary        Immunizations and preventive care screenings were discussed with patient today  Appropriate education was printed on patient's after visit summary  Counseling:  Alcohol/drug use: discussed moderation in alcohol intake, the recommendations for healthy alcohol use, and avoidance of illicit drug use  Dental Health: discussed importance of regular tooth brushing, flossing, and dental visits  Injury prevention: discussed safety/seat belts, safety helmets, smoke detectors, carbon dioxide detectors, and smoking near bedding or upholstery  · Sexual health: discussed sexually transmitted diseases, partner selection, use of condoms, avoidance of unintended pregnancy, and contraceptive alternatives  BMI Counseling: Body mass index is 42 68 kg/m²  The BMI is above normal  Nutrition recommendations include decreasing portion sizes, encouraging healthy choices of fruits and vegetables, limiting drinks that contain sugar, moderation in carbohydrate intake and increasing intake of lean protein  Return in about 6 months (around 2021) for Follow-up       Chief Complaint:     Chief Complaint   Patient presents with    Follow-up     labs- 2021      History of Present Illness:     Adult Annual Physical   Patient here for a comprehensive physical exam  The patient reports continued problems with plantar fasciitis  Saw Dr Candice Eisenberg and now going to physical therapy  No other big changes with his health  Lab work is stable  Diet and Physical Activity  · Diet/Nutrition: poor diet  · Exercise: no formal exercise  Depression Screening  PHQ-9 Depression Screening    PHQ-9:   Frequency of the following problems over the past two weeks:      Little interest or pleasure in doing things: 0 - not at all  Feeling down, depressed, or hopeless: 0 - not at all  PHQ-2 Score: 0       General Health  · Sleep: sleeps well  · Hearing: normal - bilateral   · Vision: goes for regular eye exams and wears glasses  · Dental: regular dental visits  [facility]  · Symptoms include: none     Review of Systems:     Review of Systems   Constitutional: Negative for appetite change, chills, fatigue and fever  HENT: Negative for congestion, hearing loss, postnasal drip, rhinorrhea, sore throat, tinnitus and trouble swallowing  Eyes: Negative for pain, discharge, redness and visual disturbance  Respiratory: Negative for cough, chest tightness, shortness of breath and wheezing  Cardiovascular: Negative for chest pain, palpitations and leg swelling  Gastrointestinal: Negative for abdominal distention, abdominal pain, blood in stool, constipation, diarrhea, nausea and vomiting  Endocrine: Negative for cold intolerance, heat intolerance, polydipsia, polyphagia and polyuria  Genitourinary: Negative for difficulty urinating, dysuria, frequency, hematuria and urgency  Musculoskeletal: Positive for arthralgias  Negative for back pain, gait problem, joint swelling, myalgias, neck pain and neck stiffness  Skin: Negative for color change and rash  Neurological: Negative for dizziness, tremors, seizures, syncope, speech difficulty, weakness, light-headedness, numbness and headaches  Hematological: Negative for adenopathy   Does not bruise/bleed easily  Psychiatric/Behavioral: Negative for agitation, behavioral problems, confusion, hallucinations, sleep disturbance and suicidal ideas  The patient is not nervous/anxious  Past Medical History:     Past Medical History:   Diagnosis Date    GERD (gastroesophageal reflux disease)     Hypercholesterolemia     Hypertension     Lyme disease 3/19/2019    Obesity     Sexual dysfunction     Psychosexual dysfunction with inhibited sexual excitement       Past Surgical History:     Past Surgical History:   Procedure Laterality Date    BACK SURGERY      spinal fusion, LUMBAR    CARDIAC CATHETERIZATION      CARDIAC CATHETERIZATION      CARDIAC CATHETERIZATION      EGD AND COLONOSCOPY N/A 6/15/2018    Procedure: EGD AND COLONOSCOPY;  Surgeon: Severiano Rojas MD;  Location: [facility]; Service: Gastroenterology    IN COLONOSCOPY FLX DX W/COLLJ [facility] WHEN PFRMD N/A 3/26/2019    Procedure: COLONOSCOPY;  Surgeon: Farhana Lui MD;  Location: [facility]; Service: Gastroenterology    IN ESOPHAGOGASTRODUODENOSCOPY TRANSORAL DIAGNOSTIC N/A 3/26/2019    Procedure: ESOPHAGOGASTRODUODENOSCOPY (EGD); Surgeon: Farhana Lui MD;  Location: [facility];   Service: Gastroenterology   Sonya Ledezma        Family History:     Family History   Problem Relation Age of Onset    Atrial fibrillation Mother     COPD Mother     Coronary artery disease Mother     Lung cancer Mother     Rheum arthritis Mother     Stroke Mother         Syndrome    Lung cancer Father     Prostate cancer Maternal Grandfather     Heart defect Daughter     Arthritis Family       Social History:     E-Cigarette/Vaping    E-Cigarette Use Never User      E-Cigarette/Vaping Substances    Nicotine No     THC No     CBD No     Flavoring No     Other No     Unknown No      Social History     Socioeconomic History    Marital status: /Civil Union     Spouse name: None    Number of children: None    Years of education: None    Highest education level: None   Occupational History    None   Social Needs    Financial resource strain: None    Food insecurity     Worry: None     Inability: None    Transportation needs     Medical: None     Non-medical: None   Tobacco Use    Smoking status: Former Smoker     Packs/day: 0 25     Years: 10 00     Pack years: 2 50     Types: Cigarettes     Quit date: 1998     Years since quittin 9    Smokeless tobacco: Former User     Types: Chew     Quit date: 3/13/2017   Substance and Sexual Activity    Alcohol use: Yes     Frequency: Monthly or less     Drinks per session: 1 or 2     Binge frequency: Never     Comment: Occ alcohol use per [employer]     Drug use: Yes     Types: Marijuana     Comment: medical    Sexual activity: Yes     Partners: Female   Lifestyle    Physical activity     Days per week: 3 days     Minutes per session: 120 min    Stress:  To some extent   Relationships    Social connections     Talks on phone: None     Gets together: None     Attends Jewish service: None     Active member of club or organization: None     Attends meetings of clubs or organizations: None     Relationship status: None    Intimate partner violence     Fear of current or ex partner: None     Emotionally abused: None     Physically abused: None     Forced sexual activity: None   Other Topics Concern    None   Social History Narrative    None      Current Medications:     Current Outpatient Medications   Medication Sig Dispense Refill    aluminum hydroxide-magnesium carbonate (GAVISCON)  mg/15 mL oral suspension Take 15 mL by mouth 4 (four) times daily (after meals and at bedtime) (Patient taking differently: Take 15 mL by mouth as needed ) 1 Bottle 0    amLODIPine (NORVASC) 10 mg tablet Take 1 tablet (10 mg total) by mouth daily 90 tablet 3    aspirin 81 mg chewable tablet Chew 1 tablet (81 mg total) daily  0    doxycycline monohydrate (MONODOX) 100 mg capsule Take 100 mg by mouth 2 (two) times a day       ibuprofen (MOTRIN) 600 mg tablet Take 1 tablet (600 mg total) by mouth every 8 (eight) hours (Patient taking differently: Take 600 mg by mouth as needed ) 90 tablet 1    metoprolol succinate (TOPROL-XL) 50 mg 24 hr tablet Take 1 tablet (50 mg total) by mouth daily 90 tablet 3    olmesartan (BENICAR) 40 mg tablet Take 1 tablet (40 mg total) by mouth daily 90 tablet 3    pantoprazole (PROTONIX) 40 mg tablet Take 1 tablet (40 mg total) by mouth 2 (two) times a day before meals 180 tablet 3    Probiotic Product (PROBIOTIC DAILY PO) Take by mouth daily      rosuvastatin (CRESTOR) 5 mg tablet Take 1 tablet (5 mg total) by mouth daily 90 tablet 3     No current facility-administered medications for this visit  Allergies:     No Known Allergies      Physical Exam:     /72 (BP Location: Left arm, Patient Position: Sitting, Cuff Size: Standard)   Pulse 72   Temp 97 5 °F (36 4 °C) (Temporal) Comment: NO NSAIDS  Wt 120 kg (264 lb 6 4 oz) Comment: w/ shoes denied off  SpO2 98%   BMI 42 68 kg/m²     Physical Exam  Vitals signs reviewed  Constitutional:       General: He is not in acute distress  Appearance: He is well-developed  He is obese  He is not diaphoretic  HENT:      Right Ear: Tympanic membrane, ear canal and external ear normal  There is no impacted cerumen  Left Ear: Tympanic membrane, ear canal and external ear normal  There is no impacted cerumen  Eyes:      General: No scleral icterus  Right eye: No discharge  Left eye: No discharge  Conjunctiva/sclera: Conjunctivae normal    Neck:      Musculoskeletal: Neck supple  Thyroid: No thyromegaly  Vascular: No JVD  Cardiovascular:      Rate and Rhythm: Normal rate and regular rhythm  Heart sounds: Normal heart sounds  No murmur  Pulmonary:      Effort: Pulmonary effort is normal  No respiratory distress  Breath sounds: Normal breath sounds   No wheezing or rales  Abdominal:      General: Abdomen is protuberant  Bowel sounds are normal  There is no distension  Palpations: Abdomen is soft  Tenderness: There is no abdominal tenderness  Musculoskeletal:         General: No tenderness or deformity  Right lower leg: No edema  Left lower leg: No edema  Lymphadenopathy:      Cervical: No cervical adenopathy  Skin:     General: Skin is warm and dry  Neurological:      General: No focal deficit present  Mental Status: He is alert and oriented to person, place, and time     Psychiatric:         Mood and Affect: Mood normal          Behavior: Behavior normal         Gabby Sandoval DO  [facility] [address]

## 2021-02-23 ENCOUNTER — APPOINTMENT (OUTPATIENT)
Dept: PHYSICAL THERAPY | Facility: CLINIC | Age: 56
End: 2021-02-23
Payer: COMMERCIAL

## 2021-03-01 ENCOUNTER — OFFICE VISIT (OUTPATIENT)
Dept: PHYSICAL THERAPY | Facility: CLINIC | Age: 56
End: 2021-03-01
Payer: COMMERCIAL

## 2021-03-01 DIAGNOSIS — M72.2 PLANTAR FASCIITIS, BILATERAL: Primary | ICD-10-CM

## 2021-03-01 DIAGNOSIS — M76.829 POSTERIOR TIBIALIS MUSCLE DYSFUNCTION: ICD-10-CM

## 2021-03-01 PROCEDURE — 97110 THERAPEUTIC EXERCISES: CPT | Performed by: PHYSICAL THERAPIST

## 2021-03-01 PROCEDURE — 97112 NEUROMUSCULAR REEDUCATION: CPT | Performed by: PHYSICAL THERAPIST

## 2021-03-01 PROCEDURE — 97140 MANUAL THERAPY 1/> REGIONS: CPT | Performed by: PHYSICAL THERAPIST

## 2021-03-01 NOTE — PROGRESS NOTES
Daily Note     Today's date: 3/1/2021  Patient name: Yaneth Jimenez  : 1965  MRN: [id number]  Referring provider: Alex Mckoy MD  Dx:   Encounter Diagnosis     ICD-10-CM    1  Plantar fasciitis, bilateral  M72 2    2  Posterior tibialis muscle dysfunction  M76 829                   Subjective: Patient reports 7/10 pain at start of session  Reports being on his feet all day long and having increased pain  States he's been doing the stretching with minimal to no relief of symptoms  Objective: See treatment diary below      Assessment: Tolerated treatment well  Patient tolerates IASTM well, with a  reduction in symptom from start to finish of session  Patient reports feeling looser post session  He is provided TB PF as part of HEP  Reviewed all previous exercises  Patient would benefit from continued PT      Plan: Continue per plan of care        Precautions: HTN, high cholesterol       Manuals 2/16 3/1           IASTM B PF NV 10' ea foot = 20 min                                                  Neuro Re-Ed             Arch cupping 5"x10             Biodex LOS  Floor/Foam Lv 12 x 3 ea                                                                            Ther Ex             Gastroc stretch w towel 5x20" ea             Soleus stretch w towel 5 x20" ea            Standing gastroc & soleus stretch HEP instruction 5x20" ea           Golf ball press HEP instruction            Great toe stretch 5x20"  By PT KS           Recumbent bike  8 min           Gastroc stretch by PT   5x20" ea                        Ther Activity                                       Gait Training                                       Modalities             Cold pack instruction for HEP decline

## 2021-03-08 ENCOUNTER — OFFICE VISIT (OUTPATIENT)
Dept: PHYSICAL THERAPY | Facility: CLINIC | Age: 56
End: 2021-03-08
Payer: COMMERCIAL

## 2021-03-08 DIAGNOSIS — M72.2 PLANTAR FASCIITIS, BILATERAL: Primary | ICD-10-CM

## 2021-03-08 DIAGNOSIS — M76.829 POSTERIOR TIBIALIS MUSCLE DYSFUNCTION: ICD-10-CM

## 2021-03-08 PROCEDURE — 97110 THERAPEUTIC EXERCISES: CPT | Performed by: PHYSICAL THERAPIST

## 2021-03-08 PROCEDURE — 97112 NEUROMUSCULAR REEDUCATION: CPT | Performed by: PHYSICAL THERAPIST

## 2021-03-08 PROCEDURE — 97140 MANUAL THERAPY 1/> REGIONS: CPT | Performed by: PHYSICAL THERAPIST

## 2021-03-08 NOTE — PROGRESS NOTES
Daily Note     Today's date: 3/8/2021  Patient name: Enrrique Fenton  : 1965  MRN: [id number]  Referring provider: Muna Burrows MD  Dx:   Encounter Diagnosis     ICD-10-CM    1  Plantar fasciitis, bilateral  M72 2    2  Posterior tibialis muscle dysfunction  M76 829                   Subjective: Patient reports 9/10 pain at start of session  States he had no time today to do any stretches  He reports at worst last week, he noted 5 or 6 pain  States last week was less painful  Objective: See treatment diary below      Assessment: Tolerated treatment well  Patient able to perform listed exercises with minimal to no difficulty  Continued good response to manual intervention with a significant reduction in symptoms from start to finish of session  He leaves session with 3/10 pain  Patient would benefit from continued PT      Plan: Continue per plan of care        Precautions: HTN, high cholesterol       Manuals 2/16 3/1 3/8          IASTM B PF NV 10' ea foot = 20 min 10' ea foot = 20'                                                 Neuro Re-Ed             Arch cupping 5"x10             Biodex LOS  Floor/Foam Lv 12 x 3 ea Floor/foam Lv 12 x3 ea          BAPs- DF/PF & IN/EV   x20 ea          Heel raises w eccentric/unilateral lowering   x10 ea                                                 Ther Ex             Gastroc stretch w towel 5x20" ea             Soleus stretch w towel 5 x20" ea            Standing gastroc & soleus stretch HEP instruction 5x20" ea gastroc 5x20" ea          Golf ball press HEP instruction            Great toe stretch 5x20"  By PT KS           Recumbent bike  8 min 10 min           Gastroc stretch by PT   5x20" ea           Foot roller   2 min ea/foot = 4'           Ther Activity                                       Gait Training                                       Modalities             Cold pack instruction for HEP decline decline

## 2021-03-09 ENCOUNTER — EVALUATION (OUTPATIENT)
Dept: PHYSICAL THERAPY | Facility: CLINIC | Age: 56
End: 2021-03-09
Payer: COMMERCIAL

## 2021-03-09 DIAGNOSIS — M76.829 POSTERIOR TIBIALIS MUSCLE DYSFUNCTION: ICD-10-CM

## 2021-03-09 DIAGNOSIS — M72.2 PLANTAR FASCIITIS, BILATERAL: Primary | ICD-10-CM

## 2021-03-09 PROCEDURE — 97760 ORTHOTIC MGMT&TRAING 1ST ENC: CPT | Performed by: PHYSICAL THERAPIST

## 2021-03-09 PROCEDURE — 97164 PT RE-EVAL EST PLAN CARE: CPT | Performed by: PHYSICAL THERAPIST

## 2021-03-09 NOTE — PROGRESS NOTES
PT Evaluation     Today's date: 3/9/2021  Patient name: Denae Dalton  : 1965  MRN: [id number]  Referring provider: Debbie Molina MD  Dx:   Encounter Diagnosis     ICD-10-CM    1  Plantar fasciitis, bilateral  M72 2    2  Posterior tibialis muscle dysfunction  M76 829                   Assessment/Plan  Pt should benefit from custom orthotics  Goals:  Fit orthotics  IP in break in period  Assure good shoe fit    Return for one more visit in 2-4 weeks to fit orthotics  Subjective Evaluation    History of Present Illness  Mechanism of injury: Pt presents for custom orthotics due to chronic bilat heel pain  Insidious onset > 2 y a  He recently started PT which seems to be helping    Has been wearing night splints    Occupation:   Wears steel tip boots  Pain  Current pain ratin  At best pain ratin  At worst pain ratin          Objective  Mild pes planus  Gait: mod STJ pronation R > L  LMI R 6 deg ev, L 8 deg ev  Minimal inversion with heel raise  (-) windlass  Hallux DF PROM R 35 deg, L 55 deg     TTP med calc tubercle and plantar aspect of calcaneus    casted for custom orthotics       Precauttions: none      Manuals                                                                 Neuro Re-Ed                                                                                                        Ther Ex                                                                                                                     Ther Activity                                       Gait Training                                       Modalities

## 2021-03-10 DIAGNOSIS — Z23 ENCOUNTER FOR IMMUNIZATION: ICD-10-CM

## 2021-03-15 ENCOUNTER — OFFICE VISIT (OUTPATIENT)
Dept: PHYSICAL THERAPY | Facility: CLINIC | Age: 56
End: 2021-03-15
Payer: COMMERCIAL

## 2021-03-15 DIAGNOSIS — M72.2 PLANTAR FASCIITIS, BILATERAL: Primary | ICD-10-CM

## 2021-03-15 DIAGNOSIS — M76.829 POSTERIOR TIBIALIS MUSCLE DYSFUNCTION: ICD-10-CM

## 2021-03-15 PROCEDURE — 97140 MANUAL THERAPY 1/> REGIONS: CPT | Performed by: PHYSICAL THERAPIST

## 2021-03-15 PROCEDURE — 97112 NEUROMUSCULAR REEDUCATION: CPT | Performed by: PHYSICAL THERAPIST

## 2021-03-15 PROCEDURE — 97110 THERAPEUTIC EXERCISES: CPT | Performed by: PHYSICAL THERAPIST

## 2021-03-15 NOTE — PROGRESS NOTES
Daily Note     Today's date: 3/15/2021  Patient name: Debra Loza  : 1965  MRN: [id number]  Referring provider: Camden Maria MD  Dx:   Encounter Diagnosis     ICD-10-CM    1  Plantar fasciitis, bilateral  M72 2    2  Posterior tibialis muscle dysfunction  M76 829                   Subjective: Patient reports 6/10 pain at start of session  States he's getting 2 days of relief following therapy  Post session, he reports 1/10 pain  Objective: See treatment diary below      Assessment: Tolerated treatment well  Patient went for custom orthotics last week and will going back for second fitting in a few weeks  Progression of self stretching to include hamstring and piriformis stretching  Patient tolerates IASTM well with an overall reduction in pain post session  Patient would benefit from continued PT      Plan: Continue per plan of care        Precautions: HTN, high cholesterol       Manuals 2/16 3/1 3/8 3/15         IASTM B PF NV 10' ea foot = 20 min 10' ea foot = 20' 8' ea foot = 16'                                                 Neuro Re-Ed             Arch cupping 5"x10             Biodex LOS  Floor/Foam Lv 12 x 3 ea Floor/foam Lv 12 x3 ea Floor/foam Lv 10 x 3 ea         BAPs- DF/PF & IN/EV   x20 ea          Heel raises w eccentric/unilateral lowering   x10 ea 2x10 ea         SLB on foam    2x30" ea                                   Ther Ex             Gastroc stretch w towel 5x20" ea             Soleus stretch w towel 5 x20" ea            Standing gastroc & soleus stretch HEP instruction 5x20" ea gastroc 5x20" ea          Golf ball press HEP instruction            Great toe stretch 5x20"  By PT KS           Recumbent bike  8 min 10 min  10 min         Gastroc stretch by PT   5x20" ea           Foot roller   2 min ea/foot = 4'  2 min ea/foot = 4'          Hamstring and piriformis stretch B    5x20" ea w HEP instruction         Ther Activity                                       Gait Training Modalities             Cold pack instruction for HEP decline decline decline

## 2021-03-22 ENCOUNTER — OFFICE VISIT (OUTPATIENT)
Dept: PHYSICAL THERAPY | Facility: CLINIC | Age: 56
End: 2021-03-22
Payer: COMMERCIAL

## 2021-03-22 DIAGNOSIS — M72.2 PLANTAR FASCIITIS, BILATERAL: Primary | ICD-10-CM

## 2021-03-22 DIAGNOSIS — M76.829 POSTERIOR TIBIALIS MUSCLE DYSFUNCTION: ICD-10-CM

## 2021-03-22 PROCEDURE — 97110 THERAPEUTIC EXERCISES: CPT | Performed by: PHYSICAL THERAPIST

## 2021-03-22 PROCEDURE — 97112 NEUROMUSCULAR REEDUCATION: CPT | Performed by: PHYSICAL THERAPIST

## 2021-03-22 PROCEDURE — 97140 MANUAL THERAPY 1/> REGIONS: CPT | Performed by: PHYSICAL THERAPIST

## 2021-03-22 NOTE — PROGRESS NOTES
Daily Note     Today's date: 3/22/2021  Patient name: Amando Bullock  : 1965  MRN: [id number]  Referring provider: Nikki Barrera MD  Dx:   Encounter Diagnosis     ICD-10-CM    1  Plantar fasciitis, bilateral  M72 2    2  Posterior tibialis muscle dysfunction  M76 829                   Subjective: Patient states he's feeling good today and yesterday  Reports after last visit, his pain relief lasted from [facility] after session  Pain today = 3/10  Objective: See treatment diary below      Assessment: Tolerated treatment well  Patient challenged with standing arch cupping on RLE, but is able to perform in standing barefoot on floor and on foam   He leaves session with less pain than upon starting session  Patient would benefit from continued PT      Plan: Continue per plan of care        Precautions: HTN, high cholesterol       Manuals 2/16 3/1 3/8 3/15 3/22        IASTM B PF NV 10' ea foot = 20 min 10' ea foot = 20' 8' ea foot = 16'  8' ea foot = 16'                                                Neuro Re-Ed             Arch cupping 5"x10     Floor/foam barefoot 3"x20         Biodex LOS  Floor/Foam Lv 12 x 3 ea Floor/foam Lv 12 x3 ea Floor/foam Lv 10 x 3 ea Floor/foam Lv 9 x 3 ea         BAPs- DF/PF & IN/EV   x20 ea          Heel raises w eccentric/unilateral lowering   x10 ea 2x10 ea 2x10 ea        SLB on foam    2x30" ea Shoes & barefoot 2x30" ea                                   Ther Ex             Gastroc stretch w towel 5x20" ea             Soleus stretch w towel 5 x20" ea            Standing gastroc & soleus stretch HEP instruction 5x20" ea gastroc 5x20" ea  5x20" ea        Golf ball press HEP instruction            Great toe stretch 5x20"  By PT KS           Recumbent bike  8 min 10 min  10 min 10 min         Gastroc stretch by PT   5x20" ea   5x20" ea        Foot roller   2 min ea/foot = 4'  2 min ea/foot = 4'  2 min ea/foot = 4'         Hamstring and piriformis stretch B    5x20" ea w HEP instruction         Ther Activity                                       Gait Training                                       Modalities             Cold pack instruction for HEP decline decline decline

## 2021-03-23 ENCOUNTER — IMMUNIZATIONS (OUTPATIENT)
Dept: FAMILY MEDICINE CLINIC | Facility: HOSPITAL | Age: 56
End: 2021-03-23

## 2021-03-23 DIAGNOSIS — Z23 ENCOUNTER FOR IMMUNIZATION: Primary | ICD-10-CM

## 2021-03-23 PROCEDURE — 0001A SARS-COV-2 / COVID-19 MRNA VACCINE (PFIZER-BIONTECH) 30 MCG: CPT

## 2021-03-23 PROCEDURE — 91300 SARS-COV-2 / COVID-19 MRNA VACCINE (PFIZER-BIONTECH) 30 MCG: CPT

## 2021-03-29 ENCOUNTER — APPOINTMENT (OUTPATIENT)
Dept: PHYSICAL THERAPY | Facility: CLINIC | Age: 56
End: 2021-03-29
Payer: COMMERCIAL

## 2021-03-29 DIAGNOSIS — K21.9 GASTROESOPHAGEAL REFLUX DISEASE WITHOUT ESOPHAGITIS: ICD-10-CM

## 2021-03-29 RX ORDER — PANTOPRAZOLE SODIUM 40 MG/1
TABLET, DELAYED RELEASE ORAL
Qty: 180 TABLET | Refills: 2 | OUTPATIENT
Start: 2021-03-29

## 2021-03-29 RX ORDER — PANTOPRAZOLE SODIUM 40 MG/1
40 TABLET, DELAYED RELEASE ORAL
Qty: 60 TABLET | Refills: 0 | Status: SHIPPED | OUTPATIENT
Start: 2021-03-29 | End: 2021-04-26 | Stop reason: SDUPTHER

## 2021-03-30 ENCOUNTER — OFFICE VISIT (OUTPATIENT)
Dept: PHYSICAL THERAPY | Facility: CLINIC | Age: 56
End: 2021-03-30
Payer: COMMERCIAL

## 2021-03-30 DIAGNOSIS — M76.829 POSTERIOR TIBIALIS MUSCLE DYSFUNCTION: ICD-10-CM

## 2021-03-30 DIAGNOSIS — M72.2 PLANTAR FASCIITIS, BILATERAL: Primary | ICD-10-CM

## 2021-03-30 PROCEDURE — L3010 FOOT LONGITUDINAL ARCH SUPPO: HCPCS | Performed by: PHYSICAL THERAPIST

## 2021-04-05 ENCOUNTER — APPOINTMENT (OUTPATIENT)
Dept: PHYSICAL THERAPY | Facility: CLINIC | Age: 56
End: 2021-04-05
Payer: COMMERCIAL

## 2021-04-12 ENCOUNTER — OFFICE VISIT (OUTPATIENT)
Dept: PHYSICAL THERAPY | Facility: CLINIC | Age: 56
End: 2021-04-12
Payer: COMMERCIAL

## 2021-04-12 DIAGNOSIS — M76.829 POSTERIOR TIBIALIS MUSCLE DYSFUNCTION: ICD-10-CM

## 2021-04-12 DIAGNOSIS — M72.2 PLANTAR FASCIITIS, BILATERAL: Primary | ICD-10-CM

## 2021-04-12 PROCEDURE — 97110 THERAPEUTIC EXERCISES: CPT | Performed by: PHYSICAL THERAPIST

## 2021-04-12 PROCEDURE — 97140 MANUAL THERAPY 1/> REGIONS: CPT | Performed by: PHYSICAL THERAPIST

## 2021-04-12 NOTE — PROGRESS NOTES
PT Discharge    Today's date: 2021  Patient name: Lasha Reyes  : 1965  MRN: [id number]  Referring provider: William Gallegos MD  Dx:   Encounter Diagnosis     ICD-10-CM    1  Plantar fasciitis, bilateral  M72 2    2  Posterior tibialis muscle dysfunction  M76 829                   Assessment  Assessment details: Patient is a 54 y/o male with chief complaints of bilateral plantar fascia pain that began 1 5 years ago  Since starting therapy, patient presents with improved functional mobility per FOTO score and self reports, less intense and less frequent pain,  Improved DF ROM bilaterally  He remains in progress of his goals and has met some of his goals  He is independent in HEP and received custom orthotics, which have been helping  Patient in agreement to d/c to [facility]  He will call clinic with questions or concerns  He will call physician for follow up  Impairments: abnormal or restricted ROM, activity intolerance, impaired physical strength, lacks appropriate home exercise program and pain with function  Barriers to therapy: Chronic history of pain   High copay   Understanding of Dx/Px/POC: good   Prognosis: good    Goals  STG within 4 weeks:   1  Patient to be independent in HEP  MET  2  Reduce pain by 50% to improve quality of life  PARTIALLY MET   3  Improve DF ROM to 10 degrees bilaterally  MET  LTG within 8 weeks:   1  Patient to be independent in ADLs/IADLs without difficulty  PARTIALLY MET  2  Patient to be able to perform reciprocal stairs  MET  3  Patient to be able to perform independent community ambulation without pain  PARTIALLY MET    Plan  Plan details: D/C to [facility]  Patient to call physician to follow up     Patient would benefit from: skilled physical therapy and PT eval  Planned modality interventions: cryotherapy, hydrotherapy and unattended electrical stimulation  Planned therapy interventions: therapeutic training, therapeutic exercise, therapeutic activities, stretching, strengthening, postural training, patient education, neuromuscular re-education, manual therapy, joint mobilization, IADL retraining, activity modification, ADL retraining, ADL training, body mechanics training, flexibility, functional ROM exercises, gait training, graded activity, graded exercise, graded motor, home exercise program, Abebe taping and balance/weight bearing training  Treatment plan discussed with: patient        Subjective Evaluation    History of Present Illness  Onset date: chronic history since    Mechanism of injury: Patient is a 54 y/o male with chief complaints of bilateral plantar fascia pain  He states pain began in 2019 and he tried injections, special shoes, and inserts without relief  He states around the time pain began, he started a new job which required him to stand for long periods of time on a hard surface  He was seen recently by ortho and prescribed PT, custom orthotics, and night splints  Patient states the night splints are helping his pain  He has not made an appointment with the PT that makes custom orthotics yet  He is referred for evaluation and treatment of bilateral feet  21: Patient reports feeling a lot better since his orthotics and starting PT  He reports less frequent and less intense pain, reporting typically only 2-3 pain  However, he states he wore his orthotics for too long the last two days and today he reports 8/10 pain  Patient reports overall improvements with walking, standing, and working  States he is in agreement to d/c to HEP     Pain  Current pain ratin  At best pain ratin  At worst pain rating: 3 (today 8/10 pain due to wearing his orthotics too long yesterday, but typically only 2-3 pain)  Quality: sharp  Progression: improved    Social Support  Steps to enter house: yes  Stairs in house: no   Lives in: [facility]    Employment status: working (- full time )  Exercise history: None   Life stress: medium Diagnostic Tests  Abnormal x-ray: calcaneal spurs   Treatments  Previous treatment: injection treatment (shoes, inserts )  Current treatment comments: gel heel cups, night splints   Patient Goals  Patient goals for therapy: decreased pain          Objective     Active Range of Motion   Left Ankle/Foot   Dorsiflexion (ke): 12 degrees   Plantar flexion: 50 degrees   Inversion: 35 degrees   Eversion: 25 degrees     Right Ankle/Foot   Dorsiflexion (ke): 12 degrees   Plantar flexion: 50 degrees   Inversion: 35 degrees   Eversion: 25 degrees     Strength/Myotome Testing     Left Ankle/Foot   Normal strength    Right Ankle/Foot   Normal strength             Precautions: HTN, high cholesterol     Patient provided updated written HEP  Increased time spent on patient education and d/c evaluation     Manuals 2/16 3/1 3/8 3/15 3/22 4/12       IASTM B PF NV 10' ea foot = 20 min 10' ea foot = 20' 8' ea foot = 16'  8' ea foot = 16'  8' ea foot = 16'                                              Neuro Re-Ed             Arch cupping 5"x10     Floor/foam barefoot 3"x20  HEP review       Biodex LOS  Floor/Foam Lv 12 x 3 ea Floor/foam Lv 12 x3 ea Floor/foam Lv 10 x 3 ea Floor/foam Lv 9 x 3 ea  Foam Lv 8 x 5       BAPs- DF/PF & IN/EV   x20 ea          Heel raises w eccentric/unilateral lowering   x10 ea 2x10 ea 2x10 ea HEP review       SLB on foam    2x30" ea Shoes & barefoot 2x30" ea  2x30" ea       Standing hip abd/ext      Red 2x10 ea                    Ther Ex             Gastroc stretch w towel 5x20" ea      HEP review       Soleus stretch w towel 5 x20" ea     HEP review       Standing gastroc & soleus stretch HEP instruction 5x20" ea gastroc 5x20" ea  5x20" ea HEP review       Golf ball press HEP instruction            Great toe stretch 5x20"  By PT KS           Recumbent bike  8 min 10 min  10 min 10 min  10 min       Gastroc stretch by PT   5x20" ea   5x20" ea        Foot roller   2 min ea/foot = 4'  2 min ea/foot = 4' 2 min ea/foot = 4'         Hamstring and piriformis stretch B    5x20" ea w HEP instruction  HEP review       Ther Activity                                       Gait Training                                       Modalities             Cold pack instruction for HEP decline decline decline  decline

## 2021-04-14 ENCOUNTER — IMMUNIZATIONS (OUTPATIENT)
Dept: FAMILY MEDICINE CLINIC | Facility: HOSPITAL | Age: 56
End: 2021-04-14

## 2021-04-14 DIAGNOSIS — Z23 ENCOUNTER FOR IMMUNIZATION: Primary | ICD-10-CM

## 2021-04-14 PROCEDURE — 0002A SARS-COV-2 / COVID-19 MRNA VACCINE (PFIZER-BIONTECH) 30 MCG: CPT

## 2021-04-14 PROCEDURE — 91300 SARS-COV-2 / COVID-19 MRNA VACCINE (PFIZER-BIONTECH) 30 MCG: CPT

## 2021-04-21 ENCOUNTER — TRANSCRIBE ORDERS (OUTPATIENT)
Dept: LAB | Facility: CLINIC | Age: 56
End: 2021-04-21

## 2021-04-21 ENCOUNTER — APPOINTMENT (OUTPATIENT)
Dept: LAB | Facility: CLINIC | Age: 56
End: 2021-04-21
Payer: COMMERCIAL

## 2021-04-21 DIAGNOSIS — M25.50 PAIN IN JOINT, MULTIPLE SITES: ICD-10-CM

## 2021-04-21 DIAGNOSIS — A69.20 LYME DISEASE: Primary | ICD-10-CM

## 2021-04-21 DIAGNOSIS — A69.20 LYME DISEASE: ICD-10-CM

## 2021-04-21 PROCEDURE — 86753 PROTOZOA ANTIBODY NOS: CPT

## 2021-04-21 PROCEDURE — 86618 LYME DISEASE ANTIBODY: CPT

## 2021-04-21 PROCEDURE — 86757 RICKETTSIA ANTIBODY: CPT

## 2021-04-21 PROCEDURE — 86617 LYME DISEASE ANTIBODY: CPT

## 2021-04-21 PROCEDURE — 36415 COLL VENOUS BLD VENIPUNCTURE: CPT

## 2021-04-22 LAB — B BURGDOR IGG+IGM SER-ACNC: 637

## 2021-04-23 LAB
B BURGDOR IGG PATRN SER IB-IMP: NEGATIVE
B BURGDOR IGM PATRN SER IB-IMP: NEGATIVE
B BURGDOR18KD IGG SER QL IB: ABNORMAL
B BURGDOR23KD IGG SER QL IB: PRESENT
B BURGDOR23KD IGM SER QL IB: ABNORMAL
B BURGDOR28KD IGG SER QL IB: PRESENT
B BURGDOR30KD IGG SER QL IB: PRESENT
B BURGDOR39KD IGG SER QL IB: ABNORMAL
B BURGDOR39KD IGM SER QL IB: ABNORMAL
B BURGDOR41KD IGG SER QL IB: ABNORMAL
B BURGDOR41KD IGM SER QL IB: ABNORMAL
B BURGDOR45KD IGG SER QL IB: PRESENT
B BURGDOR58KD IGG SER QL IB: ABNORMAL
B BURGDOR66KD IGG SER QL IB: ABNORMAL
B BURGDOR93KD IGG SER QL IB: ABNORMAL

## 2021-04-24 LAB — R RICKETTSI IGG SER QL IA: NEGATIVE

## 2021-04-26 ENCOUNTER — OFFICE VISIT (OUTPATIENT)
Dept: GASTROENTEROLOGY | Facility: CLINIC | Age: 56
End: 2021-04-26
Payer: COMMERCIAL

## 2021-04-26 VITALS
DIASTOLIC BLOOD PRESSURE: 87 MMHG | WEIGHT: 264 LBS | HEIGHT: 66 IN | BODY MASS INDEX: 42.43 KG/M2 | HEART RATE: 89 BPM | SYSTOLIC BLOOD PRESSURE: 118 MMHG

## 2021-04-26 DIAGNOSIS — K21.9 GASTROESOPHAGEAL REFLUX DISEASE WITHOUT ESOPHAGITIS: ICD-10-CM

## 2021-04-26 DIAGNOSIS — K29.70 GASTRITIS, PRESENCE OF BLEEDING UNSPECIFIED, UNSPECIFIED CHRONICITY, UNSPECIFIED GASTRITIS TYPE: Primary | ICD-10-CM

## 2021-04-26 LAB
B MICROTI IGG TITR SER: NORMAL {TITER}
B MICROTI IGM TITR SER: NORMAL {TITER}

## 2021-04-26 PROCEDURE — 3074F SYST BP LT 130 MM HG: CPT | Performed by: PHYSICIAN ASSISTANT

## 2021-04-26 PROCEDURE — 3008F BODY MASS INDEX DOCD: CPT | Performed by: PHYSICIAN ASSISTANT

## 2021-04-26 PROCEDURE — 1036F TOBACCO NON-USER: CPT | Performed by: PHYSICIAN ASSISTANT

## 2021-04-26 PROCEDURE — 3079F DIAST BP 80-89 MM HG: CPT | Performed by: PHYSICIAN ASSISTANT

## 2021-04-26 PROCEDURE — 99213 OFFICE O/P EST LOW 20 MIN: CPT | Performed by: PHYSICIAN ASSISTANT

## 2021-04-26 RX ORDER — PANTOPRAZOLE SODIUM 40 MG/1
40 TABLET, DELAYED RELEASE ORAL
Qty: 180 TABLET | Refills: 2 | Status: SHIPPED | OUTPATIENT
Start: 2021-04-26 | End: 2022-02-01

## 2021-04-26 NOTE — PROGRESS NOTES
[facility] - Outpatient Follow-up Note  Delilah Garsia 55 y o  male MRN: [id number]  Encounter: [id number]          ASSESSMENT AND PLAN:      1  Gastroesophageal reflux disease  2  Gastritis    Patient presents for follow up  Her has a history of GERD and gastritis  EGD in 2019 showed moderate gastritis and biopsies were negative for h pylori  EGD in 2018 showed gastritis and duodenitis  He is maintained on Pantoprazole 40mg po BID  He is doing well without breakthrough GERD symptoms  Will try decreasing Pantoprazole to 40mg po daily if tolerated  GERD modifications  Follow up in 1 year   ______________________________________________________________________    SUBJECTIVE:  Patient is a 55year old male who presents to the office for follow up  He has a history of GERD and gastritis  He is maintained on Pantoprazole 40mg po BID  EGD in 2019 showed moderate gastritis and biopsies were negative for h pylori  EGD in 2018 showed gastritis and duodenitis  He previously tried to stop his PPI after course of treatment in 2018 and symptoms returned  He also has a history of gastrointestinal bleeding  Colonoscopy in 2019 showed mild diverticulosis  He feels well  No heartburn  No nausea or vomiting  No blood in the stool  No dysphagia  He denies frequent NSAIDs with the exception of his baby aspirin  REVIEW OF SYSTEMS IS OTHERWISE NEGATIVE        Historical Information   Past Medical History:   Diagnosis Date    GERD (gastroesophageal reflux disease)     Hypercholesterolemia     Hypertension     Lyme disease 3/19/2019    Obesity     Sexual dysfunction     Psychosexual dysfunction with inhibited sexual excitement      Past Surgical History:   Procedure Laterality Date    BACK SURGERY      spinal fusion, LUMBAR    CARDIAC CATHETERIZATION      CARDIAC CATHETERIZATION      CARDIAC CATHETERIZATION      EGD AND COLONOSCOPY N/A 6/15/2018    Procedure: EGD AND COLONOSCOPY;  Surgeon: Bakari Baires MD;  [facility]; Service: Gastroenterology    [facility] N/A 3/26/2019    Procedure: COLONOSCOPY;  Surgeon: Shruthi Junior MD;  [facility]; Service: Gastroenterology    CA ESOPHAGOGASTRODUODENOSCOPY TRANSORAL DIAGNOSTIC N/A 3/26/2019    Procedure: ESOPHAGOGASTRODUODENOSCOPY (EGD); Surgeon: Shruthi Junior MD;  [facility];   Service: Gastroenterology    SEPTOPLASTY       Social History   Social History     Substance and Sexual Activity   Alcohol Use Yes    Frequency: Monthly or less    Drinks per session: 1 or 2    Binge frequency: Never    Comment: Occ alcohol use per All[employer]      Social History     Substance and Sexual Activity   Drug Use Yes    Types: Marijuana    Comment: medical     Social History     Tobacco Use   Smoking Status Former Smoker    Packs/day: 0 25    Years: 10 00    Pack years: 2 50    Types: Cigarettes    Quit date: 1998    Years since quittin 1   Smokeless Tobacco Former User    Types: [employer] Quit date: 3/13/2017     Family History   Problem Relation Age of Onset    Atrial fibrillation Mother     COPD Mother     Coronary artery disease Mother     Lung cancer Mother     Rheum arthritis Mother     Stroke Mother         Syndrome    Lung cancer Father     Prostate cancer Maternal Grandfather     Heart defect Daughter     Arthritis Family        Meds/Allergies       Current Outpatient Medications:     aluminum hydroxide-magnesium carbonate (GAVISCON)  mg/15 mL oral suspension    amLODIPine (NORVASC) 10 mg tablet    aspirin 81 mg chewable tablet    ibuprofen (MOTRIN) 600 mg tablet    metoprolol succinate (TOPROL-XL) 50 mg 24 hr tablet    olmesartan (BENICAR) 40 mg tablet    pantoprazole (PROTONIX) 40 mg tablet    Probiotic Product (PROBIOTIC DAILY PO)    rosuvastatin (CRESTOR) 5 mg tablet    No Known Allergies        Objective     Blood pressure 118/87, pulse 89, height 5' 6" (1 676 m), weight 120 kg (264 lb)  Body mass index is 42 61 kg/m²  PHYSICAL EXAM:      General Appearance:   Alert, cooperative, no distress   HEENT:   Normocephalic, atraumatic, anicteric      Neck:  Supple, symmetrical, trachea midline   Lungs:   Clear to auscultation bilaterally; no rales, rhonchi or wheezing; respirations unlabored    Heart[de-identified]   Regular rate and rhythm; no murmur, rub, or gallop  Abdomen:   Soft, non-tender, non-distended; normal bowel sounds; no masses, no organomegaly    Genitalia:   Deferred    Rectal:   Deferred    Extremities:  No cyanosis, clubbing or edema    Pulses:  2+ and symmetric    Skin:  No jaundice, rashes, or lesions    Lymph nodes:  No palpable cervical lymphadenopathy        Lab Results:   No visits with results within 1 Day(s) from this visit  Latest known visit with results is:   Appointment on 04/21/2021   Component Date Value    RMSF IgG 04/21/2021 Negative     Lyme total antibody 04/21/2021 637*    Lyme 18 kD IgG 04/21/2021 Absent     Lyme 23 kD IgG 04/21/2021 Present*    Lyme 28 kD IgG 04/21/2021 Present*    Lyme 30 kD IgG 04/21/2021 Present*    Lyme 39 kD IgG 04/21/2021 Absent     Lyme 41 kD IgG 04/21/2021 Absent     Lyme 45 kD IgG 04/21/2021 Present*    Lyme 58 kD IgG 04/21/2021 Absent     Lyme 66 kD IgG 04/21/2021 Absent     Lyme 93 kD IgG 04/21/2021 Absent     Lyme 23 kD IgM 04/21/2021 Absent     Lyme 39 kD IgM 04/21/2021 Absent     Lyme 41 kD IgM 04/21/2021 Absent     Lyme IgG WB Interp  04/21/2021 Negative     Lyme IgM WB Interp  04/21/2021 Negative          Radiology Results:   No results found

## 2021-06-04 DIAGNOSIS — E78.00 HYPERCHOLESTEROLEMIA: Chronic | ICD-10-CM

## 2021-06-05 RX ORDER — ROSUVASTATIN CALCIUM 5 MG/1
TABLET, COATED ORAL
Qty: 90 TABLET | Refills: 1 | Status: SHIPPED | OUTPATIENT
Start: 2021-06-05 | End: 2021-11-23

## 2021-08-25 ENCOUNTER — RA CDI HCC (OUTPATIENT)
Dept: OTHER | Facility: HOSPITAL | Age: 56
End: 2021-08-25

## 2021-08-25 NOTE — PROGRESS NOTES
[facility]  coding opportunities       Chart reviewed, no opportunity found: CHART REVIEWED, NO OPPORTUNITY FOUND                        Patients insurance company:  [employer] [facility] (Medicare Advantage and Commercial)

## 2021-09-21 ENCOUNTER — APPOINTMENT (OUTPATIENT)
Dept: LAB | Facility: CLINIC | Age: 56
End: 2021-09-21
Payer: COMMERCIAL

## 2021-09-21 DIAGNOSIS — I10 ESSENTIAL HYPERTENSION: Chronic | ICD-10-CM

## 2021-09-21 DIAGNOSIS — E78.00 HYPERCHOLESTEROLEMIA: Chronic | ICD-10-CM

## 2021-09-21 LAB
ANION GAP SERPL CALCULATED.3IONS-SCNC: 6 MMOL/L (ref 4–13)
BUN SERPL-MCNC: 15 MG/DL (ref 5–25)
CALCIUM SERPL-MCNC: 8.6 MG/DL (ref 8.3–10.1)
CHLORIDE SERPL-SCNC: 104 MMOL/L (ref 100–108)
CHOLEST SERPL-MCNC: 127 MG/DL (ref 50–200)
CO2 SERPL-SCNC: 28 MMOL/L (ref 21–32)
CREAT SERPL-MCNC: 0.78 MG/DL (ref 0.6–1.3)
ERYTHROCYTE [DISTWIDTH] IN BLOOD BY AUTOMATED COUNT: 13.2 % (ref 11.6–15.1)
GFR SERPL CREATININE-BSD FRML MDRD: 102 ML/MIN/1.73SQ M
GLUCOSE P FAST SERPL-MCNC: 93 MG/DL (ref 65–99)
HCT VFR BLD AUTO: 46.3 % (ref 36.5–49.3)
HDLC SERPL-MCNC: 47 MG/DL
HGB BLD-MCNC: 14.5 G/DL (ref 12–17)
LDLC SERPL CALC-MCNC: 71 MG/DL (ref 0–100)
MCH RBC QN AUTO: 27.1 PG (ref 26.8–34.3)
MCHC RBC AUTO-ENTMCNC: 31.3 G/DL (ref 31.4–37.4)
MCV RBC AUTO: 86 FL (ref 82–98)
NONHDLC SERPL-MCNC: 80 MG/DL
PLATELET # BLD AUTO: 144 THOUSANDS/UL (ref 149–390)
PMV BLD AUTO: 8.8 FL (ref 8.9–12.7)
POTASSIUM SERPL-SCNC: 3.6 MMOL/L (ref 3.5–5.3)
RBC # BLD AUTO: 5.36 MILLION/UL (ref 3.88–5.62)
SODIUM SERPL-SCNC: 138 MMOL/L (ref 136–145)
TRIGL SERPL-MCNC: 46 MG/DL
WBC # BLD AUTO: 4.87 THOUSAND/UL (ref 4.31–10.16)

## 2021-09-21 PROCEDURE — 80061 LIPID PANEL: CPT

## 2021-09-21 PROCEDURE — 36415 COLL VENOUS BLD VENIPUNCTURE: CPT

## 2021-09-21 PROCEDURE — 85027 COMPLETE CBC AUTOMATED: CPT

## 2021-09-21 PROCEDURE — 80048 BASIC METABOLIC PNL TOTAL CA: CPT

## 2021-09-22 ENCOUNTER — RA CDI HCC (OUTPATIENT)
Dept: OTHER | Facility: HOSPITAL | Age: 56
End: 2021-09-22

## 2021-09-22 NOTE — PROGRESS NOTES
[facility]       Chart reviewed, no opportunity found: CHART REVIEWED, NO OPPORTUNITY FOUND                        Patients insurance company:  [employer] [facility] (Medicare Advantage and Commercial)

## 2021-09-24 ENCOUNTER — HOSPITAL ENCOUNTER (EMERGENCY)
Facility: HOSPITAL | Age: 56
Discharge: HOME | End: 2021-09-24
Attending: EMERGENCY MEDICINE | Admitting: EMERGENCY MEDICINE
Payer: COMMERCIAL

## 2021-09-24 ENCOUNTER — APPOINTMENT (EMERGENCY)
Dept: VASCULAR ULTRASOUND | Facility: HOSPITAL | Age: 56
End: 2021-09-24
Payer: COMMERCIAL

## 2021-09-24 VITALS
TEMPERATURE: 97.8 F | SYSTOLIC BLOOD PRESSURE: 144 MMHG | RESPIRATION RATE: 16 BRPM | HEART RATE: 55 BPM | DIASTOLIC BLOOD PRESSURE: 85 MMHG | BODY MASS INDEX: 39.21 KG/M2 | HEIGHT: 66 IN | WEIGHT: 244 LBS | OXYGEN SATURATION: 99 %

## 2021-09-24 DIAGNOSIS — I87.2 STASIS DERMATITIS OF BOTH LEGS: Primary | ICD-10-CM

## 2021-09-24 LAB
ANION GAP SERPL CALCULATED.3IONS-SCNC: 7 MMOL/L (ref 4–13)
BASOPHILS # BLD AUTO: 0.03 THOUSANDS/ΜL (ref 0–0.1)
BASOPHILS NFR BLD AUTO: 1 % (ref 0–1)
BUN SERPL-MCNC: 18 MG/DL (ref 5–25)
CALCIUM SERPL-MCNC: 8.6 MG/DL (ref 8.3–10.1)
CHLORIDE SERPL-SCNC: 103 MMOL/L (ref 100–108)
CO2 SERPL-SCNC: 26 MMOL/L (ref 21–32)
CREAT SERPL-MCNC: 0.8 MG/DL (ref 0.6–1.3)
D DIMER PPP FEU-MCNC: 1.15 UG/ML FEU
EOSINOPHIL # BLD AUTO: 0.22 THOUSAND/ΜL (ref 0–0.61)
EOSINOPHIL NFR BLD AUTO: 5 % (ref 0–6)
ERYTHROCYTE [DISTWIDTH] IN BLOOD BY AUTOMATED COUNT: 12.9 % (ref 11.6–15.1)
GFR SERPL CREATININE-BSD FRML MDRD: 101 ML/MIN/1.73SQ M
GLUCOSE SERPL-MCNC: 100 MG/DL (ref 65–140)
HCT VFR BLD AUTO: 42 % (ref 36.5–49.3)
HGB BLD-MCNC: 13.9 G/DL (ref 12–17)
IMM GRANULOCYTES # BLD AUTO: 0.02 THOUSAND/UL (ref 0–0.2)
IMM GRANULOCYTES NFR BLD AUTO: 0 % (ref 0–2)
LYMPHOCYTES # BLD AUTO: 1.16 THOUSANDS/ΜL (ref 0.6–4.47)
LYMPHOCYTES NFR BLD AUTO: 24 % (ref 14–44)
MCH RBC QN AUTO: 27 PG (ref 26.8–34.3)
MCHC RBC AUTO-ENTMCNC: 33.1 G/DL (ref 31.4–37.4)
MCV RBC AUTO: 82 FL (ref 82–98)
MONOCYTES # BLD AUTO: 0.43 THOUSAND/ΜL (ref 0.17–1.22)
MONOCYTES NFR BLD AUTO: 9 % (ref 4–12)
NEUTROPHILS # BLD AUTO: 2.97 THOUSANDS/ΜL (ref 1.85–7.62)
NEUTS SEG NFR BLD AUTO: 61 % (ref 43–75)
NRBC BLD AUTO-RTO: 0 /100 WBCS
PLATELET # BLD AUTO: 118 THOUSANDS/UL (ref 149–390)
PMV BLD AUTO: 8.4 FL (ref 8.9–12.7)
POTASSIUM SERPL-SCNC: 4 MMOL/L (ref 3.5–5.3)
RBC # BLD AUTO: 5.14 MILLION/UL (ref 3.88–5.62)
SODIUM SERPL-SCNC: 136 MMOL/L (ref 136–145)
WBC # BLD AUTO: 4.83 THOUSAND/UL (ref 4.31–10.16)

## 2021-09-24 PROCEDURE — 85379 FIBRIN DEGRADATION QUANT: CPT | Performed by: NURSE PRACTITIONER

## 2021-09-24 PROCEDURE — 93970 EXTREMITY STUDY: CPT

## 2021-09-24 PROCEDURE — 99284 EMERGENCY DEPT VISIT MOD MDM: CPT

## 2021-09-24 PROCEDURE — 99284 EMERGENCY DEPT VISIT MOD MDM: CPT | Performed by: NURSE PRACTITIONER

## 2021-09-24 PROCEDURE — 93970 EXTREMITY STUDY: CPT | Performed by: SURGERY

## 2021-09-24 PROCEDURE — 36415 COLL VENOUS BLD VENIPUNCTURE: CPT | Performed by: NURSE PRACTITIONER

## 2021-09-24 PROCEDURE — 80048 BASIC METABOLIC PNL TOTAL CA: CPT | Performed by: NURSE PRACTITIONER

## 2021-09-24 PROCEDURE — 85025 COMPLETE CBC W/AUTO DIFF WBC: CPT | Performed by: NURSE PRACTITIONER

## 2021-09-24 NOTE — ED PROVIDER NOTES
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 Taking?    Probiotic Product (PROBIOTIC DAILY PO)  Self Yes No   Sig: Take by mouth daily   aluminum hydroxide-magnesium carbonate (GAVISCON)  mg/15 mL oral suspension  Self No No   Sig: Take 15 mL by mouth 4 (four) times daily (after meals and at bedtime)   Patient taking differently: Take 15 mL by mouth as needed    amLODIPine (NORVASC) 10 mg tablet  Self No No   Sig: Take 1 tablet (10 mg total) by mouth daily   aspirin 81 mg chewable tablet  Self No No   Sig: Chew 1 tablet (81 mg total) daily   ibuprofen (MOTRIN) 600 mg tablet  Self No No   Sig: Take 1 tablet (600 mg total) by mouth every 8 (eight) hours   Patient taking differently: Take 600 mg by mouth as needed    metoprolol succinate (TOPROL-XL) 50 mg 24 hr tablet  Self No No   Sig: Take 1 tablet (50 mg total) by mouth daily   olmesartan (BENICAR) 40 mg tablet  Self No No   Sig: Take 1 tablet (40 mg total) by mouth daily   pantoprazole (PROTONIX) 40 mg tablet   No No   Sig: Take 1 tablet (40 mg total) by mouth 2 (two) times a day before meals   rosuvastatin (CRESTOR) 5 mg tablet   No No   Sig: TAKE 1 TABLET BY MOUTH EVERY DAY      Facility-Administered Medications: None       Past Medical History:   Diagnosis Date    GERD (gastroesophageal reflux disease)     Hypercholesterolemia     Hypertension     Lyme disease 3/19/2019    Obesity     Sexual dysfunction     Psychosexual dysfunction with inhibited sexual excitement        Past Surgical History:   Procedure Laterality Date    BACK SURGERY      spinal fusion, LUMBAR    CARDIAC CATHETERIZATION      CARDIAC CATHETERIZATION      CARDIAC CATHETERIZATION      EGD AND COLONOSCOPY N/A 6/15/2018    Procedure: EGD AND COLONOSCOPY;  Surgeon: Mandie Gabriel MD;  [facility]; Service: Gastroenterology    NE COLONOSCOPY FLX DX W/[address] WHEN PFRMD N/A 3/26/2019    Procedure: COLONOSCOPY;  Surgeon: Moreno Carballo MD;  [facility]; Service: Gastroenterology    NE ESOPHAGOGASTRODUODENOSCOPY TRANSORAL DIAGNOSTIC N/A 3/26/2019    Procedure: ESOPHAGOGASTRODUODENOSCOPY (EGD); Surgeon: Moreno Carballo MD;  [facility]; Service: Gastroenterology    SEPTOPLASTY         Family History   Problem Relation Age of Onset    Atrial fibrillation Mother     COPD Mother     Coronary artery disease Mother     Lung cancer Mother     Rheum arthritis Mother     Stroke Mother         Syndrome    Lung cancer Father     Prostate cancer Maternal Grandfather     Heart defect Daughter     Arthritis Family      I have reviewed and agree with the history as documented  E-Cigarette/Vaping    E-Cigarette Use Never User      E-Cigarette/Vaping Substances    Nicotine No     THC No     CBD No     Flavoring No     Other No     Unknown No      Social History     Tobacco Use    Smoking status: Former Smoker     Packs/day: 0 25     Years: 10 00     Pack years: 2 50     Types: Cigarettes     Quit date: 1998     Years since quittin 5    Smokeless tobacco: Former User     Types: Chew     Quit date: 3/13/2017   Vaping Use    Vaping Use: Never used   Substance Use Topics    Alcohol use: Yes     Comment: Occ alcohol use per Allscripts     Drug use:  Yes Types: Marijuana     Comment: medical       Review of Systems   Constitutional: Negative for diaphoresis, fatigue and fever  HENT: Negative for congestion, ear pain, nosebleeds and sore throat  Eyes: Negative for photophobia, pain, discharge and visual disturbance  Respiratory: Negative for cough, choking, chest tightness, shortness of breath and wheezing  Cardiovascular: Negative for chest pain and palpitations  Gastrointestinal: Negative for abdominal distention, abdominal pain, diarrhea and vomiting  Genitourinary: Negative for dysuria, flank pain and frequency  Musculoskeletal: Negative for back pain, gait problem and joint swelling  Skin: Positive for rash  Negative for color change  Neurological: Negative for dizziness, syncope and headaches  Psychiatric/Behavioral: Negative for behavioral problems and confusion  The patient is not nervous/anxious  All other systems reviewed and are negative  Physical Exam  Physical Exam  Vitals and nursing note reviewed  Constitutional:       General: He is not in acute distress  Appearance: He is well-developed  HENT:      Head: Normocephalic and atraumatic  Eyes:      General:         Right eye: No discharge  Left eye: No discharge  Conjunctiva/sclera: Conjunctivae normal    Cardiovascular:      Rate and Rhythm: Normal rate  Pulmonary:      Effort: Pulmonary effort is normal  No respiratory distress  Abdominal:      General: There is no distension  Tenderness: There is no guarding  Musculoskeletal:         General: No deformity  Cervical back: Normal range of motion and neck supple  Skin:     General: Skin is warm and dry  Findings: Erythema and rash present  Neurological:      Mental Status: He is alert and oriented to person, place, and time        Coordination: Coordination normal                  Vital Signs  ED Triage Vitals   Temperature Pulse Respirations Blood Pressure SpO2   09/24/21 0842 09/24/21 0841 09/24/21 0841 09/24/21 0841 09/24/21 0841   97 8 °F (36 6 °C) 58 19 155/86 97 %      Temp Source Heart Rate Source Patient Position - Orthostatic VS BP Location FiO2 (%)   09/24/21 0841 09/24/21 0841 09/24/21 0841 09/24/21 0841 --   Oral Monitor Lying Right arm       Pain Score       --                  Vitals:    09/24/21 0841 09/24/21 1145   BP: 155/86 144/85   Pulse: 58 55   Patient Position - Orthostatic VS: Lying          Visual Acuity      ED Medications  Medications - No data to display    Diagnostic Studies  Results Reviewed     Procedure Component Value Units Date/Time    D-Dimer [[id number]]  (Abnormal) Collected: 09/24/21 0900    Lab Status: Final result Specimen: Blood from Arm, Right Updated: 09/24/21 0922     D-Dimer, Quant 1 15 ug/ml FEU     Basic metabolic panel [[id number]] Collected: 09/24/21 0900    Lab Status: Final result Specimen: Blood from Arm, Right Updated: 09/24/21 0917     Sodium 136 mmol/L      Potassium 4 0 mmol/L      Chloride 103 mmol/L      CO2 26 mmol/L      ANION GAP 7 mmol/L      BUN 18 mg/dL      Creatinine 0 80 mg/dL      Glucose 100 mg/dL      Calcium 8 6 mg/dL      eGFR 101 ml/min/1 73sq m     Narrative:      [place] guidelines for Chronic Kidney Disease (CKD):     Stage 1 with normal or high GFR (GFR > 90 mL/min/1 73 square meters)    Stage 2 Mild CKD (GFR = 60-89 mL/min/1 73 square meters)    Stage 3A Moderate CKD (GFR = 45-59 mL/min/1 73 square meters)    Stage 3B Moderate CKD (GFR = 30-44 mL/min/1 73 square meters)    Stage 4 Severe CKD (GFR = 15-29 mL/min/1 73 square meters)    Stage 5 End Stage CKD (GFR <15 mL/min/1 73 square meters)  Note: GFR calculation is accurate only with a steady state creatinine    CBC and differential [[id number]]  (Abnormal) Collected: 09/24/21 0900    Lab Status: Final result Specimen: Blood from Arm, Right Updated: 09/24/21 0905     WBC 4 83 Thousand/uL      RBC 5 14 Million/uL      Hemoglobin 13 9 g/dL      Hematocrit 42 0 %      MCV 82 fL      MCH 27 0 pg      MCHC 33 1 g/dL      RDW 12 9 %      MPV 8 4 fL      Platelets 140 Thousands/uL      nRBC 0 /100 WBCs      Neutrophils Relative 61 %      Immat GRANS % 0 %      Lymphocytes Relative 24 %      Monocytes Relative 9 %      Eosinophils Relative 5 %      Basophils Relative 1 %      Neutrophils Absolute 2 97 Thousands/µL      Immature Grans Absolute 0 02 Thousand/uL      Lymphocytes Absolute 1 16 Thousands/µL      Monocytes Absolute 0 43 Thousand/µL      Eosinophils Absolute 0 22 Thousand/µL      Basophils Absolute 0 03 Thousands/µL                  VAS lower limb venous duplex study, complete bilateral    (Results Pending)              Procedures  Procedures         ED Course                                           MDM  Number of Diagnoses or Management Options  Stasis dermatitis of both legs: new and requires workup  Diagnosis management comments: The bilateral nature of the patient's rash suggests stasis dermatitis rather than an infectious cause  [place] Amount and/or Complexity of Data Reviewed  Clinical lab tests: reviewed and ordered  Tests in the radiology section of CPT®: reviewed and ordered  Independent visualization of images, tracings, or specimens: yes    Patient Progress  Patient progress: stable      Disposition  Final diagnoses:   Stasis dermatitis of both legs     Time reflects when diagnosis was documented in both MDM as applicable and the Disposition within this note     Time User Action Codes Description Comment    9/24/2021 11:19 AM Monica Solomon Add [I87 2] Stasis dermatitis of both legs       ED Disposition     ED Disposition Condition Date/Time Comment    Discharge Stable Fri Sep 24, 2021 11:18 AM [facility]/self care              Follow-up Information     Follow up With Specialties Details Why [address], DO Internal Medicine Schedule an appointment as soon as possible for a visit  For Continued Evaluation [address] [facility] [address]  [phone]            Discharge Medication List as of 9/24/2021 11:24 AM      CONTINUE these medications which have NOT CHANGED    Details   aluminum hydroxide-magnesium carbonate (GAVISCON)  mg/15 mL oral suspension Take 15 mL by mouth 4 (four) times daily (after meals and at bedtime), Starting Tue 6/12/2018, Normal      amLODIPine (NORVASC) 10 mg tablet Take 1 tablet (10 mg total) by mouth daily, Starting Fri 10/16/2020, Normal      aspirin 81 mg chewable tablet Chew 1 tablet (81 mg total) daily, Starting Wed 6/6/2018, No Print      ibuprofen (MOTRIN) 600 mg tablet Take 1 tablet (600 mg total) by mouth every 8 (eight) hours, Starting Tue 8/20/2019, Normal      metoprolol succinate (TOPROL-XL) 50 mg 24 hr tablet Take 1 tablet (50 mg total) by mouth daily, Starting Fri 10/16/2020, Normal      olmesartan (BENICAR) 40 mg tablet Take 1 tablet (40 mg total) by mouth daily, Starting Fri 10/16/2020, Normal      pantoprazole (PROTONIX) 40 mg tablet Take 1 tablet (40 mg total) by mouth 2 (two) times a day before meals, Starting Mon 4/26/2021, Until Sun 7/25/2021, Normal      Probiotic Product (PROBIOTIC DAILY PO) Take by mouth daily, Historical Med      rosuvastatin (CRESTOR) 5 mg tablet TAKE 1 TABLET BY MOUTH EVERY DAY, Normal           No discharge procedures on file      PDMP Review     None          ED Provider  Electronically Signed by           ASHLEY Sumner  09/24/21 8986

## 2021-10-01 ENCOUNTER — OFFICE VISIT (OUTPATIENT)
Dept: INTERNAL MEDICINE CLINIC | Facility: CLINIC | Age: 56
End: 2021-10-01
Payer: COMMERCIAL

## 2021-10-01 VITALS
HEIGHT: 66 IN | HEART RATE: 61 BPM | DIASTOLIC BLOOD PRESSURE: 82 MMHG | SYSTOLIC BLOOD PRESSURE: 118 MMHG | BODY MASS INDEX: 40.5 KG/M2 | WEIGHT: 252 LBS | OXYGEN SATURATION: 96 % | TEMPERATURE: 97.7 F

## 2021-10-01 DIAGNOSIS — I10 PRIMARY HYPERTENSION: Primary | Chronic | ICD-10-CM

## 2021-10-01 DIAGNOSIS — E78.00 HYPERCHOLESTEROLEMIA: Chronic | ICD-10-CM

## 2021-10-01 DIAGNOSIS — Z23 ENCOUNTER FOR IMMUNIZATION: ICD-10-CM

## 2021-10-01 DIAGNOSIS — D69.6 THROMBOCYTOPENIA (HCC): ICD-10-CM

## 2021-10-01 PROCEDURE — 3008F BODY MASS INDEX DOCD: CPT | Performed by: INTERNAL MEDICINE

## 2021-10-01 PROCEDURE — 3725F SCREEN DEPRESSION PERFORMED: CPT | Performed by: INTERNAL MEDICINE

## 2021-10-01 PROCEDURE — 3074F SYST BP LT 130 MM HG: CPT | Performed by: INTERNAL MEDICINE

## 2021-10-01 PROCEDURE — 1036F TOBACCO NON-USER: CPT | Performed by: INTERNAL MEDICINE

## 2021-10-01 PROCEDURE — 90682 RIV4 VACC RECOMBINANT DNA IM: CPT | Performed by: INTERNAL MEDICINE

## 2021-10-01 PROCEDURE — 90471 IMMUNIZATION ADMIN: CPT | Performed by: INTERNAL MEDICINE

## 2021-10-01 PROCEDURE — 99214 OFFICE O/P EST MOD 30 MIN: CPT | Performed by: INTERNAL MEDICINE

## 2021-10-01 PROCEDURE — 3079F DIAST BP 80-89 MM HG: CPT | Performed by: INTERNAL MEDICINE

## 2021-11-02 DIAGNOSIS — I10 ESSENTIAL HYPERTENSION: ICD-10-CM

## 2021-11-02 RX ORDER — METOPROLOL SUCCINATE 50 MG/1
TABLET, EXTENDED RELEASE ORAL
Qty: 90 TABLET | Refills: 3 | Status: SHIPPED | OUTPATIENT
Start: 2021-11-02

## 2021-11-02 RX ORDER — OLMESARTAN MEDOXOMIL 40 MG/1
TABLET ORAL
Qty: 90 TABLET | Refills: 3 | Status: SHIPPED | OUTPATIENT
Start: 2021-11-02

## 2021-11-23 DIAGNOSIS — E78.00 HYPERCHOLESTEROLEMIA: Chronic | ICD-10-CM

## 2021-11-23 RX ORDER — ROSUVASTATIN CALCIUM 5 MG/1
TABLET, COATED ORAL
Qty: 90 TABLET | Refills: 1 | Status: SHIPPED | OUTPATIENT
Start: 2021-11-23 | End: 2022-06-10

## 2021-12-13 DIAGNOSIS — I10 ESSENTIAL HYPERTENSION: ICD-10-CM

## 2021-12-13 RX ORDER — AMLODIPINE BESYLATE 10 MG/1
TABLET ORAL
Qty: 90 TABLET | Refills: 3 | Status: SHIPPED | OUTPATIENT
Start: 2021-12-13

## 2022-01-21 ENCOUNTER — IMMUNIZATIONS (OUTPATIENT)
Dept: FAMILY MEDICINE CLINIC | Facility: HOSPITAL | Age: 57
End: 2022-01-21

## 2022-01-21 DIAGNOSIS — Z23 ENCOUNTER FOR IMMUNIZATION: Primary | ICD-10-CM

## 2022-01-21 PROCEDURE — 0001A COVID-19 PFIZER VACC 0.3 ML: CPT

## 2022-01-21 PROCEDURE — 91300 COVID-19 PFIZER VACC 0.3 ML: CPT

## 2022-02-01 DIAGNOSIS — K21.9 GASTROESOPHAGEAL REFLUX DISEASE WITHOUT ESOPHAGITIS: ICD-10-CM

## 2022-02-01 RX ORDER — PANTOPRAZOLE SODIUM 40 MG/1
40 TABLET, DELAYED RELEASE ORAL
Qty: 180 TABLET | Refills: 2 | Status: SHIPPED | OUTPATIENT
Start: 2022-02-01 | End: 2022-05-02

## 2022-03-29 ENCOUNTER — APPOINTMENT (OUTPATIENT)
Dept: LAB | Facility: CLINIC | Age: 57
End: 2022-03-29
Payer: COMMERCIAL

## 2022-03-29 DIAGNOSIS — E78.00 HYPERCHOLESTEROLEMIA: Chronic | ICD-10-CM

## 2022-03-29 DIAGNOSIS — D69.6 THROMBOCYTOPENIA (HCC): ICD-10-CM

## 2022-03-29 LAB
ALBUMIN SERPL BCP-MCNC: 3.7 G/DL (ref 3.5–5)
ALP SERPL-CCNC: 53 U/L (ref 46–116)
ALT SERPL W P-5'-P-CCNC: 42 U/L (ref 12–78)
ANION GAP SERPL CALCULATED.3IONS-SCNC: 4 MMOL/L (ref 4–13)
AST SERPL W P-5'-P-CCNC: 19 U/L (ref 5–45)
BILIRUB SERPL-MCNC: 0.59 MG/DL (ref 0.2–1)
BUN SERPL-MCNC: 16 MG/DL (ref 5–25)
CALCIUM SERPL-MCNC: 9.1 MG/DL (ref 8.3–10.1)
CHLORIDE SERPL-SCNC: 109 MMOL/L (ref 100–108)
CHOLEST SERPL-MCNC: 156 MG/DL
CO2 SERPL-SCNC: 27 MMOL/L (ref 21–32)
CREAT SERPL-MCNC: 0.87 MG/DL (ref 0.6–1.3)
ERYTHROCYTE [DISTWIDTH] IN BLOOD BY AUTOMATED COUNT: 13 % (ref 11.6–15.1)
GFR SERPL CREATININE-BSD FRML MDRD: 96 ML/MIN/1.73SQ M
GLUCOSE P FAST SERPL-MCNC: 92 MG/DL (ref 65–99)
HCT VFR BLD AUTO: 47.8 % (ref 36.5–49.3)
HDLC SERPL-MCNC: 55 MG/DL
HGB BLD-MCNC: 15.4 G/DL (ref 12–17)
LDLC SERPL CALC-MCNC: 88 MG/DL (ref 0–100)
MCH RBC QN AUTO: 27.6 PG (ref 26.8–34.3)
MCHC RBC AUTO-ENTMCNC: 32.2 G/DL (ref 31.4–37.4)
MCV RBC AUTO: 86 FL (ref 82–98)
NONHDLC SERPL-MCNC: 101 MG/DL
PLATELET # BLD AUTO: 157 THOUSANDS/UL (ref 149–390)
PMV BLD AUTO: 9.1 FL (ref 8.9–12.7)
POTASSIUM SERPL-SCNC: 4.1 MMOL/L (ref 3.5–5.3)
PROT SERPL-MCNC: 7.1 G/DL (ref 6.4–8.2)
RBC # BLD AUTO: 5.57 MILLION/UL (ref 3.88–5.62)
SODIUM SERPL-SCNC: 140 MMOL/L (ref 136–145)
TRIGL SERPL-MCNC: 65 MG/DL
WBC # BLD AUTO: 5.96 THOUSAND/UL (ref 4.31–10.16)

## 2022-03-29 PROCEDURE — 80061 LIPID PANEL: CPT

## 2022-03-29 PROCEDURE — 36415 COLL VENOUS BLD VENIPUNCTURE: CPT

## 2022-03-29 PROCEDURE — 85027 COMPLETE CBC AUTOMATED: CPT

## 2022-03-29 PROCEDURE — 80053 COMPREHEN METABOLIC PANEL: CPT

## 2022-04-05 ENCOUNTER — RA CDI HCC (OUTPATIENT)
Dept: OTHER | Facility: HOSPITAL | Age: 57
End: 2022-04-05

## 2022-04-05 NOTE — PROGRESS NOTES
[facility] 75  coding opportunities          Chart Reviewed number of suggestions sent to Provider: 1     E66 01 Morbid obesity with BMI of 40 0-44 9, adult [facility])  *Needs recertification   BMI:40 67     If this is correct, please document and assess at your next visit,4/12/2022    Patients Insurance        Commercial Insurance: [employer]

## 2022-04-12 ENCOUNTER — OFFICE VISIT (OUTPATIENT)
Dept: INTERNAL MEDICINE CLINIC | Facility: CLINIC | Age: 57
End: 2022-04-12
Payer: COMMERCIAL

## 2022-04-12 VITALS
SYSTOLIC BLOOD PRESSURE: 124 MMHG | OXYGEN SATURATION: 97 % | HEART RATE: 72 BPM | DIASTOLIC BLOOD PRESSURE: 80 MMHG | BODY MASS INDEX: 44.26 KG/M2 | WEIGHT: 275.4 LBS | HEIGHT: 66 IN | TEMPERATURE: 98.1 F

## 2022-04-12 DIAGNOSIS — E78.00 HYPERCHOLESTEROLEMIA: Chronic | ICD-10-CM

## 2022-04-12 DIAGNOSIS — E66.01 MORBID OBESITY WITH BMI OF 40.0-44.9, ADULT (HCC): Chronic | ICD-10-CM

## 2022-04-12 DIAGNOSIS — I10 PRIMARY HYPERTENSION: Primary | Chronic | ICD-10-CM

## 2022-04-12 DIAGNOSIS — Z11.59 NEED FOR HEPATITIS C SCREENING TEST: ICD-10-CM

## 2022-04-12 DIAGNOSIS — Z12.5 SCREENING FOR PROSTATE CANCER: ICD-10-CM

## 2022-04-12 PROBLEM — D69.6 THROMBOCYTOPENIA (HCC): Status: RESOLVED | Noted: 2021-10-01 | Resolved: 2022-04-12

## 2022-04-12 PROCEDURE — 3074F SYST BP LT 130 MM HG: CPT | Performed by: INTERNAL MEDICINE

## 2022-04-12 PROCEDURE — 3725F SCREEN DEPRESSION PERFORMED: CPT | Performed by: INTERNAL MEDICINE

## 2022-04-12 PROCEDURE — 3008F BODY MASS INDEX DOCD: CPT | Performed by: INTERNAL MEDICINE

## 2022-04-12 PROCEDURE — 3079F DIAST BP 80-89 MM HG: CPT | Performed by: INTERNAL MEDICINE

## 2022-04-12 PROCEDURE — 99214 OFFICE O/P EST MOD 30 MIN: CPT | Performed by: INTERNAL MEDICINE

## 2022-04-12 PROCEDURE — 1036F TOBACCO NON-USER: CPT | Performed by: INTERNAL MEDICINE

## 2022-04-12 NOTE — PATIENT INSTRUCTIONS
Chronic Hypertension   AMBULATORY CARE:   Hypertension  is high blood pressure  Your blood pressure is the force of your blood moving against the walls of your arteries  Hypertension causes your blood pressure to get so high that your heart has to work much harder than normal  This can damage your heart  Even if you have hypertension for years, lifestyle changes, medicines, or both can help bring your blood pressure to normal   Call your local emergency number (911 in the [address] [facility]) or have someone call if:   · You have chest pain  · You have any of the following signs of a heart attack:      ? Squeezing, pressure, or pain in your chest    ? You may  also have any of the following:     § Discomfort or pain in your back, neck, jaw, stomach, or arm    § Shortness of breath    § Nausea or vomiting    § Lightheadedness or a sudden cold sweat    · You become confused or have difficulty speaking  · You suddenly feel lightheaded or have trouble breathing  Seek care immediately if:   · You have a severe headache or vision loss  · You have weakness in an arm or leg  Call your doctor or cardiologist if:   · You feel faint, dizzy, confused, or drowsy  · You have been taking your blood pressure medicine but your pressure is higher than your provider says it should be  · You have questions or concerns about your condition or care  Treatment for chronic hypertension  may include medicine to lower your blood pressure and cholesterol levels  A low cholesterol level helps prevent heart disease and makes it easier to control your blood pressure  Heart disease can make your blood pressure harder to control  You may also need to make lifestyle changes  What you need to know about the stages of hypertension:       · Normal blood pressure is 119/79 or lower   Your healthcare provider may only check your blood pressure each year if it stays at a normal level  · Elevated blood pressure is 120/79 to 129/79    This is sometimes called prehypertension  Your healthcare provider may suggest lifestyle changes to help lower your blood pressure to a normal level  He or she may then check it again in 3 to 6 months  · Stage 1 hypertension is 130/80  to 139/89   Your provider may recommend lifestyle changes, medication, and checks every 3 to 6 months until your blood pressure is controlled  · Stage 2 hypertension is 140/90 or higher   Your provider will recommend lifestyle changes and have you take 2 kinds of hypertension medicines  You will also need to have your blood pressure checked monthly until it is controlled  Manage chronic hypertension:   · Check your blood pressure at home  Avoid smoking, caffeine, and exercise at least 30 minutes before checking your blood pressure  Sit and rest for 5 minutes before you take your blood pressure  Extend your arm and support it on a flat surface  Your arm should be at the same level as your heart  Follow the directions that came with your blood pressure monitor  Check your blood pressure 2 times, 1 minute apart, before you take your medicine in the morning  Also check your blood pressure before your evening meal  Keep a record of your readings and bring it to your follow-up visits  Ask your healthcare provider what your blood pressure should be  · Manage any other health conditions you have  Health conditions such as diabetes can increase your risk for hypertension  Follow your healthcare provider's instructions and take all your medicines as directed  Talk to your healthcare provider about any new health conditions you have recently developed  · Ask about all medicines  Certain medicines can increase your blood pressure  Examples include oral birth control pills, decongestants, herbal supplements, and NSAIDs, such as ibuprofen  Your healthcare provider can tell you which medicines are safe for you to take   This includes prescription and over-the-counter medicines  Lifestyle changes you can make to lower your blood pressure: Your provider may want you to make more lifestyle changes if you are having trouble controlling your blood pressure  This may feel difficult over time, especially if you think you are making good changes but your pressure is still high  It might help to focus on one new change at a time  For example, try to add 1 more day of exercise, or exercise for an extra 10 minutes on 2 days  Small changes can make a big difference  Your healthcare provider can also refer you to specialists such as a dietitian who can help you make small changes  Your family members may be included in helping you learn to create lifestyle changes, such as the following:     · Limit sodium (salt) as directed  Too much sodium can affect your fluid balance  Check labels to find low-sodium or no-salt-added foods  Some low-sodium foods use potassium salts for flavor  Too much potassium can also cause health problems  Your healthcare provider will tell you how much sodium and potassium are safe for you to have in a day  He or she may recommend that you limit sodium to 2,300 mg a day  · Follow the meal plan recommended by your healthcare provider  A dietitian or your provider can give you more information on low-sodium plans or the DASH (Dietary Approaches to Stop Hypertension) eating plan  The DASH plan is low in sodium, processed sugar, unhealthy fats, and total fat  It is high in potassium, calcium, and fiber  These can be found in vegetables, fruit, and whole-grain foods  · Be physically active throughout the day  Physical activity, such as exercise, can help control your blood pressure and your weight  Be physically active for at least 30 minutes per day, on most days of the week  Include aerobic activity, such as walking or riding a bicycle  Also include strength training at least 2 times each week   Your healthcare providers can help you create a physical activity plan  · Decrease stress  This may help lower your blood pressure  Learn ways to relax, such as deep breathing or listening to music  · Limit alcohol as directed  Alcohol can increase your blood pressure  A drink of alcohol is 12 ounces of beer, 5 ounces of wine, or 1½ ounces of liquor  · Do not smoke  Nicotine and other chemicals in cigarettes and cigars can increase your blood pressure and also cause lung damage  Ask your healthcare provider for information if you currently smoke and need help to quit  E-cigarettes or smokeless tobacco still contain nicotine  Talk to your healthcare provider before you use these products  Follow up with your doctor or cardiologist as directed: You will need to return to have your blood pressure checked and to have other lab tests done  Write down your questions so you remember to ask them during your visits  © Copyright [employer] 2022 Information is for End User's use only and may not be sold, redistributed or otherwise used for commercial purposes  All illustrations and images included in CareNotes® are the copyrighted property of [employer]  or [facility] Cindi Newman   The above information is an  only  It is not intended as medical advice for individual conditions or treatments  Talk to your doctor, nurse or pharmacist before following any medical regimen to see if it is safe and effective for you

## 2022-04-12 NOTE — PROGRESS NOTES
[facility] [address] [place] [address]    NAME: Maria M Hook  AGE: 65 y o  SEX: male  : 1965     DATE: 2022     Assessment and Plan:     1  Primary hypertension    Continue amlodipine, olmesartan, Toprol-XL  Work on diet and exercise  - Basic metabolic panel; Future    2  Hypercholesterolemia    Cholesterol well controlled  Heart healthy diet  Continue rosuvastatin  - CBC; Future  - Lipid Panel with Direct LDL reflex; Future    3  Morbid obesity with BMI of 40 0-44 9, adult [facility])    Discussed changes he needs to make in regards to diet/lifestyle  4  Screening for prostate cancer  - PSA, Total Screen; Future    5  Need for hepatitis C screening test  - Hepatitis C antibody; Future    BMI Counseling: Body mass index is 44 45 kg/m²  The BMI is above normal  Nutrition recommendations include decreasing portion sizes, encouraging healthy choices of fruits and vegetables, limiting drinks that contain sugar, moderation in carbohydrate intake and increasing intake of lean protein  Exercise recommendations include exercising 3-5 times per week  Depression Screening and Follow-up Plan: Patient was screened for depression during today's encounter  They screened negative with a PHQ-2 score of 0  Return in about 6 months (around 10/12/2022) for Follow-up  Chief Complaint:     Chief Complaint   Patient presents with    Follow-up     6 months and labs       History of Present Illness:       Patient presents for routine follow-up  He is doing well except that he gained weight  He knows what he is doing and he has been eating a lot of carbs  He has been able to lose weight before when he cuts carb/calorie intake  His labs remained stable  No changes to his medications  Review of Systems:     Review of Systems   Constitutional: Negative  Respiratory: Negative  Cardiovascular: Negative  Gastrointestinal: Negative  Musculoskeletal: Negative  Objective:     /80 (BP Location: Left arm, Patient Position: Sitting, Cuff Size: Standard)   Pulse 72   Temp 98 1 °F (36 7 °C) (Temporal)   Ht 5' 6" (1 676 m)   Wt 125 kg (275 lb 6 4 oz)   SpO2 97%   BMI 44 45 kg/m²     Physical Exam  Vitals reviewed  Constitutional:       General: He is not in acute distress  Appearance: He is well-developed  He is obese  He is not diaphoretic  Neck:      Thyroid: No thyromegaly  Vascular: No JVD  Cardiovascular:      Rate and Rhythm: Normal rate and regular rhythm  Heart sounds: Normal heart sounds  No murmur heard  Pulmonary:      Effort: Pulmonary effort is normal  No respiratory distress  Breath sounds: Normal breath sounds  No wheezing or rales  Abdominal:      General: Bowel sounds are normal  There is no distension  Palpations: Abdomen is soft  Tenderness: There is no abdominal tenderness  Musculoskeletal:      Cervical back: Neck supple  Right lower leg: No edema  Left lower leg: No edema  Lymphadenopathy:      Cervical: No cervical adenopathy  Skin:     General: Skin is warm and dry  Neurological:      Mental Status: He is alert     Psychiatric:         Mood and Affect: Mood normal          Behavior: Behavior normal        Tay Dunn DO  MEDICAL [id number]

## 2022-06-10 DIAGNOSIS — E78.00 HYPERCHOLESTEROLEMIA: Chronic | ICD-10-CM

## 2022-06-10 RX ORDER — ROSUVASTATIN CALCIUM 5 MG/1
TABLET, COATED ORAL
Qty: 90 TABLET | Refills: 1 | Status: SHIPPED | OUTPATIENT
Start: 2022-06-10

## 2022-10-11 ENCOUNTER — APPOINTMENT (OUTPATIENT)
Dept: LAB | Facility: CLINIC | Age: 57
End: 2022-10-11
Payer: COMMERCIAL

## 2022-10-11 DIAGNOSIS — Z12.5 SCREENING FOR PROSTATE CANCER: ICD-10-CM

## 2022-10-11 DIAGNOSIS — E78.00 HYPERCHOLESTEROLEMIA: Chronic | ICD-10-CM

## 2022-10-11 DIAGNOSIS — I10 PRIMARY HYPERTENSION: Chronic | ICD-10-CM

## 2022-10-11 DIAGNOSIS — Z11.59 NEED FOR HEPATITIS C SCREENING TEST: ICD-10-CM

## 2022-10-11 LAB
ANION GAP SERPL CALCULATED.3IONS-SCNC: 5 MMOL/L (ref 4–13)
BUN SERPL-MCNC: 19 MG/DL (ref 5–25)
CALCIUM SERPL-MCNC: 9.3 MG/DL (ref 8.3–10.1)
CHLORIDE SERPL-SCNC: 109 MMOL/L (ref 96–108)
CHOLEST SERPL-MCNC: 129 MG/DL
CO2 SERPL-SCNC: 28 MMOL/L (ref 21–32)
CREAT SERPL-MCNC: 1 MG/DL (ref 0.6–1.3)
ERYTHROCYTE [DISTWIDTH] IN BLOOD BY AUTOMATED COUNT: 12.7 % (ref 11.6–15.1)
GFR SERPL CREATININE-BSD FRML MDRD: 83 ML/MIN/1.73SQ M
GLUCOSE P FAST SERPL-MCNC: 105 MG/DL (ref 65–99)
HCT VFR BLD AUTO: 45.2 % (ref 36.5–49.3)
HDLC SERPL-MCNC: 43 MG/DL
HGB BLD-MCNC: 14 G/DL (ref 12–17)
LDLC SERPL CALC-MCNC: 74 MG/DL (ref 0–100)
MCH RBC QN AUTO: 27.5 PG (ref 26.8–34.3)
MCHC RBC AUTO-ENTMCNC: 31 G/DL (ref 31.4–37.4)
MCV RBC AUTO: 89 FL (ref 82–98)
PLATELET # BLD AUTO: 159 THOUSANDS/UL (ref 149–390)
PMV BLD AUTO: 9.1 FL (ref 8.9–12.7)
POTASSIUM SERPL-SCNC: 4.3 MMOL/L (ref 3.5–5.3)
PSA SERPL-MCNC: 0.4 NG/ML (ref 0–4)
RBC # BLD AUTO: 5.09 MILLION/UL (ref 3.88–5.62)
SODIUM SERPL-SCNC: 142 MMOL/L (ref 135–147)
TRIGL SERPL-MCNC: 62 MG/DL
WBC # BLD AUTO: 5.49 THOUSAND/UL (ref 4.31–10.16)

## 2022-10-11 PROCEDURE — G0103 PSA SCREENING: HCPCS

## 2022-10-11 PROCEDURE — 80048 BASIC METABOLIC PNL TOTAL CA: CPT

## 2022-10-11 PROCEDURE — 85027 COMPLETE CBC AUTOMATED: CPT

## 2022-10-11 PROCEDURE — 36415 COLL VENOUS BLD VENIPUNCTURE: CPT

## 2022-10-11 PROCEDURE — 80061 LIPID PANEL: CPT

## 2022-10-11 PROCEDURE — 86803 HEPATITIS C AB TEST: CPT

## 2022-10-12 LAB — HCV AB SER QL: NORMAL

## 2022-10-16 DIAGNOSIS — I10 ESSENTIAL HYPERTENSION: ICD-10-CM

## 2022-10-16 DIAGNOSIS — E78.00 HYPERCHOLESTEROLEMIA: Chronic | ICD-10-CM

## 2022-10-16 RX ORDER — ROSUVASTATIN CALCIUM 5 MG/1
TABLET, COATED ORAL
Qty: 90 TABLET | Refills: 1 | Status: SHIPPED | OUTPATIENT
Start: 2022-10-16

## 2022-10-17 RX ORDER — OLMESARTAN MEDOXOMIL 40 MG/1
TABLET ORAL
Qty: 90 TABLET | Refills: 3 | Status: SHIPPED | OUTPATIENT
Start: 2022-10-17

## 2022-10-17 RX ORDER — METOPROLOL SUCCINATE 50 MG/1
TABLET, EXTENDED RELEASE ORAL
Qty: 90 TABLET | Refills: 3 | Status: SHIPPED | OUTPATIENT
Start: 2022-10-17

## 2022-10-17 RX ORDER — AMLODIPINE BESYLATE 10 MG/1
TABLET ORAL
Qty: 90 TABLET | Refills: 3 | Status: SHIPPED | OUTPATIENT
Start: 2022-10-17

## 2022-10-18 ENCOUNTER — OFFICE VISIT (OUTPATIENT)
Dept: INTERNAL MEDICINE CLINIC | Facility: CLINIC | Age: 57
End: 2022-10-18
Payer: COMMERCIAL

## 2022-10-18 VITALS
HEART RATE: 70 BPM | DIASTOLIC BLOOD PRESSURE: 80 MMHG | BODY MASS INDEX: 45.55 KG/M2 | WEIGHT: 282.2 LBS | SYSTOLIC BLOOD PRESSURE: 122 MMHG

## 2022-10-18 DIAGNOSIS — I10 PRIMARY HYPERTENSION: Primary | Chronic | ICD-10-CM

## 2022-10-18 DIAGNOSIS — Z23 ENCOUNTER FOR IMMUNIZATION: ICD-10-CM

## 2022-10-18 DIAGNOSIS — K21.9 GASTROESOPHAGEAL REFLUX DISEASE WITHOUT ESOPHAGITIS: ICD-10-CM

## 2022-10-18 DIAGNOSIS — E78.00 HYPERCHOLESTEROLEMIA: Chronic | ICD-10-CM

## 2022-10-18 DIAGNOSIS — R73.01 IFG (IMPAIRED FASTING GLUCOSE): ICD-10-CM

## 2022-10-18 PROCEDURE — 99214 OFFICE O/P EST MOD 30 MIN: CPT | Performed by: INTERNAL MEDICINE

## 2022-10-18 PROCEDURE — 90682 RIV4 VACC RECOMBINANT DNA IM: CPT | Performed by: INTERNAL MEDICINE

## 2022-10-18 PROCEDURE — 90471 IMMUNIZATION ADMIN: CPT | Performed by: INTERNAL MEDICINE

## 2022-10-18 RX ORDER — PANTOPRAZOLE SODIUM 40 MG/1
40 TABLET, DELAYED RELEASE ORAL DAILY
Qty: 90 TABLET | Refills: 3 | Status: SHIPPED | OUTPATIENT
Start: 2022-10-18 | End: 2023-10-13

## 2022-10-18 NOTE — PROGRESS NOTES
[facility] OF [address] [place] [address]    NAME: Leslye Hook  AGE: 65 y o  SEX: male  : 1965     DATE: 10/18/2022     Assessment and Plan:     1  Primary hypertension    BP stable today in the office  Continue anti-HTN medication    - Basic metabolic panel; Future    2  Hypercholesterolemia    Cholesterol well controlled  Continue crestor     - Lipid panel; Future    3  IFG (impaired fasting glucose)    Fasting glucose was a little elevated  Work on diet, exercise, weight loss  Will check A1c with next appointment  - Hemoglobin A1C; Future    4  Encounter for immunization  - influenza vaccine, quadrivalent, recombinant, PF, 0 5 mL, for patients 18 yr+ (FLUBLOK)       Return in about 6 months (around 2023) for Follow-up  History of Present Illness:     [place] Glaze presents for follow-up  Labs are stable, but hasn't been doing the best with his diet  Weight has gone up  Review of Systems:     Review of Systems   Constitutional: Negative  Respiratory: Negative  Cardiovascular: Negative  Gastrointestinal: Negative  Musculoskeletal: Negative  Objective:     /80   Pulse 70   Wt 128 kg (282 lb 3 2 oz)   BMI 45 55 kg/m²     Physical Exam  Constitutional:       General: He is not in acute distress  Appearance: He is obese  He is not ill-appearing  Cardiovascular:      Rate and Rhythm: Normal rate and regular rhythm  Heart sounds: No murmur heard  Pulmonary:      Effort: Pulmonary effort is normal  No respiratory distress  Breath sounds: No wheezing  Abdominal:      General: Bowel sounds are normal  There is no distension  Tenderness: There is no abdominal tenderness  Musculoskeletal:      Right lower leg: No edema  Left lower leg: No edema  Neurological:      Mental Status: He is alert         [address]

## 2022-10-18 NOTE — PATIENT INSTRUCTIONS
Chronic Hypertension   AMBULATORY CARE:   Hypertension  is high blood pressure  Your blood pressure is the force of your blood moving against the walls of your arteries  Hypertension causes your blood pressure to get so high that your heart has to work much harder than normal  This can damage your heart  Even if you have hypertension for years, lifestyle changes, medicines, or both can help bring your blood pressure to normal   Call your local emergency number (911 in the [address] [facility]) or have someone call if:   You have chest pain  You have any of the following signs of a heart attack:      Squeezing, pressure, or pain in your chest    You may  also have any of the following:     Discomfort or pain in your back, neck, jaw, stomach, or arm    Shortness of breath    Nausea or vomiting    Lightheadedness or a sudden cold sweat    You become confused or have difficulty speaking  You suddenly feel lightheaded or have trouble breathing  Seek care immediately if:   You have a severe headache or vision loss  You have weakness in an arm or leg  Call your doctor or cardiologist if:   You feel faint, dizzy, confused, or drowsy  You have been taking your blood pressure medicine but your pressure is higher than your provider says it should be  You have questions or concerns about your condition or care  Treatment for chronic hypertension  may include medicine to lower your blood pressure and cholesterol levels  A low cholesterol level helps prevent heart disease and makes it easier to control your blood pressure  Heart disease can make your blood pressure harder to control  You may also need to make lifestyle changes  What you need to know about the stages of hypertension:       Normal blood pressure is 119/79 or lower   Your healthcare provider may only check your blood pressure each year if it stays at a normal level  Elevated blood pressure is 120/79 to 129/79   This is sometimes called prehypertension  Your healthcare provider may suggest lifestyle changes to help lower your blood pressure to a normal level  He or she may then check it again in 3 to 6 months  Stage 1 hypertension is 130/80  to 139/89   Your provider may recommend lifestyle changes, medication, and checks every 3 to 6 months until your blood pressure is controlled  Stage 2 hypertension is 140/90 or higher   Your provider will recommend lifestyle changes and have you take 2 kinds of hypertension medicines  You will also need to have your blood pressure checked monthly until it is controlled  Manage chronic hypertension:   Check your blood pressure at home  Avoid smoking, caffeine, and exercise at least 30 minutes before checking your blood pressure  Sit and rest for 5 minutes before you take your blood pressure  Extend your arm and support it on a flat surface  Your arm should be at the same level as your heart  Follow the directions that came with your blood pressure monitor  Check your blood pressure 2 times, 1 minute apart, before you take your medicine in the morning  Also check your blood pressure before your evening meal  Keep a record of your readings and bring it to your follow-up visits  Ask your healthcare provider what your blood pressure should be  Manage any other health conditions you have  Health conditions such as diabetes can increase your risk for hypertension  Follow your healthcare provider's instructions and take all your medicines as directed  Talk to your healthcare provider about any new health conditions you have recently developed  Ask about all medicines  Certain medicines can increase your blood pressure  Examples include oral birth control pills, decongestants, herbal supplements, and NSAIDs, such as ibuprofen  Your healthcare provider can tell you which medicines are safe for you to take  This includes prescription and over-the-counter medicines      Lifestyle changes you can make to lower your blood pressure: Your provider may want you to make more lifestyle changes if you are having trouble controlling your blood pressure  This may feel difficult over time, especially if you think you are making good changes but your pressure is still high  It might help to focus on one new change at a time  For example, try to add 1 more day of exercise, or exercise for an extra 10 minutes on 2 days  Small changes can make a big difference  Your healthcare provider can also refer you to specialists such as a dietitian who can help you make small changes  Your family members may be included in helping you learn to create lifestyle changes, such as the following:     Limit sodium (salt) as directed  Too much sodium can affect your fluid balance  Check labels to find low-sodium or no-salt-added foods  Some low-sodium foods use potassium salts for flavor  Too much potassium can also cause health problems  Your healthcare provider will tell you how much sodium and potassium are safe for you to have in a day  He or she may recommend that you limit sodium to 2,300 mg a day  Follow the meal plan recommended by your healthcare provider  A dietitian or your provider can give you more information on low-sodium plans or the DASH (Dietary Approaches to Stop Hypertension) eating plan  The DASH plan is low in sodium, processed sugar, unhealthy fats, and total fat  It is high in potassium, calcium, and fiber  These can be found in vegetables, fruit, and whole-grain foods  Be physically active throughout the day  Physical activity, such as exercise, can help control your blood pressure and your weight  Be physically active for at least 30 minutes per day, on most days of the week  Include aerobic activity, such as walking or riding a bicycle  Also include strength training at least 2 times each week  Your healthcare providers can help you create a physical activity plan  Decrease stress    This may help lower your blood pressure  Learn ways to relax, such as deep breathing or listening to music  Limit alcohol as directed  Alcohol can increase your blood pressure  A drink of alcohol is 12 ounces of beer, 5 ounces of wine, or 1½ ounces of liquor  Do not smoke  Nicotine and other chemicals in cigarettes and cigars can increase your blood pressure and also cause lung damage  Ask your healthcare provider for information if you currently smoke and need help to quit  E-cigarettes or smokeless tobacco still contain nicotine  Talk to your healthcare provider before you use these products  Follow up with your doctor or cardiologist as directed: You will need to return to have your blood pressure checked and to have other lab tests done  Write down your questions so you remember to ask them during your visits  © Copyright [employer] 2022 Information is for End User's use only and may not be sold, redistributed or otherwise used for commercial purposes  All illustrations and images included in CareNotes® are the copyrighted property of [employer]  or [facility] Cindi Newman   The above information is an  only  It is not intended as medical advice for individual conditions or treatments  Talk to your doctor, nurse or pharmacist before following any medical regimen to see if it is safe and effective for you

## 2022-10-26 NOTE — PROGRESS NOTES
CARDIOLOGY OFFICE VISIT  [facility]  [facility], [place], [address]  [facility], [place], [address]  Tel: [phone]      NAME: Johnny Gayle  AGE: 47 y o  SEX: male  : 1965   MRN: [id number]      Chief Complaint:  Chief Complaint   Patient presents with   [facility] follow-up - [facility], chest pain         History of Present Illness:   Patient comes for a consult being referred by [facility]  Patient was at [facility] for an episode of dizziness - he states he felt like the room was spinning  These spinning episodes have been going on for the last 1-1/2 years  Usually occur with neck movement  Denies associated or otherwise chest pain, SOB, palpitations, lightheadedness, syncope  Also denies swelling feet, orthopnea, PND, claudication  Occ neck stiffness+    HTN -  Has been hypertensive for many years  Taking medications regularly  Denies lightheadedness, headache, medication side effects  HLP -  Has had hyperlipidemia for many years  Taking statin regularly along with diet control  Denies myalgia  PCP closely monitoring the blood work  Morbid obesity - states he is trying to lose weight      Tobacco abuse -  Continues to smoke on a regular basis      Past Medical History:  Past Medical History:   Diagnosis Date    Hypercholesterolemia     Hypertension     Obesity     Sexual dysfunction     Psychosexual dysfunction with inhibited sexual excitement          Past Surgical History:  Past Surgical History:   Procedure Laterality Date    BACK SURGERY      spinal fusion    SEPTOPLASTY           Family History:  Family History   Problem Relation Age of Onset    Atrial fibrillation Mother     COPD Mother     Coronary artery disease Mother     Lung cancer Mother     Rheum arthritis Mother     Stroke Mother      Syndrome    Lung cancer Father     Prostate cancer Maternal Grandfather          Social History:  Social History     Social History    Marital status: /Civil Union     Spouse name: N/A    Number of children: N/A    Years of education: N/A     Social History Main Topics    Smoking status: Former Smoker     Quit date: 2/25/1998    Smokeless tobacco: Current User     Types: Chew    Alcohol use Yes      Comment: Occ alcohol use per [employer]     Drug use: No    Sexual activity: Yes     Partners: Female     Other Topics Concern    Not on file     Social History Narrative    No narrative on file         Active Problems:  Patient Active Problem List   Diagnosis    Other chest pain    Hypokalemia    Hypertension    Hypercholesterolemia    Obesity    Chewing tobacco nicotine dependence with nicotine-induced disorder    Allergic rhinitis    Lumbar radiculopathy    Dehydration         The following portions of the patient's history were reviewed and updated as appropriate: past medical history, past surgical history, past family history,  past social history, current medications, allergies and problem list       Review of Systems:  Constitutional: Denies fever, chills, fatigue  Eyes: Denies eye redness, eye discharge, double vision  ENT: Denies hearing loss, tinnitus, sneezing, nasal discharge, sore throat   Respiratory: Denies cough, expectoration, hemoptysis, shortness of breath  Cardiovascular: Denies chest pain, palpitations, orthopnea, PND, lower extremity swelling  Gastrointestinal: Denies abdominal pain, nausea, vomiting, hematemesis, diarrhea, bloody stools  Genito-Urinary: Denies dysuria, incontinence  Musculoskeletal: Denies back pain, joint pain, muscle pain  Neurologic: Denies confusion, lightheadedness, syncope, headache, focal weakness, seizures  Endocrine: Denies polyuria, polydipsia, temperature intolerance  Allergy and Immunology: Denies hives, insect bite sensitivity  Hematological and Lymphatic: Denies bleeding problems, swollen glands   Psychological: Denies depression, suicidal ideation, anxiety, panic  Dermatological: Denies pruritus, rash, skin lesion changes      Vitals:  Vitals:    04/05/18 1605   BP: 132/84   Pulse: 79   SpO2: 96%       Body mass index is 41 64 kg/m²  Weight (last 2 days)     Date/Time   Weight    04/05/18 1605  117 (258)              Physical Examination:  General: Patient is not in acute distress  Awake, alert, oriented in time, place and person  Responding to commands  Head: Normocephalic  Atraumatic  Eyes: Both pupils normal sized, round and reactive to light  Nonicteric  ENT: Normal external ear canals  Nares normal, no drainage  Lips and oral mucosa normal  Neck: Supple  JVP not raised  Trachea central  No thyromegaly  Lungs: Bilateral bronchovascular breath sounds with no crackles or rhonchi  Chest wall: No tenderness  Cardiovascular: RRR  S1 and S2 normal  No murmur, rub or gallop  Gastrointestinal: Abdomen soft, nontender  No guarding or rigidity  Liver and spleen not palpable  Bowel sounds present  Neurologic: Patient is awake, alert, oriented in time, place and person  Responding to command  Moving all extremities  Integumentary:  No skin rash  Lymphatic: No cervical lymphadenopathy  Back: Symmetric   No CVA tenderness  Extremities: No clubbing, cyanosis or edema      Laboratory Results:  CBC with diff:   Lab Results   Component Value Date    WBC 8 96 04/04/2018    RBC 5 39 04/04/2018    HGB 15 1 04/04/2018    HCT 45 5 04/04/2018    MCV 84 04/04/2018    MCH 28 0 04/04/2018    RDW 12 9 04/04/2018     04/04/2018       CMP:  Lab Results   Component Value Date    CREATININE 0 86 04/04/2018    CREATININE 0 77 10/29/2015    BUN 14 04/04/2018    BUN 15 10/29/2015     04/04/2018     10/29/2015    K 3 8 04/04/2018    K 4 2 10/29/2015     04/04/2018     10/29/2015    CO2 26 04/04/2018    CO2 28 10/29/2015    GLUCOSE 109 04/04/2018    GLUCOSE 82 10/29/2015    PROT 6 6 02/26/2018    PROT 7 0 10/29/2015    ALKPHOS 50 02/26/2018 ALKPHOS 57 10/29/2015    ALT 30 2018    ALT 36 10/29/2015    AST 14 2018    AST 14 10/29/2015       Lab Results   Component Value Date    HGBA1C 5 5 2018    MG 1 9 2018       Lab Results   Component Value Date    TROPONINI <0 02 2018    TROPONINI <0 02 2018    TROPONINI <0 02 2018    CKTOTAL 92 2017    CKTOTAL 52 2016    CKTOTAL 72 2016    CKTOTAL 102 10/29/2015    CKTOTAL 123 2015    CKTOTAL 121 2014       Lipid Profile:   Lab Results   Component Value Date    CHOL 187 2018    CHOL 136 2016    CHOL 170 2016     Lab Results   Component Value Date    HDL 43 2018    HDL 41 2016    HDL 51 2016     Lab Results   Component Value Date    LDLCALC 130 (H) 2018    LDLCALC 79 2016    LDLCALC 103 (H) 2016     Lab Results   Component Value Date    TRIG 71 2018    TRIG 81 2016    TRIG 78 2016       Cardiac testing:   Results for orders placed during the hospital encounter of 18   Echo complete with contrast if indicated    Narrative [address] [place] [id number] [phone]    Transthoracic Echocardiogram  2D, M-mode, Doppler, and Color Doppler    Study date:  2018    Patient: Dionte Hill  MR number: [id number]  Account number: [de-identified]  : 1965  Age: 47 years  Gender: Male  Status: Outpatient  Location: Bedside  Height: 66 in  Weight: 251 lb  BP: 153/ 96 mmHg    Indications: Chest pain, pressure nad tightness, Assess left ventricular function  Diagnoses: R07 9 - Chest pain, unspecified    Sonographer:   [facility] [place]  Interpreting Physician:  Claudetta Reichmann, MD  Referring Physician:  Binu Pulido MD  Group:  [facility]    SUMMARY    PROCEDURE INFORMATION:  This was a technically difficult study    Echocardiographic views were limited due to poor acoustic window availability, decreased penetration, and lung interference  LEFT VENTRICLE:  Systolic function was normal  Ejection fraction was estimated to be 60 %  There were no regional wall motion abnormalities  Left ventricular diastolic function parameters were normal     RIGHT VENTRICLE:  The size was normal   Systolic function was normal     MITRAL VALVE:  There was trace regurgitation  TRICUSPID VALVE:  There was trace regurgitation  Pulmonary artery systolic pressure was within the normal range  HISTORY: Symptoms: chest pain  PRIOR HISTORY: Risk factors: hypertension, hypercholesterolemia, and morbid obesity  PROCEDURE: The procedure was performed at the bedside  This was a routine study  The transthoracic approach was used  The study included complete 2D imaging, M-mode, complete spectral Doppler, and color Doppler  The heart rate was 69 bpm,  at the start of the study  Images were obtained from the parasternal, apical, subcostal, and suprasternal notch acoustic windows  Echocardiographic views were limited due to poor acoustic window availability, decreased penetration, and  lung interference  This was a technically difficult study  LEFT VENTRICLE: Size was normal  Systolic function was normal  Ejection fraction was estimated to be 60 %  There were no regional wall motion abnormalities  Wall thickness was normal  No evidence of apical thrombus  DOPPLER: Left  ventricular diastolic function parameters were normal     RIGHT VENTRICLE: The size was normal  Systolic function was normal  Wall thickness was normal     LEFT ATRIUM: Size was normal     RIGHT ATRIUM: Size was normal     MITRAL VALVE: Valve structure was normal  There was normal leaflet separation  DOPPLER: The transmitral velocity was within the normal range  There was no evidence for stenosis  There was trace regurgitation  AORTIC VALVE: The valve was trileaflet  Leaflets exhibited mildly increased thickness and normal cuspal separation   DOPPLER: Transaortic velocity was within the normal range  There was no evidence for stenosis  There was no significant  regurgitation  TRICUSPID VALVE: The valve structure was normal  There was normal leaflet separation  DOPPLER: The transtricuspid velocity was within the normal range  There was no evidence for stenosis  There was trace regurgitation  Pulmonary artery  systolic pressure was within the normal range  PULMONIC VALVE: Leaflets exhibited normal thickness, no calcification, and normal cuspal separation  DOPPLER: The transpulmonic velocity was within the normal range  There was no significant regurgitation  PERICARDIUM: There was no pericardial effusion  The pericardium was normal in appearance  AORTA: The root exhibited normal size  SYSTEMIC VEINS: IVC: The inferior vena cava was normal in size  SYSTEM MEASUREMENT TABLES    2D  %FS: 36 6 %  Ao Diam: 2 9 cm  EDV(Teich): 93 6 ml  EF(Teich): 66 5 %  ESV(Teich): 31 3 ml  IVSd: 1 cm  LA Area: 18 1 cm2  LA Diam: 4 cm  LVEDV MOD A4C: 130 5 ml  LVEF MOD A4C: 65 5 %  LVESV MOD A4C: 45 ml  LVIDd: 4 5 cm  LVIDs: 2 9 cm  LVLd A4C: 9 cm  LVLs A4C: 6 7 cm  LVPWd: 0 9 cm  RA Area: 19 3 cm2  RVIDd: 3 3 cm  SV MOD A4C: 85 5 ml  SV(Teich): 62 2 ml    CW  TR Vmax: 2 3 m/s  TR maxP 8 mmHg    MM  TAPSE: 2 3 cm    PW  E': 0 1 m/s  E/E': 8  MV A Tono: 0 8 m/s  MV Dec Yell: 3 6 m/s2  MV DecT: 227 5 ms  MV E Tono: 0 8 m/s  MV E/A Ratio: 1  MV PHT: 66 ms  MVA By PHT: 3 3 cm2    [employer] Accredited Echocardiography Laboratory    Prepared and electronically signed by    Aranza Franz MD  Signed [id number] 21:17:05       No results found for this or any previous visit  No results found for this or any previous visit  No procedure found    Results for orders placed during the hospital encounter of 18   NM myocardial perfusion spect (stress and/or rest)    Narrative [address] [place] [address] [id number] [phone]    Rest/Stress Gated SPECT Myocardial Perfusion Imaging After Exercise    Patient: Rolando Bucio  MR number: [id number]  Account number: [de-identified]  : 1965  Age: 47 years  Gender: Male  Status: Outpatient  Location: Stress lab  Height: 66 in  Weight: 250 lb  BP: 118/ 80 mmHg    Allergies: NO KNOWN ALLERGIES    Diagnosis: R07 9 - Chest pain, unspecified    Primary Physician:  Romulo Galindo MD  RN:  Jacqueline Gallardo  Interpreting Physician:  Bradley Rendon MD  Referring Physician:  Toney Veloz PA-C  Group:  [facility]  Report Prepared By[de-identified]  Lynette Crandall    INDICATIONS: Detection of coronary artery disease  HISTORY: The patient is a 47year old  male  Chest pain status: recent onset chest pain, radiation to left arm with numbness and tingling  Coronary artery disease risk factors: dyslipidemia, hypertension, and smoking  Medications:  an ARB, a diuretic, aspirin, and a lipid lowering agent  PHYSICAL EXAM: Baseline physical exam screening: no wheezes audible, no loud murmur  REST ECG: Normal baseline ECG  PROCEDURE: The study was performed in the stress lab  Treadmill exercise testing was performed, using the Maikel protocol  Gated SPECT myocardial perfusion imaging was performed after stress and at rest  Systolic blood pressure was 118  mmHg, at the start of the study  Diastolic blood pressure was 80 mmHg, at the start of the study  The heart rate was 75 bpm, at the start of the study  Patient was not experiencing chest pain at the time of the injection of the  radiopharmaceutical  IV double checked      MAIKEL PROTOCOL:  HR bpm SBP mmHg DBP mmHg Symptoms ST change Rhythm/conduct  Baseline 75 118 80 none none NSR, no ectopy  Stage 1 97 140 80 none -- --  Stage 2 117 144 82 none -- --  Stage 3 148 160 80 mild dyspnea, mild fatigue -- --  Recovery 1 78 178 78 subsiding -- --  Recovery 2 102 134 80 none -- --  No medications or fluids given     STRESS SUMMARY: Duration of exercise was 9 min  The patient exercised to protocol stage 3  Maximal work rate was 10 4 METs  Functional capacity was normal  Maximal heart rate during stress was 151 bpm ( 90 % of maximal predicted heart  rate)  The heart rate response to stress was normal  There was normal resting blood pressure with an appropriate response to stress  The rate-pressure product for the peak heart rate and blood pressure was 74893  There was no chest pain  during stress  The stress test was terminated due to achievement of maximal (symptom limited) exercise, mild dyspnea, and mild fatigue  Pre oxygen saturation: 98 %  Peak oxygen saturation: 98 %  The stress ECG was negative for ischemia and  normal  Arrhythmia during stress: isolated atrial premature beats and isolated premature ventricular beats  ISOTOPE ADMINISTRATION:  Resting isotope administration Stress isotope administration  Agent Sestamibi Sestamibi  Dose 11 mCi 33 mCi  Date 02/26/2018 02/26/2018  Injection-image interval 30 min 30 min    The radiopharmaceutical was injected one minute before the end of exercise  The radiopharmaceutical was injected at the peak effect of pharmacologic stress  MYOCARDIAL PERFUSION IMAGING:  The image quality was fair  Left ventricular size was normal     PERFUSION DEFECTS:  -  There was a moderate to large myocardial perfusion defect of the basal-mid inferior and inferoseptal wall and apical cap region which significantly improved on the supine stress images and was not seen on the prone stress images  indicating that it was likely a diaphragmatic attenuation artifact  GATED SPECT:  The calculated left ventricular ejection fraction was 61 %  Left ventricular ejection fraction was within normal limits by visual estimate  There was no diagnostic evidence for left ventricular regional abnormality  SUMMARY:  -  Stress results: Duration of exercise was 9 min  Target heart rate was achieved  There was no chest pain during stress  -  ECG conclusions: The stress ECG was negative for ischemia and normal  Arrhythmia during stress: isolated atrial premature beats and isolated premature ventricular beats  -  Perfusion imaging: There was a moderate to large myocardial perfusion defect of the basal-mid inferior and inferoseptal wall and apical cap region which significantly improved on the supine stress images and was not seen on the prone  stress images indicating that it was likely a diaphragmatic attenuation artifact   -  Gated SPECT: The calculated left ventricular ejection fraction was 61 %  Left ventricular ejection fraction was within normal limits by visual estimate  There was no diagnostic evidence for left ventricular regional abnormality  IMPRESSIONS: Normal study after maximal exercise  There was a moderate to large myocardial perfusion defect of the basal-mid inferior and inferoseptal wall and apical cap region which significantly improved on the supine stress images and  was not seen on the prone stress images indicating that it was likely a diaphragmatic attenuation artifact  Left ventricular systolic function was normal     Prepared and signed by    Charley Mcdonald MD  Signed 02/26/2018 16:49:00         Imaging: I have personally reviewed pertinent reports  Procedure: Xr Chest 2 Views    Result Date: 4/4/2018  Narrative: CHEST INDICATION:   dizziness/hypertension  COMPARISON:  02/25/2018 EXAM PERFORMED/VIEWS:  XR CHEST PA & LATERAL  The frontal view was performed utilizing dual energy radiographic technique  Images: 4 FINDINGS: Cardiomediastinal silhouette appears unremarkable  No evidence of heart failure  The lungs are clear  No pneumothorax or pleural effusion  Osseous structures appear within normal limits for patient age  Impression: No acute cardiopulmonary disease   Findings concur with the preliminary report by the referring clinician already in PACS and/or our electronic record EPIC  Workstation performed: [id number]     Procedure: Ct Head Without Contrast    Result Date: 4/4/2018  Narrative: CT BRAIN - WITHOUT CONTRAST INDICATION:   dizziness/hypertension  Vertigo  Dizziness with change in head position  COMPARISON:  None  TECHNIQUE:  CT examination of the brain was performed  In addition to axial images, coronal 2D reformatted images were created and submitted for interpretation  Radiation dose length product (DLP) for this visit:  929 mGy-cm   This examination, like all CT scans performed in the [facility], was performed utilizing techniques to minimize radiation dose exposure, including the use of iterative reconstruction and automated exposure control  IMAGE QUALITY:  Diagnostic  FINDINGS: PARENCHYMA:  No intracranial mass, mass effect or midline shift  No CT signs of acute infarction  No acute intracranial hemorrhage  VENTRICLES AND EXTRA-AXIAL SPACES:  Normal for the patient's age  VISUALIZED ORBITS AND PARANASAL SINUSES:  No acute abnormality involving the orbits  Mild scattered sinus mucosal thickening is noted  No fluid levels are seen  CALVARIUM AND EXTRACRANIAL SOFT TISSUES:  Normal      Impression: No acute intracranial abnormality  Workstation performed: [id number]       Medications:    Current Outpatient Prescriptions:     diflorasone (PSORCON) 0 05 % cream, Apply topically 2 (two) times a day, Disp: , Rfl:     meclizine (ANTIVERT) 25 mg tablet, Take 1 tablet (25 mg total) by mouth 3 (three) times a day as needed for dizziness, Disp: 30 tablet, Rfl: 0    rosuvastatin (CRESTOR) 10 MG tablet, Take 0 5 tablets (5 mg total) by mouth daily, Disp: 30 tablet, Rfl: 5    valsartan (DIOVAN) 160 mg tablet, Take 1 tablet (160 mg total) by mouth daily, Disp: 30 tablet, Rfl: 5      Allergies:  No Known Allergies      Assessment and Plan:    Records from ER visit obtained, reviewed and discussed with the patient and spouse in detail    1   Vertigo   had a detailed discussion with the patient and spouse regarding causes and its management  Avoid extreme neck movements  Meclizine p r n     2  Spondylosis of cervical region without myelopathy or radiculopathy    Avoid extreme neck movements  Tylenol for pain  Next strengthening exercises recommended  3  Essential hypertension   BP stable continue current medications    4  Mixed hyperlipidemia   continue statin  Dietary modification discussed  His PCP closely monitors his blood work    5  Morbid obesity due to excess calories ([address] )   strongly counseled to try to lose weight    6  Tobacco abuse   strongly counseled to stop smoking    Recommend aggressive risk factor modification and therapeutic lifestyle changes  Low-salt, low-calorie, low-fat, low-cholesterol diet with regular exercise and to optimize weight  I will defer the ordering and monitoring of necessity lab studies to you, but I am available and happy to review and manage any of the data at your request in the future  Discussed concepts of atherosclerosis, including signs and symptoms of cardiac disease  Previous studies were reviewed  Safety measures were reviewed  Questions were entertained and answered  Patient was advised to report any problems requiring medical attention  Follow-up with PCP and appropriate specialist and lab work as discussed  Return for follow up visit as scheduled or earlier, if needed  Thank you for allowing me to participate in the care and evaluation of your patient  Should you have any questions, please feel free to contact me        Juan Redding MD  9/0/0314,2:36 PM no

## 2023-01-22 DIAGNOSIS — K21.9 GASTROESOPHAGEAL REFLUX DISEASE WITHOUT ESOPHAGITIS: ICD-10-CM

## 2023-01-22 RX ORDER — PANTOPRAZOLE SODIUM 40 MG/1
TABLET, DELAYED RELEASE ORAL
Qty: 180 TABLET | Refills: 2 | Status: SHIPPED | OUTPATIENT
Start: 2023-01-22 | End: 2023-01-23

## 2023-01-23 ENCOUNTER — TELEPHONE (OUTPATIENT)
Dept: OTHER | Facility: OTHER | Age: 58
End: 2023-01-23

## 2023-01-25 NOTE — TELEPHONE ENCOUNTER
Madeline resendiz from [facility] just to inform pt's prior auth for pantoprazole was approved for the next 12 months with max dose of 2x a day

## 2023-01-30 ENCOUNTER — TELEPHONE (OUTPATIENT)
Dept: OBGYN CLINIC | Facility: CLINIC | Age: 58
End: 2023-01-30

## 2023-01-30 NOTE — PROGRESS NOTES
Patient Name:  Jimbo Jeff  MRN:  [id number]    [address] [place] [address]     1  Primary osteoarthritis of both knees  -     Large joint arthrocentesis: R knee  -     Large joint arthrocentesis: L knee  -     Durable Medical Equipment    2  Right knee pain, unspecified chronicity  -     XR knee 4+ vw right injury; Future; Expected date: 01/31/2023    3  Left knee pain, unspecified chronicity  -     XR knee 4+ vw left injury; Future; Expected date: 01/31/2023        63 y o  male with Bilateral knee osteoarthritis  X-rays reviewed in office today with patient  In depth discussion had with patient regarding continued nonoperative management of Bilateral  knee osteoarthritis including OTC oral analgesics, topical analgesics, formal outpatient physical therapy for strengthening of quads, hamstrings, hip abductors, ice application, Medial  bracing, corticosteroid, viscosupplementation injections  If nonoperative treatment performed with persistent symptoms, also discussed surgical intervention by means of total knee arthroplasty  Briefly discussed BMI requirements to move forward with total knee arthroplasty  Advised patient weight loss will also help off load joints  Patient verbalized understanding of the above and would like to proceed forward with bilateral knee corticosteroid injections  Patient tolerated procedure well without complications  Also placed order today for bilateral medial  braces to wear during ambulation  Advised patient to stay away from NSAIDs and consider topical analgesics, OTC oral tylenol as needed for pain relief  In the future, can consider visco injections if symptoms persist or worsen  Follow up in 3 months  Chief Complaint     Bilateral knee pain    History of the Present Illness     Jimbo Jeff is a 63 y o  male with Bilateral knee pain ongoing for many years  Patient admits to history of Lyme disease which was uncontrolled for many years; currently under control  He admits he is moderate difficulty with long distance ambulation  Patient reports history of bilateral knee corticosteroid injections, about 3-4 years ago  History of labor intensive occupations  He cannot take NSAIDs; had GI bleed in the past  He uses medical marijuana  Review of Systems     Review of Systems   Constitutional: Negative for chills and fever  HENT: Negative for ear pain and sore throat  Eyes: Negative for pain and visual disturbance  Respiratory: Negative for cough and shortness of breath  Cardiovascular: Negative for chest pain and palpitations  Gastrointestinal: Negative for abdominal pain and vomiting  Genitourinary: Negative for dysuria and hematuria  Musculoskeletal: Negative for arthralgias and back pain  Skin: Negative for color change and rash  Neurological: Negative for seizures and syncope  All other systems reviewed and are negative  Physical Exam     /94 (BP Location: Left arm)   Pulse 71   Ht 5' 6" (1 676 m)   Wt 128 kg (282 lb)   BMI 45 52 kg/m²     RightKnee  Range of motion from 0 to 115  There is crepitus with range of motion  There is no effusion  There is tenderness over the medial joint line  There is 5/5 quadriceps strength and preserved tone  The patient is able to perform a straight leg raise  negative patellar grind test   Varus stress testing reveals no instability at 0 and 30 degrees   Valgus stress testing reveals no instability at 0 and 30 degrees  The patient is neurovascular intact distally  Left Knee  Range of motion from 0 to 105  There is crepitus with range of motion  There is no effusion  There is tenderness over the medial joint line  There is 5/5 quadriceps strength and preserved tone  The patient is able to perform a straight leg raise        negative patellar grind test   Varus stress testing reveals no instability at 0 and 30 degrees   Valgus stress testing reveals no instability at 0 and 30 degrees  The patient is neurovascular intact distally  Eyes:  Anicteric sclerae  Neck:  Supple  Lungs:  Normal respiratory effort  Cardiovascular:  Capillary refill is less than 2 seconds  Skin:  Intact without erythema  Neurologic:  Sensation grossly intact to light touch  Psychiatric:  Mood and affect are appropriate  Data Review     I have personally reviewed pertinent films in PACS, and my interpretation follows:    X-rays taken 01/31/2023 of Right knee independently reviewed and demonstrate severe medial compartment and mild patellofemoral compartment degenerative changes with sclerosis and small osteophytes  No fracture or dislocation  X-rays taken 01/31/2023 of Left knee independently reviewed and demonstrate  severe medial compartment and mild patellofemoral compartment degenerative changes  No acute fracture or dislocation  Past Medical History:   Diagnosis Date   • GERD (gastroesophageal reflux disease)    • Hypercholesterolemia    • Hypertension    • Lyme disease 3/19/2019   • Obesity    • Sexual dysfunction     Psychosexual dysfunction with inhibited sexual excitement        Past Surgical History:   Procedure Laterality Date   • BACK SURGERY      spinal fusion, LUMBAR   • CARDIAC CATHETERIZATION     • CARDIAC CATHETERIZATION     • CARDIAC CATHETERIZATION     • EGD AND COLONOSCOPY N/A 6/15/2018    Procedure: EGD AND COLONOSCOPY;  Surgeon: Anat Presley MD;  Location: [facility]; Service: Gastroenterology   • DE COLONOSCOPY FLX DX W/[facility] WHEN PFRMD N/A 3/26/2019    Procedure: COLONOSCOPY;  Surgeon: Geraldine Hernandez MD;  [facility]: [facility]; Service: Gastroenterology   • DE ESOPHAGOGASTRODUODENOSCOPY TRANSORAL DIAGNOSTIC N/A 3/26/2019    Procedure: ESOPHAGOGASTRODUODENOSCOPY (EGD); Surgeon: Geraldine Hernandez MD;  Location: [facility];   Service: Gastroenterology   • SEPTOPLASTY         No Known Allergies    Current Outpatient Medications on File Prior to Visit Medication Sig Dispense Refill   • amLODIPine (NORVASC) 10 mg tablet TAKE 1 TABLET BY MOUTH EVERY DAY 90 tablet 3   • aspirin 81 mg chewable tablet Chew 1 tablet (81 mg total) daily  0   • betamethasone, augmented, (DIPROLENE-AF) 0 05 % cream Apply topically 2 (two) times a day 50 g 3   • ibuprofen (MOTRIN) 600 mg tablet Take 1 tablet (600 mg total) by mouth every 8 (eight) hours 90 tablet 1   • metoprolol succinate (TOPROL-XL) 50 mg 24 hr tablet TAKE 1 TABLET DAILY 90 tablet 3   • olmesartan (BENICAR) 40 mg tablet TAKE 1 TABLET DAILY 90 tablet 3   • pantoprazole (PROTONIX) 40 mg tablet TAKE 1 TABLET BY MOUTH 2 TIMES A DAY BEFORE MEALS  180 tablet 0   • rosuvastatin (CRESTOR) 5 mg tablet TAKE 1 TABLET BY MOUTH EVERY DAY 90 tablet 1     No current facility-administered medications on file prior to visit         Social History     Tobacco Use   • Smoking status: Former     Packs/day: 0 25     Years: 10 00     Pack years: 2 50     Types: Cigarettes     Quit date: 1998     Years since quittin 9   • Smokeless tobacco: Former     Types: Chew     Quit date: 3/13/2017   Vaping Use   • Vaping Use: Never used   Substance Use Topics   • Alcohol use: Yes     Comment: Occ alcohol use per [employer]    • Drug use: Yes     Types: Marijuana     Comment: medical       Family History   Problem Relation Age of Onset   • Atrial fibrillation Mother    • COPD Mother    • Coronary artery disease Mother    • Lung cancer Mother    • Rheum arthritis Mother    • Stroke Mother         Syndrome   • Lung cancer Father    • Prostate cancer Maternal Grandfather    • Heart defect Daughter    • Arthritis Family              Procedures Performed     Large joint arthrocentesis: R knee  Universal Protocol:  Risks and benefits: risks, benefits and alternatives were discussed  Consent given by: patient  Patient identity confirmed: verbally with patient    Procedure Details  Location: knee - R knee  Needle size: 22 G  Approach: lateral  Medications administered: 2 mL bupivacaine 0 25 %; 2 mL lidocaine 1 %; 2 mL methylPREDNISolone acetate 40 mg/mL    Patient tolerance: patient tolerated the procedure well with no immediate complications  Dressing:  Sterile dressing applied    Large joint arthrocentesis: L knee  Universal Protocol:  Risks and benefits: risks, benefits and alternatives were discussed  Consent given by: patient  Patient identity confirmed: verbally with patient    Procedure Details  Location: knee - L knee  Needle size: 22 G  Approach: lateral  Medications administered: 2 mL bupivacaine 0 25 %; 2 mL lidocaine 1 %; 2 mL methylPREDNISolone acetate 40 mg/mL    Patient tolerance: patient tolerated the procedure well with no immediate complications  Dressing:  Sterile dressing applied              Darlene Espinosa DO

## 2023-01-31 ENCOUNTER — OFFICE VISIT (OUTPATIENT)
Dept: OBGYN CLINIC | Facility: CLINIC | Age: 58
End: 2023-01-31

## 2023-01-31 ENCOUNTER — APPOINTMENT (OUTPATIENT)
Dept: RADIOLOGY | Facility: CLINIC | Age: 58
End: 2023-01-31

## 2023-01-31 VITALS
HEART RATE: 71 BPM | WEIGHT: 282 LBS | BODY MASS INDEX: 45.32 KG/M2 | SYSTOLIC BLOOD PRESSURE: 157 MMHG | DIASTOLIC BLOOD PRESSURE: 94 MMHG | HEIGHT: 66 IN

## 2023-01-31 DIAGNOSIS — M25.561 RIGHT KNEE PAIN, UNSPECIFIED CHRONICITY: ICD-10-CM

## 2023-01-31 DIAGNOSIS — M25.562 LEFT KNEE PAIN, UNSPECIFIED CHRONICITY: ICD-10-CM

## 2023-01-31 DIAGNOSIS — M17.0 PRIMARY OSTEOARTHRITIS OF BOTH KNEES: Primary | ICD-10-CM

## 2023-01-31 RX ORDER — METHYLPREDNISOLONE ACETATE 40 MG/ML
2 INJECTION, SUSPENSION INTRA-ARTICULAR; INTRALESIONAL; INTRAMUSCULAR; SOFT TISSUE
Status: COMPLETED | OUTPATIENT
Start: 2023-01-31 | End: 2023-01-31

## 2023-01-31 RX ORDER — LIDOCAINE HYDROCHLORIDE 10 MG/ML
2 INJECTION, SOLUTION INFILTRATION; PERINEURAL
Status: COMPLETED | OUTPATIENT
Start: 2023-01-31 | End: 2023-01-31

## 2023-01-31 RX ORDER — BUPIVACAINE HYDROCHLORIDE 2.5 MG/ML
2 INJECTION, SOLUTION INFILTRATION; PERINEURAL
Status: COMPLETED | OUTPATIENT
Start: 2023-01-31 | End: 2023-01-31

## 2023-01-31 RX ADMIN — BUPIVACAINE HYDROCHLORIDE 2 ML: 2.5 INJECTION, SOLUTION INFILTRATION; PERINEURAL at 13:28

## 2023-01-31 RX ADMIN — LIDOCAINE HYDROCHLORIDE 2 ML: 10 INJECTION, SOLUTION INFILTRATION; PERINEURAL at 13:28

## 2023-01-31 RX ADMIN — METHYLPREDNISOLONE ACETATE 2 ML: 40 INJECTION, SUSPENSION INTRA-ARTICULAR; INTRALESIONAL; INTRAMUSCULAR; SOFT TISSUE at 13:28

## 2023-02-21 ENCOUNTER — OFFICE VISIT (OUTPATIENT)
Dept: GASTROENTEROLOGY | Facility: CLINIC | Age: 58
End: 2023-02-21

## 2023-02-21 VITALS
HEIGHT: 66 IN | HEART RATE: 63 BPM | DIASTOLIC BLOOD PRESSURE: 70 MMHG | BODY MASS INDEX: 45.96 KG/M2 | WEIGHT: 286 LBS | OXYGEN SATURATION: 97 % | SYSTOLIC BLOOD PRESSURE: 138 MMHG

## 2023-02-21 DIAGNOSIS — K21.9 GASTROESOPHAGEAL REFLUX DISEASE WITHOUT ESOPHAGITIS: ICD-10-CM

## 2023-02-21 RX ORDER — PANTOPRAZOLE SODIUM 40 MG/1
40 TABLET, DELAYED RELEASE ORAL
Qty: 180 TABLET | Refills: 3 | Status: SHIPPED | OUTPATIENT
Start: 2023-02-21

## 2023-02-21 NOTE — PROGRESS NOTES
[facility] - Outpatient Follow-up Note  Say Nieves 63 y o  male MRN: [id number]  Encounter: [id number]          ASSESSMENT AND PLAN:      1  Gastroesophageal reflux disease    Patient presents for follow up  He has a history of chronic GERD and gastritis  EGD in 2019 showed moderate gastritis and biopsies were negative for h pylori  EGD in 2018 showed gastritis and duodenitis  He was recently able to decrease his Pantoprazole 40mg from BID to just once a day (in the past, he was unsuccessful)  He is doing well with only occasional breakthrough GERD symptoms  Will continue current regimen with Pantoprazole 40mg po daily  GERD modifications  Follow up in 1 year or sooner if needed  ______________________________________________________________________    SUBJECTIVE:  Patient is a pleasant 63year old male who presents to the office for follow up  He has a history of GERD and gastritis  He is maintained on Pantoprazole  He was previously on Pantoprazole BID and unable to lower the dose due to symptoms  However, over the fall he was successful and is now taking the medication once a day  He reports occasional breakthrough heartburn when he eats typical GERD trigger foods or eats too close to bedtime  No nausea or vomiting  No abdominal pain  No blood in the stool  He had a colonoscopy in 2019 which showed diverticulosis  No family history of esophageal, stomach, or colon cancer  REVIEW OF SYSTEMS IS OTHERWISE NEGATIVE        Historical Information   Past Medical History:   Diagnosis Date   • GERD (gastroesophageal reflux disease)    • Hypercholesterolemia    • Hypertension    • Lyme disease 3/19/2019   • Obesity    • Sexual dysfunction     Psychosexual dysfunction with inhibited sexual excitement      Past Surgical History:   Procedure Laterality Date   • BACK SURGERY      spinal fusion, LUMBAR   • CARDIAC CATHETERIZATION     • CARDIAC CATHETERIZATION     • CARDIAC CATHETERIZATION     • COLONOSCOPY  2019   • EGD AND COLONOSCOPY N/A 06/15/2018    Procedure: EGD AND COLONOSCOPY;  Surgeon: Amanda Mitchell MD;  [facility]: [facility]; Service: Gastroenterology   • MT COLONOSCOPY FLX DX W/[facility] [address] WHEN PFRMD N/A 2019    Procedure: COLONOSCOPY;  Surgeon: Chasity Ashley MD;  [facility]; Service: Gastroenterology   • MT ESOPHAGOGASTRODUODENOSCOPY TRANSORAL DIAGNOSTIC N/A 2019    Procedure: ESOPHAGOGASTRODUODENOSCOPY (EGD); Surgeon: Chasity Ashley MD;  Location[facility];   Service: Gastroenterology   • SEPTOPLASTY     • UPPER GASTROINTESTINAL ENDOSCOPY  2019     Social History   Social History     Substance and Sexual Activity   Alcohol Use Yes   • Alcohol/week: 4 0 standard drinks   • Types: 4 Standard drinks or equivalent per week    Comment: Occ alcohol use per [employer]      Social History     Substance and Sexual Activity   Drug Use Yes   • Types: Marijuana    Comment: medical     Social History     Tobacco Use   Smoking Status Former   • Packs/day: 0 25   • Years: 5 00   • Pack years: 1 25   • Types: Cigarettes   • Start date: 1984   • Quit date: 10/20/1989   • Years since quittin 3   Smokeless Tobacco Current   • Types: Chew   • Last attempt to quit: 2019     Family History   Problem Relation Age of Onset   • Atrial fibrillation Mother    • COPD Mother    • Coronary artery disease Mother    • Lung cancer Mother    • Rheum arthritis Mother    • Stroke Mother         Syndrome   • Arthritis Mother    • Cancer Mother    • Hypertension Mother    • Lung cancer Father    • Cancer Father    • Prostate cancer Maternal Grandfather    • Heart defect Daughter    • Arthritis Family        Meds/Allergies       Current Outpatient Medications:   •  amLODIPine (NORVASC) 10 mg tablet  •  aspirin 81 mg chewable tablet  •  betamethasone, augmented, (DIPROLENE-AF) 0 05 % cream  •  ibuprofen (MOTRIN) 600 mg tablet  •  metoprolol succinate (TOPROL-XL) 50 mg 24 hr tablet  •  olmesartan (BENICAR) 40 mg tablet  •  pantoprazole (PROTONIX) 40 mg tablet  •  rosuvastatin (CRESTOR) 5 mg tablet    No Known Allergies        Objective     Blood pressure 138/70, pulse 63, height 5' 6" (1 676 m), weight 130 kg (286 lb), SpO2 97 %  Body mass index is 46 16 kg/m²  PHYSICAL EXAM:      General Appearance:   Alert, cooperative, no distress   HEENT:   Normocephalic, atraumatic, anicteric      Neck:  Supple, symmetrical, trachea midline   Lungs:   Clear to auscultation bilaterally; no rales, rhonchi or wheezing; respirations unlabored    Heart[de-identified]   Regular rate and rhythm; no murmur, rub, or gallop  Abdomen:   Soft, non-tender, non-distended; normal bowel sounds; no masses, no organomegaly    Genitalia:   Deferred    Rectal:   Deferred    Extremities:  No cyanosis, clubbing or edema    Pulses:  2+ and symmetric    Skin:  No jaundice, rashes, or lesions    Lymph nodes:  No palpable cervical lymphadenopathy        Lab Results:   No visits with results within 1 Day(s) from this visit     Latest known visit with results is:   Appointment on 10/11/2022   Component Date Value   • WBC 10/11/2022 5 49    • RBC 10/11/2022 5 09    • Hemoglobin 10/11/2022 14 0    • Hematocrit 10/11/2022 45 2    • MCV 10/11/2022 89    • MCH 10/11/2022 27 5    • MCHC 10/11/2022 31 0 (L)    • RDW 10/11/2022 12 7    • Platelets 66/00/1246 159    • MPV 10/11/2022 9 1    • Sodium 10/11/2022 142    • Potassium 10/11/2022 4 3    • Chloride 10/11/2022 109 (H)    • CO2 10/11/2022 28    • ANION GAP 10/11/2022 5    • BUN 10/11/2022 19    • Creatinine 10/11/2022 1 00    • Glucose, Fasting 10/11/2022 105 (H)    • Calcium 10/11/2022 9 3    • eGFR 10/11/2022 83    • PSA 10/11/2022 0 4    • Cholesterol 10/11/2022 129    • Triglycerides 10/11/2022 62    • HDL, Direct 10/11/2022 43    • LDL Calculated 10/11/2022 74    • Hepatitis C Ab 10/11/2022 Non-reactive          Radiology Results:   XR knee 4+ vw left injury    Result Date: 2/3/2023  Narrative: LEFT KNEE INDICATION:   M25 562: Pain in left knee  COMPARISON:  4/9/2019 VIEWS:  XR KNEE 4+ VW LEFT INJURY FINDINGS: There is no acute fracture or dislocation  There is no joint effusion  Degenerative change with severe medial compartment joint space narrowing  No lytic or blastic osseous lesion  Soft tissues are unremarkable  Impression: Degenerative osteoarthritis without evidence of acute osseous abnormality  Workstation performed: [id number]     XR knee 4+ vw right injury    Result Date: 2/3/2023  Narrative: RIGHT KNEE INDICATION:   M25 561: Pain in right knee  COMPARISON:  4/9/2019 VIEWS:  XR KNEE 4+ VW RIGHT INJURY FINDINGS: There is no acute fracture or dislocation  There is no joint effusion  Degenerative change including severe medial compartment joint space narrowing  No lytic or blastic osseous lesion  Soft tissues are unremarkable  Impression: Degenerative osteoarthritis without evidence of acute osseous abnormality   Workstation performed: [id number]

## 2023-02-26 ENCOUNTER — HOSPITAL ENCOUNTER (EMERGENCY)
Facility: HOSPITAL | Age: 58
Discharge: HOME | End: 2023-02-26
Attending: EMERGENCY MEDICINE | Admitting: EMERGENCY MEDICINE

## 2023-02-26 ENCOUNTER — APPOINTMENT (EMERGENCY)
Dept: RADIOLOGY | Facility: HOSPITAL | Age: 58
End: 2023-02-26

## 2023-02-26 VITALS
HEIGHT: 66 IN | RESPIRATION RATE: 18 BRPM | HEART RATE: 73 BPM | WEIGHT: 270 LBS | DIASTOLIC BLOOD PRESSURE: 88 MMHG | OXYGEN SATURATION: 96 % | BODY MASS INDEX: 43.39 KG/M2 | TEMPERATURE: 97.4 F | SYSTOLIC BLOOD PRESSURE: 142 MMHG

## 2023-02-26 DIAGNOSIS — J10.1 INFLUENZA A: Primary | ICD-10-CM

## 2023-02-26 LAB
FLUAV RNA RESP QL NAA+PROBE: POSITIVE
FLUBV RNA RESP QL NAA+PROBE: NEGATIVE
RSV RNA RESP QL NAA+PROBE: NEGATIVE
SARS-COV-2 RNA RESP QL NAA+PROBE: NEGATIVE

## 2023-02-26 RX ORDER — ALBUTEROL SULFATE 90 UG/1
2 AEROSOL, METERED RESPIRATORY (INHALATION) ONCE
Status: COMPLETED | OUTPATIENT
Start: 2023-02-26 | End: 2023-02-26

## 2023-02-26 RX ADMIN — ALBUTEROL SULFATE 2 PUFF: 90 AEROSOL, METERED RESPIRATORY (INHALATION) at 09:49

## 2023-02-26 NOTE — Clinical Note
Karen Parra was seen and treated in our emergency department on 2/26/2023  No restrictions            Diagnosis: Influenza A    Jose Carlos Chadwick  may return to work on return date  He may return on this date: 03/02/2023         If you have any questions or concerns, please don't hesitate to call        Hussein Ortez PA-C    ______________________________           _______________          _______________  Hospital Representative                              Date                                Time

## 2023-02-26 NOTE — DISCHARGE INSTRUCTIONS
Take Tylenol and ibuprofen as needed for fevers/aches  Use honey for cough  Use inhaler every 6 hours as needed for wheezing  Drink plenty of fluids and rest     Please follow-up with your family doctor  Return to the ER with any worsening symptoms and trouble breathing

## 2023-02-26 NOTE — ED PROVIDER NOTES
History  Chief Complaint   Patient presents with   • Flu Symptoms     Patient c/o fever, nonproductive cough, body aches x 2 days  61yo male with a history of hypertension, hyperlipidemia, obesity, GERD, Lyme disease presenting for evaluation of flu-like symptoms x 2 days  He reports nonproductive cough, nasal congestion, body aches, and fevers  Tmax 101  He has been using OTC decongestants  No vomiting, diarrhea, shortness of breath  His coworkers were sick last week  History provided by:  Patient   used: No    Flu Symptoms  Presenting symptoms: cough, fatigue, fever and myalgias    Presenting symptoms: no diarrhea, no shortness of breath and no vomiting    Severity:  Mild  Onset quality:  Gradual  Duration:  2 days  Progression:  Unchanged  Chronicity:  New  Relieved by:  OTC medications  Associated symptoms: nasal congestion    Associated symptoms: no mental status change, no neck stiffness and no syncope    Risk factors: sick contacts    Risk factors: no immunocompromised state        Prior to Admission Medications   Prescriptions Last Dose Informant Patient Reported? Taking?    amLODIPine (NORVASC) 10 mg tablet   No No   Sig: TAKE 1 TABLET BY MOUTH EVERY DAY   aspirin 81 mg chewable tablet   No No   Sig: Chew 1 tablet (81 mg total) daily   betamethasone, augmented, (DIPROLENE-AF) 0 05 % cream   No No   Sig: Apply topically 2 (two) times a day   ibuprofen (MOTRIN) 600 mg tablet   No No   Sig: Take 1 tablet (600 mg total) by mouth every 8 (eight) hours   metoprolol succinate (TOPROL-XL) 50 mg 24 hr tablet   No No   Sig: TAKE 1 TABLET DAILY   olmesartan (BENICAR) 40 mg tablet   No No   Sig: TAKE 1 TABLET DAILY   pantoprazole (PROTONIX) 40 mg tablet   No No   Sig: Take 1 tablet (40 mg total) by mouth 2 (two) times a day before meals   rosuvastatin (CRESTOR) 5 mg tablet   No No   Sig: TAKE 1 TABLET BY MOUTH EVERY DAY      Facility-Administered Medications: None       Past Medical History:   Diagnosis Date   • GERD (gastroesophageal reflux disease)    • Hypercholesterolemia    • Hypertension    • Lyme disease 3/19/2019   • Obesity    • Sexual dysfunction     Psychosexual dysfunction with inhibited sexual excitement        Past Surgical History:   Procedure Laterality Date   • BACK SURGERY      spinal fusion, LUMBAR   • CARDIAC CATHETERIZATION     • CARDIAC CATHETERIZATION     • CARDIAC CATHETERIZATION     • COLONOSCOPY     • EGD AND COLONOSCOPY N/A 06/15/2018    Procedure: EGD AND COLONOSCOPY;  Surgeon: Celso Panchal MD;  Location: [facility]; Service: Gastroenterology   • SD COLONOSCOPY FLX DX W/[facility] WHEN PFRMD N/A 2019    Procedure: COLONOSCOPY;  Surgeon: Jimmie Velazquez MD;  Location[facility]; Service: Gastroenterology   • SD ESOPHAGOGASTRODUODENOSCOPY TRANSORAL DIAGNOSTIC N/A 2019    Procedure: ESOPHAGOGASTRODUODENOSCOPY (EGD); Surgeon: Jimmie Velazquez MD;  Location[facility]; Service: Gastroenterology   • SEPTOPLASTY     • UPPER GASTROINTESTINAL ENDOSCOPY         Family History   Problem Relation Age of Onset   • Atrial fibrillation Mother    • COPD Mother    • Coronary artery disease Mother    • Lung cancer Mother    • Rheum arthritis Mother    • Stroke Mother         Syndrome   • Arthritis Mother    • Cancer Mother    • Hypertension Mother    • Lung cancer Father    • Cancer Father    • Prostate cancer Maternal Grandfather    • Heart defect Daughter    • Arthritis Family      I have reviewed and agree with the history as documented      E-Cigarette/Vaping   • E-Cigarette Use Never User      E-Cigarette/Vaping Substances   • Nicotine No    • THC No    • CBD No    • Flavoring No    • Other No    • Unknown No      Social History     Tobacco Use   • Smoking status: Former     Packs/day: 0 25     Years: 5 00     Pack years: 1 25     Types: Cigarettes     Start date: 1984     Quit date: 10/20/1989     Years since quittin 3   • Smokeless tobacco: Current     Types: Felisha Rivers     Last attempt to quit: 8/22/2019   Vaping Use   • Vaping Use: Never used   Substance Use Topics   • Alcohol use: Yes     Alcohol/week: 4 0 standard drinks     Types: 4 Standard drinks or equivalent per week     Comment: Occ alcohol use per [employer]    • Drug use: Yes     Types: Marijuana     Comment: medical       Review of Systems   Constitutional: Positive for fatigue and fever  HENT: Positive for congestion  Negative for drooling  Eyes: Negative for discharge and redness  Respiratory: Positive for cough  Negative for shortness of breath  Gastrointestinal: Negative for diarrhea and vomiting  Musculoskeletal: Positive for myalgias  Negative for neck stiffness  Skin: Negative for color change and rash  Psychiatric/Behavioral: Negative for confusion  The patient is not nervous/anxious  All other systems reviewed and are negative  Physical Exam  Physical Exam  Vitals and nursing note reviewed  Constitutional:       General: He is not in acute distress  Appearance: He is well-developed  He is not diaphoretic  HENT:      Head: Normocephalic and atraumatic  Right Ear: Tympanic membrane, ear canal and external ear normal       Left Ear: Tympanic membrane, ear canal and external ear normal       Mouth/Throat:      Mouth: Mucous membranes are moist       Pharynx: Oropharynx is clear  No oropharyngeal exudate  Eyes:      General: No scleral icterus  Right eye: No discharge  Left eye: No discharge  Conjunctiva/sclera: Conjunctivae normal    Cardiovascular:      Rate and Rhythm: Normal rate and regular rhythm  Heart sounds: Normal heart sounds  No murmur heard  Pulmonary:      Effort: Pulmonary effort is normal  No respiratory distress  Breath sounds: No stridor  Wheezing present  No rales  Comments: Faint expiratory wheezes  Musculoskeletal:         General: No deformity  Normal range of motion        Cervical back: Normal range of motion and neck supple  Skin:     General: Skin is warm and dry  Neurological:      Mental Status: He is alert  He is not disoriented  GCS: GCS eye subscore is 4  GCS verbal subscore is 5  GCS motor subscore is 6  Psychiatric:         Mood and Affect: Mood normal          Behavior: Behavior normal          Vital Signs  ED Triage Vitals   Temperature Pulse Respirations Blood Pressure SpO2   02/26/23 0922 02/26/23 0922 02/26/23 0922 02/26/23 0922 02/26/23 0922   (!) 97 4 °F (36 3 °C) 73 18 (!) 157/102 96 %      Temp Source Heart Rate Source Patient Position - Orthostatic VS BP Location FiO2 (%)   02/26/23 0922 02/26/23 0922 02/26/23 0922 02/26/23 0922 --   Temporal Monitor Sitting Left arm       Pain Score       02/26/23 0959       No Pain           Vitals:    02/26/23 0922 02/26/23 0929   BP: (!) 157/102 142/88   Pulse: 73    Patient Position - Orthostatic VS: Sitting          Visual Acuity      ED Medications  Medications   albuterol (PROVENTIL HFA,VENTOLIN HFA) inhaler 2 puff (2 puffs Inhalation Given 2/26/23 0949)       Diagnostic Studies  Results Reviewed     Procedure Component Value Units Date/Time    COVID/FLU/RSV [[id number]]  (Abnormal) Collected: 02/26/23 0923    Lab Status: Final result Specimen: Nares from Nose Updated: 02/26/23 1016     SARS-CoV-2 Negative     INFLUENZA A PCR Positive     INFLUENZA B PCR Negative     RSV PCR Negative    Narrative:      FOR PEDIATRIC PATIENTS - copy/paste COVID Guidelines URL to browser: [email or web address]/  [email or web address]    SARS-CoV-2 assay is a Nucleic Acid Amplification assay intended for the  qualitative detection of nucleic acid from SARS-CoV-2 in nasopharyngeal  swabs  Results are for the presumptive identification of SARS-CoV-2 RNA  Positive results are indicative of infection with SARS-CoV-2, the virus  causing COVID-19, but do not rule out bacterial infection or co-infection  with other viruses  Laboratories within the [place] and its  territories are required to report all positive results to the appropriate  public health authorities  Negative results do not preclude SARS-CoV-2  infection and should not be used as the sole basis for treatment or other  patient management decisions  Negative results must be combined with  clinical observations, patient history, and epidemiological information  This test has not been FDA cleared or approved  This test has been authorized by FDA under an Emergency Use Authorization  (EUA)  This test is only authorized for the duration of time the  declaration that circumstances exist justifying the authorization of the  emergency use of an in vitro diagnostic tests for detection of SARS-CoV-2  virus and/or diagnosis of COVID-19 infection under section 564(b)(1) of  the Act, 21 U  S C  883TMN-4(B)(4), unless the authorization is terminated  or revoked sooner  The test has been validated but independent review by FDA  and CLIA is pending  Test performed using [employer] GeneXpert: This RT-PCR assay targets N2,  a region unique to SARS-CoV-2  A conserved region in the E-gene was chosen  for pan-Sarbecovirus detection which includes SARS-CoV-2  According to CMS-2020-01-R, this platform meets the definition of high-throughput technology  XR chest 2 views   ED Interpretation by Vasyl Vale PA-C (02/26 1017)   No acute abnormality      Final Result by Loretta Vaughn MD (02/26 1101)      No acute cardiopulmonary disease  Workstation performed: [id number]                    Procedures  Procedures         ED Course  ED Course as of 02/27/23 1106   Sun Feb 26, 2023   1017 INFLU A PCR(!): Positive             SBIRT 22yo+    Flowsheet Row Most Recent Value   SBIRT (23 yo +)    In order to provide better care to our patients, we are screening all of our patients for alcohol and drug use   Would it be okay to ask you these screening questions? No Filed at: 02/26/2023 2993                    Medical Decision Making  58yoM presenting for flu-like symptoms x 2 days  C/o cough, congestion, fevers  No CP/SOB  He is afebrile and hemodynamically stable  Oxygen saturation 96% on room air  He is well appearing in no distress  There are faint expiratory wheezes on lung exam  Respirations are unlabored and he is speaking in full sentences without difficulty  Remainder of exam is reassuring  COVID/flu/RSV swab obtained  He is positive for influenza A  CXR also obtained which is negative for infiltrates  He was given an albuterol inhaler with improvement  No indication for further workup  Supportive care discussed  Advised close PCP follow-up  ED return precautions discussed  Patient expressed understanding and is agreeable to plan  Patient discharged in stable condition  Influenza A: acute illness or injury  Amount and/or Complexity of Data Reviewed  Labs:  Decision-making details documented in ED Course  Radiology: ordered and independent interpretation performed  Risk  Prescription drug management  Disposition  Final diagnoses:   Influenza A     Time reflects when diagnosis was documented in both MDM as applicable and the Disposition within this note     Time User Action Codes Description Comment    2/26/2023 10:24  [facility] [J10 1] Influenza A       ED Disposition     ED Disposition   Discharge    Condition   Stable    Date/Time   Sun Feb 26, 2023 10:24 AM    Comment   [facility] discharge to home/self care                 Follow-up Information     Follow up With Specialties Details Why Contact Info Additional [id number] [facility],  Internal Medicine Schedule an appointment as soon as possible for a visit   [address] [facility] [place] [address] Emergency Department Emergency Medicine  If symptoms worsen [address]  [facility] [address] [facility] [id number] [facility] Emergency Department, [address] [facility], [place], [place], [address]          Discharge Medication List as of 2/26/2023 10:26 AM      CONTINUE these medications which have NOT CHANGED    Details   amLODIPine (NORVASC) 10 mg tablet TAKE 1 TABLET BY MOUTH EVERY DAY, Normal      aspirin 81 mg chewable tablet Chew 1 tablet (81 mg total) daily, Starting Wed 6/6/2018, No Print      betamethasone, augmented, (DIPROLENE-AF) 0 05 % cream Apply topically 2 (two) times a day, Starting Mon 8/8/2022, Normal      ibuprofen (MOTRIN) 600 mg tablet Take 1 tablet (600 mg total) by mouth every 8 (eight) hours, Starting Tue 8/20/2019, Normal      metoprolol succinate (TOPROL-XL) 50 mg 24 hr tablet TAKE 1 TABLET DAILY, Normal      olmesartan (BENICAR) 40 mg tablet TAKE 1 TABLET DAILY, Normal      pantoprazole (PROTONIX) 40 mg tablet Take 1 tablet (40 mg total) by mouth 2 (two) times a day before meals, Starting Tue 2/21/2023, Normal      rosuvastatin (CRESTOR) 5 mg tablet TAKE 1 TABLET BY MOUTH EVERY DAY, Normal             No discharge procedures on file      PDMP Review     None          ED Provider  Electronically Signed by           Jaycee Galdamez PA-C  02/27/23 8388

## 2023-05-27 DIAGNOSIS — E78.00 HYPERCHOLESTEROLEMIA: Chronic | ICD-10-CM

## 2023-05-27 RX ORDER — ROSUVASTATIN CALCIUM 5 MG/1
TABLET, COATED ORAL
Qty: 90 TABLET | Refills: 1 | Status: SHIPPED | OUTPATIENT
Start: 2023-05-27

## 2023-05-30 ENCOUNTER — OFFICE VISIT (OUTPATIENT)
Dept: OBGYN CLINIC | Facility: CLINIC | Age: 58
End: 2023-05-30

## 2023-05-30 VITALS
WEIGHT: 270 LBS | SYSTOLIC BLOOD PRESSURE: 144 MMHG | HEIGHT: 66 IN | DIASTOLIC BLOOD PRESSURE: 92 MMHG | BODY MASS INDEX: 43.39 KG/M2 | HEART RATE: 57 BPM

## 2023-05-30 DIAGNOSIS — M25.561 CHRONIC PAIN OF RIGHT KNEE: ICD-10-CM

## 2023-05-30 DIAGNOSIS — M17.0 PRIMARY OSTEOARTHRITIS OF BOTH KNEES: Primary | ICD-10-CM

## 2023-05-30 DIAGNOSIS — M25.562 CHRONIC PAIN OF LEFT KNEE: ICD-10-CM

## 2023-05-30 DIAGNOSIS — G89.29 CHRONIC PAIN OF RIGHT KNEE: ICD-10-CM

## 2023-05-30 DIAGNOSIS — G89.29 CHRONIC PAIN OF LEFT KNEE: ICD-10-CM

## 2023-05-30 RX ORDER — LIDOCAINE HYDROCHLORIDE 10 MG/ML
2 INJECTION, SOLUTION INFILTRATION; PERINEURAL
Status: COMPLETED | OUTPATIENT
Start: 2023-05-30 | End: 2023-05-30

## 2023-05-30 RX ORDER — METHYLPREDNISOLONE ACETATE 40 MG/ML
2 INJECTION, SUSPENSION INTRA-ARTICULAR; INTRALESIONAL; INTRAMUSCULAR; SOFT TISSUE
Status: COMPLETED | OUTPATIENT
Start: 2023-05-30 | End: 2023-05-30

## 2023-05-30 RX ORDER — BUPIVACAINE HYDROCHLORIDE 2.5 MG/ML
2 INJECTION, SOLUTION INFILTRATION; PERINEURAL
Status: COMPLETED | OUTPATIENT
Start: 2023-05-30 | End: 2023-05-30

## 2023-05-30 RX ADMIN — METHYLPREDNISOLONE ACETATE 2 ML: 40 INJECTION, SUSPENSION INTRA-ARTICULAR; INTRALESIONAL; INTRAMUSCULAR; SOFT TISSUE at 08:45

## 2023-05-30 RX ADMIN — BUPIVACAINE HYDROCHLORIDE 2 ML: 2.5 INJECTION, SOLUTION INFILTRATION; PERINEURAL at 08:45

## 2023-05-30 RX ADMIN — LIDOCAINE HYDROCHLORIDE 2 ML: 10 INJECTION, SOLUTION INFILTRATION; PERINEURAL at 08:45

## 2023-05-30 NOTE — PROGRESS NOTES
"Patient Name:  Valeri Ormond  MRN:  [id number]    [address] [place] [address]  Primary osteoarthritis of both knees  -     Large joint arthrocentesis: R knee  -     Large joint arthrocentesis: L knee      Bilateral knee osteoarthritis s/p bilateral knee CSI on 01/31/2023, recently worsening pain  · Overall, patient had moderate improvement of pain from previous injections  Offered and accepted repeat injections today  Tolerated procedure well  Can be repeated every 3 months if needed  · Advised patient he may call [employer] or go to their store to discuss  braces  · Follow up in office in 3 months or as needed pending symptoms  History of the Present Illness   Valeri Ormond is a 63 y o  male with Bilateral knee osteoarthritis s/p bilateral knee CSI on 01/31/2023  Today, patient reports he had moderate improvement of pain from most recent injections, the most relief he has had from them  He states \"maybe because my Lyme disease is under control\"  He has started to notice symptoms returning for the past few weeks and is considering repeat injections today  Review of Systems     Review of Systems   Constitutional: Negative for chills and fever  HENT: Negative for ear pain and sore throat  Eyes: Negative for pain and visual disturbance  Respiratory: Negative for cough and shortness of breath  Cardiovascular: Negative for chest pain and palpitations  Gastrointestinal: Negative for abdominal pain and vomiting  Genitourinary: Negative for dysuria and hematuria  Musculoskeletal: Negative for arthralgias and back pain  Skin: Negative for color change and rash  Neurological: Negative for seizures and syncope  All other systems reviewed and are negative  Physical Exam     /92   Pulse 57   Ht 5' 6\" (1 676 m)   Wt 122 kg (270 lb)   BMI 43 58 kg/m²     Right Knee  Range of motion from 0 to 120 degrees without pain  There is no effusion      There is tenderness over the " medial joint line   The patient is able to perform a straight leg raise with 5/5 quad strength  Varus stress testing reveals no pain or instability at 0 and 30 degrees   Valgus stress testing reveals no pain or instability at 0 and 30 degrees  The patient is neurovascular intact distally  Left  Knee  Range of motion from 0 to 120 degrees without pain  There is no effusion  There is tenderness over the medial joint line  The patient is able to perform a straight leg raise with 5/5 quad strength  Varus stress testing reveals no pain or instability at 0 and 30 degrees   Valgus stress testing reveals no pain or instability at 0 and 30 degrees  The patient is neurovascular intact distally  Data Review     I have personally reviewed pertinent films in PACS, and my interpretation follows  X-rays taken 2023 of Right knee independently reviewed and demonstrate severe medial compartment and mild patellofemoral compartment degenerative changes with sclerosis and small osteophytes  No fracture or dislocation       X-rays taken 2023 of Left knee independently reviewed and demonstrate  severe medial compartment and mild patellofemoral compartment degenerative changes  No acute fracture or dislocation  Social History     Tobacco Use   • Smoking status: Former     Packs/day: 0 25     Years: 5 00     Total pack years: 1 25     Types: Cigarettes     Start date: 1984     Quit date: 10/20/1989     Years since quittin 6   • Smokeless tobacco: Current     Types: Velora Pun     Last attempt to quit: 2019   Vaping Use   • Vaping Use: Never used   Substance Use Topics   • Alcohol use:  Yes     Alcohol/week: 4 0 standard drinks of alcohol     Types: 4 Standard drinks or equivalent per week     Comment: Occ alcohol use per [employer]    • Drug use: Yes     Types: Marijuana     Comment: medical           Large joint arthrocentesis: R knee  Universal Protocol:  Risks and benefits: risks, benefits and alternatives were discussed  Consent given by: patient  Patient identity confirmed: verbally with patient    Procedure Details  Location: knee - R knee  Needle size: 22 G  Approach: lateral  Medications administered: 2 mL bupivacaine 0 25 %; 2 mL lidocaine 1 %; 2 mL methylPREDNISolone acetate 40 mg/mL    Patient tolerance: patient tolerated the procedure well with no immediate complications  Dressing:  Sterile dressing applied    Large joint arthrocentesis: L knee  Universal Protocol:  Risks and benefits: risks, benefits and alternatives were discussed  Consent given by: patient  Patient identity confirmed: verbally with patient    Procedure Details  Location: knee - L knee  Needle size: 22 G  Approach: lateral  Medications administered: 2 mL bupivacaine 0 25 %; 2 mL lidocaine 1 %; 2 mL methylPREDNISolone acetate 40 mg/mL    Patient tolerance: patient tolerated the procedure well with no immediate complications  Dressing:  Sterile dressing applied            Lizzette Arana PA-C

## 2023-06-07 ENCOUNTER — APPOINTMENT (OUTPATIENT)
Age: 58
End: 2023-06-07
Payer: COMMERCIAL

## 2023-06-07 DIAGNOSIS — I10 PRIMARY HYPERTENSION: Chronic | ICD-10-CM

## 2023-06-07 DIAGNOSIS — E78.00 HYPERCHOLESTEROLEMIA: Chronic | ICD-10-CM

## 2023-06-07 DIAGNOSIS — R73.01 IFG (IMPAIRED FASTING GLUCOSE): ICD-10-CM

## 2023-06-07 LAB
ANION GAP SERPL CALCULATED.3IONS-SCNC: 2 MMOL/L (ref 4–13)
BUN SERPL-MCNC: 18 MG/DL (ref 5–25)
CALCIUM SERPL-MCNC: 8.9 MG/DL (ref 8.3–10.1)
CHLORIDE SERPL-SCNC: 110 MMOL/L (ref 96–108)
CHOLEST SERPL-MCNC: 140 MG/DL
CO2 SERPL-SCNC: 29 MMOL/L (ref 21–32)
CREAT SERPL-MCNC: 0.91 MG/DL (ref 0.6–1.3)
EST. AVERAGE GLUCOSE BLD GHB EST-MCNC: 105 MG/DL
GFR SERPL CREATININE-BSD FRML MDRD: 93 ML/MIN/1.73SQ M
GLUCOSE P FAST SERPL-MCNC: 107 MG/DL (ref 65–99)
HBA1C MFR BLD: 5.3 %
HDLC SERPL-MCNC: 52 MG/DL
LDLC SERPL CALC-MCNC: 77 MG/DL (ref 0–100)
NONHDLC SERPL-MCNC: 88 MG/DL
POTASSIUM SERPL-SCNC: 4.4 MMOL/L (ref 3.5–5.3)
SODIUM SERPL-SCNC: 141 MMOL/L (ref 135–147)
TRIGL SERPL-MCNC: 53 MG/DL

## 2023-06-07 PROCEDURE — 83036 HEMOGLOBIN GLYCOSYLATED A1C: CPT

## 2023-06-07 PROCEDURE — 80048 BASIC METABOLIC PNL TOTAL CA: CPT

## 2023-06-07 PROCEDURE — 36415 COLL VENOUS BLD VENIPUNCTURE: CPT

## 2023-06-07 PROCEDURE — 80061 LIPID PANEL: CPT

## 2023-06-13 ENCOUNTER — OFFICE VISIT (OUTPATIENT)
Age: 58
End: 2023-06-13
Payer: COMMERCIAL

## 2023-06-13 VITALS
SYSTOLIC BLOOD PRESSURE: 136 MMHG | OXYGEN SATURATION: 98 % | DIASTOLIC BLOOD PRESSURE: 88 MMHG | RESPIRATION RATE: 20 BRPM | WEIGHT: 272 LBS | HEIGHT: 66 IN | BODY MASS INDEX: 43.71 KG/M2 | TEMPERATURE: 98 F | HEART RATE: 66 BPM

## 2023-06-13 DIAGNOSIS — E66.01 MORBID OBESITY WITH BMI OF 40.0-44.9, ADULT (HCC): Chronic | ICD-10-CM

## 2023-06-13 DIAGNOSIS — E78.00 HYPERCHOLESTEROLEMIA: Chronic | ICD-10-CM

## 2023-06-13 DIAGNOSIS — Z12.5 SCREENING FOR PROSTATE CANCER: ICD-10-CM

## 2023-06-13 DIAGNOSIS — I10 PRIMARY HYPERTENSION: Primary | Chronic | ICD-10-CM

## 2023-06-13 PROCEDURE — 99214 OFFICE O/P EST MOD 30 MIN: CPT | Performed by: INTERNAL MEDICINE

## 2023-06-13 NOTE — PROGRESS NOTES
Name: Richmond Valdez      : 1965      MRN: [id number]  Encounter Provider: Haven Irizarry DO  Encounter Date: 2023   Encounter department: [address] [place] [address]  Primary hypertension    Blood pressure ok  Would benefit from weight loss  Follow DASH diet or Mediterranean diet  2  Hypercholesterolemia    Cholesterol is well controlled  Continue statin as prescribed  - CBC; Future  - Comprehensive metabolic panel; Future  - Lipid panel; Future    3  Morbid obesity with BMI of 40 0-44 9, adult (HCC)    Body mass index is 43 9 kg/m²  Discussed the patient's BMI with him  The BMI is above normal  Nutrition recommendations include reducing portion sizes, decreasing overall calorie intake and 3-5 servings of fruits/vegetables daily  Reason for BMI follow-up up due to patient being morbidly obese     4  Screening for prostate cancer  - PSA, Total Screen; Future        Return in about 6 months (around 2023) for Follow-up  Subjective      Health is stable  No concerns noted on labs  Chronic struggles with his weight  Admits to poor eating habits and eating late at night  He feels he is pretty active at work  Review of Systems   Constitutional: Negative for activity change, appetite change and fatigue  Respiratory: Negative for apnea, cough, chest tightness, shortness of breath and wheezing  Cardiovascular: Negative for chest pain, palpitations and leg swelling  Gastrointestinal: Negative for abdominal distention, abdominal pain, blood in stool, constipation, diarrhea, nausea and vomiting  Musculoskeletal: Positive for arthralgias  Neurological: Negative for dizziness, weakness, light-headedness, numbness and headaches  Psychiatric/Behavioral: Negative for behavioral problems, confusion, hallucinations, sleep disturbance and suicidal ideas  The patient is not nervous/anxious        Objective     /88 (BP Location: Left arm, Patient "Position: Sitting, Cuff Size: Standard)   Pulse 66   Temp 98 °F (36 7 °C) (Tympanic)   Resp 20   Ht 5' 6\" (1 676 m)   Wt 123 kg (272 lb)   SpO2 98%   BMI 43 90 kg/m²     Physical Exam  Constitutional:       General: He is not in acute distress  Appearance: He is well-developed  He is obese  He is not diaphoretic  Neck:      Thyroid: No thyromegaly  Vascular: No JVD  Cardiovascular:      Rate and Rhythm: Normal rate and regular rhythm  Heart sounds: Normal heart sounds  No murmur heard  Pulmonary:      Effort: Pulmonary effort is normal  No respiratory distress  Breath sounds: Normal breath sounds  No wheezing or rales  Abdominal:      General: Bowel sounds are normal  There is no distension  Palpations: Abdomen is soft  There is no mass  Tenderness: There is no abdominal tenderness  There is no guarding or rebound  Musculoskeletal:      Right lower leg: No edema  Left lower leg: No edema  Neurological:      Mental Status: He is alert         Duke Nothstein, DO    "

## 2023-06-13 NOTE — PATIENT INSTRUCTIONS
Chronic Hypertension   AMBULATORY CARE:   Hypertension is considered chronic  when it continues for 3 months or longer  Hypertension that continues causes your heart to work much harder than normal, which may lead to heart damage  Even if you have hypertension for years, lifestyle changes, medicines, or both may help lower your blood pressure  Call your local emergency number [phone] in the [facility] [facility]) or have someone call if:   You have chest pain  You have any of the following signs of a heart attack:      Squeezing, pressure, or pain in your chest    You may  also have any of the following:     Discomfort or pain in your back, neck, jaw, stomach, or arm    Shortness of breath    Nausea or vomiting    Lightheadedness or a sudden cold sweat    You become confused or have difficulty speaking  You suddenly feel lightheaded or have trouble breathing  Seek care immediately if:   You have a severe headache or vision loss  You have weakness in an arm or leg  Call your doctor or cardiologist if:   You feel faint, dizzy, confused, or drowsy  You have been taking your blood pressure medicine but your pressure is higher than your provider says it should be  You have questions or concerns about your condition or care  Treatment for chronic hypertension  may include medicine to lower your blood pressure and cholesterol levels  A low cholesterol level helps prevent heart disease and makes it easier to control your blood pressure  Heart disease can make your blood pressure harder to control  You may also need to make lifestyle changes  What you need to know about the stages of hypertension:  Your healthcare provider will give you a blood pressure goal based on your age, health, and risk for cardiovascular disease  The following are general guidelines on the stages of hypertension:  Normal blood pressure is 119/79 or lower    Your provider may only check your blood pressure each year if it stays at a normal level     Elevated blood pressure is 120/79 to 129/79   This is sometimes called prehypertension  Your provider may suggest lifestyle changes to help lower your blood pressure to a normal level  He or she may then check it again in 3 to 6 months  Stage 1 hypertension is 130/80  to 139/89   Your provider may recommend lifestyle changes, medication, and checks every 3 to 6 months until your blood pressure is controlled  Stage 2 hypertension is 140/90 or higher   Your provider will recommend lifestyle changes and have you take 2 kinds of hypertension medicines  You will also need to have your blood pressure checked monthly until it is controlled  Manage chronic hypertension:   Check your blood pressure at home  Do not smoke, have caffeine, or exercise for at least 30 minutes before you check your blood pressure  Sit and rest for 5 minutes before you check your blood pressure  Extend your arm and support it on a flat surface  Your arm should be at the same level as your heart  Follow the directions that came with your blood pressure monitor  Check your blood pressure 2 times, 1 minute apart, before you take your medicine in the morning  Also check your blood pressure before your evening meal  Keep a record of your readings and bring it to your follow-up visits  Your healthcare provider may use the readings to make changes to your treatment plan  Manage any other health conditions you have  Health conditions such as diabetes can increase your risk for hypertension  Follow your provider's instructions and take all your medicines as directed  Talk to your provider about any new health conditions you have recently developed  Ask about all medicines  Certain medicines can increase your blood pressure  Examples include oral birth control pills, decongestants, herbal supplements, and NSAIDs, such as ibuprofen  Your provider can tell you which medicines are safe for you to take   This includes prescription and over-the-counter medicines  Lifestyle changes you can make to lower your blood pressure: Your provider may want you to make more lifestyle changes if you are having trouble controlling your blood pressure  This may feel difficult over time, especially if you think you are making good changes but your pressure is still high  It might help to focus on one new change at a time  For example, try to add 1 more day of exercise, or exercise for an extra 10 minutes on 2 days  Small changes can make a big difference  Your healthcare provider can also refer you to specialists such as a dietitian who can help you make small changes  Your family members may be included in helping you learn to create lifestyle changes, such as the following:     Limit sodium (salt) as directed  Too much sodium can affect your fluid balance  Check labels to find low-sodium or no-salt-added foods  Some low-sodium foods use potassium salts for flavor  Too much potassium can also cause health problems  Your provider will tell you how much sodium and potassium are safe for you to have in a day  He or she may recommend that you limit sodium to 2,300 mg a day  Follow the meal plan recommended by your provider  A dietitian or your provider can give you more information on low-sodium plans or the DASH (Dietary Approaches to Stop Hypertension) eating plan  The DASH plan is low in sodium, processed sugar, unhealthy fats, and total fat  It is high in potassium, calcium, and fiber  These can be found in vegetables, fruit, and whole-grain foods  Be physically active throughout the day  Physical activity, such as exercise, can help control your blood pressure and your weight  Be physically active for at least 30 minutes per day, on most days of the week  Include aerobic activity, such as walking or riding a bicycle  Also include strength training at least 2 times each week   Your provider can help you create a physical activity plan  Decrease stress  This may help lower your blood pressure  Learn ways to relax, such as deep breathing or listening to music  Limit alcohol as directed  Alcohol can increase your blood pressure  A drink of alcohol is 12 ounces of beer, 5 ounces of wine, or 1½ ounces of liquor  Your provider can help you set daily and weekly drink limits  He or she may recommend no alcohol if your blood pressure stays higher than goal even with medicine or other measures  Ask your provider for information if you need help to quit  Do not smoke  Nicotine and other chemicals in cigarettes and cigars can increase your blood pressure and also cause lung damage  Ask your provider for information if you currently smoke and need help to quit  E-cigarettes or smokeless tobacco still contain nicotine  Talk to your provider before you use these products  Follow up with your doctor or cardiologist as directed: You will need to return to have your blood pressure checked and to have other lab tests done  Write down your questions so you remember to ask them during your visits  © Copyright [employer] 2022 Information is for End User's use only and may not be sold, redistributed or otherwise used for commercial purposes  The above information is an  only  It is not intended as medical advice for individual conditions or treatments  Talk to your doctor, nurse or pharmacist before following any medical regimen to see if it is safe and effective for you

## 2023-08-22 ENCOUNTER — TELEPHONE (OUTPATIENT)
Dept: OBGYN CLINIC | Facility: MEDICAL CENTER | Age: 58
End: 2023-08-22

## 2023-09-05 ENCOUNTER — OFFICE VISIT (OUTPATIENT)
Dept: OBGYN CLINIC | Facility: CLINIC | Age: 58
End: 2023-09-05
Payer: COMMERCIAL

## 2023-09-05 VITALS
WEIGHT: 272 LBS | HEART RATE: 58 BPM | SYSTOLIC BLOOD PRESSURE: 152 MMHG | BODY MASS INDEX: 43.71 KG/M2 | HEIGHT: 66 IN | DIASTOLIC BLOOD PRESSURE: 89 MMHG

## 2023-09-05 DIAGNOSIS — G89.29 CHRONIC PAIN OF LEFT KNEE: ICD-10-CM

## 2023-09-05 DIAGNOSIS — M25.562 CHRONIC PAIN OF LEFT KNEE: ICD-10-CM

## 2023-09-05 DIAGNOSIS — M17.0 PRIMARY OSTEOARTHRITIS OF BOTH KNEES: Primary | ICD-10-CM

## 2023-09-05 DIAGNOSIS — M25.561 CHRONIC PAIN OF RIGHT KNEE: ICD-10-CM

## 2023-09-05 DIAGNOSIS — G89.29 CHRONIC PAIN OF RIGHT KNEE: ICD-10-CM

## 2023-09-05 PROCEDURE — 99214 OFFICE O/P EST MOD 30 MIN: CPT | Performed by: ORTHOPAEDIC SURGERY

## 2023-09-05 PROCEDURE — 20610 DRAIN/INJ JOINT/BURSA W/O US: CPT | Performed by: ORTHOPAEDIC SURGERY

## 2023-09-05 RX ORDER — METHYLPREDNISOLONE ACETATE 40 MG/ML
2 INJECTION, SUSPENSION INTRA-ARTICULAR; INTRALESIONAL; INTRAMUSCULAR; SOFT TISSUE
Status: COMPLETED | OUTPATIENT
Start: 2023-09-05 | End: 2023-09-05

## 2023-09-05 RX ORDER — BUPIVACAINE HYDROCHLORIDE 2.5 MG/ML
2 INJECTION, SOLUTION INFILTRATION; PERINEURAL
Status: COMPLETED | OUTPATIENT
Start: 2023-09-05 | End: 2023-09-05

## 2023-09-05 RX ORDER — LIDOCAINE HYDROCHLORIDE 10 MG/ML
2 INJECTION, SOLUTION INFILTRATION; PERINEURAL
Status: COMPLETED | OUTPATIENT
Start: 2023-09-05 | End: 2023-09-05

## 2023-09-05 RX ADMIN — LIDOCAINE HYDROCHLORIDE 2 ML: 10 INJECTION, SOLUTION INFILTRATION; PERINEURAL at 08:45

## 2023-09-05 RX ADMIN — METHYLPREDNISOLONE ACETATE 2 ML: 40 INJECTION, SUSPENSION INTRA-ARTICULAR; INTRALESIONAL; INTRAMUSCULAR; SOFT TISSUE at 08:45

## 2023-09-05 RX ADMIN — BUPIVACAINE HYDROCHLORIDE 2 ML: 2.5 INJECTION, SOLUTION INFILTRATION; PERINEURAL at 08:45

## 2023-09-05 NOTE — PROGRESS NOTES
Patient Name:  Sonya Prieto  MRN:  [id number]    [id number] [facility]     1. Primary osteoarthritis of both knees  -     Large joint arthrocentesis: R knee  -     Large joint arthrocentesis: L knee    2. Chronic pain of right knee  -     Large joint arthrocentesis: R knee    3. Chronic pain of left knee  -     Large joint arthrocentesis: L knee      Bilateral knee osteoarthritis with recently worsening pain. · Discussed nonoperative treatments of bilateral knee osteoarthritis and patient wished to move forward with bilateral knee CSI. Risks and benefits were discussed in detail. Patient tolerated well. May be repeated every 3 months as needed for pain. · Discussed continued OTC medications as needed and weight loss for pain control as well as BMI of 40 required to proceed forward with surgical intervention form of total knee arthroplasty if nonoperative treatment fails. · Also discussed possibility of future viscosupplementation injections if corticosteroid injections do not provide sufficient relief. · Follow up as needed     History of the Present Illness   Sonya Prieto is a 63 y.o. male with Bilateral knee osteoarthritis. Today, patient reports the most recent CSI on 05/30/2023 provided him with moderate pain relief. He thinks they are wearing off, over the past couple weeks. He locates pain to the medial aspect of his knees, worse with increased activity and function. Patient uses braces throughout the day, especially while at work. Patient is currently working for his [employer] business. Review of Systems     Review of Systems   Constitutional: Negative for chills and fever. HENT: Negative for ear pain and sore throat. Eyes: Negative for pain and visual disturbance. Respiratory: Negative for cough and shortness of breath. Cardiovascular: Negative for chest pain and palpitations. Gastrointestinal: Negative for abdominal pain and vomiting.    Genitourinary: Negative for dysuria and hematuria. Musculoskeletal: Negative for arthralgias and back pain. Skin: Negative for color change and rash. Neurological: Negative for seizures and syncope. All other systems reviewed and are negative. Physical Exam     /89 Comment: patient takes BP medications in the evening  Pulse 58   Ht 5' 6" (1.676 m)   Wt 123 kg (272 lb)   BMI 43.90 kg/m²     Left Knee  Range of motion from 0 to 120 degrees. There is no effusion. There is minimal tenderness over the medial joint line. The patient is able to perform a straight leg raise with 5/5 quad strength. Varus stress testing reveals no pain or instability at 0 and 30 degrees   Valgus stress testing reveals no pain or instability at 0 and 30 degrees  The patient is neurovascular intact distally. Right Knee  Range of motion from 0 to 120 degrees. There is no effusion. There is minimal tenderness over the medial joint line. The patient is able to perform a straight leg raise with 5/5 quad strength. Varus stress testing reveals no pain or instability at 0 and 30 degrees   Valgus stress testing reveals no pain or instability at 0 and 30 degrees  The patient is neurovascular intact distally. Data Review     I have personally reviewed pertinent films in PACS, and my interpretation follows. X-rays taken 2023 of Right knee demonstrate severe medial compartment and mild patellofemoral compartment degenerative changes with sclerosis and small osteophytes. No fracture or dislocation.      X-rays taken 2023 of Left knee demonstrate  severe medial compartment and mild patellofemoral compartment degenerative changes. No acute fracture or dislocation.     Social History     Tobacco Use   • Smoking status: Former     Packs/day: 0.25     Years: 5.00     Total pack years: 1.25     Types: Cigarettes     Start date: 1984     Quit date: 10/20/1989     Years since quittin.8   • Smokeless tobacco: Current     Types: Chew     Last attempt to quit: 8/22/2019   Vaping Use   • Vaping Use: Never used   Substance Use Topics   • Alcohol use:  Yes     Alcohol/week: 4.0 standard drinks of alcohol     Types: 4 Standard drinks or equivalent per week     Comment: Occ alcohol use per [employer]    • Drug use: Yes     Types: Marijuana     Comment: medical           Large joint arthrocentesis: R knee  Universal Protocol:  Risks and benefits: risks, benefits and alternatives were discussed  Consent given by: patient  Patient identity confirmed: verbally with patient    Procedure Details  Location: knee - R knee  Needle size: 22 G  Approach: lateral  Medications administered: 2 mL bupivacaine 0.25 %; 2 mL lidocaine 1 %; 2 mL methylPREDNISolone acetate 40 mg/mL    Patient tolerance: patient tolerated the procedure well with no immediate complications  Dressing:  Sterile dressing applied    Large joint arthrocentesis: L knee  Universal Protocol:  Risks and benefits: risks, benefits and alternatives were discussed  Consent given by: patient  Patient identity confirmed: verbally with patient    Procedure Details  Location: knee - L knee  Needle size: 22 G  Approach: lateral  Medications administered: 2 mL bupivacaine 0.25 %; 2 mL lidocaine 1 %; 2 mL methylPREDNISolone acetate 40 mg/mL    Patient tolerance: patient tolerated the procedure well with no immediate complications  Dressing:  Sterile dressing applied            Emre Klein DO

## 2023-10-30 DIAGNOSIS — I10 ESSENTIAL HYPERTENSION: ICD-10-CM

## 2023-10-30 RX ORDER — METOPROLOL SUCCINATE 50 MG/1
50 TABLET, EXTENDED RELEASE ORAL DAILY
Qty: 90 TABLET | Refills: 3 | Status: SHIPPED | OUTPATIENT
Start: 2023-10-30

## 2023-10-30 RX ORDER — OLMESARTAN MEDOXOMIL 40 MG/1
40 TABLET ORAL DAILY
Qty: 90 TABLET | Refills: 3 | Status: SHIPPED | OUTPATIENT
Start: 2023-10-30

## 2023-12-05 DIAGNOSIS — I10 ESSENTIAL HYPERTENSION: ICD-10-CM

## 2023-12-05 DIAGNOSIS — E78.00 HYPERCHOLESTEROLEMIA: Chronic | ICD-10-CM

## 2023-12-05 RX ORDER — AMLODIPINE BESYLATE 10 MG/1
TABLET ORAL
Qty: 90 TABLET | Refills: 3 | Status: SHIPPED | OUTPATIENT
Start: 2023-12-05

## 2023-12-05 RX ORDER — ROSUVASTATIN CALCIUM 5 MG/1
TABLET, COATED ORAL
Qty: 90 TABLET | Refills: 3 | Status: SHIPPED | OUTPATIENT
Start: 2023-12-05

## 2023-12-19 ENCOUNTER — TELEPHONE (OUTPATIENT)
Age: 58
End: 2023-12-19

## 2023-12-19 ENCOUNTER — OFFICE VISIT (OUTPATIENT)
Age: 58
End: 2023-12-19
Payer: COMMERCIAL

## 2023-12-19 VITALS
BODY MASS INDEX: 45.48 KG/M2 | DIASTOLIC BLOOD PRESSURE: 80 MMHG | WEIGHT: 283 LBS | HEIGHT: 66 IN | SYSTOLIC BLOOD PRESSURE: 130 MMHG | HEART RATE: 62 BPM

## 2023-12-19 DIAGNOSIS — K21.9 GASTROESOPHAGEAL REFLUX DISEASE WITHOUT ESOPHAGITIS: ICD-10-CM

## 2023-12-19 DIAGNOSIS — E78.00 HYPERCHOLESTEROLEMIA: Chronic | ICD-10-CM

## 2023-12-19 DIAGNOSIS — I10 PRIMARY HYPERTENSION: Primary | Chronic | ICD-10-CM

## 2023-12-19 DIAGNOSIS — E66.01 MORBID OBESITY WITH BMI OF 40.0-44.9, ADULT (HCC): Chronic | ICD-10-CM

## 2023-12-19 DIAGNOSIS — Z23 ENCOUNTER FOR IMMUNIZATION: ICD-10-CM

## 2023-12-19 PROCEDURE — 99214 OFFICE O/P EST MOD 30 MIN: CPT | Performed by: INTERNAL MEDICINE

## 2023-12-19 PROCEDURE — 90686 IIV4 VACC NO PRSV 0.5 ML IM: CPT | Performed by: INTERNAL MEDICINE

## 2023-12-19 PROCEDURE — 90472 IMMUNIZATION ADMIN EACH ADD: CPT | Performed by: INTERNAL MEDICINE

## 2023-12-19 PROCEDURE — 90715 TDAP VACCINE 7 YRS/> IM: CPT | Performed by: INTERNAL MEDICINE

## 2023-12-19 PROCEDURE — 90471 IMMUNIZATION ADMIN: CPT | Performed by: INTERNAL MEDICINE

## 2023-12-19 RX ORDER — SEMAGLUTIDE 0.25 MG/.5ML
INJECTION, SOLUTION SUBCUTANEOUS
Refills: 0 | OUTPATIENT
Start: 2023-12-19

## 2023-12-19 RX ORDER — TIRZEPATIDE 2.5 MG/.5ML
2.5 INJECTION, SOLUTION SUBCUTANEOUS WEEKLY
Qty: 2 ML | Refills: 0 | Status: SHIPPED | OUTPATIENT
Start: 2023-12-19 | End: 2023-12-19

## 2023-12-19 RX ORDER — PANTOPRAZOLE SODIUM 40 MG/1
40 TABLET, DELAYED RELEASE ORAL DAILY
Qty: 90 TABLET | Refills: 3 | Status: SHIPPED | OUTPATIENT
Start: 2023-12-19

## 2023-12-19 NOTE — TELEPHONE ENCOUNTER
Spoke with: patient  Re:  St loss medication-Insurance denied    Provider's message relayed in full detail.  Comments:

## 2023-12-19 NOTE — PROGRESS NOTES
"Name: Osmin Hook      : 1965      MRN: [id number]  Encounter Provider: Duke Hurtado DO  Encounter Date: 2023   Encounter department: [facility] [place]    Assessment & Plan     1. Primary hypertension    Patient blood pressure is controlled at this time.  Continue current antihypertensives.  Weight loss is advised.    2. Hypercholesterolemia    Will check up-to-date lab work.  Continue rosuvastatin as prescribed.    3. Gastroesophageal reflux disease without esophagitis    Acid reflux is stable.  He is only taking pantoprazole once daily now.    - pantoprazole (PROTONIX) 40 mg tablet; Take 1 tablet (40 mg total) by mouth daily  Dispense: 90 tablet; Refill: 3    4. Morbid obesity with BMI of 40.0-44.9, adult ([facility])    Would recommend we send Zepbound to the pharmacy to see if it would be covered by his insurance.  Discussed mechanism of action and common side effects.    - tirzepatide (Zepbound) 2.5 mg/0.5 mL auto-injector; Inject 0.5 mL (2.5 mg total) under the skin once a week for 28 days  Dispense: 2 mL; Refill: 0    5. Encounter for immunization  - TDAP VACCINE GREATER THAN OR EQUAL TO 8YO IM  - influenza vaccine, quadrivalent, 0.5 mL, preservative-free, for adult and pediatric patients 6 mos+ (AFLURIA, FLUARIX, FLULAVAL, FLUZONE)        Subjective      Osmin presents for follow-up. Didn't get labs done because he had COVID-19.  Patient has chronic issues with his joints.  This restricts his activity.  He follows with orthopedics.  He is interested in medication for weight loss.    Review of Systems   Constitutional: Negative.    Respiratory: Negative.     Cardiovascular: Negative.    Gastrointestinal: Negative.    Musculoskeletal:  Positive for arthralgias.     Objective     /80   Pulse 62   Ht 5' 6\" (1.676 m)   Wt 128 kg (283 lb)   BMI 45.68 kg/m²     Physical Exam  Constitutional:       General: He is not in acute distress.     Appearance: He is " well-developed. He is obese. He is not diaphoretic.   Neck:      Thyroid: No thyromegaly.      Vascular: No JVD.   Cardiovascular:      Rate and Rhythm: Normal rate and regular rhythm.      Heart sounds: Normal heart sounds. No murmur heard.  Pulmonary:      Effort: Pulmonary effort is normal. No respiratory distress.      Breath sounds: Normal breath sounds. No wheezing or rales.   Abdominal:      General: Bowel sounds are normal. There is no distension.      Palpations: Abdomen is soft. There is no mass.      Tenderness: There is no abdominal tenderness. There is no guarding or rebound.   Musculoskeletal:      Right lower leg: No edema.      Left lower leg: No edema.   Neurological:      Mental Status: He is alert.       [facility], DO

## 2023-12-19 NOTE — TELEPHONE ENCOUNTER
Let patient know that unfortunately his insurance does not cover the weight loss drugs Zepbound or Wegovy

## 2023-12-26 ENCOUNTER — APPOINTMENT (OUTPATIENT)
Age: 58
End: 2023-12-26
Payer: COMMERCIAL

## 2023-12-26 DIAGNOSIS — Z12.5 SCREENING FOR PROSTATE CANCER: ICD-10-CM

## 2023-12-26 DIAGNOSIS — E78.00 HYPERCHOLESTEROLEMIA: Chronic | ICD-10-CM

## 2023-12-26 LAB
ALBUMIN SERPL BCP-MCNC: 4 G/DL (ref 3.5–5)
ALP SERPL-CCNC: 45 U/L (ref 34–104)
ALT SERPL W P-5'-P-CCNC: 21 U/L (ref 7–52)
ANION GAP SERPL CALCULATED.3IONS-SCNC: 7 MMOL/L
AST SERPL W P-5'-P-CCNC: 15 U/L (ref 13–39)
BILIRUB SERPL-MCNC: 0.46 MG/DL (ref 0.2–1)
BUN SERPL-MCNC: 14 MG/DL (ref 5–25)
CALCIUM SERPL-MCNC: 9.1 MG/DL (ref 8.4–10.2)
CHLORIDE SERPL-SCNC: 103 MMOL/L (ref 96–108)
CHOLEST SERPL-MCNC: 136 MG/DL
CO2 SERPL-SCNC: 29 MMOL/L (ref 21–32)
CREAT SERPL-MCNC: 0.7 MG/DL (ref 0.6–1.3)
ERYTHROCYTE [DISTWIDTH] IN BLOOD BY AUTOMATED COUNT: 12.5 % (ref 11.6–15.1)
GFR SERPL CREATININE-BSD FRML MDRD: 104 ML/MIN/1.73SQ M
GLUCOSE P FAST SERPL-MCNC: 103 MG/DL (ref 65–99)
HCT VFR BLD AUTO: 45.9 % (ref 36.5–49.3)
HDLC SERPL-MCNC: 50 MG/DL
HGB BLD-MCNC: 14.9 G/DL (ref 12–17)
LDLC SERPL CALC-MCNC: 69 MG/DL (ref 0–100)
MCH RBC QN AUTO: 28.5 PG (ref 26.8–34.3)
MCHC RBC AUTO-ENTMCNC: 32.5 G/DL (ref 31.4–37.4)
MCV RBC AUTO: 88 FL (ref 82–98)
NONHDLC SERPL-MCNC: 86 MG/DL
PLATELET # BLD AUTO: 188 THOUSANDS/UL (ref 149–390)
PMV BLD AUTO: 9.4 FL (ref 8.9–12.7)
POTASSIUM SERPL-SCNC: 4.3 MMOL/L (ref 3.5–5.3)
PROT SERPL-MCNC: 6.8 G/DL (ref 6.4–8.4)
PSA SERPL-MCNC: 0.46 NG/ML (ref 0–4)
RBC # BLD AUTO: 5.22 MILLION/UL (ref 3.88–5.62)
SODIUM SERPL-SCNC: 139 MMOL/L (ref 135–147)
TRIGL SERPL-MCNC: 83 MG/DL
WBC # BLD AUTO: 6.69 THOUSAND/UL (ref 4.31–10.16)

## 2023-12-26 PROCEDURE — 80061 LIPID PANEL: CPT

## 2023-12-26 PROCEDURE — 85027 COMPLETE CBC AUTOMATED: CPT

## 2023-12-26 PROCEDURE — 36415 COLL VENOUS BLD VENIPUNCTURE: CPT

## 2023-12-26 PROCEDURE — 80053 COMPREHEN METABOLIC PANEL: CPT

## 2023-12-26 PROCEDURE — G0103 PSA SCREENING: HCPCS

## 2023-12-27 DIAGNOSIS — E66.01 MORBID OBESITY WITH BMI OF 40.0-44.9, ADULT (HCC): ICD-10-CM

## 2023-12-29 ENCOUNTER — TELEPHONE (OUTPATIENT)
Age: 58
End: 2023-12-29

## 2023-12-29 NOTE — TELEPHONE ENCOUNTER
Brittany left a vm with our office today  It's in regards to the DENIAL for his Wegovy.  This DENIAL is scanned in his chart.    Please call Brittany back with any questions

## 2023-12-30 RX ORDER — SEMAGLUTIDE 0.25 MG/.5ML
INJECTION, SOLUTION SUBCUTANEOUS
Qty: 2 ML | Refills: 0 | OUTPATIENT
Start: 2023-12-30

## 2023-12-30 NOTE — TELEPHONE ENCOUNTER
As expected, it's truly not covered even though patient was told something different by his insurance. Prior auth denial scanned in media.

## 2024-01-08 ENCOUNTER — OFFICE VISIT (OUTPATIENT)
Dept: OBGYN CLINIC | Facility: CLINIC | Age: 59
End: 2024-01-08
Payer: COMMERCIAL

## 2024-01-08 VITALS
HEART RATE: 69 BPM | WEIGHT: 290 LBS | HEIGHT: 66 IN | SYSTOLIC BLOOD PRESSURE: 141 MMHG | DIASTOLIC BLOOD PRESSURE: 101 MMHG | BODY MASS INDEX: 46.61 KG/M2

## 2024-01-08 DIAGNOSIS — G89.29 CHRONIC PAIN OF LEFT KNEE: ICD-10-CM

## 2024-01-08 DIAGNOSIS — M25.561 CHRONIC PAIN OF RIGHT KNEE: ICD-10-CM

## 2024-01-08 DIAGNOSIS — M25.562 CHRONIC PAIN OF LEFT KNEE: ICD-10-CM

## 2024-01-08 DIAGNOSIS — M17.0 PRIMARY OSTEOARTHRITIS OF BOTH KNEES: Primary | ICD-10-CM

## 2024-01-08 DIAGNOSIS — G89.29 CHRONIC PAIN OF RIGHT KNEE: ICD-10-CM

## 2024-01-08 PROCEDURE — 20610 DRAIN/INJ JOINT/BURSA W/O US: CPT | Performed by: ORTHOPAEDIC SURGERY

## 2024-01-08 PROCEDURE — 99214 OFFICE O/P EST MOD 30 MIN: CPT | Performed by: ORTHOPAEDIC SURGERY

## 2024-01-08 RX ORDER — METHYLPREDNISOLONE ACETATE 40 MG/ML
2 INJECTION, SUSPENSION INTRA-ARTICULAR; INTRALESIONAL; INTRAMUSCULAR; SOFT TISSUE
Status: COMPLETED | OUTPATIENT
Start: 2024-01-08 | End: 2024-01-08

## 2024-01-08 RX ORDER — BUPIVACAINE HYDROCHLORIDE 2.5 MG/ML
2 INJECTION, SOLUTION INFILTRATION; PERINEURAL
Status: COMPLETED | OUTPATIENT
Start: 2024-01-08 | End: 2024-01-08

## 2024-01-08 RX ORDER — LIDOCAINE HYDROCHLORIDE 10 MG/ML
2 INJECTION, SOLUTION INFILTRATION; PERINEURAL
Status: COMPLETED | OUTPATIENT
Start: 2024-01-08 | End: 2024-01-08

## 2024-01-08 RX ADMIN — LIDOCAINE HYDROCHLORIDE 2 ML: 10 INJECTION, SOLUTION INFILTRATION; PERINEURAL at 15:00

## 2024-01-08 RX ADMIN — BUPIVACAINE HYDROCHLORIDE 2 ML: 2.5 INJECTION, SOLUTION INFILTRATION; PERINEURAL at 15:00

## 2024-01-08 RX ADMIN — METHYLPREDNISOLONE ACETATE 2 ML: 40 INJECTION, SUSPENSION INTRA-ARTICULAR; INTRALESIONAL; INTRAMUSCULAR; SOFT TISSUE at 15:00

## 2024-01-08 NOTE — PROGRESS NOTES
Patient Name:  Osmin Hook  MRN:  [id number]    Assessment & Plan     1. Primary osteoarthritis of both knees  -     Large joint arthrocentesis: R knee  -     Large joint arthrocentesis: L knee  -     Injection Procedure Prior Authorization; Future    2. Chronic pain of right knee  -     Large joint arthrocentesis: R knee  -     Injection Procedure Prior Authorization; Future    3. Chronic pain of left knee  -     Large joint arthrocentesis: L knee  -     Injection Procedure Prior Authorization; Future    4. BMI 45.0-49.9, adult ([facility])      58 y.o. male with Bilateral knee osteoarthritis.   The patient was offered a corticosteroid injection for their bilateral knees.  Risks and benefits were discussed in detail.  They tolerated the procedure well.  The patient was educated they may have some irritation in the next few days and should rest, ice, elevate and perform gentle range of motion exercises.   Preauthorization was requested for Durolane for bilateral knees  Discussed weight loss with the patient. BMI of 40 or below required to move forward with total knee arthroplasty  Patient will follow up when Durolane is approved    History of the Present Illness   Osmin Hook is a 58 y.o. male with Bilateral knee osteoarthritis s/p corticosteroid injection 09/05/2023. He notes moderate relief from the injections. He has been working with his PCP to try to lose weight. He went back to his old job and has been on his on his feet for about 9 hours per day. His pain today is rated 10/10.         Review of Systems     Review of Systems   Constitutional:  Negative for chills and fever.   HENT:  Negative for ear pain and sore throat.    Eyes:  Negative for pain and visual disturbance.   Respiratory:  Negative for cough and shortness of breath.    Cardiovascular:  Negative for chest pain and palpitations.   Gastrointestinal:  Negative for abdominal pain and vomiting.   Genitourinary:  Negative for dysuria and hematuria.  "  Musculoskeletal:  Negative for arthralgias and back pain.   Skin:  Negative for color change and rash.   Neurological:  Negative for seizures and syncope.   All other systems reviewed and are negative.      Physical Exam     BP (!) 141/101   Pulse 69   Ht 5' 6\" (1.676 m)   Wt 132 kg (290 lb)   BMI 46.81 kg/m²     Right Knee  Range of motion from 0 to 105.    There is no effusion.    There is mild tenderness over the medial joint line.    The patient is able to perform a straight leg raise.    Varus stress testing reveals no instability at 0 and 30 degrees   Valgus stress testing reveals no instability at 0 and 30 degrees  The patient is neurovascular intact distally.    Left  Knee  Range of motion from 0 to 105.    There is no effusion.    There is mild tenderness over the medial joint line.    The patient is able to perform a straight leg raise.    Varus stress testing reveals no instability at 0 and 30 degrees   Valgus stress testing reveals no instability at 0 and 30 degrees  The patient is neurovascular intact distally.    Data Review     I have personally reviewed pertinent films in PACS, and my interpretation follows.    X-rays taken 2023 of Right knee demonstrate severe medial compartment and mild patellofemoral compartment degenerative changes with sclerosis and small osteophytes. No fracture or dislocation.      X-rays taken 2023 of Left knee demonstrate  severe medial compartment and mild patellofemoral compartment degenerative changes. No acute fracture or dislocation.    Social History     Tobacco Use    Smoking status: Former     Current packs/day: 0.00     Average packs/day: 0.3 packs/day for 5.8 years (1.4 ttl pk-yrs)     Types: Cigarettes     Start date: 1984     Quit date: 10/20/1989     Years since quittin.2    Smokeless tobacco: Current     Types: Chew   Vaping Use    Vaping status: Never Used   Substance Use Topics    Alcohol use: Yes     Alcohol/week: 4.0 standard " "drinks of alcohol     Types: 4 Standard drinks or equivalent per week     Comment: Occ alcohol use per [employer]     Drug use: Yes     Types: Marijuana     Comment: medical           Large joint arthrocentesis: R knee  Universal Protocol:  Consent: Verbal consent obtained.  Risks and benefits: risks, benefits and alternatives were discussed  Consent given by: patient  Time out: Immediately prior to procedure a \"time out\" was called to verify the correct patient, procedure, equipment, support staff and site/side marked as required.  Patient understanding: patient states understanding of the procedure being performed  Site marked: the operative site was marked  Patient identity confirmed: verbally with patient  Supporting Documentation  Indications: pain   Procedure Details  Location: knee - R knee  Preparation: Patient was prepped and draped in the usual sterile fashion  Needle size: 22 G  Ultrasound guidance: no  Approach: anterolateral  Medications administered: 2 mL bupivacaine 0.25 %; 2 mL methylPREDNISolone acetate 40 mg/mL; 2 mL lidocaine 1 %    Patient tolerance: patient tolerated the procedure well with no immediate complications  Dressing:  Sterile dressing applied      Large joint arthrocentesis: L knee  Universal Protocol:  Consent: Verbal consent obtained.  Risks and benefits: risks, benefits and alternatives were discussed  Consent given by: patient  Time out: Immediately prior to procedure a \"time out\" was called to verify the correct patient, procedure, equipment, support staff and site/side marked as required.  Patient understanding: patient states understanding of the procedure being performed  Site marked: the operative site was marked  Patient identity confirmed: verbally with patient  Supporting Documentation  Indications: pain   Procedure Details  Location: knee - L knee  Preparation: Patient was prepped and draped in the usual sterile fashion  Needle size: 22 G  Ultrasound guidance: " no  Approach: anterolateral  Medications administered: 2 mL bupivacaine 0.25 %; 2 mL methylPREDNISolone acetate 40 mg/mL; 2 mL lidocaine 1 %    Patient tolerance: patient tolerated the procedure well with no immediate complications  Dressing:  Sterile dressing applied            Kaylan Vera   Scribe Attestation      I,:  Kaylan Vera am acting as a scribe while in the presence of the attending physician.:       I,:  Tommie Baird DO personally performed the services described in this documentation    as scribed in my presence.:

## 2024-01-20 ENCOUNTER — APPOINTMENT (EMERGENCY)
Dept: RADIOLOGY | Facility: HOSPITAL | Age: 59
End: 2024-01-20
Payer: OTHER MISCELLANEOUS

## 2024-01-20 ENCOUNTER — HOSPITAL ENCOUNTER (EMERGENCY)
Facility: HOSPITAL | Age: 59
Discharge: HOME | End: 2024-01-20
Attending: EMERGENCY MEDICINE | Admitting: EMERGENCY MEDICINE
Payer: OTHER MISCELLANEOUS

## 2024-01-20 ENCOUNTER — APPOINTMENT (EMERGENCY)
Dept: CT IMAGING | Facility: HOSPITAL | Age: 59
End: 2024-01-20
Payer: OTHER MISCELLANEOUS

## 2024-01-20 VITALS
OXYGEN SATURATION: 98 % | HEART RATE: 61 BPM | RESPIRATION RATE: 18 BRPM | BODY MASS INDEX: 49.32 KG/M2 | WEIGHT: 305.56 LBS | TEMPERATURE: 98.7 F | SYSTOLIC BLOOD PRESSURE: 134 MMHG | DIASTOLIC BLOOD PRESSURE: 70 MMHG

## 2024-01-20 DIAGNOSIS — S43.006A SHOULDER DISLOCATION: Primary | ICD-10-CM

## 2024-01-20 PROCEDURE — 96375 TX/PRO/DX INJ NEW DRUG ADDON: CPT

## 2024-01-20 PROCEDURE — 99152 MOD SED SAME PHYS/QHP 5/>YRS: CPT | Performed by: EMERGENCY MEDICINE

## 2024-01-20 PROCEDURE — 73020 X-RAY EXAM OF SHOULDER: CPT

## 2024-01-20 PROCEDURE — 73200 CT UPPER EXTREMITY W/O DYE: CPT

## 2024-01-20 PROCEDURE — 99284 EMERGENCY DEPT VISIT MOD MDM: CPT

## 2024-01-20 PROCEDURE — 99285 EMERGENCY DEPT VISIT HI MDM: CPT | Performed by: EMERGENCY MEDICINE

## 2024-01-20 PROCEDURE — 73030 X-RAY EXAM OF SHOULDER: CPT

## 2024-01-20 PROCEDURE — 23650 CLTX SHO DSLC W/MNPJ WO ANES: CPT | Performed by: EMERGENCY MEDICINE

## 2024-01-20 PROCEDURE — 73070 X-RAY EXAM OF ELBOW: CPT

## 2024-01-20 PROCEDURE — 73110 X-RAY EXAM OF WRIST: CPT

## 2024-01-20 PROCEDURE — 96374 THER/PROPH/DIAG INJ IV PUSH: CPT

## 2024-01-20 PROCEDURE — 73060 X-RAY EXAM OF HUMERUS: CPT

## 2024-01-20 PROCEDURE — G1004 CDSM NDSC: HCPCS

## 2024-01-20 PROCEDURE — 99244 OFF/OP CNSLTJ NEW/EST MOD 40: CPT | Performed by: ORTHOPAEDIC SURGERY

## 2024-01-20 RX ORDER — MORPHINE SULFATE 4 MG/ML
4 INJECTION, SOLUTION INTRAMUSCULAR; INTRAVENOUS ONCE
Status: COMPLETED | OUTPATIENT
Start: 2024-01-20 | End: 2024-01-20

## 2024-01-20 RX ORDER — KETOROLAC TROMETHAMINE 30 MG/ML
15 INJECTION, SOLUTION INTRAMUSCULAR; INTRAVENOUS ONCE
Status: COMPLETED | OUTPATIENT
Start: 2024-01-20 | End: 2024-01-20

## 2024-01-20 RX ORDER — LIDOCAINE HYDROCHLORIDE 20 MG/ML
10 INJECTION, SOLUTION EPIDURAL; INFILTRATION; INTRACAUDAL; PERINEURAL ONCE
Status: COMPLETED | OUTPATIENT
Start: 2024-01-20 | End: 2024-01-20

## 2024-01-20 RX ORDER — PROPOFOL 10 MG/ML
0.5 INJECTION, EMULSION INTRAVENOUS ONCE
Status: COMPLETED | OUTPATIENT
Start: 2024-01-20 | End: 2024-01-20

## 2024-01-20 RX ORDER — PROPOFOL 10 MG/ML
1 INJECTION, EMULSION INTRAVENOUS ONCE
Status: COMPLETED | OUTPATIENT
Start: 2024-01-20 | End: 2024-01-20

## 2024-01-20 RX ORDER — ACETAMINOPHEN 10 MG/ML
1000 INJECTION, SOLUTION INTRAVENOUS ONCE
Status: COMPLETED | OUTPATIENT
Start: 2024-01-20 | End: 2024-01-20

## 2024-01-20 RX ORDER — PROPOFOL 10 MG/ML
0.5 INJECTION, EMULSION INTRAVENOUS ONCE
Status: CANCELLED | OUTPATIENT
Start: 2024-01-20 | End: 2024-01-20

## 2024-01-20 RX ADMIN — LIDOCAINE HYDROCHLORIDE 10 ML: 20 INJECTION, SOLUTION EPIDURAL; INFILTRATION; INTRACAUDAL at 09:35

## 2024-01-20 RX ADMIN — KETOROLAC TROMETHAMINE 15 MG: 30 INJECTION, SOLUTION INTRAMUSCULAR at 09:32

## 2024-01-20 RX ADMIN — PROPOFOL 139 MG: 10 INJECTION, EMULSION INTRAVENOUS at 10:51

## 2024-01-20 RX ADMIN — PROPOFOL 70 MG: 10 INJECTION, EMULSION INTRAVENOUS at 11:57

## 2024-01-20 RX ADMIN — ACETAMINOPHEN 1000 MG: 10 INJECTION INTRAVENOUS at 09:35

## 2024-01-20 RX ADMIN — MORPHINE SULFATE 4 MG: 4 INJECTION INTRAVENOUS at 09:31

## 2024-01-20 RX ADMIN — PROPOFOL 70 MG: 10 INJECTION, EMULSION INTRAVENOUS at 11:56

## 2024-01-20 NOTE — ED NOTES
Wasted 3 opened vials of propofol that was given during conscious sedation by provider, witnessed by Yanique Castro RN.     Mahi Alford RN  01/20/24 [id number]

## 2024-01-20 NOTE — ED PROVIDER NOTES
History  Chief Complaint   Patient presents with    Fall     Pt arrived via ems from work, Pt reported coming off a 4ft elevated flat ramp onto dumpster, and fell. Pt c/o right arm pain and left shin pain. -HS, -LOC, -BT     Patient is a 58-year-old male past medical history of hypertension, hyperlipidemia, GERD, Lyme disease presenting with shoulder injury.  Patient states prior to arrival he was on top of a ramp coming out of a dumpster when the ramp fell causing him to fall inside of the dumpster onto his right shoulder.  Denies any head trauma or LOC and states he does take a baby aspirin.  Denies any chest pain, headache, neck pain, back pain, abdominal pain, nausea or vomiting, numbness or tingling or dizziness but does state he is having severe pain to the right shoulder radiating through the whole arm.  Has not take any medication for pain as of yet.        Prior to Admission Medications   Prescriptions Last Dose Informant Patient Reported? Taking?   amLODIPine (NORVASC) 10 mg tablet  Self No No   Sig: TAKE 1 TABLET BY MOUTH EVERY DAY   aspirin 81 mg chewable tablet  Self No No   Sig: Chew 1 tablet (81 mg total) daily   betamethasone, augmented, (DIPROLENE-AF) 0.05 % cream  Self No No   Sig: Apply topically 2 (two) times a day   metoprolol succinate (TOPROL-XL) 50 mg 24 hr tablet  Self No No   Sig: TAKE 1 TABLET BY MOUTH EVERY DAY   olmesartan (BENICAR) 40 mg tablet  Self No No   Sig: TAKE 1 TABLET BY MOUTH EVERY DAY   pantoprazole (PROTONIX) 40 mg tablet  Self No No   Sig: Take 1 tablet (40 mg total) by mouth daily   rosuvastatin (CRESTOR) 5 mg tablet  Self No No   Sig: TAKE 1 TABLET BY MOUTH EVERY DAY      Facility-Administered Medications: None       Past Medical History:   Diagnosis Date    GERD (gastroesophageal reflux disease)     Hypercholesterolemia     Hypertension     Lyme disease 3/19/2019    Obesity     Sexual dysfunction     Psychosexual dysfunction with inhibited sexual excitement        Past  Surgical History:   Procedure Laterality Date    BACK SURGERY      spinal fusion, LUMBAR    CARDIAC CATHETERIZATION      CARDIAC CATHETERIZATION      CARDIAC CATHETERIZATION      COLONOSCOPY  2019    EGD AND COLONOSCOPY N/A 06/15/2018    Procedure: EGD AND COLONOSCOPY;  Surgeon: Jaciel Johnson MD;  Location: [facility];  Service: Gastroenterology    TN COLONOSCOPY FLX DX W/COLLJ SPEC WHEN PFRMD N/A 2019    Procedure: COLONOSCOPY;  Surgeon: Kvng Cerna MD;  Location[facility];  Service: Gastroenterology    TN ESOPHAGOGASTRODUODENOSCOPY TRANSORAL DIAGNOSTIC N/A 2019    Procedure: ESOPHAGOGASTRODUODENOSCOPY (EGD);  Surgeon: Kvng Cerna MD;  Location: [facility];  Service: Gastroenterology    SEPTOPLASTY      UPPER GASTROINTESTINAL ENDOSCOPY         Family History   Problem Relation Age of Onset    Atrial fibrillation Mother     COPD Mother     Coronary artery disease Mother     Lung cancer Mother     Rheum arthritis Mother     Stroke Mother         Syndrome    Arthritis Mother     Cancer Mother     Hypertension Mother     Lung cancer Father     Cancer Father     Prostate cancer Maternal Grandfather     Heart defect Daughter     Arthritis Family      I have reviewed and agree with the history as documented.    E-Cigarette/Vaping    E-Cigarette Use Never User      E-Cigarette/Vaping Substances    Nicotine No     THC No     CBD No     Flavoring No     Other No     Unknown No      Social History     Tobacco Use    Smoking status: Former     Current packs/day: 0.00     Average packs/day: 0.3 packs/day for 5.8 years (1.4 ttl pk-yrs)     Types: Cigarettes     Start date: 1984     Quit date: 10/20/1989     Years since quittin.2    Smokeless tobacco: Current     Types: Chew   Vaping Use    Vaping status: Never Used   Substance Use Topics    Alcohol use: Yes     Alcohol/week: 4.0 standard drinks of alcohol     Types: 4 Standard drinks or equivalent per week     Comment: Occ alcohol use per  [employer]     Drug use: Yes     Types: Marijuana     Comment: medical       Review of Systems   All other systems reviewed and are negative.      Physical Exam  Physical Exam  Vitals reviewed.   Constitutional:       General: He is not in acute distress.     Appearance: Normal appearance. He is not ill-appearing.   HENT:      Head: Normocephalic and atraumatic.      Mouth/Throat:      Mouth: Mucous membranes are moist.   Eyes:      Conjunctiva/sclera: Conjunctivae normal.   Cardiovascular:      Rate and Rhythm: Normal rate and regular rhythm.      Pulses: Normal pulses.      Heart sounds: Normal heart sounds.   Pulmonary:      Effort: Pulmonary effort is normal.      Breath sounds: Normal breath sounds.   Chest:      Chest wall: No tenderness.   Abdominal:      General: Abdomen is flat.      Palpations: Abdomen is soft.      Tenderness: There is no abdominal tenderness.   Musculoskeletal:         General: Tenderness present. No swelling. Normal range of motion.      Cervical back: Normal range of motion and neck supple. No tenderness.      Comments: No spinal tenderness, patient has tenderness to the right humerus, shoulder, elbow and mildly to the wrist with no deformity, edema, intact distal motor, sensation, pulses   Skin:     General: Skin is warm and dry.   Neurological:      General: No focal deficit present.      Mental Status: He is alert.      Sensory: No sensory deficit.      Motor: No weakness.   Psychiatric:         Mood and Affect: Mood normal.         Vital Signs  ED Triage Vitals   Temperature Pulse Respirations Blood Pressure SpO2   01/20/24 0906 01/20/24 0906 01/20/24 0906 01/20/24 0906 01/20/24 0906   98.7 °F (37.1 °C) 65 20 (!) 141/101 98 %      Temp Source Heart Rate Source Patient Position - Orthostatic VS BP Location FiO2 (%)   01/20/24 0906 01/20/24 0906 -- -- --   Temporal Monitor         Pain Score       01/20/24 0931       10 - Worst Possible Pain           Vitals:    01/20/24 1213  01/20/24 1216 01/20/24 1222 01/20/24 1230   BP: 105/57 111/64 114/78 134/70   Pulse: 69 68 65 61         Visual Acuity      ED Medications  Medications   ketorolac (TORADOL) injection 15 mg (15 mg Intravenous Given 1/20/24 0932)   morphine injection 4 mg (4 mg Intravenous Given 1/20/24 0931)   lidocaine (PF) (XYLOCAINE-MPF) 2 % injection 10 mL (10 mL Infiltration Given 1/20/24 0935)   acetaminophen (Ofirmev) injection 1,000 mg (0 mg Intravenous Stopped 1/20/24 0950)   propofol (DIPRIVAN) 200 MG/20ML bolus injection 139 mg (139 mg Intravenous Given 1/20/24 1051)   propofol (DIPRIVAN) 200 MG/20ML bolus injection 70 mg (70 mg Intravenous Given 1/20/24 1156)   propofol (DIPRIVAN) 200 MG/20ML bolus injection 70 mg (70 mg Intravenous Given 1/20/24 1157)       Diagnostic Studies  Results Reviewed       None                   XR shoulder 1 vw right   ED Interpretation by Carol Ann Mejia DO (01/20 1215)   Appropriate reduction, possible humeral head fracture      XR humerus RIGHT   ED Interpretation by Carol Ann Mejia DO (01/20 0935)   Shoulder dislocation, no fracture      XR shoulder 2+ views RIGHT   ED Interpretation by Carol Ann Mejia DO (01/20 0936)   Shoulder dislocation no fracture      XR elbow 2 views RIGHT   ED Interpretation by Carol Ann Mejia DO (01/20 0936)   NAD      XR wrist 3+ views RIGHT   ED Interpretation by Carol Ann Mejia DO (01/20 0936)   NAD      CT upper extremity wo contrast right    (Results Pending)   XR shoulder 1 vw right    (Results Pending)              Procedures  Orthopedic injury treatment    Date/Time: 1/20/2024 1:05 PM    Performed by: Carol Ann Mejia DO  Authorized by: Carol Ann Mejia DO    Patient Location:  ED  [place] Protocol:  Procedure performed by:  Consent given by: patient  Patient understanding: patient states understanding of the procedure being performed  Patient consent: the patient's understanding of the procedure matches consent  given  Procedure consent: procedure consent matches procedure scheduled  Relevant documents: relevant documents present and verified  Test results: test results available and properly labeled  Site marked: the operative site was marked  Radiology Images displayed and confirmed. If images not available, report reviewed: imaging studies available  Patient identity confirmed: verbally with patient    Injury location:  Shoulder  Location details:  Right shoulder  Injury type:  Dislocation  Dislocation type: anterior    Chronicity:  New  Hill-Sachs deformity?: Yes    Neurovascular status: Neurovascularly intact    Distal perfusion: normal    Neurological function: normal    Range of motion: normal    Local anesthesia used?: Yes    General anesthesia used?: Yes    Anesthesia:  Local infiltration  Manipulation performed?: Yes    Reduction method:  External rotation and traction and counter traction  Reduction method:  External rotation and traction and counter traction  Reduction method:  External rotation and traction and counter traction  Reduction method:  External rotation and traction and counter traction  Reduction method:  External rotation and traction and counter traction  Reduction method:  External rotation and traction and counter traction  Skeletal traction used?: Yes    Reduction successful?: Yes    Confirmation: Reduction confirmed by x-ray    Immobilization:  Sling  Neurovascular status: Neurovascularly intact    Distal perfusion: normal    Neurological function: normal    Range of motion: normal    Patient tolerance:  Patient tolerated the procedure well with no immediate complications   Orthopedics Julianna durán Sedation    Date/Time: 1/20/2024 1:07 PM    Performed by: Carol Ann Mejia DO  Authorized by: Carol Ann Mejia DO    Immediate pre-procedure details:     Reassessment: Patient reassessed immediately prior to procedure      Reviewed: vital signs      Verified: bag valve mask  available, emergency equipment available, intubation equipment available, IV patency confirmed, oxygen available and suction available    Procedure details (see MAR for exact dosages):     Preoxygenation:  Nasal cannula    Sedation:  Propofol    Analgesia:  Morphine    Intra-procedure monitoring:  Blood pressure monitoring, cardiac monitor, continuous pulse oximetry, continuous capnometry, frequent LOC assessments and frequent vital sign checks    Intra-procedure events: none      Total sedation time (minutes):  25  Post-procedure details:     Attendance: Constant attendance by certified staff until patient recovered      Recovery: Patient returned to pre-procedure baseline      Post-sedation assessments completed and reviewed: cardiovascular function, hydration status, mental status, nausea/vomiting, pain level and respiratory function      Patient is stable for discharge or admission: yes      Patient tolerance:  Tolerated well, no immediate complications           ED Course  ED Course as of 01/20/24 1307   Sat Jan 20, 2024   1022 Unable to reduce at bedside with intra-articular block alone, will give propofol and perform procedural sedation for reduction.   1215 Patient appropriately reduced, placed in splint with outpatient orthopedic follow-up.                               SBIRT 20yo+      Flowsheet Row Most Recent Value   Initial Alcohol Screen: US AUDIT-C     1. How often do you have a drink containing alcohol? 1 Filed at: 01/20/2024 0910   2. How many drinks containing alcohol do you have on a typical day you are drinking?  1 Filed at: 01/20/2024 0910   3a. Male UNDER 65: How often do you have five or more drinks on one occasion? 0 Filed at: 01/20/2024 0910   Audit-C Score 2 Filed at: 01/20/2024 0910   TALAT: How many times in the past year have you...    Used an illegal drug or used a prescription medication for non-medical reasons? Never Filed at: 01/20/2024 0910                      Medical Decision  Making  Patient is a 58-year-old male past medical history of hypertension, hyperlipidemia, GERD, Lyme presenting with shoulder injury.  Patient is well-appearing at bedside with stable vitals and in no acute distress.  He is intact distal motor, sensation, pulses but does have tenderness to the right shoulder, humerus, elbow and mildly to the wrist with no deformity.  Will obtain x-rays, give pain control and reassess.  Patient denying any head trauma with no signs of trauma, no spinal tenderness, moving all extremities with equal strength and sensation and no other significant physical exam findings.    Amount and/or Complexity of Data Reviewed  Radiology: ordered and independent interpretation performed.    Risk  Prescription drug management.             Disposition  Final diagnoses:   Shoulder dislocation     Time reflects when diagnosis was documented in both MDM as applicable and the Disposition within this note       Time User Action Codes Description Comment    1/20/2024 12:16 PM Carol Ann Mejia Add [S43.006A] Shoulder dislocation           ED Disposition       ED Disposition   Discharge    Condition   Stable    Date/Time   Sat Jan 20, 2024 1216    Comment   Osmin Hook discharge to home/self care.                   Follow-up Information       Follow up With Specialties Details Why Contact Info Additional Information    [facility] Schedule an appointment as soon as possible for a visit   [address] [address] [phone] [facility], [address] [place] [address], [address] [phone]    [facility] Emergency Medicine  If symptoms worsen [address] [facility] [address] [phone] [facility] Emergency [facility], [address] [facility]  [place], [address]            Patient's Medications   Discharge Prescriptions    No medications on file           PDMP Review       None            ED Provider  Electronically Signed by             Carol Ann Mejia DO  01/20/24 5153

## 2024-01-20 NOTE — Clinical Note
Osmin Hook was seen and treated in our emergency department on 1/20/2024.                Diagnosis:     Osmin  may return to work on return date.    He may return on this date: 01/24/2024         If you have any questions or concerns, please don't hesitate to call.      Mahi Alford RN    ______________________________           _______________          _______________  Hospital Representative                              Date                                Time

## 2024-01-20 NOTE — CONSULTS
Orthopedics   Osmin Hook 58 y.o. male MRN: [id number]  Unit/Bed#: ED 15      Chief Complaint:   Right shoulder pain    HPI:  58 y.o. RHD male with Right shoulder pain after mechanical fall, about 3-4 feet while at work. He had immediate Right shoulder pain and was unable to move the arm. He reported to the ED for evaluation and reduction was attempted. He admits to pain in the shoulder without other musculoskeletal complaints.     Review Of Systems:   Skin: Normal  Neuro: See HPI  Musculoskeletal: See HPI  14 point review of systems negative except as stated above     Past Medical History:   Past Medical History:   Diagnosis Date    GERD (gastroesophageal reflux disease)     Hypercholesterolemia     Hypertension     Lyme disease 3/19/2019    Obesity     Sexual dysfunction     Psychosexual dysfunction with inhibited sexual excitement        Past Surgical History:   Past Surgical History:   Procedure Laterality Date    BACK SURGERY      spinal fusion, LUMBAR    CARDIAC CATHETERIZATION      CARDIAC CATHETERIZATION      CARDIAC CATHETERIZATION      COLONOSCOPY  2019    EGD AND COLONOSCOPY N/A 06/15/2018    Procedure: EGD AND COLONOSCOPY;  Surgeon: Jaciel Johnson MD;  Location: [facility];  Service: Gastroenterology    DC COLONOSCOPY FLX DX W/COLLJ SPEC WHEN PFRMD N/A 03/26/2019    Procedure: COLONOSCOPY;  Surgeon: Kvng eCrna MD;  [facility];  Service: Gastroenterology    DC ESOPHAGOGASTRODUODENOSCOPY TRANSORAL DIAGNOSTIC N/A 03/26/2019    Procedure: ESOPHAGOGASTRODUODENOSCOPY (EGD);  Surgeon: Kvng Cerna MD;  [facility];  Service: Gastroenterology    SEPTOPLASTY      UPPER GASTROINTESTINAL ENDOSCOPY  2019       Family History:  Family history reviewed and non-contributory  Family History   Problem Relation Age of Onset    Atrial fibrillation Mother     COPD Mother     Coronary artery disease Mother     Lung cancer Mother     Rheum arthritis Mother     Stroke Mother         Syndrome     Arthritis Mother     Cancer Mother     Hypertension Mother     Lung cancer Father     Cancer Father     Prostate cancer Maternal Grandfather     Heart defect Daughter     Arthritis Family        Social History:  Social History     Socioeconomic History    Marital status: /Civil Union     Spouse name: None    Number of children: None    Years of education: None    Highest education level: None   Occupational History    None   Tobacco Use    Smoking status: Former     Current packs/day: 0.00     Average packs/day: 0.3 packs/day for 5.8 years (1.4 ttl pk-yrs)     Types: Cigarettes     Start date: 1984     Quit date: 10/20/1989     Years since quittin.2    Smokeless tobacco: Current     Types: Chew   Vaping Use    Vaping status: Never Used   Substance and Sexual Activity    Alcohol use: Yes     Alcohol/week: 4.0 standard drinks of alcohol     Types: 4 Standard drinks or equivalent per week     Comment: Occ alcohol use per [employer]     Drug use: Yes     Types: Marijuana     Comment: medical    Sexual activity: Yes     Partners: Female   Other Topics Concern    None   Social History Narrative    None     Social Determinants of Health     Financial Resource Strain: Not on file   Food Insecurity: Not on file   Transportation Needs: Not on file   Physical Activity: Sufficiently Active (2021)    Exercise Vital Sign     Days of Exercise per Week: 3 days     Minutes of Exercise per Session: 120 min   Stress: Stress Concern Present (2021)    [employer] [place] - Occupational Stress Questionnaire     Feeling of Stress : To some extent   Social Connections: Not on file   Intimate Partner Violence: Not on file   Housing Stability: Not on file       Allergies:   No Known Allergies        Labs:  0   Lab Value Date/Time    HCT 45.9 2023 0901    HCT 45.2 10/11/2022 0843    HCT 47.8 2022 0944    HGB 14.9 2023 0901    HGB 14.0 10/11/2022 0843    HGB 15.4 2022  "0944    INR 1.08 06/14/2018 1956    WBC 6.69 12/26/2023 0901    WBC 5.49 10/11/2022 0843    WBC 5.96 03/29/2022 0944    ESR 6 05/07/2019 1525    CRP <3.0 05/07/2019 1525       Meds:  No current facility-administered medications for this encounter.    Current Outpatient Medications:     amLODIPine (NORVASC) 10 mg tablet, TAKE 1 TABLET BY MOUTH EVERY DAY, Disp: 90 tablet, Rfl: 3    aspirin 81 mg chewable tablet, Chew 1 tablet (81 mg total) daily, Disp: , Rfl: 0    betamethasone, augmented, (DIPROLENE-AF) 0.05 % cream, Apply topically 2 (two) times a day, Disp: 50 g, Rfl: 3    metoprolol succinate (TOPROL-XL) 50 mg 24 hr tablet, TAKE 1 TABLET BY MOUTH EVERY DAY, Disp: 90 tablet, Rfl: 3    olmesartan (BENICAR) 40 mg tablet, TAKE 1 TABLET BY MOUTH EVERY DAY, Disp: 90 tablet, Rfl: 3    pantoprazole (PROTONIX) 40 mg tablet, Take 1 tablet (40 mg total) by mouth daily, Disp: 90 tablet, Rfl: 3    rosuvastatin (CRESTOR) 5 mg tablet, TAKE 1 TABLET BY MOUTH EVERY DAY, Disp: 90 tablet, Rfl: 3    Blood Culture:   No results found for: \"BLOODCX\"    Wound Culture:   No results found for: \"WOUNDCULT\"    Ins and Outs:  I/O last 24 hours:  In: 100 [IV Piggyback:100]  Out: -           Physical Exam:   /64   Pulse 68   Temp 98.7 °F (37.1 °C) (Temporal)   Resp 18   Wt (!) 139 kg (305 lb 8.9 oz)   SpO2 97%   BMI 49.32 kg/m²   Gen: No acute distress, resting comfortably in bed  HEENT: Eyes clear, moist mucus membranes, hearing intact  Respiratory: No audible wheezing or stridor  Cardiovascular: Well Perfused peripherally, 2+ distal pulse  Abdomen: nondistended, no peritoneal signs    Musculoskeletal:   Right shoulder  Skin without evidence of integrity disruption  TTP at proximal humerus  Full active & passive ROM at elbow, wrist and fingers  Unable to perform ROM of the shoulder secondary to pain   SILT m/r/u.   Motor intact ain/pin/m/r/u  2+ radial and ulnar pulse  Full composite fist    Tertiary: no tenderness over all other " joints/long bones as except already stated.    Radiology:   I personally reviewed the films.  X-rays taken upon arrival demonstrate anterior shoulder dislocation with persistent dislocation during reduction films.     _*_*_*_*_*_*_*_*_*_*_*_*_*_*_*_*_*_*_*_*_*_*_*_*_*_*_*_*_*_*_*_*_*_*_*_*_*_*_*_*_*    Assessment:  58 y.o.male Right anterior shoulder dislocation     Plan:   NWB Right upper extremity  Attempted reduction without relocation of shoulder.  Discussed with ED physician whom admits after attempted reduction in ED, patient ROM was reasonable, but x-rays still demonstrated dislocation.  Discussed repeat reduction with patient and wife and reviewed risks including but not limited to persistent pain, inability to reduce requiring closed reduction in the OR, iatrogenic fracture, fracture sustained from dislocation, osteoarthritis, numbness, tingling, need for future surgical procedure. Patient agreeable to the above risks. Discussed shoulder dislocations, glenoid labral tears, and rotator cuff tears with patient.   Second reduction was successful per AP image. Will obtain CT of Right shoulder to evaluate for possible fracture.   Maintain sling at all times until reevaluated in the office   Dispo: Once patient CT resulted, patient may be discharged home with follow up with orthopaedics with Dr Billingsley vs Dr Baird. Continue sling until first office visit. Remove for hygenic purposes and gentle elbow ROM.      Kirsten Rodrigues PA-C

## 2024-01-22 ENCOUNTER — APPOINTMENT (OUTPATIENT)
Dept: RADIOLOGY | Facility: CLINIC | Age: 59
End: 2024-01-22
Payer: COMMERCIAL

## 2024-01-22 ENCOUNTER — OFFICE VISIT (OUTPATIENT)
Dept: OBGYN CLINIC | Facility: CLINIC | Age: 59
End: 2024-01-22
Payer: OTHER MISCELLANEOUS

## 2024-01-22 ENCOUNTER — TELEPHONE (OUTPATIENT)
Age: 59
End: 2024-01-22

## 2024-01-22 VITALS
HEIGHT: 66 IN | WEIGHT: 292.8 LBS | BODY MASS INDEX: 47.06 KG/M2 | DIASTOLIC BLOOD PRESSURE: 87 MMHG | HEART RATE: 73 BPM | SYSTOLIC BLOOD PRESSURE: 136 MMHG

## 2024-01-22 DIAGNOSIS — S93.402A SPRAIN OF UNSPECIFIED LIGAMENT OF LEFT ANKLE, INITIAL ENCOUNTER: ICD-10-CM

## 2024-01-22 DIAGNOSIS — S43.004A DISLOCATION OF RIGHT SHOULDER JOINT, INITIAL ENCOUNTER: Primary | ICD-10-CM

## 2024-01-22 DIAGNOSIS — M25.572 PAIN, JOINT, ANKLE AND FOOT, LEFT: ICD-10-CM

## 2024-01-22 DIAGNOSIS — S43.006A SHOULDER DISLOCATION: ICD-10-CM

## 2024-01-22 PROCEDURE — 99214 OFFICE O/P EST MOD 30 MIN: CPT | Performed by: ORTHOPAEDIC SURGERY

## 2024-01-22 PROCEDURE — 73610 X-RAY EXAM OF ANKLE: CPT

## 2024-01-22 NOTE — LETTER
January 22, 2024     Patient: Osmin Hook  YOB: 1965  Date of Visit: 1/22/2024      To Whom it May Concern:    Osmin Hook is under my professional care. Osmin was seen in my office on 1/22/2024. Osmin will remain out of work until cleared. The patient will follow up in 4 weeks for repeat evaluation.    If you have any questions or concerns, please don't hesitate to call.         Sincerely,          Tommie Baird, DO

## 2024-01-22 NOTE — PROGRESS NOTES
Patient Name:  Osmin Hook  MRN:  [id number]    Assessment & Plan     1. Dislocation of right shoulder joint, initial encounter  -     Ambulatory Referral to Physical Therapy; Future    2. Pain, joint, ankle and foot, left  -     XR ankle 3+ vw left; Future; Expected date: 01/22/2024    3. Sprain of unspecified ligament of left ankle, initial encounter  -     Ambulatory Referral to Physical Therapy; Future        58 y.o. male with Right shoulder dislocation with bony Bankart and hill-sacs lesion and left ankle sprain. X-rays and CT scan reviewed in office today with patient and his wife.    Discussed with the patient associated injuries with shoulder dislocation, including possible rotator cuff tear.   Discussed secondary to age and this being his first dislocation episode we can try and treat this non operatively.  The patient was agreeable to this.  He was instructed to continue with the sling for the next 2 weeks.  A referral was provided for PT which he will start in 2 weeks.  The patient was instructed to avoid external rotation and abduction as these positions put him at higher risk for recurrent dislocation.  The patient can also start therapy for his left ankle and a script was provided for this today.  Discussed the ankle swelling can take time to resolve.  He will remain out of work at this time and a note was provided for this today.  He will follow up in 4-5 weeks for repeat evaluation. Will consider further imaging of shoulder if symptoms persist or worsen.       Chief Complaint     Right shoulder pain    History of the Present Illness     Osmin Hook is a RHD 58 y.o. male with Right shoulder pain. The patient states on 1/20/24 he was coming off a compactor deck at work for a full side dumpster when the compactor has a lift where there was 4 by 4's placed and he stepped up onto the deck and it broke and he fell appx 3-4 feet. The patient states he believed he landed onto his outstretched hand  "to try and catch himself. He noted immediate pain and felt/heard a pop and crack. The patient presented to the ED after the injury where x-rays were taken. The patient states they attempted reduction 3 times until it was successful. The patient notes pain to the anterior aspect of the shoulder that can radiate to the elbow. He has been compliant with the sling. He has been taking Aleve OTC for pain. The patient states he cannot take much of this due to GI issues. He also notes pain and swelling to his left ankle.      Review of Systems     Review of Systems   Constitutional:  Negative for chills and fever.   HENT:  Negative for drooling and sneezing.    Eyes:  Negative for redness.   Respiratory:  Negative for cough and wheezing.    Gastrointestinal:  Negative for nausea and vomiting.   Musculoskeletal:  Positive for arthralgias. Negative for joint swelling and myalgias.   Neurological:  Negative for weakness and numbness.   Psychiatric/Behavioral:  Negative for behavioral problems. The patient is not nervous/anxious.        Physical Exam     /87   Pulse 73   Ht 5' 6\" (1.676 m)   Wt 133 kg (292 lb 12.8 oz)   BMI 47.26 kg/m²     Right Shoulder:   Active range of motion   Forward flexion no tested secondary to recent dislocation  Not tested secondary to recent dislocation abduction  External rotation Not tested secondary to recent dislocation  Internal rotation Not tested secondary to recent dislocation  Passive range of motion   90 degrees of forward flexion       0-130 elbow ROM  Full hand and wrist ROM  Palpable deltoid traction   The patient is neurovascularly intact distally in the extremity.      Left Ankle  Diffuse ankle tenderness   Ecchymosis anterior and lateral joint  Mild tenderness distal fibula  TTP proximal tibfib region  7-40 ankle ROM    Eyes:  Anicteric sclerae.  Neck:  Supple.  Lungs:  Normal respiratory effort.  Cardiovascular:  Capillary refill is less than 2 seconds.  Skin:  Intact " without erythema.  Neurologic:  Sensation grossly intact to light touch.  Psychiatric:  Mood and affect are appropriate.    Data Review     I have personally reviewed pertinent films in PACS, and my interpretation follows:    [facility] glenohumeral [facility].      CT scan taken 1/20/24 of [facility] demonstrate congruent glenohumeral joint with bony Bankart and hill-sacs lesion.      X-rays taken 1/22/24 of Left ankle independently reviewed and demonstrate no fractures or dislocations.     Past Medical History:   Diagnosis Date    GERD (gastroesophageal reflux disease)     Hypercholesterolemia     Hypertension     Lyme disease 3/19/2019    Obesity     Sexual dysfunction     Psychosexual dysfunction with inhibited sexual excitement        Past Surgical History:   Procedure Laterality Date    BACK SURGERY      spinal fusion, LUMBAR    CARDIAC CATHETERIZATION      CARDIAC CATHETERIZATION      CARDIAC CATHETERIZATION      COLONOSCOPY  2019    EGD AND COLONOSCOPY N/A 06/15/2018    Procedure: EGD AND COLONOSCOPY;  Surgeon: Jaciel Johnson MD;  Location: [facility];  Service: Gastroenterology    CT COLONOSCOPY FLX DX W/COLLJ SPEC WHEN PFRMD N/A 03/26/2019    Procedure: COLONOSCOPY;  Surgeon: Kvng Cerna MD;  Location: [facility];  Service: Gastroenterology    CT ESOPHAGOGASTRODUODENOSCOPY TRANSORAL DIAGNOSTIC N/A 03/26/2019    Procedure: ESOPHAGOGASTRODUODENOSCOPY (EGD);  Surgeon: Kvng Cerna MD;  Location: [facility];  Service: Gastroenterology    SEPTOPLASTY      UPPER GASTROINTESTINAL ENDOSCOPY  2019       No Known Allergies    Current Outpatient Medications on File Prior to Visit   Medication Sig Dispense Refill    amLODIPine (NORVASC) 10 mg tablet TAKE 1 TABLET BY MOUTH EVERY DAY 90 tablet 3    aspirin 81 mg chewable tablet Chew 1 tablet (81 mg total) daily  0    betamethasone, augmented, (DIPROLENE-AF) 0.05 % cream Apply topically 2 (two) times a  day 50 g 3    metoprolol succinate (TOPROL-XL) 50 mg 24 hr tablet TAKE 1 TABLET BY MOUTH EVERY DAY 90 tablet 3    olmesartan (BENICAR) 40 mg tablet TAKE 1 TABLET BY MOUTH EVERY DAY 90 tablet 3    pantoprazole (PROTONIX) 40 mg tablet Take 1 tablet (40 mg total) by mouth daily 90 tablet 3    rosuvastatin (CRESTOR) 5 mg tablet TAKE 1 TABLET BY MOUTH EVERY DAY 90 tablet 3     No current facility-administered medications on file prior to visit.       Social History     Tobacco Use    Smoking status: Former     Current packs/day: 0.00     Average packs/day: 0.3 packs/day for 5.8 years (1.4 ttl pk-yrs)     Types: Cigarettes     Start date: 1984     Quit date: 10/20/1989     Years since quittin.2    Smokeless tobacco: Current     Types: Chew   Vaping Use    Vaping status: Never Used   Substance Use Topics    Alcohol use: Yes     Alcohol/week: 4.0 standard drinks of alcohol     Types: 4 Standard drinks or equivalent per week     Comment: Occ alcohol use per [employer]     Drug use: Yes     Types: Marijuana     Comment: medical       Family History   Problem Relation Age of Onset    Atrial fibrillation Mother     COPD Mother     Coronary artery disease Mother     Lung cancer Mother     Rheum arthritis Mother     Stroke Mother         Syndrome    Arthritis Mother     Cancer Mother     Hypertension Mother     Lung cancer Father     Cancer Father     Prostate cancer Maternal Grandfather     Heart defect Daughter     Arthritis Family              Procedures Performed     Procedures  None performed today      Scribe Attestation      I,:  Omayra Cardona MA am acting as a scribe while in the presence of the attending physician.:       I,:  Tommie Baird DO personally performed the services described in this documentation    as scribed in my presence.:

## 2024-01-22 NOTE — TELEPHONE ENCOUNTER
Caller: Patient    Doctor: Dr. Baird    Reason for call: Patient calling stating that he saw Dr. Baird's PA in ER this weekend and was told to get in to see Dr. Oli GILLILAND per patient.  Can patient be forced on to schedule today?    Call back#: [phone]     Lets do the stress echo. Do I need to put in another order for it?

## 2024-02-01 ENCOUNTER — TELEPHONE (OUTPATIENT)
Age: 59
End: 2024-02-01

## 2024-02-01 ENCOUNTER — TELEPHONE (OUTPATIENT)
Dept: OBGYN CLINIC | Facility: HOSPITAL | Age: 59
End: 2024-02-01

## 2024-02-01 NOTE — TELEPHONE ENCOUNTER
Caller: patient    Doctor: Oli    Reason for call: would like to discuss the pain in his knee and the VISCO injections,  asking for a cb     Call back#:   [phone]

## 2024-02-05 ENCOUNTER — TELEPHONE (OUTPATIENT)
Age: 59
End: 2024-02-05

## 2024-02-05 NOTE — TELEPHONE ENCOUNTER
Received a fax from the pt's pharmacy stating the pt's insurance will on cover pantoprazole 40 Mg daily not BID     Please send new script or alternative

## 2024-02-06 ENCOUNTER — DOCUMENTATION (OUTPATIENT)
Age: 59
End: 2024-02-06

## 2024-02-06 ENCOUNTER — EVALUATION (OUTPATIENT)
Dept: PHYSICAL THERAPY | Facility: CLINIC | Age: 59
End: 2024-02-06
Payer: OTHER MISCELLANEOUS

## 2024-02-06 VITALS — DIASTOLIC BLOOD PRESSURE: 97 MMHG | SYSTOLIC BLOOD PRESSURE: 153 MMHG

## 2024-02-06 DIAGNOSIS — S43.004A DISLOCATION OF RIGHT SHOULDER JOINT, INITIAL ENCOUNTER: Primary | ICD-10-CM

## 2024-02-06 DIAGNOSIS — S93.402A SPRAIN OF UNSPECIFIED LIGAMENT OF LEFT ANKLE, INITIAL ENCOUNTER: ICD-10-CM

## 2024-02-06 DIAGNOSIS — K21.9 GASTROESOPHAGEAL REFLUX DISEASE WITHOUT ESOPHAGITIS: Primary | ICD-10-CM

## 2024-02-06 PROCEDURE — 97161 PT EVAL LOW COMPLEX 20 MIN: CPT

## 2024-02-06 PROCEDURE — 97110 THERAPEUTIC EXERCISES: CPT

## 2024-02-06 RX ORDER — PANTOPRAZOLE SODIUM 40 MG/1
40 TABLET, DELAYED RELEASE ORAL DAILY
Qty: 90 TABLET | Refills: 3 | Status: SHIPPED | OUTPATIENT
Start: 2024-02-06 | End: 2024-02-07 | Stop reason: SDUPTHER

## 2024-02-06 NOTE — PROGRESS NOTES
PT Evaluation     Today's date: 2024  Patient name: Osmin Hook  : 1965  MRN: [id number]  Referring provider: Tommie Baird DO  Dx:   Encounter Diagnosis     ICD-10-CM    1. Dislocation of right shoulder joint, initial encounter  S43.004A Ambulatory Referral to Physical Therapy     PT plan of care cert/re-cert      2. Sprain of unspecified ligament of left ankle, initial encounter  S93.402A Ambulatory Referral to Physical Therapy          Start Time: 943  Stop Time:   Total time in clinic (min): 44 minutes    Assessment  Assessment details: Problem List:  1) Limited R shoulder ROM  2) R shoulder weakness    Osmin Hook is a pleasant 59 y.o. male who presents s/p R shoulder dislocation that occurred from fall on 24.  Reduced in ER same day.  Movement diagnosis of R shoulder hypomobility with primary nociceptive pain.  He has limited R shoulder ROM, R shoulder weakness, and activity intolerance resulting in fear of not being able to keep active.  No further referral appears necessary at this time based upon examination results.  I expect he will improve with gradual ROM and strengthening exercises.  Positive prognostic indicators include positive attitude toward recovery.  Negative prognostic indicators include hypertension, high symptom irritability, and obesity.  Gave patient IR, ER, and flexion isometrics and cane flexion and ABD for HEP.  Educated patient on shoulder precautions as he is to limit ABD, ER and extension ROM at this time.  Patient should not push into pain and should let symptoms guide force/ROM with HEP.  Patient would benefit from skilled PT services to address these deficits and return to PLOF.    Comparable signs:  1) R shoulder flexion ROM  2) R shoulder ABD ROM  Impairments: abnormal muscle firing, abnormal or restricted ROM, activity intolerance, impaired physical strength, lacks appropriate home exercise program, pain with function and weight-bearing  intolerance    Symptom irritability: highUnderstanding of Dx/Px/POC: good   Prognosis: fair    Goals  STGs  Patient will be independent with HEP in 4 weeks  Patient will decrease pain by 2 points in 4 weeks  LTGs  Patient will achieve R shoulder ROM equal to L shoulder ROM in 10 weeks  Patient will be able to shave without limitation in 10 weeks  Patient will be able to work in 10 weeks   Patient will be able to walk his dog in 10 weeks    Plan  Patient would benefit from: skilled physical therapy  Planned therapy interventions: home exercise program, therapeutic exercise, therapeutic activities, motor coordination training, neuromuscular re-education, patient education, strengthening, manual therapy, functional ROM exercises and activity modification  Frequency: 2x week  Duration in visits: 20  Duration in weeks: 10  Plan of Care beginning date: 2024  Plan of Care expiration date: 2024  Treatment plan discussed with: patient        Subjective Evaluation    History of Present Illness  Date of onset: 2024  Mechanism of injury: Pain location: R anterior shoulder radiating down lateral aspect of deltoid, occasionally numbness in R thumb  Pain severity: 3-3-8  Aggs: quick movement  Eases: sleeping on it  Irritability: high  FELI/timeline: patient reports that he fell when getting out of a dumpster, the floor dropped out causing him to fall, his shoulder was dislocated and reduced in ER, X-ray reveals Hill Sachs  PMH/PSH: none    Patient Goals  Patient goal: shave, work, dress  Pain  Current pain rating: 3  At best pain rating: 3  At worst pain ratin          Objective     Neurological Testing     Sensation   Cervical/Thoracic   Left   Intact: light touch    Right   Intact: light touch    Active Range of Motion   Cervical/Thoracic Spine       Cervical    Flexion: 50 degrees  with pain Restriction level: moderate  Extension: 75 degrees     Restriction level: minimal  Left lateral flexion: 50 degrees      Restriction level: moderate  Right lateral flexion: 50 degrees     Restriction level moderate  Left rotation: 75 degrees Restriction level: minimal  Right rotation: 75 degrees    with pain Restriction level: minimal    Right Shoulder   Flexion: 58 degrees   Abduction: 53 degrees     Passive Range of Motion     Right Shoulder   Flexion: 90 degrees with pain  Abduction: 75 degrees with pain  External rotation 0°: 40 degrees              Precautions: HTN, hx of lumbar fusion, Lyme's  POC expires Unit limit Auth Expiration date PT/OT/ST + Visit Limit?   4/16/24 BOMN pend BOMN                           Visit/Unit Tracking  AUTH Status:  Date 2/6              pend Used 1               Remaining                      Foto 2/6            Manuals 2/6            R shoulder PROM                                                    Neuro Re-Ed             Shoulder iso-flex, IR, ER HEP                                                                                          Ther Ex             Wall slide             Standing cane flex HEP            Standing cane ABD HEP                                                   Pt edu GS- HEP, precautions            Reynaldo             Ther Activity                                       Gait Training                                       Modalities

## 2024-02-06 NOTE — TELEPHONE ENCOUNTER
[facility] called refill line stating that his insurance will only cover once a day. When looking at his medication list it looks like on 12/19/23. There was a pantoprazole prescription that states once a day however it was not sent to the pharmacy. Please review

## 2024-02-07 DIAGNOSIS — K21.9 GASTROESOPHAGEAL REFLUX DISEASE WITHOUT ESOPHAGITIS: ICD-10-CM

## 2024-02-07 RX ORDER — PANTOPRAZOLE SODIUM 40 MG/1
40 TABLET, DELAYED RELEASE ORAL DAILY
Qty: 90 TABLET | Refills: 3 | Status: SHIPPED | OUTPATIENT
Start: 2024-02-07

## 2024-02-07 NOTE — TELEPHONE ENCOUNTER
Patient called in for a refill of pantoprazole. Medication was sent over to the wrong pharmacy. Patient is requesting the medication be sent to the [facility] pharmacy on [address], thank you.

## 2024-02-09 ENCOUNTER — OFFICE VISIT (OUTPATIENT)
Dept: PHYSICAL THERAPY | Facility: CLINIC | Age: 59
End: 2024-02-09
Payer: COMMERCIAL

## 2024-02-09 DIAGNOSIS — S43.004A DISLOCATION OF RIGHT SHOULDER JOINT, INITIAL ENCOUNTER: Primary | ICD-10-CM

## 2024-02-09 DIAGNOSIS — S93.402A SPRAIN OF UNSPECIFIED LIGAMENT OF LEFT ANKLE, INITIAL ENCOUNTER: ICD-10-CM

## 2024-02-09 PROCEDURE — 97110 THERAPEUTIC EXERCISES: CPT

## 2024-02-09 PROCEDURE — 97112 NEUROMUSCULAR REEDUCATION: CPT

## 2024-02-09 NOTE — PROGRESS NOTES
"Daily Note     Today's date: 2024  Patient name: Osmin Hook  : 1965  MRN: [id number]  Referring provider: Andres Billingsley MD  Dx:   Encounter Diagnosis     ICD-10-CM    1. Dislocation of right shoulder joint, initial encounter  S43.004A       2. Sprain of unspecified ligament of left ankle, initial encounter  S93.402A           Start Time: 904  Stop Time: 939  Total time in clinic (min): 35 minutes    Subjective: Patient reports that his shoulder has been sore after the isometric exercises.  Patient states that the ROM exercises don't bother him that much.      Objective: See treatment diary below      Assessment: Patient demonstrates improving R shoulder ROM and isometric strength this session.  Patient completed today's session without increase in pain.  PROM performed in order to maintain as much mobility as possible.  Added scap retractions and manually resisted scap retractions in order to improve scapular stability in before returning to overhead reaching.  Future sessions should continue to progress ROM and isometric exercises as able. Tolerated treatment well. Patient would benefit from continued PT      Plan: Continue per plan of care.      Precautions: HTN, hx of lumbar fusion, Lyme's  POC expires Unit limit Auth Expiration date PT/OT/ST + Visit Limit?   24 BOMN 24 BOMN                           Visit/Unit Tracking  AUTH Status:  Date              30 visits Used 1 2              Remaining  29 28                   Foto             Manuals                                                                Neuro Re-Ed             Shoulder iso-flex, IR, ER HEP X20 3\"           SL scap retraction MRE  x20           Scap retraction  x20                                                               Ther Ex             Wall slide             Standing cane flex HEP x20           Standing cane ABD HEP x20           Supine cane ER  x20           R shoulder PROM  x20       "                  Pt edu GS- HEP, precautions GS- precautions           Reynaldo             Ther Activity                                       Gait Training                                       Modalities

## 2024-02-12 ENCOUNTER — APPOINTMENT (OUTPATIENT)
Dept: PHYSICAL THERAPY | Facility: CLINIC | Age: 59
End: 2024-02-12
Payer: COMMERCIAL

## 2024-02-15 ENCOUNTER — OFFICE VISIT (OUTPATIENT)
Dept: PHYSICAL THERAPY | Facility: CLINIC | Age: 59
End: 2024-02-15
Payer: COMMERCIAL

## 2024-02-15 DIAGNOSIS — S93.402A SPRAIN OF UNSPECIFIED LIGAMENT OF LEFT ANKLE, INITIAL ENCOUNTER: Primary | ICD-10-CM

## 2024-02-15 DIAGNOSIS — S43.004A DISLOCATION OF RIGHT SHOULDER JOINT, INITIAL ENCOUNTER: ICD-10-CM

## 2024-02-15 PROCEDURE — 97110 THERAPEUTIC EXERCISES: CPT

## 2024-02-15 PROCEDURE — 97112 NEUROMUSCULAR REEDUCATION: CPT

## 2024-02-15 NOTE — PROGRESS NOTES
"Daily Note     Today's date: 2/15/2024  Patient name: Osmin Hook  : 1965  MRN: [id number]  Referring provider: Tommie Baird DO  Dx:   Encounter Diagnosis     ICD-10-CM    1. Sprain of unspecified ligament of left ankle, initial encounter  S93.402A       2. Dislocation of right shoulder joint, initial encounter  S43.004A           Start Time: 1455  Stop Time: 1543  Total time in clinic (min): 48 minutes    Subjective: Patient reports that his shoulder has been increasing in pain over the last 4 days.  Pain even bothers him at night.  He states that HEP does not bother the shoulder any extra.      Objective: See treatment diary below      Assessment: Patient demonstrates improving R shoulder AROM and PROM this session.  Patient completed today's session without increase in pain.  PROM end feel is empty.  Continuing with gentle ROM to preserve ROM until patient follows up with orthopedics next week.  Neuromuscular control of R scapula is improving.  Future sessions should continue to progress ROM exercises as able. Tolerated treatment fair. Patient would benefit from continued PT      Plan: Continue per plan of care.      Precautions: HTN, hx of lumbar fusion, Lyme's  POC expires Unit limit Auth Expiration date PT/OT/ST + Visit Limit?   24 BOMN 24 BOMN                           Visit/Unit Tracking  AUTH Status:  Date 2/6 2/9 2/15            30 visits Used 1 2 3             Remaining  29 28 27                  Foto             Manuals 2/6 2/9 2/15                                                              Neuro Re-Ed             Shoulder iso-flex, IR, ER HEP X20 3\" X20 3\"          SL scap retraction MRE  x20 x20          Scap retraction  x20 x20                                                              Ther Ex             Wall slide             Standing cane flex HEP x20 x20          Standing cane ABD HEP x20 x20          Supine cane ER  x20 x20          R shoulder PROM  x20 x20      " - Schedule MRI brain w/o contrast   - Take Prednisone 10 mg 2 tablets for 5 days then 1 tablet for 5 days.     - Take Sumatriptan 100 mg as need for moderate to severe headache. Do not take more than two tablets per day or twice per week.  - Take Ranitidine over the counter as needed for stomach discomfort after taking Prednisone  - Start Melatonin 1 tablet before bedtime daily to help with sleep  - Increase Cymbalta to 60 mg daily    - Call me on day 11 for update     Natural Preventive Options: You can pick one or two of these to start. Can take 2-3 months to become effective. Safe to take all together and with your other medications.     Riboflavin (vitamin B2): 200 mg twice a day. Turns urine bright yellow. Helps energy.     Magnesium Oxide: 200-400 mg in the morning to start, then over 1-2 weeks, can gradually increase to 3x/day with meals. Can cause stomach upset or diarrhea if too much too fast. Other forms of magnesium (citrate, gluconate, chelated, and others) may be better tolerated and are fine to try (though citrate may be more diarrhea provoking).      Coenzyme Q 10: 150 mg in the morning and increase as needed. This also has anti-oxidant and energy-boosting properties. Some patients need up to 800 mg/day for maximum benefit. This should help improve fatigue within a few days, but headaches could take longer to respond. Shop around for a good bargain as this can be pricey.       Wall slides   x20          Pt edu GS- HEP, precautions GS- precautions           Pulley   4'          Ther Activity                                       Gait Training                                       Modalities

## 2024-02-19 ENCOUNTER — OFFICE VISIT (OUTPATIENT)
Dept: PHYSICAL THERAPY | Facility: CLINIC | Age: 59
End: 2024-02-19
Payer: COMMERCIAL

## 2024-02-19 ENCOUNTER — OFFICE VISIT (OUTPATIENT)
Dept: OBGYN CLINIC | Facility: CLINIC | Age: 59
End: 2024-02-19
Payer: OTHER MISCELLANEOUS

## 2024-02-19 VITALS
WEIGHT: 294.6 LBS | BODY MASS INDEX: 47.35 KG/M2 | DIASTOLIC BLOOD PRESSURE: 107 MMHG | HEART RATE: 53 BPM | HEIGHT: 66 IN | SYSTOLIC BLOOD PRESSURE: 153 MMHG

## 2024-02-19 DIAGNOSIS — M25.511 RIGHT SHOULDER PAIN, UNSPECIFIED CHRONICITY: ICD-10-CM

## 2024-02-19 DIAGNOSIS — S43.004A DISLOCATION OF RIGHT SHOULDER JOINT, INITIAL ENCOUNTER: ICD-10-CM

## 2024-02-19 DIAGNOSIS — S43.004D DISLOCATION OF RIGHT SHOULDER JOINT, SUBSEQUENT ENCOUNTER: Primary | ICD-10-CM

## 2024-02-19 DIAGNOSIS — M24.811 INTERNAL DERANGEMENT OF RIGHT SHOULDER: ICD-10-CM

## 2024-02-19 DIAGNOSIS — S93.402A SPRAIN OF UNSPECIFIED LIGAMENT OF LEFT ANKLE, INITIAL ENCOUNTER: ICD-10-CM

## 2024-02-19 PROCEDURE — 99214 OFFICE O/P EST MOD 30 MIN: CPT | Performed by: ORTHOPAEDIC SURGERY

## 2024-02-19 PROCEDURE — 97112 NEUROMUSCULAR REEDUCATION: CPT

## 2024-02-19 PROCEDURE — 97110 THERAPEUTIC EXERCISES: CPT

## 2024-02-19 NOTE — PROGRESS NOTES
"Daily Note     Today's date: 2024  Patient name: Osmin Hook  : 1965  MRN: [id number]  Referring provider: Andres Billingsley MD  Dx:   Encounter Diagnosis     ICD-10-CM    1. Dislocation of right shoulder joint, subsequent encounter  S43.004D       2. Sprain of unspecified ligament of left ankle, initial encounter  S93.402A           Start Time: 1622  Stop Time: 1700  Total time in clinic (min): 38 minutes    One on one: [id number] and [id number]    Subjective: Patient reports that his shoulder is a little sore today since he just came from his orthopedic appointment.      Objective: See treatment diary below      Assessment: Patient demonstrates consistent L shoulder ROM and strength  this session.  Patient completed today's session with increase in pain at end of range.  Pulleys performed in order to improve AAROM.  Patient has MRI scheduled for early March to assess potential soft tissue damage.  Future sessions should continue to progress ROM and strengthening exercises as able. Tolerated treatment well. Patient would benefit from continued PT      Plan: Continue per plan of care.      Precautions: HTN, hx of lumbar fusion, Lyme's  POC expires Unit limit Auth Expiration date PT/OT/ST + Visit Limit?   24 BOMN 24 BOMN                           Visit/Unit Tracking  AUTH Status:  Date 2/6 2/9 2/15 2/19           30 visits Used 1 2 3 4            Remaining  29 28 27 26                 Foto             Manuals 2/6 2/9 2/15 2/19                                                             Neuro Re-Ed             Shoulder iso-flex, IR, ER HEP X20 3\" X20 3\" X20 3\"         SL scap retraction MRE  x20 x20 x20         Scap retraction  x20 x20 x20                                                             Ther Ex             Wall slide             Standing cane flex HEP x20 x20 x20         Standing cane ABD HEP x20 x20 x20         Supine cane ER  x20 x20 x20         R shoulder PROM  x20 x20 x20  "        Wall slides   x20          Pt edu GS- HEP, precautions GS- precautions           Pulley   4' 2x10         Ther Activity                                       Gait Training                                       Modalities

## 2024-02-19 NOTE — PROGRESS NOTES
Patient Name:  Osmin Hook  MRN:  [id number]    Assessment & Plan     1. Dislocation of right shoulder joint, subsequent encounter  -     MRI shoulder right wo contrast; Future; Expected date: 02/19/2024    2. Right shoulder pain, unspecified chronicity  -     MRI shoulder right wo contrast; Future; Expected date: 02/19/2024    3. Internal derangement of right shoulder  -     MRI shoulder right wo contrast; Future; Expected date: 02/19/2024        59 y.o. male s/p Right shoulder dislocation with bony bankart and hill-sacs lesion 1/20/24.   Due to persistent symptoms, right shoulder MRI study was ordered today to further evaluate rotator cuff tendons for suspected tear due to persistent weakness and pain on exam.  He will continue physical therapy as prescribed  OTC analgesics as needed for pain management  He will remain out of work at least until his follow up appointment. A note was provided  He may discontinue wearing the sling at this time.  He will follow up after MRI study for further treatment planning.    History of the Present Illness   Osmin Hook is a 59 y.o. male with s/p Right shoulder dislocation with bony bankart and hill-sacs lesion 1/20/24.  He is complaining of pain rated 6/10. He has only been wearing his sling when he is out in public. He began physical therapy. He admits to pain improving, but still has limitations in strength and range of motion. He has difficulty with isometric external rotation exercises at physical therapy due to increased pain. He has been noticing clicking in his shoulder since the injury event.        Review of Systems     Review of Systems   Constitutional:  Negative for chills and fever.   HENT:  Negative for ear pain and sore throat.    Eyes:  Negative for pain and visual disturbance.   Respiratory:  Negative for cough and shortness of breath.    Cardiovascular:  Negative for chest pain and palpitations.   Gastrointestinal:  Negative for abdominal pain and  "vomiting.   Genitourinary:  Negative for dysuria and hematuria.   Musculoskeletal:  Negative for arthralgias and back pain.   Skin:  Negative for color change and rash.   Neurological:  Negative for seizures and syncope.   All other systems reviewed and are negative.      Physical Exam     BP (!) 153/107   Pulse (!) 53   Ht 5' 6\" (1.676 m)   Wt 134 kg (294 lb 9.6 oz)   BMI 47.55 kg/m²     Right Shoulder:   Active range of motion   80 degrees forward flexion  110 degrees abduction  50 degrees external rotation   SI joint internal rotation    Passive range of motion   150 degrees of forward flexion     There is no tenderness present over the shoulder.   Supraspinatus testing 4/5  Infraspinatus testing 4/5  Subscapularis testing 4/5  The patient is neurovascularly intact distally in the extremity.      Data Review     I have personally reviewed pertinent films in PACS, and my interpretation follows.    X-rays taken 24 of [facility] demonstrate successful reduction glenohumeral joint.     CT scan taken 24 of [facility] demonstrate congruent glenohumeral joint with bony Bankart and hill-sacs lesion.    Social History     Tobacco Use    Smoking status: Former     Current packs/day: 0.00     Average packs/day: 0.3 packs/day for 5.8 years (1.4 ttl pk-yrs)     Types: Cigarettes     Start date: 1984     Quit date: 10/20/1989     Years since quittin.3    Smokeless tobacco: Current     Types: Chew   Vaping Use    Vaping status: Never Used   Substance Use Topics    Alcohol use: Yes     Alcohol/week: 4.0 standard drinks of alcohol     Types: 4 Standard drinks or equivalent per week     Comment: Occ alcohol use per [employer]     Drug use: Yes     Types: Marijuana     Comment: medical           Procedures  None.    Tommie Baird, DO   Scribe Attestation      I,:  Kaylan Vera am acting as a scribe while in the presence of the attending physician.:       I,:  Tommie Baird, DO personally " performed the services described in this documentation    as scribed in my presence.:

## 2024-02-19 NOTE — LETTER
February 19, 2024     Patient: Osmin Hook  YOB: 1965  Date of Visit: 2/19/2024      To Whom it May Concern:    Osmin Hook is under my professional care. Osmin was seen in my office on 2/19/2024. Osmin will remain out of work at this time. He will be reevaluated again following his MRI study.    If you have any questions or concerns, please don't hesitate to call.         Sincerely,          Tommie Baird,         CC: No Recipients

## 2024-02-21 ENCOUNTER — TELEPHONE (OUTPATIENT)
Dept: OBGYN CLINIC | Facility: HOSPITAL | Age: 59
End: 2024-02-21

## 2024-02-21 NOTE — TELEPHONE ENCOUNTER
Caller: Patient    Doctor: Oli    Reason for call: Patient calling on status of injections. Advised was approved and visit is scheduled    Call back#: n/a

## 2024-02-22 ENCOUNTER — TELEPHONE (OUTPATIENT)
Age: 59
End: 2024-02-22

## 2024-02-22 ENCOUNTER — OFFICE VISIT (OUTPATIENT)
Dept: PHYSICAL THERAPY | Facility: CLINIC | Age: 59
End: 2024-02-22
Payer: COMMERCIAL

## 2024-02-22 DIAGNOSIS — S43.004D DISLOCATION OF RIGHT SHOULDER JOINT, SUBSEQUENT ENCOUNTER: Primary | ICD-10-CM

## 2024-02-22 PROCEDURE — 97110 THERAPEUTIC EXERCISES: CPT

## 2024-02-22 PROCEDURE — 97140 MANUAL THERAPY 1/> REGIONS: CPT

## 2024-02-22 PROCEDURE — 97112 NEUROMUSCULAR REEDUCATION: CPT

## 2024-02-22 NOTE — TELEPHONE ENCOUNTER
Caller: W/JENNY ( Scot)    Doctor: Oli    Reason for call: Asked for MRI Script, Verbally approved, he is faxing over approval.   Fax- [phone]   Attention [id number]

## 2024-02-22 NOTE — PROGRESS NOTES
"Daily Note     Today's date: 2024  Patient name: Osmin Hook  : 1965  MRN: [id number]  Referring provider: Andres Billingsley MD  Dx:   Encounter Diagnosis     ICD-10-CM    1. Dislocation of right shoulder joint, subsequent encounter  S43.004D             Start Time: 1750  Stop Time: 1835  Total time in clinic (min): 45 minutes      Subjective: Patient reports his pain is a 4/10 today and that his bicep and upper trap is sore.       Objective: See treatment diary below      Assessment: Pt R shoulder PROM still limited by painful end range. Pt demonstrated good musculature contractions during isometrics exercises. Pulleys performed in order to improve AAROM.  Patient has MRI scheduled for next week. Tolerated treatment well. Patient would benefit from continued PT for graded strengthen, increase ROM, and reduce symptom irritability.       Plan: Continue per plan of care.      Precautions: HTN, hx of lumbar fusion, Lyme's  POC expires Unit limit Auth Expiration date PT/OT/ST + Visit Limit?   24 BOMN 24 BOMN                           Visit/Unit Tracking  AUTH Status:  Date 2/6 2/9 2/15 2/19 2/22          30 visits Used 1 2 3 4 5           Remaining  29 28 27 26 25                Foto             Manuals 2/6 2/9 2/15 2/19 2/22        Shoulder PROM     10'                                               Neuro Re-Ed             Shoulder iso-flex, IR, ER HEP X20 3\" X20 3\" X20 3\" X20 3\"        SL scap retraction MRE  x20 x20 x20 x20        Scap retraction  x20 x20 x20 x20                                                            Ther Ex             Wall slide             Standing cane flex HEP x20 x20 x20 x20        Standing cane ABD HEP x20 x20 x20 x20        Supine cane ER  x20 x20 x20 x20        R shoulder PROM  x20 x20 x20         Wall slides   x20  x20        Pt edu GS- HEP, precautions GS- precautions           Pulley   4' 2x10 5'        Ther Activity                                     "   Gait Training                                       Modalities

## 2024-02-26 ENCOUNTER — PROCEDURE VISIT (OUTPATIENT)
Dept: OBGYN CLINIC | Facility: CLINIC | Age: 59
End: 2024-02-26
Payer: COMMERCIAL

## 2024-02-26 VITALS
BODY MASS INDEX: 47.25 KG/M2 | WEIGHT: 294 LBS | SYSTOLIC BLOOD PRESSURE: 143 MMHG | HEIGHT: 66 IN | HEART RATE: 40 BPM | DIASTOLIC BLOOD PRESSURE: 86 MMHG

## 2024-02-26 DIAGNOSIS — M17.0 PRIMARY OSTEOARTHRITIS OF BOTH KNEES: Primary | ICD-10-CM

## 2024-02-26 PROCEDURE — 20610 DRAIN/INJ JOINT/BURSA W/O US: CPT | Performed by: PHYSICIAN ASSISTANT

## 2024-02-27 ENCOUNTER — OFFICE VISIT (OUTPATIENT)
Dept: PHYSICAL THERAPY | Facility: CLINIC | Age: 59
End: 2024-02-27
Payer: COMMERCIAL

## 2024-02-27 ENCOUNTER — HOSPITAL ENCOUNTER (OUTPATIENT)
Dept: MRI IMAGING | Facility: CLINIC | Age: 59
Discharge: HOME | End: 2024-02-27

## 2024-02-27 ENCOUNTER — HOSPITAL ENCOUNTER (OUTPATIENT)
Dept: MRI IMAGING | Facility: HOSPITAL | Age: 59
Discharge: HOME | End: 2024-02-27
Attending: ORTHOPAEDIC SURGERY
Payer: OTHER MISCELLANEOUS

## 2024-02-27 DIAGNOSIS — M25.511 RIGHT SHOULDER PAIN, UNSPECIFIED CHRONICITY: ICD-10-CM

## 2024-02-27 DIAGNOSIS — S43.004D DISLOCATION OF RIGHT SHOULDER JOINT, SUBSEQUENT ENCOUNTER: ICD-10-CM

## 2024-02-27 DIAGNOSIS — S43.004D DISLOCATION OF RIGHT SHOULDER JOINT, SUBSEQUENT ENCOUNTER: Primary | ICD-10-CM

## 2024-02-27 DIAGNOSIS — S93.402A SPRAIN OF UNSPECIFIED LIGAMENT OF LEFT ANKLE, INITIAL ENCOUNTER: ICD-10-CM

## 2024-02-27 DIAGNOSIS — M24.811 INTERNAL DERANGEMENT OF RIGHT SHOULDER: ICD-10-CM

## 2024-02-27 PROCEDURE — G1004 CDSM NDSC: HCPCS

## 2024-02-27 PROCEDURE — 97112 NEUROMUSCULAR REEDUCATION: CPT

## 2024-02-27 PROCEDURE — 73221 MRI JOINT UPR EXTREM W/O DYE: CPT

## 2024-02-27 PROCEDURE — 97110 THERAPEUTIC EXERCISES: CPT

## 2024-02-27 NOTE — PROGRESS NOTES
"Daily Note     Today's date: 2024  Patient name: Osmin Hook  : 1965  MRN: [id number]  Referring provider: Andres Billingsley MD  Dx:   Encounter Diagnosis     ICD-10-CM    1. Dislocation of right shoulder joint, subsequent encounter  S43.004D       2. Sprain of unspecified ligament of left ankle, initial encounter  S93.402A           Start Time: 1515  Stop Time: 1555  Total time in clinic (min): 40 minutes    Subjective: Patient reports that he was very sore after last session for several days.  He states that he couldn't sleep the pain was so severe.      Objective: See treatment diary below      Assessment: Patient demonstrates improving R shoulder ROM this session.  Patient completed today's session without increase in pain.  Pulleys performed in order to improve UE muscular endurance.  Added TB IR/ER in order to begin adding resistance in ROM.  Add AROM against gravity next session.  Future sessions should continue to progress ROM and strengthening exercises as able. Tolerated treatment well. Patient would benefit from continued PT    Plan: Continue per plan of care.      Precautions: HTN, hx of lumbar fusion, Lyme's  POC expires Unit limit Auth Expiration date PT/OT/ST + Visit Limit?   24 BOMN 24 BOMN                           Visit/Unit Tracking  AUTH Status:  Date 2/6 2/9 2/15 2/19 2/22 2/27         30 visits Used 1 2 3 4 5 6          Remaining  29 28 27 26 25 24               Foto             Manuals 2/6 2/9 2/15 2/19 2/22 2/27       Shoulder PROM     10'                                               Neuro Re-Ed             Shoulder iso-flex, IR, ER HEP X20 3\" X20 3\" X20 3\" X20 3\" X20 3\"       SL scap retraction MRE  x20 x20 x20 x20 x20       Scap retraction  x20 x20 x20 x20 x20                                                           Ther Ex             R shoulder AROM             TB ER/IR      2x10  rtb       Standing cane flex HEP x20 x20 x20 x20 x20       Standing cane " ABD HEP x20 x20 x20 x20 x20       Supine cane ER  x20 x20 x20 x20 x20       R shoulder PROM  x20 x20 x20  GS       Wall slides   x20  x20 x20       Pt edu GS- HEP, precautions GS- precautions           Pulley   4' 2x10 5' 3x10       Ther Activity                                       Gait Training                                       Modalities

## 2024-02-28 ENCOUNTER — TELEPHONE (OUTPATIENT)
Dept: OBGYN CLINIC | Facility: HOSPITAL | Age: 59
End: 2024-02-28

## 2024-02-29 ENCOUNTER — OFFICE VISIT (OUTPATIENT)
Dept: PHYSICAL THERAPY | Facility: CLINIC | Age: 59
End: 2024-02-29
Payer: COMMERCIAL

## 2024-02-29 DIAGNOSIS — S93.402A SPRAIN OF UNSPECIFIED LIGAMENT OF LEFT ANKLE, INITIAL ENCOUNTER: ICD-10-CM

## 2024-02-29 DIAGNOSIS — S43.004D DISLOCATION OF RIGHT SHOULDER JOINT, SUBSEQUENT ENCOUNTER: Primary | ICD-10-CM

## 2024-02-29 PROCEDURE — 97112 NEUROMUSCULAR REEDUCATION: CPT

## 2024-02-29 PROCEDURE — 97110 THERAPEUTIC EXERCISES: CPT

## 2024-02-29 NOTE — PROGRESS NOTES
"Daily Note     Today's date: 2024  Patient name: Osmin Hook  : 1965  MRN: [id number]  Referring provider: Andres Billingsley MD  Dx:   Encounter Diagnosis     ICD-10-CM    1. Dislocation of right shoulder joint, subsequent encounter  S43.004D       2. Sprain of unspecified ligament of left ankle, initial encounter  S93.402A           Start Time: 1505  Stop Time: 1557  Total time in clinic (min): 52 minutes    Subjective: Patient reports that his shoulder felt pretty good after last session.  Some discomfort after his MRI.      Objective: See treatment diary below      Assessment: Patient demonstrates improving R shoulder ROM and activity tolerance this session.  Patient completed today's session with decrease in pain compared to previous sessions.  Pulleys performed in order to improve R shoulder AAROM.  Initiated rhythmic stabilization exercises for improved control of R UE.  Future sessions should continue to progress ROM and strengthening exercises as able. Tolerated treatment well. Patient would benefit from continued PT      Plan: Continue per plan of care.      Precautions: HTN, hx of lumbar fusion, Lyme's  POC expires Unit limit Auth Expiration date PT/OT/ST + Visit Limit?   24 BOMN 24 BOMN                           Visit/Unit Tracking  AUTH Status:  Date 2/6 2/9 2/15 2/19 2/22 2/27 2/29        30 visits Used 1 2 3 4 5 6 7         Remaining  29 28 27 26 25 24 23              Foto       Manuals 2/6 2/9 2/15 2/19 2/22 2/27 2/29      Shoulder PROM     10'                                               Neuro Re-Ed             Shoulder iso-flex, IR, ER HEP X20 3\" X20 3\" X20 3\" X20 3\" X20 3\"       SL scap retraction MRE  x20 x20 x20 x20 x20 x20      Scap retraction  x20 x20 x20 x20 x20 x20      Supine rhythmic stab       4x15\"      Ylw MB on wall rhythmic stab       4x15\"                                Ther Ex             R shoulder AROM             TB ER/IR      2x10  rtb " 2x10  rtb      Standing cane flex HEP x20 x20 x20 x20 x20 x20      Standing cane ABD HEP x20 x20 x20 x20 x20 x20      Supine cane ER  x20 x20 x20 x20 x20 x20      R shoulder PROM  x20 x20 x20  GS GS      Wall slides   x20  x20 x20 x20      Pt edu GS- HEP, precautions GS- precautions           Pulley   4' 2x10 5' 3x10 3x10      Ther Activity                                       Gait Training                                       Modalities

## 2024-03-05 ENCOUNTER — OFFICE VISIT (OUTPATIENT)
Dept: PHYSICAL THERAPY | Facility: CLINIC | Age: 59
End: 2024-03-05
Payer: OTHER MISCELLANEOUS

## 2024-03-05 DIAGNOSIS — S43.004A DISLOCATION OF RIGHT SHOULDER JOINT, INITIAL ENCOUNTER: ICD-10-CM

## 2024-03-05 DIAGNOSIS — S93.402A SPRAIN OF UNSPECIFIED LIGAMENT OF LEFT ANKLE, INITIAL ENCOUNTER: ICD-10-CM

## 2024-03-05 DIAGNOSIS — S43.004D DISLOCATION OF RIGHT SHOULDER JOINT, SUBSEQUENT ENCOUNTER: Primary | ICD-10-CM

## 2024-03-05 PROCEDURE — 97112 NEUROMUSCULAR REEDUCATION: CPT

## 2024-03-05 PROCEDURE — 97110 THERAPEUTIC EXERCISES: CPT

## 2024-03-05 NOTE — PROGRESS NOTES
"Daily Note     Today's date: 3/5/2024  Patient name: Osmin Hook  : 1965  MRN: [id number]  Referring provider: Andres Billingsley MD  Dx:   Encounter Diagnosis     ICD-10-CM    1. Dislocation of right shoulder joint, subsequent encounter  S43.004D       2. Dislocation of right shoulder joint, initial encounter  S43.004A       3. Sprain of unspecified ligament of left ankle, initial encounter  S93.402A           Start Time: 1645  Stop Time: 1722  Total time in clinic (min): 37 minutes    Subjective: Patient reports that he was reading his MRI report and he has multiple tears in his shoulder.  He is expecting surgery.      Objective: See treatment diary below      Assessment: Patient demonstrates improving R shoulder AAROM this session.  Patient completed today's session without increase in pain.  Pulleys performed in order to improve AAROM.  Patient has follow up with ortho this Friday to discuss plan moving forward for his shoulder.  Future sessions should continue to progress ROM exercises as able. Tolerated treatment fair. Patient would benefit from continued PT      Plan: Continue per plan of care.      Precautions: HTN, hx of lumbar fusion, Lyme's  POC expires Unit limit Auth Expiration date PT/OT/ST + Visit Limit?   24 BOMN 24 BOMN                           Visit/Unit Tracking  AUTH Status:  Date 2/6 2/9 2/15 2/19 2/22 2/27 2/29 3/5       30 visits Used 1 2 3 4 5 6 7 8        Remaining  29 28 27 26 25 24 23 22             Foto       Manuals 2/6 2/9 2/15 2/19 2/22 2/27 2/29 3     Shoulder PROM     10'                                               Neuro Re-Ed             Shoulder iso-flex, IR, ER HEP X20 3\" X20 3\" X20 3\" X20 3\" X20 3\"  X20 3\"     SL scap retraction MRE  x20 x20 x20 x20 x20 x20 x20     Scap retraction  x20 x20 x20 x20 x20 x20      Supine rhythmic stab       4x15\"      Ylw MB on wall rhythmic stab       4x15\" 4x15\"                               Ther Ex          "    R shoulder AROM             TB ER/IR      2x10  rtb 2x10  rtb 2x10  rtb     Standing cane flex HEP x20 x20 x20 x20 x20 x20 x20     Standing cane ABD HEP x20 x20 x20 x20 x20 x20 x20     Supine cane ER  x20 x20 x20 x20 x20 x20 x20     R shoulder PROM  x20 x20 x20  GS GS GS     Wall slides   x20  x20 x20 x20 x20     Pt edu GS- HEP, precautions GS- precautions           Pulley   4' 2x10 5' 3x10 3x10 3x10     Ther Activity                                       Gait Training                                       Modalities

## 2024-03-07 ENCOUNTER — OFFICE VISIT (OUTPATIENT)
Dept: PHYSICAL THERAPY | Facility: CLINIC | Age: 59
End: 2024-03-07
Payer: OTHER MISCELLANEOUS

## 2024-03-07 DIAGNOSIS — S93.402A SPRAIN OF UNSPECIFIED LIGAMENT OF LEFT ANKLE, INITIAL ENCOUNTER: ICD-10-CM

## 2024-03-07 DIAGNOSIS — S43.004D DISLOCATION OF RIGHT SHOULDER JOINT, SUBSEQUENT ENCOUNTER: Primary | ICD-10-CM

## 2024-03-07 PROCEDURE — 97110 THERAPEUTIC EXERCISES: CPT

## 2024-03-07 PROCEDURE — 97112 NEUROMUSCULAR REEDUCATION: CPT

## 2024-03-07 NOTE — PROGRESS NOTES
"Daily Note     Today's date: 3/7/2024  Patient name: Osmin Hook  : 1965  MRN: [id number]  Referring provider: Tommie Baird DO  Dx:   Encounter Diagnosis     ICD-10-CM    1. Dislocation of right shoulder joint, subsequent encounter  S43.004D       2. Sprain of unspecified ligament of left ankle, initial encounter  S93.402A           Start Time: 1618  Stop Time: 1657  Total time in clinic (min): 39 minutes    Subjective: Patient reports that his shoulder was in a lot of pain this morning after reaching for his phone with his R arm.      Objective: See treatment diary below      Assessment: Patient demonstrates consistent R shoulder ROM this session.  Patient completed today's session without increase in pain.  Initiated UBE in order to improve R shoulder AAROM.  Patient is able to tolerate light strengthening with red TB and isometrics.  Future sessions should continue to progress strengthening and ROM exercises as able. Tolerated treatment well. Patient would benefit from continued PT      Plan: Continue per plan of care.      Precautions: HTN, hx of lumbar fusion, Lyme's  POC expires Unit limit Auth Expiration date PT/OT/ST + Visit Limit?   24 BOMN 24 BOMN                           Visit/Unit Tracking  AUTH Status:  Date 2/6 2/9 2/15 2/19 2/22 2/27 2/29 3/5 3/7      30 visits Used 1 2 3 4 5 6 7 8 9       Remaining  29 28 27 26 25 24 23 22 21            Foto       Manuals 2/6 2/9 2/15 2/19 2/22 2/27 2/29 3/5 3    Shoulder PROM     10'                                               Neuro Re-Ed             Shoulder iso-flex, IR, ER HEP X20 3\" X20 3\" X20 3\" X20 3\" X20 3\"  X20 3\" X20 3\"    SL scap retraction MRE  x20 x20 x20 x20 x20 x20 x20 x20    Scap retraction  x20 x20 x20 x20 x20 x20      Supine rhythmic stab       4x15\"      Ylw MB on wall rhythmic stab       4x15\" 4x15\" 4x15\"                              Ther Ex             R shoulder AROM             TB ER/IR      " 2x10  rtb 2x10  rtb 2x10  rtb 2x10  rtb    Standing cane flex HEP x20 x20 x20 x20 x20 x20 x20 x20    Standing cane ABD HEP x20 x20 x20 x20 x20 x20 x20 x20    Supine cane ER  x20 x20 x20 x20 x20 x20 x20 x20    R shoulder PROM  x20 x20 x20  GS GS GS GS    Wall slides   x20  x20 x20 x20 x20 x20    Pt edu GS- HEP, precautions GS- precautions           UBE         1.5'/1.5'    Pulley   4' 2x10 5' 3x10 3x10 3x10     Ther Activity                                       Gait Training                                       Modalities

## 2024-03-08 ENCOUNTER — OFFICE VISIT (OUTPATIENT)
Dept: OBGYN CLINIC | Facility: CLINIC | Age: 59
End: 2024-03-08
Payer: OTHER MISCELLANEOUS

## 2024-03-08 VITALS
SYSTOLIC BLOOD PRESSURE: 133 MMHG | WEIGHT: 294 LBS | HEART RATE: 74 BPM | BODY MASS INDEX: 47.25 KG/M2 | HEIGHT: 66 IN | DIASTOLIC BLOOD PRESSURE: 86 MMHG

## 2024-03-08 DIAGNOSIS — S43.004D DISLOCATION OF RIGHT SHOULDER JOINT, SUBSEQUENT ENCOUNTER: ICD-10-CM

## 2024-03-08 DIAGNOSIS — M25.511 ACUTE PAIN OF RIGHT SHOULDER: ICD-10-CM

## 2024-03-08 DIAGNOSIS — S46.011A TRAUMATIC COMPLETE TEAR OF RIGHT ROTATOR CUFF, INITIAL ENCOUNTER: Primary | ICD-10-CM

## 2024-03-08 PROCEDURE — 99214 OFFICE O/P EST MOD 30 MIN: CPT | Performed by: ORTHOPAEDIC SURGERY

## 2024-03-08 RX ORDER — CHLORHEXIDINE GLUCONATE ORAL RINSE 1.2 MG/ML
15 SOLUTION DENTAL ONCE
OUTPATIENT
Start: 2024-03-08 | End: 2024-03-08

## 2024-03-08 RX ORDER — CHLORHEXIDINE GLUCONATE 4 G/100ML
SOLUTION TOPICAL DAILY PRN
OUTPATIENT
Start: 2024-03-08

## 2024-03-08 NOTE — PROGRESS NOTES
Patient Name:  Osmin Hook  MRN:  [id number]    Assessment & Plan     1. Traumatic complete tear of right rotator cuff, initial encounter  -     Case request operating room: REPAIR ROTATOR CUFF  ARTHROSCOPIC and biceps tenotomy; Standing  -     Ambulatory referral to Family Practice; Future  -     CBC and differential; Future  -     Comprehensive metabolic panel; Future  -     EKG 12 lead; Future  -     XR chest pa & lateral; Future; Expected date: 03/08/2024  -     Case request operating room: REPAIR ROTATOR CUFF  ARTHROSCOPIC and biceps tenotomy    2. Acute pain of right shoulder    3. Dislocation of right shoulder joint, subsequent encounter      59 y.o. male with Right shoulder supraspinatus and infraspinatus tear s/p dislocation with bony bankart and hill-sacs lesion DOI 1/20/24.  MRI study reviewed in the office today  Nonoperative management was discussed in the form of oral and topical medications, physical therapy  Operative management was discussed in the form of arthroscopic rotator cuff repair, possible biceps tenotomy. Risks of surgery, including but not limited to, infection, nerve and blood vessel injury, postoperative stiffness, persistent pain, retear, inability to repair, DVT/PE, anesthesia complications, posttraumatic arthritis, deformity and cramping tenotomy, and need for subsequent surgery were discussed in detail.  The patient understands and has elected to proceed forward.  Post operative care was discussed in detail with the patient. All questions were answered to the patient's satisfaction.   Written consent was obtained in the office today  He will gain preoperative clearance from PCP. He will have labs, CXR, EKG  Surgery tentatively scheduled for 4/11/24  The patient will follow up post operatively    History of the Present Illness   Osmin Hook is a 59 y.o. male with right shoulder dislocation with bony bankart and hill-sacs lesion 1/20/24.   The patient was last seen for his  "shoulder on 2/19/2024 when an MRI study was ordered and he was advised to remain out of work. He has been attending physical therapy and feels he has been gaining motion. His pain today is rated 4/10. He still has difficulty with some ADLs. He is having pain and muscle spasm in his trapezius.         Review of Systems     Review of Systems   Constitutional:  Negative for chills and fever.   HENT:  Negative for ear pain and sore throat.    Eyes:  Negative for pain and visual disturbance.   Respiratory:  Negative for cough and shortness of breath.    Cardiovascular:  Negative for chest pain and palpitations.   Gastrointestinal:  Negative for abdominal pain and vomiting.   Genitourinary:  Negative for dysuria and hematuria.   Musculoskeletal:  Negative for arthralgias and back pain.   Skin:  Negative for color change and rash.   Neurological:  Negative for seizures and syncope.   All other systems reviewed and are negative.      Physical Exam     /86   Pulse 74   Ht 5' 6\" (1.676 m)   Wt 133 kg (294 lb)   BMI 47.45 kg/m²     Right Shoulder:   Active range of motion   140 degrees forward flexion  110 degrees abduction  60 degrees external rotation   SI joint internal rotation    Passive range of motion   155 degrees of forward flexion     There is no tenderness present over the shoulder.   Supraspinatus testing 4/5  Infraspinatus testing 4/5  Subscapularis testing 4/5  Goel test is negative   Broadway's test is negative    Speed's test is Negative  Apprehension test is positive with abduction and external rotation  Negative load and shift  The patient is neurovascularly intact distally in the extremity.      Data Review     I have personally reviewed pertinent films in PACS, and my interpretation follows.    X-rays taken 1/20/24 of [facility] demonstrate successful reduction glenohumeral joint.     CT scan taken 1/20/24 of [facility] demonstrate congruent glenohumeral joint with bony Bankart and " hill-sacs lesion.    Right shoulder MRI obtained 2024 demonstrates bony Bankart and hill sachs lesions with associated glenoid labral tear. Full thickness tear of supraspinatus and infraspinatus with retraction to the humeral head apex and mild fatty atrophy. Medial dislocation of the long head of the biceps tendon.     Social History     Tobacco Use    Smoking status: Former     Current packs/day: 0.00     Average packs/day: 0.3 packs/day for 5.8 years (1.4 ttl pk-yrs)     Types: Cigarettes     Start date: 1984     Quit date: 10/20/1989     Years since quittin.4    Smokeless tobacco: Current     Types: Chew   Vaping Use    Vaping status: Never Used   Substance Use Topics    Alcohol use: Yes     Alcohol/week: 4.0 standard drinks of alcohol     Types: 4 Standard drinks or equivalent per week     Comment: Occ alcohol use per [employer]     Drug use: Yes     Types: Marijuana     Comment: medical           Procedures  None performed.    Tommie Baird DO   Scribe Attestation      I,:  Kaylan Vera am acting as a scribe while in the presence of the attending physician.:       I,:  Tommie Baird DO personally performed the services described in this documentation    as scribed in my presence.:

## 2024-03-11 ENCOUNTER — TELEPHONE (OUTPATIENT)
Age: 59
End: 2024-03-11

## 2024-03-11 NOTE — TELEPHONE ENCOUNTER
Caller: Sirisha FELIX     Doctor: Oli    Reason for call: electronically faxed 3/8 OVN to fax # [phone]

## 2024-03-12 ENCOUNTER — OFFICE VISIT (OUTPATIENT)
Dept: PHYSICAL THERAPY | Facility: CLINIC | Age: 59
End: 2024-03-12
Payer: OTHER MISCELLANEOUS

## 2024-03-12 DIAGNOSIS — S43.004A DISLOCATION OF RIGHT SHOULDER JOINT, INITIAL ENCOUNTER: ICD-10-CM

## 2024-03-12 DIAGNOSIS — S43.004D DISLOCATION OF RIGHT SHOULDER JOINT, SUBSEQUENT ENCOUNTER: Primary | ICD-10-CM

## 2024-03-12 DIAGNOSIS — S93.402A SPRAIN OF UNSPECIFIED LIGAMENT OF LEFT ANKLE, INITIAL ENCOUNTER: ICD-10-CM

## 2024-03-12 PROCEDURE — 97112 NEUROMUSCULAR REEDUCATION: CPT

## 2024-03-12 PROCEDURE — 97110 THERAPEUTIC EXERCISES: CPT

## 2024-03-12 NOTE — PROGRESS NOTES
"Daily Note     Today's date: 3/12/2024  Patient name: Osmin Hook  : 1965  MRN: [id number]  Referring provider: Andres Billingsley MD  Dx:   Encounter Diagnosis     ICD-10-CM    1. Dislocation of right shoulder joint, subsequent encounter  S43.004D       2. Dislocation of right shoulder joint, initial encounter  S43.004A       3. Sprain of unspecified ligament of left ankle, initial encounter  S93.402A           Start Time: 1515  Stop Time: 1555  Total time in clinic (min): 40 minutes    Subjective: Patient reports that he is scheduled for surgery in early April.      Objective: See treatment diary below      Assessment: Patient demonstrates consistent R shoulder ROM this session.  Patient completed today's session without increase in pain.  UBE performed in order to improve UE muscular endurance.  Patient is scheduled for surgery in about a month.  We will transition to 1x/week in PT for ROM maintenance and patient is to continue with HEP as directed.  Future sessions should continue to progress ROM exercises as able. Tolerated treatment fair. Patient would benefit from continued PT      Plan: Continue per plan of care.      Precautions: HTN, hx of lumbar fusion, Lyme's  POC expires Unit limit Auth Expiration date PT/OT/ST + Visit Limit?   24 BOMN 24 BOMN                           Visit/Unit Tracking  AUTH Status:  Date 2/6 2/9 2/15 2/19 2/22 2/27 2/29 3/5 3/7 3/12     30 visits Used 1 2 3 4 5 6 7 8 9 10      Remaining  29 28 27 26 25 24 23 22 21 20           Foto       Manuals 2/6 2/9 2/15 2/19 2/22 2/27 2/29 3/5 3/7 3/12   Shoulder PROM     10'                                               Neuro Re-Ed             Shoulder iso-flex, IR, ER HEP X20 3\" X20 3\" X20 3\" X20 3\" X20 3\"  X20 3\" X20 3\" X20 3\"   SL scap retraction MRE  x20 x20 x20 x20 x20 x20 x20 x20 x20   Scap retraction  x20 x20 x20 x20 x20 x20      Supine rhythmic stab       4x15\"      Ylw MB on wall rhythmic stab       " "4x15\" 4x15\" 4x15\"                              Ther Ex             R shoulder AROM             TB ER/IR      2x10  rtb 2x10  rtb 2x10  rtb 2x10  rtb 2x10  rtb   Standing cane flex HEP x20 x20 x20 x20 x20 x20 x20 x20 x20   Standing cane ABD HEP x20 x20 x20 x20 x20 x20 x20 x20 x20   Supine cane ER  x20 x20 x20 x20 x20 x20 x20 x20 x20   R shoulder PROM  x20 x20 x20  GS GS GS GS GS   Wall slides   x20  x20 x20 x20 x20 x20 x20   Pt edu GS- HEP, precautions GS- precautions           UBE         1.5'/1.5' 2'2'   Pulley   4' 2x10 5' 3x10 3x10 3x10  3x10   Ther Activity                                       Gait Training                                       Modalities                                                          "

## 2024-03-14 ENCOUNTER — OFFICE VISIT (OUTPATIENT)
Dept: PHYSICAL THERAPY | Facility: CLINIC | Age: 59
End: 2024-03-14
Payer: OTHER MISCELLANEOUS

## 2024-03-14 DIAGNOSIS — S43.004D DISLOCATION OF RIGHT SHOULDER JOINT, SUBSEQUENT ENCOUNTER: Primary | ICD-10-CM

## 2024-03-14 PROCEDURE — 97110 THERAPEUTIC EXERCISES: CPT

## 2024-03-14 PROCEDURE — 97112 NEUROMUSCULAR REEDUCATION: CPT

## 2024-03-14 NOTE — PROGRESS NOTES
"Daily Note     Today's date: 3/14/2024  Patient name: Osmin Hook  : 1965  MRN: [id number]  Referring provider: Andres Billingsley MD  Dx:   Encounter Diagnosis     ICD-10-CM    1. Dislocation of right shoulder joint, subsequent encounter  S43.004D           Start Time: 1630  Stop Time: 1709  Total time in clinic (min): 39 minutes    Subjective: No new reports for PT      Objective: See treatment diary below      Assessment: Patient demonstrates consistent shoulder ROM this session.  Patient completed today's session without increase in pain.  UBE performed in order to improve UE muscular endurance.  Focused on maintaining ROM prior to surgery.  Reviewed HEP that patient is to perform on days he isn't in therapy.  Future sessions should continue to progress ROM and gentle strengthening as able. Tolerated treatment well. Patient would benefit from continued PT      Plan: Continue per plan of care.      Precautions: HTN, hx of lumbar fusion, Lyme's  POC expires Unit limit Auth Expiration date PT/OT/ST + Visit Limit?   24 BOMN 24 BOMN                           Visit/Unit Tracking  AUTH Status:  Date 2/6 2/9 2/15 2/19 2/22 2/27 2/29 3/5 3/7 3/12     30 visits Used 1 2 3 4 5 6 7 8 9 10      Remaining  29 28 27 26 25 24 23 22 21 20           Foto             Manuals 3/14 2/9 2/15 2/19 2/22 2/27 2/29 3/5 3/7 3/12   Shoulder PROM     10'                                               Neuro Re-Ed             Shoulder iso-flex, IR, ER X20 3\" X20 3\" X20 3\" X20 3\" X20 3\" X20 3\"  X20 3\" X20 3\" X20 3\"   SL scap retraction MRE x20 x20 x20 x20 x20 x20 x20 x20 x20 x20   Scap retraction x30 x20 x20 x20 x20 x20 x20      Supine rhythmic stab       4x15\"      Ylw MB on wall rhythmic stab 4x15\"      4x15\" 4x15\" 4x15\"                              Ther Ex             R shoulder AROM             TB ER/IR 2x10  rtb     2x10  rtb 2x10  rtb 2x10  rtb 2x10  rtb 2x10  rtb   Standing cane flex x20 x20 x20 x20 x20 x20 x20 " x20 x20 x20   Standing cane ABD x20 x20 x20 x20 x20 x20 x20 x20 x20 x20   Supine cane ER x20 x20 x20 x20 x20 x20 x20 x20 x20 x20   R shoulder PROM GS x20 x20 x20  GS GS GS GS GS   Wall slides x20  x20  x20 x20 x20 x20 x20 x20   Pt edu GS- HEP GS- precautions           UBE 2'2'        1.5'/1.5' 2'2'   Pulley x30  4' 2x10 5' 3x10 3x10 3x10  3x10   Ther Activity                                       Gait Training                                       Modalities

## 2024-03-19 ENCOUNTER — APPOINTMENT (OUTPATIENT)
Dept: LAB | Facility: HOSPITAL | Age: 59
End: 2024-03-19
Payer: OTHER MISCELLANEOUS

## 2024-03-19 ENCOUNTER — LAB (OUTPATIENT)
Dept: LAB | Facility: HOSPITAL | Age: 59
End: 2024-03-19
Payer: OTHER MISCELLANEOUS

## 2024-03-19 ENCOUNTER — HOSPITAL ENCOUNTER (OUTPATIENT)
Dept: RADIOLOGY | Facility: HOSPITAL | Age: 59
Discharge: HOME | End: 2024-03-19
Payer: OTHER MISCELLANEOUS

## 2024-03-19 DIAGNOSIS — S46.011A TRAUMATIC COMPLETE TEAR OF RIGHT ROTATOR CUFF, INITIAL ENCOUNTER: ICD-10-CM

## 2024-03-19 LAB
ALBUMIN SERPL BCP-MCNC: 4.3 G/DL (ref 3.5–5)
ALP SERPL-CCNC: 56 U/L (ref 34–104)
ALT SERPL W P-5'-P-CCNC: 23 U/L (ref 7–52)
ANION GAP SERPL CALCULATED.3IONS-SCNC: 5 MMOL/L (ref 4–13)
AST SERPL W P-5'-P-CCNC: 16 U/L (ref 13–39)
ATRIAL RATE: 57 BPM
BASOPHILS # BLD AUTO: 0.04 THOUSANDS/ÂΜL (ref 0–0.1)
BASOPHILS NFR BLD AUTO: 1 % (ref 0–1)
BILIRUB SERPL-MCNC: 0.54 MG/DL (ref 0.2–1)
BUN SERPL-MCNC: 11 MG/DL (ref 5–25)
CALCIUM SERPL-MCNC: 9.3 MG/DL (ref 8.4–10.2)
CHLORIDE SERPL-SCNC: 104 MMOL/L (ref 96–108)
CO2 SERPL-SCNC: 31 MMOL/L (ref 21–32)
CREAT SERPL-MCNC: 0.91 MG/DL (ref 0.6–1.3)
EOSINOPHIL # BLD AUTO: 0.22 THOUSAND/ÂΜL (ref 0–0.61)
EOSINOPHIL NFR BLD AUTO: 3 % (ref 0–6)
ERYTHROCYTE [DISTWIDTH] IN BLOOD BY AUTOMATED COUNT: 12.8 % (ref 11.6–15.1)
GFR SERPL CREATININE-BSD FRML MDRD: 92 ML/MIN/1.73SQ M
GLUCOSE P FAST SERPL-MCNC: 91 MG/DL (ref 65–99)
HCT VFR BLD AUTO: 46.8 % (ref 36.5–49.3)
HGB BLD-MCNC: 15.3 G/DL (ref 12–17)
IMM GRANULOCYTES # BLD AUTO: 0.04 THOUSAND/UL (ref 0–0.2)
IMM GRANULOCYTES NFR BLD AUTO: 1 % (ref 0–2)
LYMPHOCYTES # BLD AUTO: 1.79 THOUSANDS/ÂΜL (ref 0.6–4.47)
LYMPHOCYTES NFR BLD AUTO: 24 % (ref 14–44)
MCH RBC QN AUTO: 27.7 PG (ref 26.8–34.3)
MCHC RBC AUTO-ENTMCNC: 32.7 G/DL (ref 31.4–37.4)
MCV RBC AUTO: 85 FL (ref 82–98)
MONOCYTES # BLD AUTO: 0.59 THOUSAND/ÂΜL (ref 0.17–1.22)
MONOCYTES NFR BLD AUTO: 8 % (ref 4–12)
NEUTROPHILS # BLD AUTO: 4.86 THOUSANDS/ÂΜL (ref 1.85–7.62)
NEUTS SEG NFR BLD AUTO: 63 % (ref 43–75)
NRBC BLD AUTO-RTO: 0 /100 WBCS
P AXIS: 31 DEGREES
PLATELET # BLD AUTO: 161 THOUSANDS/UL (ref 149–390)
PMV BLD AUTO: 8.6 FL (ref 8.9–12.7)
POTASSIUM SERPL-SCNC: 4.4 MMOL/L (ref 3.5–5.3)
PR INTERVAL: 160 MS
PROT SERPL-MCNC: 7.2 G/DL (ref 6.4–8.4)
QRS AXIS: 6 DEGREES
QRSD INTERVAL: 92 MS
QT INTERVAL: 446 MS
QTC INTERVAL: 434 MS
RBC # BLD AUTO: 5.52 MILLION/UL (ref 3.88–5.62)
SODIUM SERPL-SCNC: 140 MMOL/L (ref 135–147)
T WAVE AXIS: 13 DEGREES
VENTRICULAR RATE: 57 BPM
WBC # BLD AUTO: 7.54 THOUSAND/UL (ref 4.31–10.16)

## 2024-03-19 PROCEDURE — 93010 ELECTROCARDIOGRAM REPORT: CPT | Performed by: INTERNAL MEDICINE

## 2024-03-19 PROCEDURE — 93005 ELECTROCARDIOGRAM TRACING: CPT

## 2024-03-19 PROCEDURE — 85025 COMPLETE CBC W/AUTO DIFF WBC: CPT

## 2024-03-19 PROCEDURE — 36415 COLL VENOUS BLD VENIPUNCTURE: CPT

## 2024-03-19 PROCEDURE — 80053 COMPREHEN METABOLIC PANEL: CPT

## 2024-03-19 PROCEDURE — 71046 X-RAY EXAM CHEST 2 VIEWS: CPT

## 2024-03-25 ENCOUNTER — CONSULT (OUTPATIENT)
Age: 59
End: 2024-03-25
Payer: OTHER MISCELLANEOUS

## 2024-03-25 ENCOUNTER — TELEPHONE (OUTPATIENT)
Age: 59
End: 2024-03-25

## 2024-03-25 VITALS
HEIGHT: 66 IN | HEART RATE: 67 BPM | RESPIRATION RATE: 18 BRPM | BODY MASS INDEX: 48.12 KG/M2 | DIASTOLIC BLOOD PRESSURE: 78 MMHG | SYSTOLIC BLOOD PRESSURE: 124 MMHG | OXYGEN SATURATION: 96 % | TEMPERATURE: 96.8 F | WEIGHT: 299.4 LBS

## 2024-03-25 DIAGNOSIS — Z01.818 PRE-OPERATIVE EXAMINATION: ICD-10-CM

## 2024-03-25 DIAGNOSIS — S46.011D TRAUMATIC COMPLETE TEAR OF RIGHT ROTATOR CUFF, SUBSEQUENT ENCOUNTER: Primary | ICD-10-CM

## 2024-03-25 DIAGNOSIS — S43.004D DISLOCATION OF RIGHT SHOULDER JOINT, SUBSEQUENT ENCOUNTER: ICD-10-CM

## 2024-03-25 DIAGNOSIS — I10 PRIMARY HYPERTENSION: Chronic | ICD-10-CM

## 2024-03-25 PROCEDURE — 99243 OFF/OP CNSLTJ NEW/EST LOW 30: CPT | Performed by: INTERNAL MEDICINE

## 2024-03-25 NOTE — PROGRESS NOTES
PRE-OPERATIVE EVALUATION  [facility] - [facility] [place]    NAME: Osmin Hook  AGE: 59 y.o. SEX: male  : 1965     DATE: 3/25/2024     Assessment/Plan:     1. Traumatic complete tear of right rotator cuff, subsequent encounter  2. Dislocation of right shoulder joint, subsequent encounter  3. Pre-operative examination  4. Primary hypertension    59 y.o. male with planned surgery: REPAIR ROTATOR CUFF  ARTHROSCOPIC and biceps tenotomy (Right: Shoulder) .      Cardiac Risk Estimation: per the Revised Cardiac Risk Index (Circ. 100:1043, 1999), the patient's risk factors for cardiac complications include  none , putting him in: RCI RISK CLASS I (0 risk factors, risk of major cardiac compl. appr. 0.5%).    Labs, EKG, and chest x-ray were reviewed. His blood pressure is well controlled. He usually takes his blood pressure medications in the evening. He will hold baby aspirin, NSAIDs, vitamins, supplements before surgery.    Clearance  Patient is CLEARED for surgery without any additional cardiac testing.      History of Present Illness:     Osmin Hook is a 59 y.o. male who presents to the office today for a preoperative consultation.    Surgery: REPAIR ROTATOR CUFF  ARTHROSCOPIC and biceps tenotomy (Right: Shoulder)   Anticipated Date of Surgery: 2024  Referring Provider: Tommie Baird DO     Patient had right shoulder injury back in 2024. He has been following with orthopedics and attending physical therapy.    MRI right shoulder back on 2024 showed:  Findings again consistent with recent anterior instability of the glenohumeral joint. Redemonstrated anteroinferior glenoid fracture (bony Bankart) with an associated glenoid labral tear. Large acute humeral head Hill-Sachs impaction fracture.  Complete full-thickness tear of the supraspinatus and infraspinatus tendons with retraction to the level of the humeral head apex. There is mild infraspinatus  muscular fatty atrophy.  Medial dislocation of the long head biceps tendon with associated tendinosis.    Planned anesthesia is General w/ Interscalene Block . Patient has a history of HTN and HLD. No evidence of diabetes, CVA, TIA, chronic kidney failure, CHF.      Assessment of Cardiac Risk:  Denies unstable or severe angina or MI in the last 6 weeks or history of stent placement in the last year   Denies decompensated heart failure (e.g. New onset heart failure, NYHA functional class IV heart failure, or worsening existing heart failure)  Denies significant arrhythmias such as high grade AV block, symptomatic ventricular arrhythmia, newly recognized ventricular tachycardia, supraventricular tachycardia with resting heart rate >100, or symptomatic bradycardia  Denies severe heart valve disease including aortic stenosis or symptomatic mitral stenosis     Prior Anesthesia Reactions: No     Personal history of venous thromboembolic disease? No    History of steroid use for >2 weeks within last year? No     Review of Systems:     Review of Systems   Constitutional: Negative.    Respiratory: Negative.     Cardiovascular: Negative.    Gastrointestinal: Negative.    Musculoskeletal:  Positive for arthralgias.      Problem List:     Patient Active Problem List   Diagnosis    Hypertension    Hypercholesterolemia    Morbid obesity with BMI of 40.0-44.9, adult (HCC)    Gastroesophageal reflux disease without esophagitis    Cervical spondylosis    S/P lumbar fusion    Chronic bilateral low back pain without sciatica    Posterior tibialis muscle dysfunction    Plantar fasciitis, bilateral      Allergies:     No Known Allergies     Current Medications:     Current Outpatient Medications on File Prior to Visit   Medication Sig    amLODIPine (NORVASC) 10 mg tablet TAKE 1 TABLET BY MOUTH EVERY DAY    aspirin 81 mg chewable tablet Chew 1 tablet (81 mg total) daily    betamethasone, augmented, (DIPROLENE-AF) 0.05 % cream Apply  topically 2 (two) times a day    metoprolol succinate (TOPROL-XL) 50 mg 24 hr tablet TAKE 1 TABLET BY MOUTH EVERY DAY    olmesartan (BENICAR) 40 mg tablet TAKE 1 TABLET BY MOUTH EVERY DAY    pantoprazole (PROTONIX) 40 mg tablet Take 1 tablet (40 mg total) by mouth daily    rosuvastatin (CRESTOR) 5 mg tablet TAKE 1 TABLET BY MOUTH EVERY DAY      Past History:     Past Medical History:   Diagnosis Date    GERD (gastroesophageal reflux disease)     Hypercholesterolemia     Hypertension     Lyme disease 3/19/2019    Obesity     Sexual dysfunction     Psychosexual dysfunction with inhibited sexual excitement      Past Surgical History:   Procedure Laterality Date    BACK SURGERY      spinal fusion, LUMBAR    CARDIAC CATHETERIZATION      CARDIAC CATHETERIZATION      CARDIAC CATHETERIZATION      COLONOSCOPY  2019    EGD AND COLONOSCOPY N/A 06/15/2018    Procedure: EGD AND COLONOSCOPY;  Surgeon: Jaciel Johnson MD;  Location: [facility];  Service: Gastroenterology    NV COLONOSCOPY FLX DX W/COLLJ SPEC WHEN PFRMD N/A 03/26/2019    Procedure: COLONOSCOPY;  Surgeon: Kvng Cerna MD;  Location: [facility];  Service: Gastroenterology    NV ESOPHAGOGASTRODUODENOSCOPY TRANSORAL DIAGNOSTIC N/A 03/26/2019    Procedure: ESOPHAGOGASTRODUODENOSCOPY (EGD);  Surgeon: Kvng Cerna MD;  Location: [facility];  Service: Gastroenterology    SEPTOPLASTY      UPPER GASTROINTESTINAL ENDOSCOPY  2019     Family History   Problem Relation Age of Onset    Atrial fibrillation Mother     COPD Mother     Coronary artery disease Mother     Lung cancer Mother     Rheum arthritis Mother     Stroke Mother         Syndrome    Arthritis Mother     Cancer Mother     Hypertension Mother     Lung cancer Father     Cancer Father     Prostate cancer Maternal Grandfather     Heart defect Daughter     Arthritis Family      Social History     Occupational History    Not on file   Tobacco Use    Smoking status: Former     Current packs/day: 0.00     Average  "packs/day: 0.3 packs/day for 5.8 years (1.4 ttl pk-yrs)     Types: Cigarettes     Start date: 1984     Quit date: 10/20/1989     Years since quittin.4    Smokeless tobacco: Former     Types: Chew     Quit date:    Vaping Use    Vaping status: Never Used   Substance and Sexual Activity    Alcohol use: Yes     Alcohol/week: 4.0 standard drinks of alcohol     Types: 4 Standard drinks or equivalent per week     Comment: Occ alcohol use per [employer]     Drug use: Yes     Types: Marijuana     Comment: medical    Sexual activity: Yes     Partners: Female      Physical Exam:      /78 (BP Location: Left arm, Patient Position: Sitting, Cuff Size: Large)   Pulse 67   Temp (!) 96.8 °F (36 °C) (Tympanic)   Resp 18   Ht 5' 6\" (1.676 m)   Wt 136 kg (299 lb 6.4 oz)   SpO2 96%   BMI 48.32 kg/m²     Physical Exam  Constitutional:       General: He is not in acute distress.     Appearance: He is obese. He is not ill-appearing.   Cardiovascular:      Rate and Rhythm: Normal rate and regular rhythm.      Heart sounds: No murmur heard.  Pulmonary:      Effort: Pulmonary effort is normal. No respiratory distress.      Breath sounds: No wheezing.   Abdominal:      General: Bowel sounds are normal. There is no distension.      Tenderness: There is no abdominal tenderness.   Musculoskeletal:         General: Deformity (decreased ROM right shoulder) present.      Right lower leg: No edema.      Left lower leg: No edema.   Neurological:      Mental Status: He is alert.       Pre-operative work-up:  Laboratory Results: I have personally reviewed the pertinent laboratory results/reports   EKG: I have personally reviewed pertinent reports.    Chest x-ray: I have personally reviewed pertinent reports.      Duke Hurtado DO  [facility] [address] [address]  Phone#  [phone]  Fax#  [phone]      "

## 2024-03-25 NOTE — TELEPHONE ENCOUNTER
Caller: Patient    Doctor: Oli    Reason for call: Patient has upcoming surgery with Dr Baird on 4/11 and has received notice from his primary insurance regarding the surgery.  Patient states that it should be a workers comp case, and wants to ensure the surgery is in fact going through workers comp, rather than his primary insurance, or if this needs to be fixed.    Please call patient back to advise.    Call back#: [phone]

## 2024-03-27 ENCOUNTER — TELEPHONE (OUTPATIENT)
Age: 59
End: 2024-03-27

## 2024-03-27 NOTE — TELEPHONE ENCOUNTER
Caller: Patient    Doctor: Oli    Reason for call: Stated he received a bill from [employer] for his injections from 2/26. Patient stated he's being billed over 3K dollars. Patient stated he spoke w/[employer] who advised the injections were never approved. [employer] states they need a letter mentioning why the injections were necessary. Patient stated he contact the billing office who directed him back to Dr Baird's office. Please contact patient to discuss    Call back#: [phone]

## 2024-04-03 ENCOUNTER — ANESTHESIA EVENT (OUTPATIENT)
Dept: PERIOP | Facility: HOSPITAL | Age: 59
End: 2024-04-03
Payer: OTHER MISCELLANEOUS

## 2024-04-03 DIAGNOSIS — Z91.89 RISK FACTORS FOR OBSTRUCTIVE SLEEP APNEA: Primary | ICD-10-CM

## 2024-04-03 NOTE — PRE-PROCEDURE INSTRUCTIONS
Pre-Surgery Instructions:   Medication Instructions    amLODIPine (NORVASC) 10 mg tablet Take night before surgery    aspirin 81 mg chewable tablet Instructions provided by MD ROCHA states ok to hold prior to surgery. Instructed to hold 7 days prior to surgery.    betamethasone, augmented, (DIPROLENE-AF) 0.05 % cream Stop taking 1 day prior to surgery.    metoprolol succinate (TOPROL-XL) 50 mg 24 hr tablet Take night before surgery    NON FORMULARY Hold day of surgery.    olmesartan (BENICAR) 40 mg tablet Take night before surgery. Do not take day of surgery.    pantoprazole (PROTONIX) 40 mg tablet Take night before surgery    rosuvastatin (CRESTOR) 5 mg tablet Take night before surgery   Pt states-he is picking up sling at Surgical office and will bring day of surgery.   Medication instructions for day surgery reviewed. Please use only a sip of water to take your instructed medications. Avoid all over the counter vitamins, supplements and NSAIDS for one week prior to surgery per anesthesia guidelines. Tylenol is ok to take as needed.     You will receive a call one business day prior to surgery with an arrival time and hospital directions. If your surgery is scheduled on a Monday, the hospital will be calling you on the Friday prior to your surgery. If you have not heard from anyone by 8pm, please call the hospital supervisor through the hospital  at [phone]. ([place] [phone] or [place] [phone]).    Do not eat or drink anything after midnight the night before your surgery, including candy, mints, lifesavers, or chewing gum. Do not drink alcohol 24hrs before your surgery. Try not to smoke at least 24hrs before your surgery.       Follow the pre surgery showering instructions as listed in the “My Surgical Experience Booklet” or otherwise provided by your surgeon's office. Do not use a blade to shave the surgical area 1 week before surgery. It is okay to use a clean electric clippers up to 24  hours before surgery. Do not apply any lotions, creams, including makeup, cologne, deodorant, or perfumes after showering on the day of your surgery. Do not use dry shampoo, hair spray, hair gel, or any type of hair products.     No contact lenses, eye make-up, or artificial eyelashes. Remove nail polish, including gel polish, and any artificial, gel, or acrylic nails if possible. Remove all jewelry including rings and body piercing jewelry.     Wear causal clothing that is easy to take on and off. Consider your type of surgery.    Keep any valuables, jewelry, piercings at home. Please bring any specially ordered equipment (sling, braces) if indicated.    Arrange for a responsible person to drive you to and from the hospital on the day of your surgery. Please confirm the visitor policy for the day of your procedure when you receive your phone call with an arrival time.     Call the surgeon's office with any new illnesses, exposures, or additional questions prior to surgery.    Please reference your “My Surgical Experience Booklet” for additional information to prepare for your upcoming surgery.

## 2024-04-04 ENCOUNTER — OFFICE VISIT (OUTPATIENT)
Dept: PHYSICAL THERAPY | Facility: CLINIC | Age: 59
End: 2024-04-04
Payer: OTHER MISCELLANEOUS

## 2024-04-04 DIAGNOSIS — S93.402A SPRAIN OF UNSPECIFIED LIGAMENT OF LEFT ANKLE, INITIAL ENCOUNTER: ICD-10-CM

## 2024-04-04 DIAGNOSIS — S43.004D DISLOCATION OF RIGHT SHOULDER JOINT, SUBSEQUENT ENCOUNTER: Primary | ICD-10-CM

## 2024-04-04 PROCEDURE — 97110 THERAPEUTIC EXERCISES: CPT

## 2024-04-04 PROCEDURE — 97112 NEUROMUSCULAR REEDUCATION: CPT

## 2024-04-04 NOTE — PROGRESS NOTES
PT Re-Evaluation     Today's date: 2024  Patient name: Osmin Hook  : 1965  MRN: [id number]  Referring provider: Tommie Baird DO  Dx:   Encounter Diagnosis     ICD-10-CM    1. Dislocation of right shoulder joint, subsequent encounter  S43.004D       2. Sprain of unspecified ligament of left ankle, initial encounter  S93.402A           Start Time: 1630  Stop Time: 1713  Total time in clinic (min): 43 minutes    Assessment  Assessment details: Problem List:  1) Limited R shoulder ROM  2) R shoulder weakness    Osmin Hook is a pleasant 59 y.o. male who presents s/p R shoulder dislocation that occurred from fall on 24.  Reduced in ER same day.  Movement diagnosis of R shoulder hypomobility with primary nociceptive pain.  With PT, he has made nice progress in his AROM and PROM.  Deficits remain the same including limited R shoulder ROM, R shoulder weakness, and activity intolerance.  Patient has surgery scheduled for next week and we will re-evaluate after that.  Patient would continue benefit from skilled PT services to address these deficits and return to PLOF.    Comparable signs:  1) R shoulder flexion ROM  2) R shoulder ABD ROM  Impairments: abnormal muscle firing, abnormal or restricted ROM, activity intolerance, impaired physical strength, lacks appropriate home exercise program, pain with function and weight-bearing intolerance    Symptom irritability: highUnderstanding of Dx/Px/POC: good   Prognosis: fair    Goals  STGs  Patient will be independent with HEP in 4 weeks -met  Patient will decrease pain by 2 points in 4 weeks -not met  LTGs  Patient will achieve R shoulder ROM equal to L shoulder ROM in 10 weeks -met  Patient will be able to shave without limitation in 10 weeks -not met  Patient will be able to work in 10 weeks -not met   Patient will be able to walk his dog in 10 weeks -not met    Plan  Plan details: Patient will see me back for re-evaluation after surgery and we  will set up new POC then  Patient would benefit from: skilled physical therapy  Planned therapy interventions: home exercise program, therapeutic exercise, therapeutic activities, motor coordination training, neuromuscular re-education, patient education, strengthening, manual therapy, functional ROM exercises and activity modification  Frequency: 2x week  Plan of Care beginning date: 2024  Plan of Care expiration date: 2024  Treatment plan discussed with: patient        Subjective Evaluation    History of Present Illness  Date of onset: 2024  Mechanism of injury: Pain location: R anterior shoulder radiating down lateral aspect of deltoid, occasionally numbness in R thumb  Pain severity: 3-3-8  Aggs: quick movement  Eases: sleeping on it  Irritability: high  FELI/timeline: patient reports that he fell when getting out of a dumpster, the floor dropped out causing him to fall, his shoulder was dislocated and reduced in ER, X-ray reveals Hill Sachs  PMH/PSH: none    - patient reports that about 50% better since starting PT  Patient Goals  Patient goal: shave, work, dress  Pain  Current pain ratin  At best pain rating: 3  At worst pain ratin          Objective     Neurological Testing     Sensation   Cervical/Thoracic   Left   Intact: light touch    Right   Intact: light touch    Active Range of Motion   Cervical/Thoracic Spine       Cervical    Flexion: 50 degrees  with pain Restriction level: moderate  Extension: 75 degrees     Restriction level: minimal  Left lateral flexion: 50 degrees     Restriction level: moderate  Right lateral flexion: 50 degrees     Restriction level moderate  Left rotation: 75 degrees Restriction level: minimal  Right rotation: 75 degrees    with pain Restriction level: minimal    Right Shoulder   Flexion: 124 degrees   Abduction: 85 degrees                 Precautions: HTN, hx of lumbar fusion, Lyme's  POC expires Unit limit Auth Expiration date PT/OT/ST + Visit Limit?  "  4/16/24 BOMN 5/6/24 BOMN                           Visit/Unit Tracking  AUTH Status:  Date 2/6 2/9 2/15 2/19 2/22 2/27 2/29 3/5 3/7 3/12     30 visits Used 1 2 3 4 5 6 7 8 9 10      Remaining  29 28 27 26 25 24 23 22 21 20           Foto       2/29      Manuals 3/14 4/4 2/15 2/19 2/22 2/27 2/29 3/5 3/7 3/12   Shoulder PROM     10'                                               Neuro Re-Ed             Shoulder iso-flex, IR, ER X20 3\"  X20 3\" X20 3\" X20 3\" X20 3\"  X20 3\" X20 3\" X20 3\"   SL scap retraction MRE x20 x20 x20 x20 x20 x20 x20 x20 x20 x20   Scap retraction x30 x30 x20 x20 x20 x20 x20      Supine rhythmic stab       4x15\"      Ylw MB on wall rhythmic stab 4x15\" 4x15\"     4x15\" 4x15\" 4x15\"                              Ther Ex             R shoulder AROM             TB ER/IR 2x10  rtb 2x10  rtb    2x10  rtb 2x10  rtb 2x10  rtb 2x10  rtb 2x10  rtb   Standing cane flex x20 x20 x20 x20 x20 x20 x20 x20 x20 x20   Standing cane ABD x20 x20 x20 x20 x20 x20 x20 x20 x20 x20   Supine cane ER x20 x20 x20 x20 x20 x20 x20 x20 x20 x20   R shoulder PROM GS GS x20 x20  GS GS GS GS GS   Wall slides x20 x20 x20  x20 x20 x20 x20 x20 x20   Pt edu GS- HEP GS- HEP           UBE 2'2' 3'3'       1.5'/1.5' 2'2'   PT re eval  GS           Reynaldo x30  4' 2x10 5' 3x10 3x10 3x10  3x10   Ther Activity                                       Gait Training                                       Modalities                                                              "

## 2024-04-08 ENCOUNTER — OFFICE VISIT (OUTPATIENT)
Dept: OBGYN CLINIC | Facility: CLINIC | Age: 59
End: 2024-04-08
Payer: OTHER MISCELLANEOUS

## 2024-04-08 VITALS
WEIGHT: 303 LBS | HEART RATE: 73 BPM | SYSTOLIC BLOOD PRESSURE: 135 MMHG | BODY MASS INDEX: 48.7 KG/M2 | HEIGHT: 66 IN | DIASTOLIC BLOOD PRESSURE: 84 MMHG

## 2024-04-08 DIAGNOSIS — S43.004D DISLOCATION OF RIGHT SHOULDER JOINT, SUBSEQUENT ENCOUNTER: ICD-10-CM

## 2024-04-08 DIAGNOSIS — M25.511 RIGHT SHOULDER PAIN, UNSPECIFIED CHRONICITY: ICD-10-CM

## 2024-04-08 DIAGNOSIS — S46.011D TRAUMATIC COMPLETE TEAR OF RIGHT ROTATOR CUFF, SUBSEQUENT ENCOUNTER: Primary | ICD-10-CM

## 2024-04-08 PROCEDURE — 99213 OFFICE O/P EST LOW 20 MIN: CPT | Performed by: ORTHOPAEDIC SURGERY

## 2024-04-08 NOTE — PROGRESS NOTES
Patient Name:  Osmin Hook  MRN:  [id number]    Assessment & Plan     1. Traumatic complete tear of right rotator cuff, subsequent encounter    2. Dislocation of right shoulder joint, subsequent encounter    3. Right shoulder pain, unspecified chronicity      59 y.o. male with Right shoulder supraspinatus and infraspinatus tear s/p dislocation with bony bankart and hill-sacs lesion DOI 1/20/24.   X-rays, CT scan, and MRI study reviewed in the office today  Operative management was discussed in the form of arthroscopic rotator cuff repair, possible biceps tenotomy. Risks of surgery, including but not limited to, infection, nerve and blood vessel injury, postoperative stiffness, persistent pain, retear, inability to repair, DVT/PE, anesthesia complications, posttraumatic arthritis, deformity and cramping tenotomy, and need for subsequent surgery were discussed in detail.  The patient understands and has elected to proceed forward.  Post operative care was discussed in detail with the patient. All questions were answered to the patient's satisfaction.   He has gained preoperative clearance from PCP. He has completed labs, CXR, EKG  Surgery scheduled for 4/11/24  The patient will follow up post operatively    History of the Present Illness   Osmin Hook is a 59 y.o. male with Right shoulder supraspinatus and infraspinatus tear s/p dislocation with bony bankart and hill-sacs lesion DOI 1/20/24. . He is scheduled for arthroscopic rotator cuff repair and biceps tenotomy on 04/11/2024. He has had his labs, CXR, EKG, and gained preoperative clearance from his PCP.         Review of Systems     Review of Systems   Constitutional:  Negative for chills and fever.   HENT:  Negative for ear pain and sore throat.    Eyes:  Negative for pain and visual disturbance.   Respiratory:  Negative for cough and shortness of breath.    Cardiovascular:  Negative for chest pain and palpitations.   Gastrointestinal:  Negative for  "abdominal pain and vomiting.   Genitourinary:  Negative for dysuria and hematuria.   Musculoskeletal:  Negative for arthralgias and back pain.   Skin:  Negative for color change and rash.   Neurological:  Negative for seizures and syncope.   All other systems reviewed and are negative.      Physical Exam     /84   Pulse 73   Ht 5' 6\" (1.676 m)   Wt (!) 137 kg (303 lb)   BMI 48.91 kg/m²     Right Shoulder:   Active range of motion   110 degrees forward flexion  90 degrees abduction  30 degrees external rotation   SI joint internal rotation    Passive range of motion   150 degrees of forward flexion     There is no tenderness present over the shoulder.   Supraspinatus testing 4/5  Infraspinatus testing 4/5  Subscapularis testing 4+/5  Goel test is negative   Clermont's test is negative    Speed's test is Positive  The patient is neurovascularly intact distally in the extremity.        Data Review     I have personally reviewed pertinent films in PACS, and my interpretation follows.    X-rays taken 24 of [facility] demonstrate successful reduction glenohumeral joint.     CT scan taken 24 of [facility] demonstrate congruent glenohumeral joint with bony Bankart and hill-sacs lesion.     Right shoulder MRI obtained 2024 demonstrates bony Bankart and hill sachs lesions with associated glenoid labral tear. Full thickness tear of supraspinatus and infraspinatus with retraction to the humeral head apex and mild fatty atrophy. Medial dislocation of the long head of the biceps tendon.   Social History     Tobacco Use    Smoking status: Former     Current packs/day: 0.00     Average packs/day: 0.3 packs/day for 5.8 years (1.4 ttl pk-yrs)     Types: Cigarettes     Start date: 1984     Quit date: 10/20/1989     Years since quittin.4    Smokeless tobacco: Former     Types: Chew     Quit date:    Vaping Use    Vaping status: Never Used   Substance Use Topics    Alcohol use: Yes     " Alcohol/week: 4.0 standard drinks of alcohol     Types: 4 Standard drinks or equivalent per week     Comment: Occ alcohol use per [employer]     Drug use: Yes     Frequency: 7.0 times per week     Types: Marijuana     Comment: medical           Procedures  None performed.    Tommie Baird DO   Scribe Attestation      I,:  Kaylan Vera am acting as a scribe while in the presence of the attending physician.:       I,:  Tommie Baird DO personally performed the services described in this documentation    as scribed in my presence.:

## 2024-04-08 NOTE — LETTER
April 8, 2024     Patient: Osmin Hook  YOB: 1965  Date of Visit: 4/8/2024      To Whom it May Concern:    Osmin Hook is under my professional care. Osmin was seen in my office on 4/8/2024. Osmin will be out of work for 4-6 months due to his upcoming surgery scheduled for 04/11/2024.    If you have any questions or concerns, please don't hesitate to call.         Sincerely,          Tommie Baird,         CC: No Recipients

## 2024-04-11 ENCOUNTER — HOSPITAL ENCOUNTER (OUTPATIENT)
Facility: HOSPITAL | Age: 59
Setting detail: OUTPATIENT SURGERY
Discharge: HOME | End: 2024-04-11
Attending: ORTHOPAEDIC SURGERY | Admitting: ORTHOPAEDIC SURGERY
Payer: OTHER MISCELLANEOUS

## 2024-04-11 ENCOUNTER — ANESTHESIA (OUTPATIENT)
Dept: PERIOP | Facility: HOSPITAL | Age: 59
End: 2024-04-11
Payer: OTHER MISCELLANEOUS

## 2024-04-11 VITALS
SYSTOLIC BLOOD PRESSURE: 134 MMHG | WEIGHT: 301.37 LBS | DIASTOLIC BLOOD PRESSURE: 64 MMHG | RESPIRATION RATE: 16 BRPM | BODY MASS INDEX: 48.43 KG/M2 | HEART RATE: 84 BPM | HEIGHT: 66 IN | OXYGEN SATURATION: 93 % | TEMPERATURE: 97.9 F

## 2024-04-11 DIAGNOSIS — M75.121 COMPLETE TEAR OF RIGHT ROTATOR CUFF, UNSPECIFIED WHETHER TRAUMATIC: Primary | ICD-10-CM

## 2024-04-11 PROCEDURE — C9290 INJ, BUPIVACAINE LIPOSOME: HCPCS | Performed by: STUDENT IN AN ORGANIZED HEALTH CARE EDUCATION/TRAINING PROGRAM

## 2024-04-11 PROCEDURE — C1713 ANCHOR/SCREW BN/BN,TIS/BN: HCPCS | Performed by: ORTHOPAEDIC SURGERY

## 2024-04-11 DEVICE — BIO-COMPOSITE CRKSCRW 5.5X14.7MM
Type: IMPLANTABLE DEVICE | Site: SHOULDER | Status: FUNCTIONAL
Brand: ARTHREX®

## 2024-04-11 DEVICE — 4.75MM BC KNOTLESS SWIVELOCK
Type: IMPLANTABLE DEVICE | Site: SHOULDER | Status: FUNCTIONAL
Brand: ARTHREX®

## 2024-04-11 RX ORDER — ONDANSETRON 2 MG/ML
INJECTION INTRAMUSCULAR; INTRAVENOUS AS NEEDED
Status: DISCONTINUED | OUTPATIENT
Start: 2024-04-11 | End: 2024-04-11

## 2024-04-11 RX ORDER — IBUPROFEN 800 MG/1
TABLET ORAL
Qty: 30 TABLET | Refills: 0 | Status: SHIPPED | OUTPATIENT
Start: 2024-04-11

## 2024-04-11 RX ORDER — MIDAZOLAM HYDROCHLORIDE 2 MG/2ML
INJECTION, SOLUTION INTRAMUSCULAR; INTRAVENOUS AS NEEDED
Status: DISCONTINUED | OUTPATIENT
Start: 2024-04-11 | End: 2024-04-11

## 2024-04-11 RX ORDER — BUPIVACAINE HYDROCHLORIDE 5 MG/ML
INJECTION, SOLUTION EPIDURAL; INTRACAUDAL
Status: COMPLETED | OUTPATIENT
Start: 2024-04-11 | End: 2024-04-11

## 2024-04-11 RX ORDER — FENTANYL CITRATE 50 UG/ML
INJECTION, SOLUTION INTRAMUSCULAR; INTRAVENOUS AS NEEDED
Status: DISCONTINUED | OUTPATIENT
Start: 2024-04-11 | End: 2024-04-11

## 2024-04-11 RX ORDER — EPHEDRINE SULFATE 50 MG/ML
INJECTION INTRAVENOUS AS NEEDED
Status: DISCONTINUED | OUTPATIENT
Start: 2024-04-11 | End: 2024-04-11

## 2024-04-11 RX ORDER — ASPIRIN 81 MG/1
81 TABLET ORAL 2 TIMES DAILY
Qty: 28 TABLET | Refills: 0 | Status: SHIPPED | OUTPATIENT
Start: 2024-04-11

## 2024-04-11 RX ORDER — CHLORHEXIDINE GLUCONATE 4 G/100ML
SOLUTION TOPICAL DAILY PRN
Status: DISCONTINUED | OUTPATIENT
Start: 2024-04-11 | End: 2024-04-11 | Stop reason: HOSPADM

## 2024-04-11 RX ORDER — ONDANSETRON 2 MG/ML
4 INJECTION INTRAMUSCULAR; INTRAVENOUS ONCE AS NEEDED
Status: DISCONTINUED | OUTPATIENT
Start: 2024-04-11 | End: 2024-04-11 | Stop reason: HOSPADM

## 2024-04-11 RX ORDER — CHLORHEXIDINE GLUCONATE ORAL RINSE 1.2 MG/ML
15 SOLUTION DENTAL ONCE
Status: COMPLETED | OUTPATIENT
Start: 2024-04-11 | End: 2024-04-11

## 2024-04-11 RX ORDER — MAGNESIUM HYDROXIDE 1200 MG/15ML
LIQUID ORAL AS NEEDED
Status: DISCONTINUED | OUTPATIENT
Start: 2024-04-11 | End: 2024-04-11 | Stop reason: HOSPADM

## 2024-04-11 RX ORDER — LIDOCAINE HYDROCHLORIDE 10 MG/ML
INJECTION, SOLUTION EPIDURAL; INFILTRATION; INTRACAUDAL; PERINEURAL AS NEEDED
Status: DISCONTINUED | OUTPATIENT
Start: 2024-04-11 | End: 2024-04-11

## 2024-04-11 RX ORDER — OXYCODONE HYDROCHLORIDE AND ACETAMINOPHEN 5; 325 MG/1; MG/1
1 TABLET ORAL EVERY 6 HOURS PRN
Status: DISCONTINUED | OUTPATIENT
Start: 2024-04-11 | End: 2024-04-11 | Stop reason: HOSPADM

## 2024-04-11 RX ORDER — DEXAMETHASONE SODIUM PHOSPHATE 10 MG/ML
8 INJECTION, SOLUTION INTRAMUSCULAR; INTRAVENOUS ONCE AS NEEDED
Status: DISCONTINUED | OUTPATIENT
Start: 2024-04-11 | End: 2024-04-11 | Stop reason: HOSPADM

## 2024-04-11 RX ORDER — SODIUM CHLORIDE, SODIUM LACTATE, POTASSIUM CHLORIDE, CALCIUM CHLORIDE 600; 310; 30; 20 MG/100ML; MG/100ML; MG/100ML; MG/100ML
INJECTION, SOLUTION INTRAVENOUS CONTINUOUS PRN
Status: DISCONTINUED | OUTPATIENT
Start: 2024-04-11 | End: 2024-04-11

## 2024-04-11 RX ORDER — ACETAMINOPHEN 325 MG/1
975 TABLET ORAL ONCE
Status: COMPLETED | OUTPATIENT
Start: 2024-04-11 | End: 2024-04-11

## 2024-04-11 RX ORDER — DEXAMETHASONE SODIUM PHOSPHATE 10 MG/ML
INJECTION, SOLUTION INTRAMUSCULAR; INTRAVENOUS AS NEEDED
Status: DISCONTINUED | OUTPATIENT
Start: 2024-04-11 | End: 2024-04-11

## 2024-04-11 RX ORDER — OXYCODONE HYDROCHLORIDE AND ACETAMINOPHEN 5; 325 MG/1; MG/1
1 TABLET ORAL EVERY 4 HOURS PRN
Qty: 20 TABLET | Refills: 0 | Status: SHIPPED | OUTPATIENT
Start: 2024-04-11

## 2024-04-11 RX ORDER — PROPOFOL 10 MG/ML
INJECTION, EMULSION INTRAVENOUS AS NEEDED
Status: DISCONTINUED | OUTPATIENT
Start: 2024-04-11 | End: 2024-04-11

## 2024-04-11 RX ORDER — SODIUM CHLORIDE, SODIUM LACTATE, POTASSIUM CHLORIDE, AND CALCIUM CHLORIDE .6; .31; .03; .02 G/100ML; G/100ML; G/100ML; G/100ML
IRRIGANT IRRIGATION AS NEEDED
Status: DISCONTINUED | OUTPATIENT
Start: 2024-04-11 | End: 2024-04-11 | Stop reason: HOSPADM

## 2024-04-11 RX ORDER — FENTANYL CITRATE/PF 50 MCG/ML
50 SYRINGE (ML) INJECTION
Status: COMPLETED | OUTPATIENT
Start: 2024-04-11 | End: 2024-04-11

## 2024-04-11 RX ORDER — ROCURONIUM BROMIDE 10 MG/ML
INJECTION, SOLUTION INTRAVENOUS AS NEEDED
Status: DISCONTINUED | OUTPATIENT
Start: 2024-04-11 | End: 2024-04-11

## 2024-04-11 RX ORDER — CEFAZOLIN SODIUM 2 G/50ML
2000 SOLUTION INTRAVENOUS ONCE
Status: DISCONTINUED | OUTPATIENT
Start: 2024-04-11 | End: 2024-04-11

## 2024-04-11 RX ADMIN — ACETAMINOPHEN 975 MG: 325 TABLET, FILM COATED ORAL at 08:55

## 2024-04-11 RX ADMIN — PROPOFOL 200 MG: 10 INJECTION, EMULSION INTRAVENOUS at 10:50

## 2024-04-11 RX ADMIN — BUPIVACAINE 20 ML: 13.3 INJECTION, SUSPENSION, LIPOSOMAL INFILTRATION at 10:00

## 2024-04-11 RX ADMIN — FENTANYL CITRATE 50 MCG: 0.05 INJECTION, SOLUTION INTRAMUSCULAR; INTRAVENOUS at 10:50

## 2024-04-11 RX ADMIN — CHLORHEXIDINE GLUCONATE 0.12% ORAL RINSE 15 ML: 1.2 LIQUID ORAL at 08:55

## 2024-04-11 RX ADMIN — LIDOCAINE HYDROCHLORIDE 50 MG: 10 INJECTION, SOLUTION EPIDURAL; INFILTRATION; INTRACAUDAL at 10:50

## 2024-04-11 RX ADMIN — ONDANSETRON 4 MG: 2 INJECTION INTRAMUSCULAR; INTRAVENOUS at 13:17

## 2024-04-11 RX ADMIN — EPHEDRINE SULFATE 10 MG: 50 INJECTION, SOLUTION INTRAVENOUS at 11:04

## 2024-04-11 RX ADMIN — OXYCODONE HYDROCHLORIDE AND ACETAMINOPHEN 1 TABLET: 5; 325 TABLET ORAL at 14:57

## 2024-04-11 RX ADMIN — FENTANYL CITRATE 50 MCG: 0.05 INJECTION, SOLUTION INTRAMUSCULAR; INTRAVENOUS at 13:45

## 2024-04-11 RX ADMIN — DEXAMETHASONE SODIUM PHOSPHATE 10 MG: 10 INJECTION, SOLUTION INTRAMUSCULAR; INTRAVENOUS at 10:53

## 2024-04-11 RX ADMIN — FENTANYL CITRATE 50 MCG: 0.05 INJECTION, SOLUTION INTRAMUSCULAR; INTRAVENOUS at 09:56

## 2024-04-11 RX ADMIN — SODIUM CHLORIDE, SODIUM LACTATE, POTASSIUM CHLORIDE, AND CALCIUM CHLORIDE: .6; .31; .03; .02 INJECTION, SOLUTION INTRAVENOUS at 13:00

## 2024-04-11 RX ADMIN — EPHEDRINE SULFATE 10 MG: 50 INJECTION, SOLUTION INTRAVENOUS at 11:14

## 2024-04-11 RX ADMIN — ROCURONIUM BROMIDE 50 MG: 10 INJECTION, SOLUTION INTRAVENOUS at 10:50

## 2024-04-11 RX ADMIN — SODIUM CHLORIDE, SODIUM LACTATE, POTASSIUM CHLORIDE, AND CALCIUM CHLORIDE: .6; .31; .03; .02 INJECTION, SOLUTION INTRAVENOUS at 10:46

## 2024-04-11 RX ADMIN — SUGAMMADEX 200 MG: 100 INJECTION, SOLUTION INTRAVENOUS at 13:24

## 2024-04-11 RX ADMIN — BUPIVACAINE HYDROCHLORIDE 5 ML: 5 INJECTION, SOLUTION EPIDURAL; INTRACAUDAL at 10:00

## 2024-04-11 RX ADMIN — MIDAZOLAM HYDROCHLORIDE 1 MG: 1 INJECTION, SOLUTION INTRAMUSCULAR; INTRAVENOUS at 09:56

## 2024-04-11 RX ADMIN — CEFAZOLIN 3000 MG: 1 INJECTION, POWDER, FOR SOLUTION INTRAMUSCULAR; INTRAVENOUS at 10:57

## 2024-04-11 RX ADMIN — FENTANYL CITRATE 50 MCG: 0.05 INJECTION, SOLUTION INTRAMUSCULAR; INTRAVENOUS at 13:55

## 2024-04-11 NOTE — ANESTHESIA PROCEDURE NOTES
Peripheral Block    Patient location during procedure: holding area  Start time: 4/11/2024 10:00 AM  Reason for block: at surgeon's request and post-op pain management  Staffing  Performed by: Venkat Fernandez MD  Authorized by: Venkat Fernandez MD    Preanesthetic Checklist  Completed: patient identified, IV checked, site marked, risks and benefits discussed, surgical consent, monitors and equipment checked, pre-op evaluation and timeout performed  Peripheral Block  Patient position: supine  Prep: ChloraPrep  Patient monitoring: heart rate, continuous pulse ox and frequent blood pressure checks  Block type: Interscalene  Laterality: right  Injection technique: single-shot  Procedures: ultrasound guided, Ultrasound guidance required for the procedure to increase accuracy and safety of medication placement and decrease risk of complications.  Ultrasound permanent image savedbupivacaine liposomal (EXPAREL) 1.3 % injection 20 mL - Perineural   20 mL - 4/11/2024 10:00:00 AM  bupivacaine (PF) (MARCAINE) 0.5 % injection 20 mL - Perineural   5 mL - 4/11/2024 10:00:00 AM  Needle  Needle type: Stimuplex   Needle gauge: 22 G  Needle length: 4 in  Needle localization: ultrasound guidance  Assessment  Injection assessment: incremental injection, local visualized surrounding nerve on ultrasound, negative aspiration for heme, no paresthesia on injection, frequent aspiration, needle tip visualized at all times, negative aspiration, no symptoms of intraneural/intravenous injection, negative for heart rate change and injected with ease  Paresthesia pain: none  Post-procedure:  site cleaned  patient tolerated the procedure well with no immediate complications

## 2024-04-11 NOTE — ANESTHESIA PREPROCEDURE EVALUATION
Procedure:  REPAIR ROTATOR CUFF  ARTHROSCOPIC and biceps tenotomy (Right: Shoulder)    Relevant Problems   CARDIO   (+) Hypercholesterolemia   (+) Hypertension      GI/HEPATIC   (+) Gastroesophageal reflux disease without esophagitis      MUSCULOSKELETAL   (+) Cervical spondylosis   (+) Chronic bilateral low back pain without sciatica      NEURO/PSYCH   (+) Chronic bilateral low back pain without sciatica        Physical Exam    Airway    Mallampati score: I  TM Distance: >3 FB  Neck ROM: full     Dental   No notable dental hx     Cardiovascular  Cardiovascular exam normal    Pulmonary  Pulmonary exam normal     Other Findings        Anesthesia Plan  ASA Score- 3     Anesthesia Type- general with ASA Monitors.         Additional Monitors:     Airway Plan: ETT.           Plan Factors-Exercise tolerance (METS): >4 METS.    Chart reviewed. EKG reviewed.  Existing labs reviewed. Patient summary reviewed.    Patient is not a current smoker.              Induction- intravenous.    Postoperative Plan- Plan for postoperative opioid use.     Informed Consent- Anesthetic plan and risks discussed with patient.  I personally reviewed this patient with the CRNA. Discussed and agreed on the Anesthesia Plan with the CRNA..

## 2024-04-11 NOTE — DISCHARGE INSTR - AVS FIRST PAGE
Shoulder Surgery: Postoperative Instructions  Dr. Tommie Baird  Diet    You may resume your regular diet as soon as possible  Medication    Take the pain medication as prescribed   Take pain medication with food   While taking pain medications, you may NOT operate a vehicle, heavy machinery, or appliances   While taking pain medication, you may NOT drink alcoholic beverages   If you have any reactions to your medications, stop taking them and call my office   Please keep in mind that constipation is a very common side effect of taking narcotic pain medication. Take precautions to prevent constipation:  o Drink plenty of water (6-8 glasses of 8 oz. a day)  o Avoid alcohol, caffeine, and dairy products  o Eat plenty of fiber (fruits, vegetables, and whole grains)  o Take an over the counter stool softener (Colace or Dulcolax)  o Patients that have had upper extremity surgery should take frequent walks  Activity    Minimize activity the day of surgery    DO NOT USE HEAT    Please keep ice applied to the shoulder for at least the first 72 hours or as long as pain or swelling persists. Do not apply ice directly to skin, or allow water to leak on your dressing.   Place a pillow behind your elbow while lying down or sleeping. Sleeping in a more upright position (recliner) may be more comfortable initially. You should NOT roll onto the operative shoulder.   You are in an immobilizer or sling and it should remain on at all times except when showering until your first postoperative visit   Open and close your hand 10 times every day for about 1 minute. You may also flex and extend your elbow each day when your sling is removed for showering. Be sure to keep your elbow at your side.   DO NOT actively (on your own) lift your operative arm away from the side of your body or rotate it out away from your body.    DO NOT use exercise equipment unless otherwise instructed.  Sling/ Immobilizer    Use the sling/immobilizer at all  times and while sleeping until your next office appointment.  Showering    You may shower 3 days after surgery unless told otherwise. DO NOT immerse the shoulder under water and DO NOT rub the incision. Place new Band-Aids over the sutures after showering.    You may sponge bathe for the first 72 hours, taking care to keep the dressing clean and dry.  Dressing Care    It is normal to get some bloody wound seepage through the bandage. DO NOT BE ALARMED.    If the dressing gets soaked with wound seepage, place more gauze over the dressing and secure with tape. If this soaks through remove the entire dressing and replace with STERILE gauze and tape    Remove all dressings at 72 hours after surgery. If there is still some wound seepage, apply a fresh STERILE gauze over the incisions and secure with tape.    DO NOT TOUCH OR REMOVE THE SUTURES!!  Emergency/Follow-Up   Please notify my office at [phone] if you develop any fever (101 deg or above), unexpected warmth, redness or swelling. Please call if your fingers become cold, purple, numb, or there is excessive bleeding.   Please call the office within 24 hours at [phone] to schedule a follow up appt within 7-10 days from surgery.

## 2024-04-11 NOTE — INTERVAL H&P NOTE
H&P reviewed. After examining the patient I find no changes in the patients condition since the H&P had been written. Patient was seen and evaluated in the office where continued nonoperative vs surgical management of Right shoulder traumatic rotator cuff tear was discussed. In light of patients persistent pain with decreased function, ADLs and pain s/p injury, patient would like to move forward with surgical intervention. Risks of surgical intervention discussed in the office with patient and detailed consent obtained. Patient will go to OR on 04/11/2024 with Dr Baird for Right shoulder arthroscopic rotator cuff repair, biceps tenotomy.     14 point ROS in office   Musculoskeletal Right shoulder pain      Heart RRR  Lungs CTA BL   Abdomen Present nontender      Right Shoulder:   Active range of motion   110 degrees forward flexion with pain  90 degrees abduction with pain   30 degrees external rotation   SI joint internal rotation    Passive range of motion   150 degrees of forward flexion with pain at terminal flexion     There is no tenderness present over the shoulder.   Supraspinatus testing 4/5 with pain  Infraspinatus testing 4/5 with asmita  Subscapularis testing 4+/5  Speed's test is Positive  The patient is neurovascularly intact distally in the extremity.            Vitals:    04/11/24 0849   BP: 135/87   Pulse: 65   Resp: 20   Temp: 98.3 °F (36.8 °C)   SpO2: 98%

## 2024-04-11 NOTE — ANESTHESIA POSTPROCEDURE EVALUATION
Post-Op Assessment Note    CV Status:  Stable  Pain Score: 0    Pain management: adequate       Mental Status:  Alert and awake   Hydration Status:  Euvolemic   PONV Controlled:  Controlled   Airway Patency:  Patent     Post Op Vitals Reviewed: Yes    No anethesia notable event occurred.    Staff: CRNA, Anesthesiologist               BP   133/83   Temp   97.4   Pulse  75   Resp   14   SpO2   96 on 6L FM

## 2024-04-12 DIAGNOSIS — M75.121 COMPLETE TEAR OF RIGHT ROTATOR CUFF, UNSPECIFIED WHETHER TRAUMATIC: ICD-10-CM

## 2024-04-12 RX ORDER — OXYCODONE HYDROCHLORIDE AND ACETAMINOPHEN 5; 325 MG/1; MG/1
1 TABLET ORAL EVERY 4 HOURS PRN
Qty: 20 TABLET | Refills: 0 | OUTPATIENT
Start: 2024-04-12

## 2024-04-12 NOTE — TELEPHONE ENCOUNTER
Reason for call:   [x] Refill   [] Prior Auth  [] Other:     Office:   [] PCP/Provider -   [x] Specialty/Provider - ortho / [place]    Medication: percocet    Dose/Frequency: 5-325mg q4 prn    Quantity: 20    Pharmacy: [facility]/pharmacy #[id number] - [facility] [place] [address] - [address] [place]     Does the patient have enough for 3 days?   [] Yes   [x] No - Send as HP to POD (will be out of med on Sunday 04/14/24)

## 2024-04-12 NOTE — TELEPHONE ENCOUNTER
Medication:  PDMP Unable to complete PDMP. Will forward to clinical staff to review.     Active agreement on file -

## 2024-04-17 ENCOUNTER — TELEPHONE (OUTPATIENT)
Age: 59
End: 2024-04-17

## 2024-04-17 NOTE — TELEPHONE ENCOUNTER
Caller: Ananda     Doctor: Oli    Reason for call: Received a letter yesterday from his person health insurance that his shoulder sx was approved, however this surgery is supposed to be  going under his work comp insurance and he wanted to make sure that was the case.     Call back#: [phone]

## 2024-04-17 NOTE — TELEPHONE ENCOUNTER
Patient was informed that WC was utilized for his surgery on 4.11.24 with Dr. Baird. I advised that it is our protocol to verify all insurances on file even if this is a WC Injury.

## 2024-04-22 ENCOUNTER — OFFICE VISIT (OUTPATIENT)
Dept: OBGYN CLINIC | Facility: CLINIC | Age: 59
End: 2024-04-22

## 2024-04-22 VITALS
BODY MASS INDEX: 48.37 KG/M2 | HEART RATE: 69 BPM | SYSTOLIC BLOOD PRESSURE: 154 MMHG | HEIGHT: 66 IN | DIASTOLIC BLOOD PRESSURE: 81 MMHG | WEIGHT: 301 LBS

## 2024-04-22 DIAGNOSIS — Z98.890 S/P RIGHT ROTATOR CUFF REPAIR: Primary | ICD-10-CM

## 2024-04-22 PROCEDURE — 99024 POSTOP FOLLOW-UP VISIT: CPT | Performed by: ORTHOPAEDIC SURGERY

## 2024-04-22 NOTE — PROGRESS NOTES
Patient Name:  Osmin Hook  MRN:  [id number]    Assessment & Plan     1. S/P right rotator cuff repair        Approximately 11 day s/p Right shoulder arthroscopic rotator cuff repair, biceps tenotomy, extensive debridement, acromioplasty performed on 04/11/2024.   Sutures removed and reviewed intraoperative images with patient   At this time, patient will maintain nonweightbearing status on Right upper extremity, using the sling at all times during th eday and when sleeping except or hygenic purposes and exercises   Demonstrated home exercises including elbow ROM, scapular retractions, and pendulum exercises  Patient was advised to start PT 2 weeks post operatively and follow rotator cuff repair protocol.  Continue OTC medications for pain  Continue ASA 81mg BID for 2 weeks post operatively  Follow up in 4 weeks for reevaluation     History of the Present Illness   Osmin Hook is a 59 y.o. male approximately 11 day s/p Right shoulder arthroscopic rotator cuff repair, biceps tenotomy, extensive debridement, acromioplasty performed on 04/11/2024. Today, patient reports improvement of symptoms since surgery. Patient admits the nerve block was sustained for a few days post operatively. He continues to use the sling daily and when sleeping.         Review of Systems     Review of Systems   Constitutional:  Negative for chills and fever.   HENT:  Negative for ear pain and sore throat.    Eyes:  Negative for pain and visual disturbance.   Respiratory:  Negative for cough and shortness of breath.    Cardiovascular:  Negative for chest pain and palpitations.   Gastrointestinal:  Negative for abdominal pain and vomiting.   Genitourinary:  Negative for dysuria and hematuria.   Musculoskeletal:  Negative for arthralgias and back pain.   Skin:  Negative for color change and rash.   Neurological:  Negative for seizures and syncope.   All other systems reviewed and are negative.      Physical Exam     /81   Pulse  "69   Ht 5' 6\" (1.676 m)   Wt (!) 137 kg (301 lb)   BMI 48.58 kg/m²     Right Shoulder:   Surgical incisions well healing without signs of infection, nylon suture removed  Elbow ROM 3-5 to 130 degrees without pain  Full composite fist  The patient is neurovascularly intact distally in the extremity.      Data Review     I have personally reviewed pertinent films in PACS, and my interpretation follows.    No new images     Social History     Tobacco Use    Smoking status: Former     Current packs/day: 0.00     Average packs/day: 0.3 packs/day for 5.8 years (1.4 ttl pk-yrs)     Types: Cigarettes     Start date: 1984     Quit date: 10/20/1989     Years since quittin.5    Smokeless tobacco: Former     Types: Chew     Quit date:    Vaping Use    Vaping status: Never Used   Substance Use Topics    Alcohol use: Not Currently     Alcohol/week: 4.0 standard drinks of alcohol     Types: 4 Standard drinks or equivalent per week    Drug use: Yes     Frequency: 7.0 times per week     Types: Marijuana     Comment: medical           Procedures  None     Tommie Baird, DO     "

## 2024-04-23 ENCOUNTER — EVALUATION (OUTPATIENT)
Dept: PHYSICAL THERAPY | Facility: CLINIC | Age: 59
End: 2024-04-23
Payer: OTHER MISCELLANEOUS

## 2024-04-23 DIAGNOSIS — M75.121 COMPLETE TEAR OF RIGHT ROTATOR CUFF, UNSPECIFIED WHETHER TRAUMATIC: Primary | ICD-10-CM

## 2024-04-23 PROCEDURE — 97110 THERAPEUTIC EXERCISES: CPT

## 2024-04-23 NOTE — PROGRESS NOTES
PT Re-Evaluation     Today's date: 2024  Patient name: Osmin Hook  : 1965  MRN: [id number]  Referring provider: Kirsten Rodrigues PA-C  Dx:   Encounter Diagnosis     ICD-10-CM    1. Complete tear of right rotator cuff, unspecified whether traumatic  M75.121 Ambulatory Referral to Physical Therapy          Start Time: 1523  Stop Time: 1552  Total time in clinic (min): 29 minutes    Assessment  Assessment details: Problem List:  1) Limited R shoulder ROM  2) R shoulder weakness    Osmin Hook is a pleasant 59 y.o. male who presents s/p R rotator cuff repair performed on 24 after suffering fall and dislocation on 24.  Movement diagnosis of R shoulder hypomobility with primary nociceptive pain.  He has limited R shoulder ROM, R shoulder weakness, and activity intolerance resulting in fear of not being able to keep active.  No further referral appears necessary at this time based upon examination results.  I expect he will improve with gradual ROM and strengthening exercises.  Positive prognostic indicators include positive attitude toward recovery.  Negative prognostic indicators include hypertension, high symptom irritability, and obesity.  Gave patient pendulums, elbow flexion, and scap retraction for HEP.  Educated patient on shoulder precautions as he is to maintain arm in sling at all times except for when performing HEP.  Patient would benefit from skilled PT services to address these deficits and return to PLOF.    Comparable signs:  1) R shoulder flexion ROM  2) R shoulder ABD ROM  Impairments: abnormal muscle firing, abnormal or restricted ROM, abnormal movement, activity intolerance, impaired physical strength, lacks appropriate home exercise program, pain with function, scapular dyskinesis and weight-bearing intolerance    Symptom irritability: highUnderstanding of Dx/Px/POC: good   Prognosis: fair    Goals  STGs  Patient will be independent with HEP in 4 weeks  Patient will  decrease pain by 2 points in 4 weeks  LTGs  Patient will achieve R shoulder AROM equal to L shoulder ROM in 12 weeks  Patient will increase R shoulder strength equal to L shoulder in 12 weeks  Patient will be able to return to work in 20 weeks  Patient will be able to walk his dog in 12 weeks    Plan  Plan details: 2x/week for 20 weeks  Patient would benefit from: skilled physical therapy  Planned therapy interventions: home exercise program, therapeutic exercise, therapeutic activities, motor coordination training, neuromuscular re-education, patient education, strengthening, manual therapy, functional ROM exercises, activity modification, flexibility, stretching and joint mobilization  Frequency: 2x week  Plan of Care beginning date: 2024  Plan of Care expiration date: 9/10/2024  Treatment plan discussed with: patient        Subjective Evaluation    History of Present Illness  Date of onset: 2024  Date of surgery: 2024  Mechanism of injury: Pain location: superior R shoulder, surrounding R GH joint  Pain severity: 4-4-8  Aggs: any movement, sleeping  Eases: ibuprofen  Irritability: high  FELI/timeline: patient reports that he fell when getting out of a dumpster, the floor dropped out causing him to fall, his shoulder was dislocated and reduced in ER, X-ray reveals Hill Sachs, rotator cuff repair performed on 24  PMH/PSH: none    Patient Goals  Patient goal: shave, work, dress  Pain  Current pain ratin  At best pain ratin  At worst pain ratin          Objective     Neurological Testing     Sensation   Cervical/Thoracic   Left   Intact: light touch    Right   Intact: light touch    Active Range of Motion   Cervical/Thoracic Spine       Cervical    Flexion: 50 degrees  Restriction level: moderate  Extension: 75 degrees     Restriction level: minimal  Left lateral flexion: 50 degrees     Restriction level: moderate  Right lateral flexion: 50 degrees     Restriction level moderate  Left  rotation: 75 degrees Restriction level: minimal  Right rotation: 75 degrees    with pain Restriction level: minimal    Passive Range of Motion     Right Shoulder   Flexion: 90 degrees   Abduction: 55 degrees   External rotation 0°: 10 degrees              Precautions: HTN, hx of lumbar fusion, Lyme's  POC expires Unit limit Auth Expiration date PT/OT/ST + Visit Limit?   4/16/24 BOMN 5/6/24 BOMN                           Visit/Unit Tracking  AUTH Status:  Date 4/23              30 visits Used 13               Remaining  17                    Foto 4/23            Manuals 4/23                                                                Neuro Re-Ed             Scap retraction HEP            SL scap retraction iso             TB row L only                                                                 Ther Ex             Pendulums HEP            Elbow flexion             R shoulder PROM GS                                                   Pt edu GS- HEP, precautions            Recumbent bike             Ther Activity             Gripping                          Gait Training                                       Modalities

## 2024-04-25 ENCOUNTER — OFFICE VISIT (OUTPATIENT)
Dept: PHYSICAL THERAPY | Facility: CLINIC | Age: 59
End: 2024-04-25
Payer: OTHER MISCELLANEOUS

## 2024-04-25 DIAGNOSIS — S43.004D DISLOCATION OF RIGHT SHOULDER JOINT, SUBSEQUENT ENCOUNTER: ICD-10-CM

## 2024-04-25 DIAGNOSIS — M75.121 COMPLETE TEAR OF RIGHT ROTATOR CUFF, UNSPECIFIED WHETHER TRAUMATIC: Primary | ICD-10-CM

## 2024-04-25 DIAGNOSIS — S93.402A SPRAIN OF UNSPECIFIED LIGAMENT OF LEFT ANKLE, INITIAL ENCOUNTER: ICD-10-CM

## 2024-04-25 PROCEDURE — 97110 THERAPEUTIC EXERCISES: CPT

## 2024-04-25 NOTE — PROGRESS NOTES
Daily Note     Today's date: 2024  Patient name: Osmin Hook  : 1965  MRN: [id number]  Referring provider: Tommie Baird DO  Dx:   Encounter Diagnosis     ICD-10-CM    1. Complete tear of right rotator cuff, unspecified whether traumatic  M75.121       2. Dislocation of right shoulder joint, subsequent encounter  S43.004D       3. Sprain of unspecified ligament of left ankle, initial encounter  S93.402A                      Subjective: Pt reports no new complaints at this time.       Objective: See treatment diary below      Assessment: Tolerated treatment well. Patient would benefit from continued PT      Plan: Continue per plan of care.      Precautions: HTN, hx of lumbar fusion, Lyme's  POC expires Unit limit Auth Expiration date PT/OT/ST + Visit Limit?   24 BOMN 24 BOMN                           Visit/Unit Tracking  AUTH Status:  Date              30 visits Used 13 14              Remaining  17 16                   Foto            Manuals                                                                 Neuro Re-Ed             Scap retraction HEP            SL scap retraction iso             TB row L only                                                                 Ther Ex             Pendulums HEP X20 ea           Elbow flexion  X20            R shoulder PROM GS HA                                                  Pt edu GS- HEP, precautions            Recumbent bike             Ther Activity             Gripping                          Gait Training                                       Modalities

## 2024-04-30 ENCOUNTER — OFFICE VISIT (OUTPATIENT)
Dept: PHYSICAL THERAPY | Facility: CLINIC | Age: 59
End: 2024-04-30
Payer: OTHER MISCELLANEOUS

## 2024-04-30 DIAGNOSIS — S93.402A SPRAIN OF UNSPECIFIED LIGAMENT OF LEFT ANKLE, INITIAL ENCOUNTER: ICD-10-CM

## 2024-04-30 DIAGNOSIS — M75.121 COMPLETE TEAR OF RIGHT ROTATOR CUFF, UNSPECIFIED WHETHER TRAUMATIC: Primary | ICD-10-CM

## 2024-04-30 DIAGNOSIS — S43.004D DISLOCATION OF RIGHT SHOULDER JOINT, SUBSEQUENT ENCOUNTER: ICD-10-CM

## 2024-04-30 PROCEDURE — 97112 NEUROMUSCULAR REEDUCATION: CPT

## 2024-04-30 PROCEDURE — 97110 THERAPEUTIC EXERCISES: CPT

## 2024-04-30 NOTE — PROGRESS NOTES
"Daily Note     Today's date: 2024  Patient name: Osmin Hook  : 1965  MRN: [id number]  Referring provider: Tommie Baird DO  Dx:   Encounter Diagnosis     ICD-10-CM    1. Complete tear of right rotator cuff, unspecified whether traumatic  M75.121       2. Sprain of unspecified ligament of left ankle, initial encounter  S93.402A       3. Dislocation of right shoulder joint, subsequent encounter  S43.004D           Start Time: 1557  Stop Time: 1637  Total time in clinic (min): 40 minutes    Subjective: Patient reports that his shoulder has been severely painful especially at night.      Objective: See treatment diary below      Assessment: Patient demonstrates improving R shoulder PROM this session.  Patient completed today's session without increase in pain.  Reiterated that patient should be in sling at all times except for PT exercises.  Future sessions should continue to progress ROM exercises as able. Tolerated treatment fair. Patient would benefit from continued PT    Plan: Continue per plan of care.      Precautions: HTN, hx of lumbar fusion, Lyme's  POC expires Unit limit Auth Expiration date PT/OT/ST + Visit Limit?   24 BOMN 24 BOMN                           Visit/Unit Tracking  AUTH Status:  Date             30 visits Used 13 14 15             Remaining  17 16 45                  Foto            Manuals                                                               Neuro Re-Ed             Scap retraction HEP  x20          SL scap retraction iso   X20 3\"          TB row L only   X20 gtb                                                              Ther Ex             Pendulums HEP X20 ea X20 ea          Elbow flexion  X20  x20          R shoulder PROM GS HA GS                                                 Pt edu GS- HEP, precautions            Recumbent bike             Ther Activity             Gripping   x30                       Gait Training "                                       Modalities

## 2024-05-02 ENCOUNTER — OFFICE VISIT (OUTPATIENT)
Dept: PHYSICAL THERAPY | Facility: CLINIC | Age: 59
End: 2024-05-02
Payer: OTHER MISCELLANEOUS

## 2024-05-02 DIAGNOSIS — S93.402A SPRAIN OF UNSPECIFIED LIGAMENT OF LEFT ANKLE, INITIAL ENCOUNTER: ICD-10-CM

## 2024-05-02 DIAGNOSIS — S43.004D DISLOCATION OF RIGHT SHOULDER JOINT, SUBSEQUENT ENCOUNTER: ICD-10-CM

## 2024-05-02 DIAGNOSIS — M75.121 COMPLETE TEAR OF RIGHT ROTATOR CUFF, UNSPECIFIED WHETHER TRAUMATIC: Primary | ICD-10-CM

## 2024-05-02 PROCEDURE — 97112 NEUROMUSCULAR REEDUCATION: CPT

## 2024-05-02 PROCEDURE — 97110 THERAPEUTIC EXERCISES: CPT

## 2024-05-02 NOTE — PROGRESS NOTES
"Daily Note     Today's date: 2024  Patient name: Osmin Hook  : 1965  MRN: [id number]  Referring provider: Tommie Baird DO  Dx:   Encounter Diagnosis     ICD-10-CM    1. Complete tear of right rotator cuff, unspecified whether traumatic  M75.121       2. Sprain of unspecified ligament of left ankle, initial encounter  S93.402A       3. Dislocation of right shoulder joint, subsequent encounter  S43.004D           Start Time: 1503  Stop Time: 1539  Total time in clinic (min): 36 minutes    Subjective: Patient reports that he was sore the night following last session, but soreness resolved the next day.      Objective: See treatment diary below      Assessment: Patient demonstrates consistent R shoulder PROM this session.  Patient completed today's session without increase in pain.  Recumbent bike performed in order to promote blood flow for healing.  Reminded patient that he should not be using his R arm for any active motion.  Future sessions should continue to progress PROM as able. Tolerated treatment well. Patient would benefit from continued PT      Plan: Continue per plan of care.      Precautions: HTN, hx of lumbar fusion, Lyme's  POC expires Unit limit Auth Expiration date PT/OT/ST + Visit Limit?   24 BOMN 24 BOMN                           Visit/Unit Tracking  AUTH Status:  Date            30 visits Used 13 14 15 16            Remaining  17 16 45 44                 Foto            Manuals                                                              Neuro Re-Ed             Scap retraction HEP  x20 x20         SL scap retraction iso   X20 3\" X20 3\"         TB row L only   X20 gtb X20  gtb                                                             Ther Ex             Pendulums HEP X20 ea X20 ea X20 ea         Elbow flexion  X20  x20 x20         R shoulder PROM GS HA GS GS                                                Pt edu GS- HEP, " precautions            Recumbent bike    2'         Ther Activity             Gripping   x30 x30                      Gait Training                                       Modalities

## 2024-05-06 ENCOUNTER — OFFICE VISIT (OUTPATIENT)
Dept: PHYSICAL THERAPY | Facility: CLINIC | Age: 59
End: 2024-05-06
Payer: OTHER MISCELLANEOUS

## 2024-05-06 DIAGNOSIS — S43.004D DISLOCATION OF RIGHT SHOULDER JOINT, SUBSEQUENT ENCOUNTER: ICD-10-CM

## 2024-05-06 DIAGNOSIS — S93.402A SPRAIN OF UNSPECIFIED LIGAMENT OF LEFT ANKLE, INITIAL ENCOUNTER: ICD-10-CM

## 2024-05-06 DIAGNOSIS — M75.121 COMPLETE TEAR OF RIGHT ROTATOR CUFF, UNSPECIFIED WHETHER TRAUMATIC: Primary | ICD-10-CM

## 2024-05-06 PROCEDURE — 97112 NEUROMUSCULAR REEDUCATION: CPT

## 2024-05-06 PROCEDURE — 97110 THERAPEUTIC EXERCISES: CPT

## 2024-05-06 NOTE — PROGRESS NOTES
"Daily Note     Today's date: 2024  Patient name: Osmin Hook  : 1965  MRN: [id number]  Referring provider: Tommie Baird DO  Dx:   Encounter Diagnosis     ICD-10-CM    1. Complete tear of right rotator cuff, unspecified whether traumatic  M75.121       2. Dislocation of right shoulder joint, subsequent encounter  S43.004D       3. Sprain of unspecified ligament of left ankle, initial encounter  S93.402A           Start Time: 1557  Stop Time: 1636  Total time in clinic (min): 39 minutes    Subjective: Patient reports that he is starting to sleep a little better.  He states that he did hear a crack in his R shoulder earlier today but it wasn't painful.      Objective: See treatment diary below      Assessment: Patient demonstrates improving R shoulder PROM this session.  Patient completed today's session without increase in pain.  Recumbent bike performed in order to promote blood flow for healing.  Future sessions should continue to progress per protocol as able. Tolerated treatment well. Patient would benefit from continued PT    Plan: Continue per plan of care.      Precautions: HTN, hx of lumbar fusion, Lyme's  POC expires Unit limit Auth Expiration date PT/OT/ST + Visit Limit?   24 BOMN 24 BOMN                           Visit/Unit Tracking  AUTH Status:  Date           30 visits Used 13 14 15 16 17           Remaining  17 16 45 44 43                Foto            Manuals                                                             Neuro Re-Ed             Scap retraction HEP  x20 x20 x30        SL scap retraction iso   X20 3\" X20 3\" X20 3\"        TB row L only   X20 gtb X20  gtb X20  btb                                                            Ther Ex             Pendulums HEP X20 ea X20 ea X20 ea X20 ea        Elbow flexion  X20  x20 x20 x20        R shoulder PROM GS HA GS GS GS                                               Pt edu " GS- HEP, precautions            Recumbent bike    2' 2.5'        Ther Activity             Gripping   x30 x30 X30 blue                     Gait Training                                       Modalities

## 2024-05-07 ENCOUNTER — APPOINTMENT (OUTPATIENT)
Dept: PHYSICAL THERAPY | Facility: CLINIC | Age: 59
End: 2024-05-07
Payer: OTHER MISCELLANEOUS

## 2024-05-07 ENCOUNTER — OFFICE VISIT (OUTPATIENT)
Dept: BARIATRICS | Facility: CLINIC | Age: 59
End: 2024-05-07
Payer: COMMERCIAL

## 2024-05-07 VITALS
BODY MASS INDEX: 48.12 KG/M2 | DIASTOLIC BLOOD PRESSURE: 90 MMHG | HEART RATE: 62 BPM | RESPIRATION RATE: 18 BRPM | OXYGEN SATURATION: 98 % | HEIGHT: 66 IN | WEIGHT: 299.4 LBS | SYSTOLIC BLOOD PRESSURE: 142 MMHG

## 2024-05-07 DIAGNOSIS — M54.50 CHRONIC BILATERAL LOW BACK PAIN WITHOUT SCIATICA: Chronic | ICD-10-CM

## 2024-05-07 DIAGNOSIS — E66.01 MORBID OBESITY WITH BMI OF 40.0-44.9, ADULT (HCC): Primary | Chronic | ICD-10-CM

## 2024-05-07 DIAGNOSIS — G89.29 CHRONIC BILATERAL LOW BACK PAIN WITHOUT SCIATICA: Chronic | ICD-10-CM

## 2024-05-07 DIAGNOSIS — I10 PRIMARY HYPERTENSION: Chronic | ICD-10-CM

## 2024-05-07 PROCEDURE — 99203 OFFICE O/P NEW LOW 30 MIN: CPT

## 2024-05-07 NOTE — ASSESSMENT & PLAN NOTE
Discussed options of HealthyCORE, VLCD, and Conservative Program and the role of weight loss medications.   Discussed the importance of making healthy lifestyle changes with anti-obesity medications.   Patient is interested in pursing Conservative Plan and follow up visits with medical weight management provider.    Initial weight loss goal of 5-10% weight loss for improved health  Discussed how weight loss can improve co-morbid conditions and or prevent weight-related complications.   Patient lifestyle habits were reviewed and barriers to weight loss were addressed today.    -Discussed with patient portion control as well as maintaining a well balanced diet with adequate protein and fiber throughout the day.     -Patient was encouraged to start a regular exercise routine which may involve walking or light strength training for at least 30 minutes   Patient's weight is not at goal.    Labs reviewed    Daily Calorie Goal: 2,100 nurys    Recommendations:  Nutrition:  Don't skip meals, eat at least three times per day.   Pay attention to your body. When you feel like you have enough to eat, stop before feeling full.   Pick lean proteins, low-fat or nonfat options, get plenty of fiber.    Food log to keep track of your daily caloric intake.         *Food log (ie.) www.myfitnesspal.com,  sparkpeople.com, loseit.com, calorieking.com, etc.  4. Drink at least 64oz of water daily.  Exercise:  While recovering from surgery try walking around the neighborhood for 10-30 minutes.    Medications Discussed:    Phentermine: Avoid with hx of HTN and age.   Wellbutrin/Naltrexone: Discussed. Will interfere with pain management.   Topamax: Discussed.  GLP-1 agonists: Insurance doesn't cover.     Patient had Right Shoulder Arthroscopic, repair of bicep tenotomy, acromioplasty and extensive debridement on 4/11/24. Patient arrived to weight management with post operative shoulder brace, and has another follow up with Dr. Baird at the end  of the month.     Patient is currently following a low calorie diet using Hello [employer] with his wife. Patient lost 5 pounds. He is focusing on portions,measuring and cooking food at home. He is conscious of healthy food alternatives.     Patient denies any cravings or emotional eating. He states losing weight isn't a problem its maintaining the weight loss. Suggested to patient to meet with registered dietician to help him with additional nutritional education and tools to help continue with successful weight loss. Patient agrees with plan.     Discussed the use of anti-obesity medication in the future. Patient is to continue with dietary changes will discuss medications in the future if needed.     Discussed the importance of getting enough protein to help with healing. Suggested  grams of protein.  Suggested using my fitness pal as a tool to help make sure he is receiving adequate macro intake.

## 2024-05-07 NOTE — PROGRESS NOTES
Assessment/Plan:    Morbid obesity with BMI of 40.0-44.9, adult (HCC)  Discussed options of HealthyCORE, VLCD, and Conservative Program and the role of weight loss medications.   Discussed the importance of making healthy lifestyle changes with anti-obesity medications.   Patient is interested in pursing Conservative Plan and follow up visits with medical weight management provider.    Initial weight loss goal of 5-10% weight loss for improved health  Discussed how weight loss can improve co-morbid conditions and or prevent weight-related complications.   Patient lifestyle habits were reviewed and barriers to weight loss were addressed today.    -Discussed with patient portion control as well as maintaining a well balanced diet with adequate protein and fiber throughout the day.     -Patient was encouraged to start a regular exercise routine which may involve walking or light strength training for at least 30 minutes   Patient's weight is not at goal.    Labs reviewed    Daily Calorie Goal: 2,100 nurys    Recommendations:  Nutrition:  Don't skip meals, eat at least three times per day.   Pay attention to your body. When you feel like you have enough to eat, stop before feeling full.   Pick lean proteins, low-fat or nonfat options, get plenty of fiber.    Food log to keep track of your daily caloric intake.         *Food log (ie.) www.[employer].com,  sparkpeople.com, loseit.com, calorieking.com, etc.  4. Drink at least 64oz of water daily.  Exercise:  While recovering from surgery try walking around the neighborhood for 10-30 minutes.    Medications Discussed:    Phentermine: Avoid with hx of HTN and age.   Wellbutrin/Naltrexone: Discussed. Will interfere with pain management.   Topamax: Discussed.  GLP-1 agonists: Insurance doesn't cover.     Patient had Right Shoulder Arthroscopic, repair of bicep tenotomy, acromioplasty and extensive debridement on 4/11/24. Patient arrived to weight management with post operative  shoulder brace, and has another follow up with Dr. Baird at the end of the month.     Patient is currently following a low calorie diet using Hello [employer] with his wife. Patient lost 5 pounds. He is focusing on portions,measuring and cooking food at home. He is conscious of healthy food alternatives.     Patient denies any cravings or emotional eating. He states losing weight isn't a problem its maintaining the weight loss. Suggested to patient to meet with registered dietician to help him with additional nutritional education and tools to help continue with successful weight loss. Patient agrees with plan.     Discussed the use of anti-obesity medication in the future. Patient is to continue with dietary changes will discuss medications in the future if needed.     Discussed the importance of getting enough protein to help with healing. Suggested  grams of protein.  Suggested using my fitness pal as a tool to help make sure he is receiving adequate macro intake.       Hypertension  Discussed dietary lifestyle changes can help improve high blood pressure. Monitor sodium intake.     Patient is currently taking metoprolol 50 mg tablet and Norvasc 10 mg tablet .       Ananda was seen today for consult.    Diagnoses and all orders for this visit:    Morbid obesity with BMI of 40.0-44.9, adult (HCC)    Primary hypertension    Chronic bilateral low back pain without sciatica    BMI 45.0-49.9, adult ([facility])  -     Ambulatory referral to Weight Management       Total time spent reviewing chart, interviewing patient, examining patient, discussing plan, answering all questions, and documentin min, with >50% face-to-face time spent counseling patient on nonsurgical interventions for the treatment of excess weight. Discussed in detail nonsurgical options including intensive lifestyle intervention program, very low-calorie diet program and conservative program.  Discussed the role of weight loss medications.  Counseled  patient on diet behavior and exercise modification for weight loss.    Follow up in approximately 2 months with Non-Surgical Physician/Advanced Practitioner.    Subjective:   Chief Complaint   Patient presents with    Consult     Initial Consult. SB =  7/8.  Waist: 55 inches       Patient ID: Osmin Hook  is a 59 y.o. male with excess weight/obesity here to pursue weight management.  Previous notes and records have been reviewed.    Past Medical History:   Diagnosis Date    Colon polyp     GERD (gastroesophageal reflux disease)     Hypercholesterolemia     Hypertension     Lyme disease 03/19/2019    Obesity     Sexual dysfunction     Psychosexual dysfunction with inhibited sexual excitement      Past Surgical History:   Procedure Laterality Date    BACK SURGERY      spinal fusion, LUMBAR    CARDIAC CATHETERIZATION      CARDIAC CATHETERIZATION      CARDIAC CATHETERIZATION      COLONOSCOPY  2019    EGD AND COLONOSCOPY N/A 06/15/2018    Procedure: EGD AND COLONOSCOPY;  Surgeon: Jaciel Johnson MD;  Location: [facility];  Service: Gastroenterology    LA COLONOSCOPY FLX DX W/COLLJ SPEC WHEN PFRMD N/A 03/26/2019    Procedure: COLONOSCOPY;  Surgeon: Kvng Cerna MD;  Location: [facility];  Service: Gastroenterology    LA ESOPHAGOGASTRODUODENOSCOPY TRANSORAL DIAGNOSTIC N/A 03/26/2019    Procedure: ESOPHAGOGASTRODUODENOSCOPY (EGD);  Surgeon: Kvng Cerna MD;  Location: [facility];  Service: Gastroenterology    LA SURGICAL ARTHROSCOPY SHOULDER W/ROTATOR CUFF RPR Right 4/11/2024    Procedure: RIGHT SHOULDER  ARTHROSCOPIC,SUPRASPINATUS, INFRASPINATUS, SUBSCAPULARIS REPAIR,BICEPS TENOTOMY, ACROMIOPLASTY, EXTENSIVE DEBRIDEMENT;  Surgeon: Tommie Baidr DO;  Location: [facility];  Service: Orthopedics    SEPTOPLASTY      UPPER GASTROINTESTINAL ENDOSCOPY  2019       HPI:  Wt Readings from Last 20 Encounters:   05/07/24 136 kg (299 lb 6.4 oz)   04/22/24 (!) 137 kg (301 lb)   04/11/24 (!) 137 kg (301 lb 5.9 oz)    04/08/24 (!) 137 kg (303 lb)   03/25/24 136 kg (299 lb 6.4 oz)   03/08/24 133 kg (294 lb)   02/26/24 133 kg (294 lb)   02/19/24 134 kg (294 lb 9.6 oz)   01/22/24 133 kg (292 lb 12.8 oz)   01/20/24 (!) 139 kg (305 lb 8.9 oz)   01/08/24 132 kg (290 lb)   12/19/23 128 kg (283 lb)   09/05/23 123 kg (272 lb)   06/13/23 123 kg (272 lb)   05/30/23 122 kg (270 lb)   02/26/23 122 kg (270 lb)   02/21/23 130 kg (286 lb)   01/31/23 128 kg (282 lb)   10/18/22 128 kg (282 lb 3.2 oz)   04/12/22 125 kg (275 lb 6.4 oz)       Patient presents today to medical weight management office for consult. Patient states he was always active and conscious with food. His lowest weight was 185 lbs and he was very active. He noticed weight became an issue after he had back surgery, and his activity slowed down. He also has a history of a GI bleed and was placed on Protonix. Patient thinks he has an ulcer. He was also diagnosed with Lyme Disease.  He has used weight watchers, and keto diet in the past to help him loose successful weight loss. Patient states losing weight isn't an issue it's just maintaining the weight loss.       Obesity/Excess Weight:  Severity: Severe  Onset:  After back surgery    Modifiers: Diet and Exercise and Commercial Weight Loss Programs-ie. Weight Watchers, Puja Ron, Nutrisystem, etc.  Contributing factors: Poor Food Choices and Insufficient Physical Activity  Associated symptoms: increased joint pain, decreased self esteem, inability to do certain activities, and clothes do not fit    Diet recall:  B: Cereal (shredded wheat) with toast, Muffin   S: skip  L: Salad, soup, or leftovers- Meet or chicken or pork.  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    Current weight: 299  Current BMI: 47.96  Current Waist Measurement: 55 inches  Goal weight: 200 lbs    Colonoscopy: up to date- WNL     The following portions of the patient's history were reviewed and updated as appropriate: allergies, current medications, past family history, past medical history, past social history, past surgical history, and problem list.    Family History   Problem Relation Age of Onset    Atrial fibrillation Mother     COPD Mother     Coronary artery disease Mother     Lung cancer Mother     Rheum arthritis Mother     Stroke Mother         Syndrome    Arthritis Mother     Cancer Mother     Hypertension Mother     Lung cancer Father     Cancer Father     Prostate cancer Maternal Grandfather     Heart defect Daughter     Arthritis Family         Review of Systems   Constitutional:  Negative for chills and fever.   HENT:  Negative for ear pain and sore throat.    Eyes:  Negative for pain and visual disturbance.   Respiratory:  Negative for cough and shortness of breath.    Cardiovascular:  Negative for chest pain and palpitations.   Gastrointestinal:  Negative for abdominal pain and vomiting.   Genitourinary:  Negative for dysuria and hematuria.   Musculoskeletal:  Positive for back pain. Negative for arthralgias.        Right shoulder brace s/p surgery   Skin:  Negative for color change and rash.   Neurological:  Negative for seizures and syncope.   Psychiatric/Behavioral:  Negative for behavioral problems, sleep disturbance and suicidal ideas. The patient is not nervous/anxious.    All other systems reviewed and are negative.      Objective:  /90 (BP Location: Left arm, Patient Position: Sitting, Cuff Size: Adult)   Pulse 62   Resp 18   Ht 5' 6.25\" (1.683 m)   Wt 136 kg (299 lb 6.4 oz)   SpO2 98%   BMI 47.96 kg/m²     Physical Exam  Constitutional:       " Appearance: Normal appearance. He is obese.   HENT:      Head: Normocephalic and atraumatic.   Cardiovascular:      Rate and Rhythm: Normal rate.   Pulmonary:      Effort: No respiratory distress.   Chest:      Chest wall: No tenderness.   Abdominal:      General: There is distension.      Palpations: Abdomen is soft.   Musculoskeletal:         General: Normal range of motion.      Cervical back: Normal range of motion.   Neurological:      General: No focal deficit present.      Mental Status: He is alert and oriented to person, place, and time.   Psychiatric:         Mood and Affect: Mood normal.         Behavior: Behavior normal.         Thought Content: Thought content normal.         Judgment: Judgment normal.              Labs and Imaging  Recent labs and imaging have been personally reviewed.  Lab Results   Component Value Date    WBC 7.54 03/19/2024    HGB 15.3 03/19/2024    HCT 46.8 03/19/2024    MCV 85 03/19/2024     03/19/2024     Lab Results   Component Value Date     10/29/2015    SODIUM 140 03/19/2024    K 4.4 03/19/2024     03/19/2024    CO2 31 03/19/2024    ANIONGAP 8 10/29/2015    AGAP 5 03/19/2024    BUN 11 03/19/2024    CREATININE 0.91 03/19/2024    GLUC 100 09/24/2021    GLUF 91 03/19/2024    CALCIUM 9.3 03/19/2024    AST 16 03/19/2024    ALT 23 03/19/2024    ALKPHOS 56 03/19/2024    PROT 7.0 10/29/2015    TP 7.2 03/19/2024    BILITOT 0.66 10/29/2015    TBILI 0.54 03/19/2024    EGFR 92 03/19/2024     Lab Results   Component Value Date    HGBA1C 5.3 06/07/2023     Lab Results   Component Value Date    HDW6ASMTGQNS 1.460 01/12/2021     Lab Results   Component Value Date    CHOLESTEROL 136 12/26/2023     Lab Results   Component Value Date    HDL 50 12/26/2023     Lab Results   Component Value Date    TRIG 83 12/26/2023     Lab Results   Component Value Date    LDLCALC 69 12/26/2023

## 2024-05-07 NOTE — ASSESSMENT & PLAN NOTE
Discussed dietary lifestyle changes can help improve high blood pressure. Monitor sodium intake.     Patient is currently taking metoprolol 50 mg tablet and Norvasc 10 mg tablet .

## 2024-05-09 ENCOUNTER — OFFICE VISIT (OUTPATIENT)
Dept: PHYSICAL THERAPY | Facility: CLINIC | Age: 59
End: 2024-05-09
Payer: OTHER MISCELLANEOUS

## 2024-05-09 DIAGNOSIS — S43.004D DISLOCATION OF RIGHT SHOULDER JOINT, SUBSEQUENT ENCOUNTER: Primary | ICD-10-CM

## 2024-05-09 DIAGNOSIS — S93.402A SPRAIN OF UNSPECIFIED LIGAMENT OF LEFT ANKLE, INITIAL ENCOUNTER: ICD-10-CM

## 2024-05-09 DIAGNOSIS — M75.121 COMPLETE TEAR OF RIGHT ROTATOR CUFF, UNSPECIFIED WHETHER TRAUMATIC: ICD-10-CM

## 2024-05-09 PROCEDURE — 97110 THERAPEUTIC EXERCISES: CPT

## 2024-05-09 PROCEDURE — 97112 NEUROMUSCULAR REEDUCATION: CPT

## 2024-05-09 NOTE — PROGRESS NOTES
"Daily Note     Today's date: 2024  Patient name: Osmin Hook  : 1965  MRN: [id number]  Referring provider: Tommie Baird DO  Dx:   Encounter Diagnosis     ICD-10-CM    1. Dislocation of right shoulder joint, subsequent encounter  S43.004D       2. Sprain of unspecified ligament of left ankle, initial encounter  S93.402A       3. Complete tear of right rotator cuff, unspecified whether traumatic  M75.121           Start Time: 1551  Stop Time: 1625  Total time in clinic (min): 34 minutes    Subjective: Patient reports that his shoulder was very painful after last session.  Patient states that the soreness lasted about 24 hours.      Objective: See treatment diary below      Assessment: Patient demonstrates improving R PROM this session.  Patient completed today's session with increase in soreness at end of session.  Continued PROM to targeted range per protocol but decreased total duration of stretches due to significant pain last time.  Future sessions should continue to progress PROM as able. Tolerated treatment well. Patient would benefit from continued PT    Plan: Continue per plan of care.      Precautions: HTN, hx of lumbar fusion, Lyme's  POC expires Unit limit Auth Expiration date PT/OT/ST + Visit Limit?   24 BOMN 24 BOMN                           Visit/Unit Tracking  AUTH Status:  Date          60 visits Used 13 14 15 16 17 18          Remaining  17 16 45 44 43 42               Foto            Manuals                                                            Neuro Re-Ed             Scap retraction HEP  x20 x20 x30 x30       SL scap retraction iso   X20 3\" X20 3\" X20 3\" X20 3\"       TB row L only   X20 gtb X20  gtb X20  btb X20 btb                                                           Ther Ex             Pendulums HEP X20 ea X20 ea X20 ea X20 ea X20 ea       Elbow flexion  X20  x20 x20 x20 x20       R shoulder PROM GS " [place] GS GS GS GS                                              Pt edu GS- HEP, precautions            Recumbent bike    2' 2.5'        Ther Activity             Gripping   x30 x30 X30 blue X30 blue                    Gait Training                                       Modalities

## 2024-05-14 ENCOUNTER — OFFICE VISIT (OUTPATIENT)
Dept: PHYSICAL THERAPY | Facility: CLINIC | Age: 59
End: 2024-05-14
Payer: OTHER MISCELLANEOUS

## 2024-05-14 DIAGNOSIS — S93.402A SPRAIN OF UNSPECIFIED LIGAMENT OF LEFT ANKLE, INITIAL ENCOUNTER: ICD-10-CM

## 2024-05-14 DIAGNOSIS — M75.121 COMPLETE TEAR OF RIGHT ROTATOR CUFF, UNSPECIFIED WHETHER TRAUMATIC: ICD-10-CM

## 2024-05-14 DIAGNOSIS — S43.004D DISLOCATION OF RIGHT SHOULDER JOINT, SUBSEQUENT ENCOUNTER: Primary | ICD-10-CM

## 2024-05-14 PROCEDURE — 97110 THERAPEUTIC EXERCISES: CPT

## 2024-05-14 NOTE — PROGRESS NOTES
"Daily Note     Today's date: 2024  Patient name: Osmin Hook  : 1965  MRN: [id number]  Referring provider: Tommie Baird DO  Dx:   Encounter Diagnosis     ICD-10-CM    1. Dislocation of right shoulder joint, subsequent encounter  S43.004D       2. Sprain of unspecified ligament of left ankle, initial encounter  S93.402A       3. Complete tear of right rotator cuff, unspecified whether traumatic  M75.121                      Subjective: Pt reports sleeping really well last night. States his shoulder is feeling good today.       Objective: See treatment diary below      Assessment: Pt did well with all TE as listed, reports no increased pain during or post tx.       Plan: Continue per plan of care.      Precautions: HTN, hx of lumbar fusion, Lyme's  POC expires Unit limit Auth Expiration date PT/OT/ST + Visit Limit?   24 BOMN 24 BOMN                           Visit/Unit Tracking  AUTH Status:  Date         60 visits Used 13 14 15 16 17 18 19         Remaining  17 16 45 44 43 42 41              Foto            Manuals                                                           Neuro Re-Ed             Scap retraction HEP  x20 x20 x30 x30 x30      SL scap retraction iso   X20 3\" X20 3\" X20 3\" X20 3\" X20 3\"      TB row L only   X20 gtb X20  gtb X20  btb X20 btb X20 btb                                                          Ther Ex             Pendulums HEP X20 ea X20 ea X20 ea X20 ea X20 ea X20 ea      Elbow flexion  X20  x20 x20 x20 x20 x20      R shoulder PROM GS HA GS GS GS GS HA                                             Pt edu GS- HEP, precautions            Recumbent bike    2' 2.5'        Ther Activity             Gripping   x30 x30 X30 blue X30 blue                    Gait Training                                       Modalities                                                      "

## 2024-05-15 ENCOUNTER — TELEPHONE (OUTPATIENT)
Age: 59
End: 2024-05-15

## 2024-05-15 NOTE — TELEPHONE ENCOUNTER
Patient requesting an earlier in the day appt with Lili on 5/22/24. Advised patient she does not have anything that morning. Patient will keep his time slot.

## 2024-05-16 ENCOUNTER — OFFICE VISIT (OUTPATIENT)
Dept: PHYSICAL THERAPY | Facility: CLINIC | Age: 59
End: 2024-05-16
Payer: OTHER MISCELLANEOUS

## 2024-05-16 DIAGNOSIS — S93.402A SPRAIN OF UNSPECIFIED LIGAMENT OF LEFT ANKLE, INITIAL ENCOUNTER: ICD-10-CM

## 2024-05-16 DIAGNOSIS — M75.121 COMPLETE TEAR OF RIGHT ROTATOR CUFF, UNSPECIFIED WHETHER TRAUMATIC: ICD-10-CM

## 2024-05-16 DIAGNOSIS — S43.004D DISLOCATION OF RIGHT SHOULDER JOINT, SUBSEQUENT ENCOUNTER: Primary | ICD-10-CM

## 2024-05-16 PROCEDURE — 97110 THERAPEUTIC EXERCISES: CPT

## 2024-05-16 PROCEDURE — 97112 NEUROMUSCULAR REEDUCATION: CPT

## 2024-05-16 NOTE — PROGRESS NOTES
"Daily Note     Today's date: 2024  Patient name: Osmin Hook  : 1965  MRN: [id number]  Referring provider: Tommie Baird DO  Dx:   Encounter Diagnosis     ICD-10-CM    1. Dislocation of right shoulder joint, subsequent encounter  S43.004D       2. Sprain of unspecified ligament of left ankle, initial encounter  S93.402A       3. Complete tear of right rotator cuff, unspecified whether traumatic  M75.121           Start Time: 1545  Stop Time: 1623  Total time in clinic (min): 38 minutes    Subjective: Patient reports that he was sore after last session.  Patient states that the soreness has decreased today.      Objective: See treatment diary below      Assessment: Patient demonstrates improving R shoulder PROM this session.  Patient completed today's session without increase in pain.  Begin to progress PROM exercises.  Future sessions should continue to progress as able per protocol. Tolerated treatment well. Patient would benefit from continued PT    Plan: Continue per plan of care.      Precautions: HTN, hx of lumbar fusion, Lyme's  POC expires Unit limit Auth Expiration date PT/OT/ST + Visit Limit?   24 BOMN 24 BOMN                           Visit/Unit Tracking  AUTH Status:  Date        60 visits Used 13 14 15 16 17 18 19 20        Remaining  17 16 45 44 43 42 41 40             Foto            Manuals  5/ 5                                                         Neuro Re-Ed             Scap retraction HEP  x20 x20 x30 x30 x30 x30     SL scap retraction iso   X20 3\" X20 3\" X20 3\" X20 3\" X20 3\" X20 3\"     TB row L only   X20 gtb X20  gtb X20  btb X20 btb X20 btb X20   btb                                                         Ther Ex             Pendulums HEP X20 ea X20 ea X20 ea X20 ea X20 ea X20 ea X20 ea     Elbow flexion  X20  x20 x20 x20 x20 x20 x20     R shoulder PROM GS HA GS GS GS GS HA GS               "                              Pt edu GS- HEP, precautions            Recumbent bike    2' 2.5'        Ther Activity             Gripping   x30 x30 X30 blue X30 blue                    Gait Training                                       Modalities

## 2024-05-20 ENCOUNTER — OFFICE VISIT (OUTPATIENT)
Dept: OBGYN CLINIC | Facility: CLINIC | Age: 59
End: 2024-05-20

## 2024-05-20 VITALS
HEIGHT: 66 IN | DIASTOLIC BLOOD PRESSURE: 91 MMHG | WEIGHT: 304.2 LBS | HEART RATE: 62 BPM | BODY MASS INDEX: 48.89 KG/M2 | SYSTOLIC BLOOD PRESSURE: 145 MMHG

## 2024-05-20 DIAGNOSIS — Z48.89 AFTERCARE FOLLOWING SURGERY: ICD-10-CM

## 2024-05-20 DIAGNOSIS — Z98.890 S/P RIGHT ROTATOR CUFF REPAIR: Primary | ICD-10-CM

## 2024-05-20 PROCEDURE — 99024 POSTOP FOLLOW-UP VISIT: CPT | Performed by: ORTHOPAEDIC SURGERY

## 2024-05-20 NOTE — LETTER
May 20, 2024     Patient: Osmin Hook  YOB: 1965  Date of Visit: 5/20/2024      To Whom it May Concern:    Osmin Hook is under my professional care. Osmin was seen in my office on 5/20/2024. Osmin may not return to work at this time. He will follow up in 6 weeks for reevaluation and new work note may be provided.     If you have any questions or concerns, please don't hesitate to call.         Sincerely,          Tommie Baird,         CC: No Recipients

## 2024-05-20 NOTE — PROGRESS NOTES
"Patient Name:  Osmin Hook  MRN:  [id number]    Assessment & Plan     1. S/P right rotator cuff repair  2. Aftercare following surgery    Approximately 5.5 week s/p Right shoulder arthroscopic rotator cuff repair, subscapularis repair, biceps tenotomy, acromioplasty, extensive debridement performed on 04/11/2024  Overall, patient improving since surgery  Advised he may remove the sling while at home and wean over the next few days  Continue outpatient PT working on stretching and passive motion, transitioning to AAROM and AROM according to protocol. Advised patient therapy will progress to obtain full ROM  Work note provided  Follow up 6 weeks      History of the Present Illness   Osmin Hook is a 59 y.o. male approximately 5.5 week s/p Right shoulder arthroscopic rotator cuff repair, subscapularis repair, biceps tenotomy, acromioplasty, extensive debridement performed on 04/11/2024. Today, patient reports he is feeling better as compared to before surgery. He is feeling sore today, as well. He admits to some pain over upper trapezius. He has continued outpatient PT and has been wearing the sling throughout the day; sometimes, it falls off at night or he removes the neck strap.         Review of Systems     Review of Systems   Constitutional:  Negative for chills and fever.   HENT:  Negative for ear pain and sore throat.    Eyes:  Negative for pain and visual disturbance.   Respiratory:  Negative for cough and shortness of breath.    Cardiovascular:  Negative for chest pain and palpitations.   Gastrointestinal:  Negative for abdominal pain and vomiting.   Genitourinary:  Negative for dysuria and hematuria.   Musculoskeletal:  Negative for arthralgias and back pain.   Skin:  Negative for color change and rash.   Neurological:  Negative for seizures and syncope.   All other systems reviewed and are negative.      Physical Exam     /91   Pulse 62   Ht 5' 6.25\" (1.683 m)   Wt (!) 138 kg (304 lb 3.2 " oz)   BMI 48.73 kg/m²     Right Shoulder:   Surgical incisions well healed  Active range of motion   85-90 degrees forward flexion  Passive range of motion   105 degrees of forward flexion   The patient is neurovascularly intact distally in the extremity.      Data Review     I have personally reviewed pertinent films in PACS, and my interpretation follows.    No new imaging     Social History     Tobacco Use    Smoking status: Former     Current packs/day: 0.00     Average packs/day: 0.3 packs/day for 5.8 years (1.4 ttl pk-yrs)     Types: Cigarettes     Start date: 1984     Quit date: 10/20/1989     Years since quittin.6    Smokeless tobacco: Former     Types: Chew     Quit date:    Vaping Use    Vaping status: Never Used   Substance Use Topics    Alcohol use: Not Currently     Alcohol/week: 4.0 standard drinks of alcohol     Types: 4 Standard drinks or equivalent per week    Drug use: Yes     Frequency: 7.0 times per week     Types: Marijuana     Comment: medical           Procedures  None     Kirsten Rodrigues PA-C

## 2024-05-21 ENCOUNTER — OFFICE VISIT (OUTPATIENT)
Dept: PHYSICAL THERAPY | Facility: CLINIC | Age: 59
End: 2024-05-21
Payer: OTHER MISCELLANEOUS

## 2024-05-21 DIAGNOSIS — M75.121 COMPLETE TEAR OF RIGHT ROTATOR CUFF, UNSPECIFIED WHETHER TRAUMATIC: ICD-10-CM

## 2024-05-21 DIAGNOSIS — S93.402A SPRAIN OF UNSPECIFIED LIGAMENT OF LEFT ANKLE, INITIAL ENCOUNTER: ICD-10-CM

## 2024-05-21 DIAGNOSIS — S43.004D DISLOCATION OF RIGHT SHOULDER JOINT, SUBSEQUENT ENCOUNTER: Primary | ICD-10-CM

## 2024-05-21 PROCEDURE — 97110 THERAPEUTIC EXERCISES: CPT

## 2024-05-21 PROCEDURE — 97112 NEUROMUSCULAR REEDUCATION: CPT

## 2024-05-21 NOTE — PROGRESS NOTES
"Daily Note     Today's date: 2024  Patient name: Osmin Hook  : 1965  MRN: [id number]  Referring provider: Tommei Baird DO  Dx:   Encounter Diagnosis     ICD-10-CM    1. Dislocation of right shoulder joint, subsequent encounter  S43.004D       2. Sprain of unspecified ligament of left ankle, initial encounter  S93.402A       3. Complete tear of right rotator cuff, unspecified whether traumatic  M75.121           Start Time: 1555  Stop Time: 1629  Total time in clinic (min): 34 minutes    Subjective: Patient reports that he had a follow up with surgeon yesterday.  He is now able to start weaning out of sling.      Objective: See treatment diary below      Assessment: Patient demonstrates improving R shoulder ROM this session.  Patient completed today's session without increase in pain.  Pulleys initiated in order to improve AAROM.  Begin to progress PROM as able per protocol.  Future sessions should continue to progress ROM as able. Tolerated treatment well. Patient would benefit from continued PT    Plan: Continue per plan of care.      Precautions: HTN, hx of lumbar fusion, Lyme's  POC expires Unit limit Auth Expiration date PT/OT/ST + Visit Limit?   24 BOMN 24 BOMN                           Visit/Unit Tracking  AUTH Status:  Date       60 visits Used 13 14 15 16 17 18 19 20 21       Remaining  17 16 45 44 43 42 41 40 39            Foto            Manuals  5 56                                                         Neuro Re-Ed             Scap retraction HEP  x20 x20 x30 x30 x30 x30 x30    SL scap retraction iso   X20 3\" X20 3\" X20 3\" X20 3\" X20 3\" X20 3\" X20 3\"    TB row L only   X20 gtb X20  gtb X20  btb X20 btb X20 btb X20   btb X20 btb                                                        Ther Ex             Pendulums HEP X20 ea X20 ea X20 ea X20 ea X20 ea X20 ea X20 ea X20 ea    Elbow flexion  " X20  x20 x20 x20 x20 x20 x20 x20    R shoulder PROM GS HA GS GS GS GS HA GS GS    Pulley         x15                              Pt edu GS- HEP, precautions            Recumbent bike    2' 2.5'        Ther Activity             Gripping   x30 x30 X30 blue X30 blue   X30 blue                 Gait Training                                       Modalities

## 2024-05-22 ENCOUNTER — TELEPHONE (OUTPATIENT)
Age: 59
End: 2024-05-22

## 2024-05-22 ENCOUNTER — CLINICAL SUPPORT (OUTPATIENT)
Dept: BARIATRICS | Facility: CLINIC | Age: 59
End: 2024-05-22

## 2024-05-22 VITALS — WEIGHT: 301 LBS | BODY MASS INDEX: 48.37 KG/M2 | HEIGHT: 66 IN

## 2024-05-22 DIAGNOSIS — R63.5 ABNORMAL WEIGHT GAIN: Primary | ICD-10-CM

## 2024-05-22 PROCEDURE — RECHECK

## 2024-05-22 PROCEDURE — BODSTAT PR BODY STAT

## 2024-05-22 NOTE — TELEPHONE ENCOUNTER
Caller: Cathleen FELIX     Doctor/Office: Oli MURCIA#: [phone]      What needs to be faxed: OVN & Work Status 5/20    ATTN to: Cathleen    Fax#: [phone]      Documents were successfully e-faxed

## 2024-05-22 NOTE — PROGRESS NOTES
"Weight Management Medical Nutrition Assessment  Ananda was here today for the body stats package.  Today he weighs 301 lbs and has a goal weight of 220. Lbs.  He currently has knee issues and has a tear in his right shoulder that is in a brace.  He is going to PT but is not able to do much exercise.  Reviewed his calorie carb and protein needs.  Discussed portion sizes.  He is not logging his food currently.  He does not really know how many calories he is eating, but does believe that \"I am not eating that many calories.\"  \"I am not eating many carbs\" but did have a bagel, a slice of pie and rice yesterday.  Stressed the importance of accurate food logging.  Additional education may be needed.      Completed a body composition using SECA scale and reviewed results with patient.    Reevue indirect calorimter revealed REE is  normal (+3%) compared to the predictive normal for someone her same age, height, and gender.     Reevue Indirect Calorimeter REE:   2448         Weight loss without exercise:  1960 - 2448          Weight loss with exercise:   +255                 Maintenance:       2448 - 3180          Patient seen by Medical Provider in past 6 months:  yes  Requested to schedule appointment with Medical Provider: No      Anthropometric Measurements  Start Weight (#): 304.2  Current Weight (#): 301  TBW % Change from start weight:1%  Ideal Body Weight (#):142  Goal Weight (#):220  Highest:305  Lowest: 184     Weight Loss History  Previous weight loss attempts: Self Created Diets (Portion Control, Healthy Food Choices, etc.)    Food and Nutrition Related History  Wake up: 6 - 7 am   Bed Time:9- 10:00    Food Recall  Breakfast:cereal (honey bunches of oats) milk 1%    Snack:none   Lunch:salad with protein or PBJ or lunch meat sandwich  Snack:fruit if needed  Dinner:chicken with couscous and carrots or meat loaf with potatoes and veg  Snack:pie or ice cream      Beverages: water and diet soda  Volume of beverage " intake: 64 - 72 oz    Weekends: Same  Cravings: sweets ( pie or ice cream)  Trouble area of day:night    Frequency of Eating out: irregularly  Food restrictions:none  Cooking: self   Food Shopping: self    Physical Activity Intake  Activity:none   Frequency: none  Physical limitations/barriers to exercise: knee issues and dislocated shoulder and tear    Estimated Needs  Energy    Sarah Castellano Energy Needs: BMR : 2128   1-2# loss weekly sedentary:  1554 - 2054             1-2# loss weekly lightly active:1926 - 2426  Maintenance calories for sedentary activity level: 2544  Protein:77 - 96      (1.2-1.5g/kg IBW)  Fluid: 76     (35mL/kg IBW)    Nutrition Diagnosis  Yes;    Overweight/obesity  related to Excess energy intake as evidenced by  BMI more than normative standard for age and sex (obesity-grade III 40+)       Nutrition Intervention    Nutrition Prescription  Calories:1800 - 2000  Protein:77 - 96  Fluid:76      Nutrition Education:    Calorie controlled menu  Lean protein food choices  Healthy snack options  Food journaling tips      Nutrition Counseling:  Strategies: meal planning, portion sizes, healthy snack choices, hydration, fiber intake, protein intake, exercise, food journal      Monitoring and Evaluation:  Evaluation criteria:  Energy Intake  Meet protein needs  Maintain adequate hydration  Monitor weekly weight  Meal planning/preparation  Food journal   Decreased portions at mealtimes and snacks  Physical activity     Barriers to learning:none  Readiness to change: Action:  (Changing behavior)  Comprehension: fair  Expected Compliance: fair

## 2024-05-23 ENCOUNTER — OFFICE VISIT (OUTPATIENT)
Dept: PHYSICAL THERAPY | Facility: CLINIC | Age: 59
End: 2024-05-23
Payer: OTHER MISCELLANEOUS

## 2024-05-23 DIAGNOSIS — S43.004D DISLOCATION OF RIGHT SHOULDER JOINT, SUBSEQUENT ENCOUNTER: Primary | ICD-10-CM

## 2024-05-23 DIAGNOSIS — M75.121 COMPLETE TEAR OF RIGHT ROTATOR CUFF, UNSPECIFIED WHETHER TRAUMATIC: ICD-10-CM

## 2024-05-23 DIAGNOSIS — S93.402A SPRAIN OF UNSPECIFIED LIGAMENT OF LEFT ANKLE, INITIAL ENCOUNTER: ICD-10-CM

## 2024-05-23 PROCEDURE — 97110 THERAPEUTIC EXERCISES: CPT

## 2024-05-23 NOTE — PROGRESS NOTES
"Daily Note     Today's date: 2024  Patient name: Osmin Hook  : 1965  MRN: [id number]  Referring provider: Tommie Baird DO  Dx:   Encounter Diagnosis     ICD-10-CM    1. Dislocation of right shoulder joint, subsequent encounter  S43.004D       2. Sprain of unspecified ligament of left ankle, initial encounter  S93.402A       3. Complete tear of right rotator cuff, unspecified whether traumatic  M75.121           Start Time: 1549  Stop Time: 1628  Total time in clinic (min): 39 minutes    Subjective: Patient reports that his shoulder has been pretty sore since he has started to wean from sling.      Objective: See treatment diary below      Assessment: Patient demonstrates improving R shoulder PROM this session.  Patient completed today's session without increase in pain.  Pulleys performed in order to improve R shoulder AAROM.  Initiated table slides and theraband resistance exercises per protocol.  Future sessions should continue to progress R shoulder ROM as able. Tolerated treatment well. Patient would benefit from continued PT    Plan: Continue per plan of care.      Precautions: HTN, hx of lumbar fusion, Lyme's  POC expires Unit limit Auth Expiration date PT/OT/ST + Visit Limit?   24 BOMN 24 BOMN                           Visit/Unit Tracking  AUTH Status:  Date      60 visits Used 13 14 15 16 17 18 19 20 21 22      Remaining  17 16 45 44 43 42 41 40 39 38           Foto            Manuals  5 514 16                                                        Neuro Re-Ed             Scap retraction HEP  x20 x20 x30 x30 x30 x30 x30    SL scap retraction iso   X20 3\" X20 3\" X20 3\" X20 3\" X20 3\" X20 3\" X20 3\"    TB row L only   X20 gtb X20  gtb X20  btb X20 btb X20 btb X20   btb X20 btb                                                        Ther Ex             Pendulums HEP X20 ea X20 ea X20 ea X20 ea " X20 ea X20 ea X20 ea X20 ea    Elbow flexion  X20  x20 x20 x20 x20 x20 x20 x20    R shoulder PROM GS HA GS GS GS GS HA GS GS GS   Reynaldo          x20   TB row          2x10  btb   TB ext          2x10  gtb   Table slide          x20   Finger ladder          x10   Pt edu GS- HEP, precautions            Recumbent bike    2' 2.5'        Ther Activity             Gripping   x30 x30 X30 blue X30 blue   X30 blue                 Gait Training                                       Modalities

## 2024-05-28 ENCOUNTER — OFFICE VISIT (OUTPATIENT)
Dept: PHYSICAL THERAPY | Facility: CLINIC | Age: 59
End: 2024-05-28
Payer: OTHER MISCELLANEOUS

## 2024-05-28 DIAGNOSIS — S93.402A SPRAIN OF UNSPECIFIED LIGAMENT OF LEFT ANKLE, INITIAL ENCOUNTER: ICD-10-CM

## 2024-05-28 DIAGNOSIS — M75.121 COMPLETE TEAR OF RIGHT ROTATOR CUFF, UNSPECIFIED WHETHER TRAUMATIC: ICD-10-CM

## 2024-05-28 DIAGNOSIS — S43.004D DISLOCATION OF RIGHT SHOULDER JOINT, SUBSEQUENT ENCOUNTER: Primary | ICD-10-CM

## 2024-05-28 PROCEDURE — 97110 THERAPEUTIC EXERCISES: CPT

## 2024-05-28 NOTE — PROGRESS NOTES
"Daily Note     Today's date: 2024  Patient name: Osmin Hook  : 1965  MRN: [id number]  Referring provider: Tommie Baird DO  Dx:   Encounter Diagnosis     ICD-10-CM    1. Dislocation of right shoulder joint, subsequent encounter  S43.004D       2. Sprain of unspecified ligament of left ankle, initial encounter  S93.402A       3. Complete tear of right rotator cuff, unspecified whether traumatic  M75.121           Start Time: 1521  Stop Time: 1559  Total time in clinic (min): 38 minutes    Subjective: Patient reports that his shoulder has been extremely painful over the last week.  Patient states that his is able to get his deodorant on better now.      Objective: See treatment diary below      Assessment: Patient demonstrates improving R shoulder PROM this session.  Patient completed today's session without increase in pain.  Pulleys performed in order to improve R shoulder AAROM.  Educated patient that increased shoulder pain/soreness is expected when transitioning out of sling.  He has completely discharged sling at this point except for when in public areas.  Future sessions should continue to progress ROM as able. Tolerated treatment well. Patient would benefit from continued PT    Plan: Continue per plan of care.      Precautions: HTN, hx of lumbar fusion, Lyme's  POC expires Unit limit Auth Expiration date PT/OT/ST + Visit Limit?   24 BOMN 24 BOMN                           Visit/Unit Tracking  AUTH Status:  Date      60 visits Used 13 14 15 16 17 18 19 20 21 22      Remaining  17 16 45 44 43 42 41 40 39 38           Foto             Manuals  5/6                                                        Neuro Re-Ed             Scap retraction   x20 x20 x30 x30 x30 x30 x30    SL scap retraction iso X20 3\"  X20 3\" X20 3\" X20 3\" X20 3\" X20 3\" X20 3\" X20 3\"    TB row L only   X20 gtb X20  gtb X20  btb " X20 btb X20 btb X20   btb X20 btb                                                        Ther Ex             Pendulums  X20 ea X20 ea X20 ea X20 ea X20 ea X20 ea X20 ea X20 ea    Elbow flexion x20 X20  x20 x20 x20 x20 x20 x20 x20    R shoulder PROM GS HA GS GS GS GS HA GS GS GS   Reynaldo x30         x20   TB row X20 gtb         2x10  btb   TB ext X20 gtb         2x10  gtb   Table slide X20 flex         x20   Finger ladder x10         x10   Pt edu GS            Recumbent bike    2' 2.5'        Ther Activity             Gripping   x30 x30 X30 blue X30 blue   X30 blue                 Gait Training                                       Modalities

## 2024-05-30 ENCOUNTER — OFFICE VISIT (OUTPATIENT)
Dept: PHYSICAL THERAPY | Facility: CLINIC | Age: 59
End: 2024-05-30
Payer: OTHER MISCELLANEOUS

## 2024-05-30 DIAGNOSIS — M75.121 COMPLETE TEAR OF RIGHT ROTATOR CUFF, UNSPECIFIED WHETHER TRAUMATIC: ICD-10-CM

## 2024-05-30 DIAGNOSIS — S43.004D DISLOCATION OF RIGHT SHOULDER JOINT, SUBSEQUENT ENCOUNTER: Primary | ICD-10-CM

## 2024-05-30 DIAGNOSIS — S93.402A SPRAIN OF UNSPECIFIED LIGAMENT OF LEFT ANKLE, INITIAL ENCOUNTER: ICD-10-CM

## 2024-05-30 PROCEDURE — 97110 THERAPEUTIC EXERCISES: CPT

## 2024-05-30 NOTE — PROGRESS NOTES
"Daily Note     Today's date: 2024  Patient name: Osmin Hook  : 1965  MRN: [id number]  Referring provider: Tommie Baird DO  Dx:   Encounter Diagnosis     ICD-10-CM    1. Dislocation of right shoulder joint, subsequent encounter  S43.004D       2. Sprain of unspecified ligament of left ankle, initial encounter  S93.402A       3. Complete tear of right rotator cuff, unspecified whether traumatic  M75.121           Start Time: 1504  Stop Time: 1549  Total time in clinic (min): 45 minutes    Subjective: Patient states he has been sore all week.       Objective: See treatment diary below      Assessment: Patient tolerated addition of TB ER with red theraband, TB IR with red theraband, and tricep extension with blue theraband with increased UE muscular fatigue. Patient did not perform table slide this visit due to muscle fatigue. Tolerated treatment well. Patient would benefit from continued PT      Plan: Continue per plan of care.      Precautions: HTN, hx of lumbar fusion, Lyme's  POC expires Unit limit Auth Expiration date PT/OT/ST + Visit Limit?   24 BOMN 24 BOMN                           Visit/Unit Tracking  AUTH Status:  Date      60 visits Used 13 14 15 16 17 18 19 20 21 22      Remaining  17 16 45 44 43 42 41 40 39 38           Foto             Manuals  5                                                       Neuro Re-Ed             Scap retraction   x20 x20 x30 x30 x30 x30 x30    SL scap retraction iso X20 3\" X20 3\" X20 3\" X20 3\" X20 3\" X20 3\" X20 3\" X20 3\" X20 3\"    TB row L only   X20 gtb X20  gtb X20  btb X20 btb X20 btb X20   btb X20 btb                                                        Ther Ex             Pendulums   X20 ea X20 ea X20 ea X20 ea X20 ea X20 ea X20 ea    Elbow flexion x20  x20 x20 x20 x20 x20 x20 x20    R shoulder PROM GS MD GS GS GS GS HA GS GS GS   Reynaldo x30 x30      "   x20   TB row X20 gtb X20 gtb        2x10  btb   TB ext X20 gtb X20 gtb        2x10  gtb   TB ER   X20 rtb           TB IR  X20 rtb           TB tricep extension  X20 btb           Table slide X20 flex         x20   Finger ladder x10 x5        x10   Pt edu GS            Recumbent bike    2' 2.5'        Ther Activity             Gripping   x30 x30 X30 blue X30 blue   X30 blue                 Gait Training                                       Modalities

## 2024-06-04 ENCOUNTER — OFFICE VISIT (OUTPATIENT)
Dept: PHYSICAL THERAPY | Facility: CLINIC | Age: 59
End: 2024-06-04
Payer: OTHER MISCELLANEOUS

## 2024-06-04 DIAGNOSIS — S93.402A SPRAIN OF UNSPECIFIED LIGAMENT OF LEFT ANKLE, INITIAL ENCOUNTER: ICD-10-CM

## 2024-06-04 DIAGNOSIS — S43.004D DISLOCATION OF RIGHT SHOULDER JOINT, SUBSEQUENT ENCOUNTER: Primary | ICD-10-CM

## 2024-06-04 DIAGNOSIS — M75.121 COMPLETE TEAR OF RIGHT ROTATOR CUFF, UNSPECIFIED WHETHER TRAUMATIC: ICD-10-CM

## 2024-06-04 PROCEDURE — 97110 THERAPEUTIC EXERCISES: CPT

## 2024-06-04 NOTE — PROGRESS NOTES
"Daily Note     Today's date: 2024  Patient name: Osmin Hook  : 1965  MRN: [id number]  Referring provider: Tommie Baird DO  Dx:   Encounter Diagnosis     ICD-10-CM    1. Dislocation of right shoulder joint, subsequent encounter  S43.004D       2. Sprain of unspecified ligament of left ankle, initial encounter  S93.402A       3. Complete tear of right rotator cuff, unspecified whether traumatic  M75.121           Start Time: 1603  Stop Time: 1643  Total time in clinic (min): 40 minutes    Subjective: Patient reports that his shoulder was very sore after last session but has been better over the last 2 days.  He states that he was able to help cook dinner today.      Objective: See treatment diary below  R shoulder flex PROM=130*  R shoulder ABD PROM=95*  R shoulder ER PROM=50*      Assessment: Patient demonstrates improving R shoulder PROM this session.  Patient completed today's session with increase in pain with end range PROM.  Pulleys performed in order to improve R shoulder AAROM.  Improving tolerance to therapy.  Future sessions should continue to progress ROM as able. Tolerated treatment well. Patient would benefit from continued PT      Plan: Continue per plan of care.      Precautions: HTN, hx of lumbar fusion, Lyme's  POC expires Unit limit Auth Expiration date PT/OT/ST + Visit Limit?   24 BOMN 24 BOMN                           Visit/Unit Tracking  AUTH Status:  Date      60 visits Used 13 14 15 16 17 18 19 20 21 22      Remaining  17 16 45 44 43 42 41 40 39 38           Foto             Manuals                                                        Neuro Re-Ed             Scap retraction    x20 x30 x30 x30 x30 x30    SL scap retraction iso X20 3\" X20 3\" X20 3\" X20 3\" X20 3\" X20 3\" X20 3\" X20 3\" X20 3\"    TB row L only    X20  gtb X20  btb X20 btb X20 btb X20   btb X20 btb            "                                             Ther Ex             Pendulums    X20 ea X20 ea X20 ea X20 ea X20 ea X20 ea    Elbow flexion x20   x20 x20 x20 x20 x20 x20    R shoulder PROM GS MD GS GS GS GS HA GS GS GS   Reynaldo x30 x30        x20   TB row X20 gtb X20 gtb X30  gtb       2x10  btb   TB ext X20 gtb X20 gtb X30  gtb       2x10  gtb   TB ER   X20 rtb X30  rtb          TB IR  X20 rtb X30  rtb          TB tricep extension  X20 btb X30  btb          Table slide X20 flex  X15 flex and ABD       x20   Finger ladder x10 x5 x8       x10   Pt edu GS            Recumbent bike    2' 2.5'        Ther Activity             Gripping    x30 X30 blue X30 blue   X30 blue                 Gait Training                                       Modalities

## 2024-06-06 ENCOUNTER — OFFICE VISIT (OUTPATIENT)
Dept: PHYSICAL THERAPY | Facility: CLINIC | Age: 59
End: 2024-06-06
Payer: OTHER MISCELLANEOUS

## 2024-06-06 DIAGNOSIS — S93.402A SPRAIN OF UNSPECIFIED LIGAMENT OF LEFT ANKLE, INITIAL ENCOUNTER: ICD-10-CM

## 2024-06-06 DIAGNOSIS — M75.121 COMPLETE TEAR OF RIGHT ROTATOR CUFF, UNSPECIFIED WHETHER TRAUMATIC: ICD-10-CM

## 2024-06-06 DIAGNOSIS — S43.004D DISLOCATION OF RIGHT SHOULDER JOINT, SUBSEQUENT ENCOUNTER: Primary | ICD-10-CM

## 2024-06-06 PROCEDURE — 97110 THERAPEUTIC EXERCISES: CPT

## 2024-06-06 PROCEDURE — 97112 NEUROMUSCULAR REEDUCATION: CPT

## 2024-06-06 NOTE — PROGRESS NOTES
"Daily Note     Today's date: 2024  Patient name: Osmin Hook  : 1965  MRN: [id number]  Referring provider: Tommie Baird DO  Dx:   Encounter Diagnosis     ICD-10-CM    1. Dislocation of right shoulder joint, subsequent encounter  S43.004D       2. Sprain of unspecified ligament of left ankle, initial encounter  S93.402A       3. Complete tear of right rotator cuff, unspecified whether traumatic  M75.121           Start Time: 1541  Stop Time: 1615  Total time in clinic (min): 34 minutes    Subjective: Reports that he is feeling sore today. Doesn't think there is any reasoning for it, some days are more sore, some are less - and today is a more sore day.       Objective: See treatment diary below      Assessment: Tolerated treatment well. Continued per protocol and primary therapist POC. ROM progressing as per protocol nicely. Some soreness throughout session but transient while performing. Patient demonstrated fatigue post treatment, exhibited good technique with therapeutic exercises, and would benefit from continued PT.      Plan: Continue per plan of care.  Progress treatment as tolerated.       Precautions: HTN, hx of lumbar fusion, Lyme's  POC expires Unit limit Auth Expiration date PT/OT/ST + Visit Limit?   24 BOMN 24 BOMN                           Visit/Unit Tracking  AUTH Status:  Date    60 visits Used 13 14 15 16 17 18 19 20 21 22 23 24 25 26    Remaining  17 16 45 44 43 42 41 40 39 38 37 36 35 34         Foto             Manuals                                                        Neuro Re-Ed             Scap retraction    x30 x30 x30 x30 x30 x30    SL scap retraction iso X20 3\" X20 3\" X20 3\" X20 3\" X20 3\" X20 3\" X20 3\" X20 3\" X20 3\"    TB row L only     X20  btb X20 btb X20 btb X20   btb X20 btb                                                        Ther Ex    "          Pendulums     X20 ea X20 ea X20 ea X20 ea X20 ea    Elbow flexion x20    x20 x20 x20 x20 x20    R shoulder PROM GS MD GS MH  GS GS HAMILTON GS GS GS   Reynaldo x30 x30  X30       x20   TB row X20 gtb X20 gtb X30  gtb X30 gtb       2x10  btb   TB ext X20 gtb X20 gtb X30  gtb X30 gtb       2x10  gtb   TB ER   X20 rtb X30  rtb X30 rtb          TB IR  X20 rtb X30  rtb X30 rtb          TB tricep extension  X20 btb X30  btb X30 btb          Table slide X20 flex  X15 flex and ABD X15 flex, scap, abd       x20   Finger ladder x10 x5 x8 X10       x10   Pt edu GS            Recumbent bike     2.5'        Ther Activity             Gripping     X30 blue X30 blue   X30 blue                 Gait Training                                       Modalities

## 2024-06-11 ENCOUNTER — OFFICE VISIT (OUTPATIENT)
Dept: PHYSICAL THERAPY | Facility: CLINIC | Age: 59
End: 2024-06-11
Payer: OTHER MISCELLANEOUS

## 2024-06-11 DIAGNOSIS — M75.121 COMPLETE TEAR OF RIGHT ROTATOR CUFF, UNSPECIFIED WHETHER TRAUMATIC: ICD-10-CM

## 2024-06-11 DIAGNOSIS — S43.004D DISLOCATION OF RIGHT SHOULDER JOINT, SUBSEQUENT ENCOUNTER: Primary | ICD-10-CM

## 2024-06-11 PROCEDURE — 97110 THERAPEUTIC EXERCISES: CPT

## 2024-06-11 NOTE — PROGRESS NOTES
"Daily Note     Today's date: 2024  Patient name: Osmin Hook  : 1965  MRN: [id number]  Referring provider: Tommie Baird DO  Dx:   Encounter Diagnosis     ICD-10-CM    1. Dislocation of right shoulder joint, subsequent encounter  S43.004D       2. Complete tear of right rotator cuff, unspecified whether traumatic  M75.121           Start Time: 1605  Stop Time: 1645  Total time in clinic (min): 40 minutes    Subjective: Patient reports that his shoulder hasn't been as painful over the last few days.  Patient states that he is still having trouble raising his arm out to the side.      Objective: See treatment diary below      Assessment: Patient demonstrates improving R shoulder ROM this session.  Patient completed today's session with increase in pain at end range of PROM.  Initiated UBE today for AAROM.  Significant improvements in PROM and overall pain today.  Patient demonstrates ability to perform AROM to shoulder height this session.  Future sessions should continue to progress ROM as able. Tolerated treatment well. Patient would benefit from continued PT      Plan: Continue per plan of care.      Precautions: HTN, hx of lumbar fusion, Lyme's  POC expires Unit limit Auth Expiration date PT/OT/ST + Visit Limit?   24 BOMN 24 BOMN                           Visit/Unit Tracking  AUTH Status:  Date    60 visits Used 13 14 15 16 17 18 19 20 21 22 23 24 25 26    Remaining  17 16 45 44 43 42 41 40 39 38 37 36 35 34         Foto             Manuals                                                        Neuro Re-Ed             Scap retraction    x30  x30 x30 x30 x30    SL scap retraction iso X20 3\" X20 3\" X20 3\" X20 3\"  X20 3\" X20 3\" X20 3\" X20 3\"    TB row L only      X20 btb X20 btb X20   btb X20 btb                                                        Ther Ex           "   Pendulums      X20 ea X20 ea X20 ea X20 ea    Elbow flexion x20     x20 x20 x20 x20    R shoulder PROM GS MD GS MH  GS GS HAMILTON GS GS GS   Reynaldo x30 x30  X30  x30     x20   AROM to 90*- flex, ABD     X10 ea        TB row X20 gtb X20 gtb X30  gtb X30 gtb  X30  btb     2x10  btb   TB ext X20 gtb X20 gtb X30  gtb X30 gtb  X30  btb     2x10  gtb   TB ER   X20 rtb X30  rtb X30 rtb  X20  rtb        TB IR  X20 rtb X30  rtb X30 rtb  X30  gtb        TB tricep extension  X20 btb X30  btb X30 btb  X30  btb        Table slide X20 flex  X15 flex and ABD X15 flex, scap, abd  X20  Flex, ABD     x20   Finger ladder x10 x5 x8 X10  x10     x10   Pt edu GS            UBE     2'2'        Ther Activity             Gripping      X30 blue   X30 blue                 Gait Training                                       Modalities

## 2024-06-12 ENCOUNTER — TELEPHONE (OUTPATIENT)
Age: 59
End: 2024-06-12

## 2024-06-13 ENCOUNTER — OFFICE VISIT (OUTPATIENT)
Dept: PHYSICAL THERAPY | Facility: CLINIC | Age: 59
End: 2024-06-13
Payer: OTHER MISCELLANEOUS

## 2024-06-13 DIAGNOSIS — S43.004D DISLOCATION OF RIGHT SHOULDER JOINT, SUBSEQUENT ENCOUNTER: Primary | ICD-10-CM

## 2024-06-13 DIAGNOSIS — S93.402A SPRAIN OF UNSPECIFIED LIGAMENT OF LEFT ANKLE, INITIAL ENCOUNTER: ICD-10-CM

## 2024-06-13 DIAGNOSIS — M75.121 COMPLETE TEAR OF RIGHT ROTATOR CUFF, UNSPECIFIED WHETHER TRAUMATIC: ICD-10-CM

## 2024-06-13 PROCEDURE — 97110 THERAPEUTIC EXERCISES: CPT

## 2024-06-13 NOTE — PROGRESS NOTES
"Daily Note     Today's date: 2024  Patient name: Osmin Hook  : 1965  MRN: [id number]  Referring provider: Tommie Baird DO  Dx:   Encounter Diagnosis     ICD-10-CM    1. Dislocation of right shoulder joint, subsequent encounter  S43.004D       2. Complete tear of right rotator cuff, unspecified whether traumatic  M75.121       3. Sprain of unspecified ligament of left ankle, initial encounter  S93.402A           Start Time: 1547  Stop Time: 1629  Total time in clinic (min): 42 minutes    Subjective: Patient states that the pain in his shoulder is about a 3. Patient states he was sore over night after last session but was fine the next day.      Objective: See treatment diary below      Assessment: Tolerated treatment well. Patient tolerated addition of weight with shoulder flx AROM with increase in pain that subsided once the exercise was done. Patient demonstrates inability to complete shoulder ABD AROM with weight. Patient would benefit from continued PT.      Plan: Continue per plan of care.      Precautions: HTN, hx of lumbar fusion, Lyme's  POC expires Unit limit Auth Expiration date PT/OT/ST + Visit Limit?   24 BOMN 24 BOMN                           Visit/Unit Tracking  AUTH Status:  Date    60 visits Used 13 14 15 16 17 18 19 20 21 22 23 24 25 26    Remaining  17 16 45 44 43 42 41 40 39 38 37 36 35 34         Foto             Manuals                                                        Neuro Re-Ed             Scap retraction    x30   x30 x30 x30    SL scap retraction iso X20 3\" X20 3\" X20 3\" X20 3\"   X20 3\" X20 3\" X20 3\"    TB row L only       X20 btb X20   btb X20 btb                                                        Ther Ex             Pendulums       X20 ea X20 ea X20 ea    Elbow flexion x20      x20 x20 x20    R shoulder PROM  MD SUAREZ   DANIELA MILIAN HAMILTON GS " GS GS   Reynaldo x30 x30  X30  x30 x30    x20   AROM to 90*- flex, ABD     X10 ea X10 1# flx   0# abd       TB row X20 gtb X20 gtb X30  gtb X30 gtb  X30  btb X30 btb    2x10  btb   TB ext X20 gtb X20 gtb X30  gtb X30 gtb  X30  btb X30 btb    2x10  gtb   TB ER   X20 rtb X30  rtb X30 rtb  X20  rtb X20 rtb       TB IR  X20 rtb X30  rtb X30 rtb  X30  gtb X30 gtb       TB tricep extension  X20 btb X30  btb X30 btb  X30  btb X30 btb       Table slide X20 flex  X15 flex and ABD X15 flex, scap, abd  X20  Flex, ABD X20 flex, ABD    x20   Finger ladder x10 x5 x8 X10  x10 X10     x10   Pt edu GS            UBE     2'2'        Ther Activity             Gripping         X30 blue                 Gait Training                                       Modalities

## 2024-06-18 ENCOUNTER — TELEPHONE (OUTPATIENT)
Age: 59
End: 2024-06-18

## 2024-06-18 ENCOUNTER — OFFICE VISIT (OUTPATIENT)
Age: 59
End: 2024-06-18
Payer: COMMERCIAL

## 2024-06-18 ENCOUNTER — APPOINTMENT (OUTPATIENT)
Dept: PHYSICAL THERAPY | Facility: CLINIC | Age: 59
End: 2024-06-18
Payer: OTHER MISCELLANEOUS

## 2024-06-18 VITALS
WEIGHT: 299.6 LBS | TEMPERATURE: 95.8 F | HEIGHT: 66 IN | BODY MASS INDEX: 48.15 KG/M2 | SYSTOLIC BLOOD PRESSURE: 114 MMHG | DIASTOLIC BLOOD PRESSURE: 74 MMHG | HEART RATE: 72 BPM | OXYGEN SATURATION: 99 %

## 2024-06-18 DIAGNOSIS — J06.9 UPPER RESPIRATORY TRACT INFECTION, UNSPECIFIED TYPE: Primary | ICD-10-CM

## 2024-06-18 PROCEDURE — 99213 OFFICE O/P EST LOW 20 MIN: CPT

## 2024-06-18 RX ORDER — AZITHROMYCIN 250 MG/1
TABLET, FILM COATED ORAL
Qty: 6 TABLET | Refills: 0 | Status: SHIPPED | OUTPATIENT
Start: 2024-06-18 | End: 2024-06-23

## 2024-06-18 NOTE — TELEPHONE ENCOUNTER
Head cold, nasal and chest congestion, mucus brown and green for 2 weeks. He has taken a lot of over the counter drugs but no help.  Is there anything the doctor can send in for him?  Please contact patient.

## 2024-06-18 NOTE — PROGRESS NOTES
Ambulatory Visit  Name: Osmin Hook      : 1965      MRN: [id number]  Encounter Provider: Dawit Dominguez PA-C  Encounter Date: 2024   Encounter department: [facility] [place]    Assessment & Plan   1. Upper respiratory tract infection, unspecified type  Comments:  Given no history asthma, COPD, will do antibiotic follow-up if worsening may do steroid and albuterol but do not feel it is needed right now  Orders:  -     azithromycin (Zithromax) 250 mg tablet; Take 2 tablets (500 mg total) by mouth daily for 1 day, THEN 1 tablet (250 mg total) daily for 4 days.  Follow-up if any new or worsening symptoms develop patient aware of concerning signs and symptoms indicating immediate medical attention     History of Present Illness     Ananda is a 59-year-old male here for a cough and congestion has been going on for a week and a half.  Has tried decongestants and expectorants at home without relief.  Sleep has been a little disturbed due to the cough but not significantly worse when laying down.  No fever or shortness of breath or chest pain.  Over the weekend was at a [place] and was walking around all day just fine with no limitations.  No leg swelling  Has been significantly improving since the beginning but is still lingering.    1.5 weeks   Decongestants   Less sleep.   No fevers, sob, cp    Cough  Associated symptoms include rhinorrhea. Pertinent negatives include no chest pain, ear pain, fever, headaches, postnasal drip, shortness of breath or wheezing.       Review of Systems   Constitutional:  Negative for activity change, diaphoresis, fatigue and fever.   HENT:  Positive for congestion and rhinorrhea. Negative for ear pain, postnasal drip, sinus pressure and sinus pain.    Eyes: Negative.    Respiratory:  Positive for cough. Negative for chest tightness, shortness of breath and wheezing.    Cardiovascular:  Negative for chest pain, palpitations and leg swelling.    Gastrointestinal: Negative.    Endocrine: Negative for polydipsia, polyphagia and polyuria.   Genitourinary:  Negative for dysuria, flank pain, frequency, hematuria and urgency.   Musculoskeletal:  Negative for back pain, joint swelling, neck pain and neck stiffness.   Skin: Negative.    Neurological:  Negative for dizziness, weakness, light-headedness and headaches.   Hematological:  Negative for adenopathy.   Psychiatric/Behavioral: Negative.  Negative for confusion and sleep disturbance. The patient is not nervous/anxious.      Pertinent Medical History         Medical History Reviewed by provider this encounter:  Tobacco  Allergies  Meds  Problems  Med Hx  Surg Hx  Fam Hx       Past Medical History   Past Medical History:   Diagnosis Date    Colon polyp     GERD (gastroesophageal reflux disease)     Hypercholesterolemia     Hypertension     Lyme disease 03/19/2019    Obesity     Sexual dysfunction     Psychosexual dysfunction with inhibited sexual excitement      Past Surgical History:   Procedure Laterality Date    BACK SURGERY      spinal fusion, LUMBAR    CARDIAC CATHETERIZATION      CARDIAC CATHETERIZATION      CARDIAC CATHETERIZATION      COLONOSCOPY  2019    EGD AND COLONOSCOPY N/A 06/15/2018    Procedure: EGD AND COLONOSCOPY;  Surgeon: Jaciel Johnson MD;  Location: [facility];  Service: Gastroenterology    MT COLONOSCOPY FLX DX W/COLLJ SPEC WHEN PFRMD N/A 03/26/2019    Procedure: COLONOSCOPY;  Surgeon: Kvng Cerna MD;  Location: [facility];  Service: Gastroenterology    MT ESOPHAGOGASTRODUODENOSCOPY TRANSORAL DIAGNOSTIC N/A 03/26/2019    Procedure: ESOPHAGOGASTRODUODENOSCOPY (EGD);  Surgeon: Kvng Cerna MD;  Location: [facility];  Service: Gastroenterology    MT SURGICAL ARTHROSCOPY SHOULDER W/ROTATOR CUFF RPR Right 4/11/2024    Procedure: RIGHT SHOULDER  ARTHROSCOPIC,SUPRASPINATUS, INFRASPINATUS, SUBSCAPULARIS REPAIR,BICEPS TENOTOMY, ACROMIOPLASTY, EXTENSIVE DEBRIDEMENT;  Surgeon:  Tommie Baird DO;  Location: [facility] [place] OR;  Service: Orthopedics    SEPTOPLASTY      UPPER GASTROINTESTINAL ENDOSCOPY  2019     Family History   Problem Relation Age of Onset    Atrial fibrillation Mother     COPD Mother     Coronary artery disease Mother     Lung cancer Mother     Rheum arthritis Mother     Stroke Mother         Syndrome    Arthritis Mother     Cancer Mother     Hypertension Mother     Lung cancer Father     Cancer Father     Prostate cancer Maternal Grandfather     Heart defect Daughter     Arthritis Family      Current Outpatient Medications on File Prior to Visit   Medication Sig Dispense Refill    amLODIPine (NORVASC) 10 mg tablet TAKE 1 TABLET BY MOUTH EVERY DAY 90 tablet 3    aspirin (ECOTRIN LOW STRENGTH) 81 mg EC tablet Take 1 tablet (81 mg total) by mouth 2 (two) times a day Take aspirin 81mg twice daily for 2 weeks post operatively, then transition to aspirin 81mg once daily as previously prescribed. 28 tablet 0    ibuprofen (MOTRIN) 800 mg tablet Take 1 tablet by mouth 3 times daily for 3 days, then take as needed. Take with food and water. Discontinue if stomach upset occurs. 30 tablet 0    metoprolol succinate (TOPROL-XL) 50 mg 24 hr tablet TAKE 1 TABLET BY MOUTH EVERY DAY 90 tablet 3    NON FORMULARY Medical [facility] as needed      olmesartan (BENICAR) 40 mg tablet TAKE 1 TABLET BY MOUTH EVERY DAY 90 tablet 3    pantoprazole (PROTONIX) 40 mg tablet Take 1 tablet (40 mg total) by mouth daily 90 tablet 3    rosuvastatin (CRESTOR) 5 mg tablet TAKE 1 TABLET BY MOUTH EVERY DAY 90 tablet 3    oxyCODONE-acetaminophen (Percocet) 5-325 mg per tablet Take 1 tablet by mouth every 4 (four) hours as needed for moderate pain Max Daily Amount: 6 tablets (Patient not taking: Reported on 6/18/2024) 20 tablet 0     No current facility-administered medications on file prior to visit.   No Known Allergies   Current Outpatient Medications on File Prior to Visit   Medication Sig Dispense Refill     amLODIPine (NORVASC) 10 mg tablet TAKE 1 TABLET BY MOUTH EVERY DAY 90 tablet 3    aspirin (ECOTRIN LOW STRENGTH) 81 mg EC tablet Take 1 tablet (81 mg total) by mouth 2 (two) times a day Take aspirin 81mg twice daily for 2 weeks post operatively, then transition to aspirin 81mg once daily as previously prescribed. 28 tablet 0    ibuprofen (MOTRIN) 800 mg tablet Take 1 tablet by mouth 3 times daily for 3 days, then take as needed. Take with food and water. Discontinue if stomach upset occurs. 30 tablet 0    metoprolol succinate (TOPROL-XL) 50 mg 24 hr tablet TAKE 1 TABLET BY MOUTH EVERY DAY 90 tablet 3    NON FORMULARY Medical [facility] as needed      olmesartan (BENICAR) 40 mg tablet TAKE 1 TABLET BY MOUTH EVERY DAY 90 tablet 3    pantoprazole (PROTONIX) 40 mg tablet Take 1 tablet (40 mg total) by mouth daily 90 tablet 3    rosuvastatin (CRESTOR) 5 mg tablet TAKE 1 TABLET BY MOUTH EVERY DAY 90 tablet 3    oxyCODONE-acetaminophen (Percocet) 5-325 mg per tablet Take 1 tablet by mouth every 4 (four) hours as needed for moderate pain Max Daily Amount: 6 tablets (Patient not taking: Reported on 2024) 20 tablet 0     No current facility-administered medications on file prior to visit.      Social History     Tobacco Use    Smoking status: Former     Current packs/day: 0.00     Average packs/day: 0.3 packs/day for 5.8 years (1.4 ttl pk-yrs)     Types: Cigarettes     Start date: 1984     Quit date: 10/20/1989     Years since quittin.6    Smokeless tobacco: Former     Types: Chew     Quit date:    Vaping Use    Vaping status: Never Used   Substance and Sexual Activity    Alcohol use: Not Currently     Alcohol/week: 4.0 standard drinks of alcohol     Types: 4 Standard drinks or equivalent per week    Drug use: Yes     Frequency: 7.0 times per week     Types: Marijuana     Comment: medical    Sexual activity: Yes     Partners: Female     Objective     /74   Pulse 72   Temp (!) 95.8 °F (35.4 °C)   Ht  "5' 6\" (1.676 m)   Wt 136 kg (299 lb 9.6 oz)   SpO2 99%   BMI 48.36 kg/m²     Physical Exam  Vitals and nursing note reviewed.   Constitutional:       General: He is not in acute distress.     Appearance: Normal appearance. He is normal weight. He is not ill-appearing, toxic-appearing or diaphoretic.   HENT:      Head: Normocephalic and atraumatic.      Right Ear: External ear normal.      Left Ear: External ear normal.      Nose: Congestion and rhinorrhea present.      Mouth/Throat:      Mouth: Mucous membranes are moist.      Pharynx: Posterior oropharyngeal erythema present. No oropharyngeal exudate.   Eyes:      General: No scleral icterus.        Right eye: No discharge.         Left eye: No discharge.      Extraocular Movements: Extraocular movements intact.      Conjunctiva/sclera: Conjunctivae normal.   Neck:      Vascular: No carotid bruit.   Cardiovascular:      Rate and Rhythm: Normal rate and regular rhythm.      Heart sounds: No murmur heard.  Pulmonary:      Effort: Pulmonary effort is normal.      Breath sounds: Wheezing (expiratory, all lobes.) present.   Abdominal:      General: Abdomen is flat. Bowel sounds are normal.      Palpations: There is no mass.      Tenderness: There is no abdominal tenderness. There is no rebound.   Musculoskeletal:      Cervical back: Normal range of motion and neck supple. No tenderness.      Right lower leg: No edema.      Left lower leg: No edema.   Lymphadenopathy:      Cervical: No cervical adenopathy.   Skin:     General: Skin is warm and dry.      Findings: No rash.   Neurological:      Mental Status: He is alert. Mental status is at baseline.      Sensory: No sensory deficit.      Motor: No weakness.   Psychiatric:         Mood and Affect: Mood normal.         Behavior: Behavior normal.         Thought Content: Thought content normal.         Judgment: Judgment normal.       Administrative Statements           "

## 2024-06-20 ENCOUNTER — OFFICE VISIT (OUTPATIENT)
Dept: PHYSICAL THERAPY | Facility: CLINIC | Age: 59
End: 2024-06-20
Payer: OTHER MISCELLANEOUS

## 2024-06-20 DIAGNOSIS — M75.121 COMPLETE TEAR OF RIGHT ROTATOR CUFF, UNSPECIFIED WHETHER TRAUMATIC: ICD-10-CM

## 2024-06-20 DIAGNOSIS — S93.402A SPRAIN OF UNSPECIFIED LIGAMENT OF LEFT ANKLE, INITIAL ENCOUNTER: ICD-10-CM

## 2024-06-20 DIAGNOSIS — S43.004D DISLOCATION OF RIGHT SHOULDER JOINT, SUBSEQUENT ENCOUNTER: Primary | ICD-10-CM

## 2024-06-20 PROCEDURE — 97110 THERAPEUTIC EXERCISES: CPT

## 2024-06-20 NOTE — PROGRESS NOTES
"Daily Note     Today's date: 2024  Patient name: Osmin Hook  : 1965  MRN: [id number]  Referring provider: Tommie Baird DO  Dx:   Encounter Diagnosis     ICD-10-CM    1. Dislocation of right shoulder joint, subsequent encounter  S43.004D       2. Complete tear of right rotator cuff, unspecified whether traumatic  M75.121       3. Sprain of unspecified ligament of left ankle, initial encounter  S93.402A           Start Time: 1549  Stop Time: 1634  Total time in clinic (min): 45 minutes    Subjective: Patient states he felt sore after last session into the night and then it was gone the next day. Patient states there is pain in the superior shoulder.      Objective: See treatment diary below      Assessment: Tolerated treatment well. Patient tolerated addition of eccentric lowering after a wall walk experiencing soreness. Patient performed less reps of TB ER due to increase in pain during exercise. Patient tolerated addition of shoulder taps in push-up position against the wall. Patient tolerated perturbations during supine shoulder flexion with no increase in pain. Patient would benefit from continued PT      Plan: Continue per plan of care.      Precautions: HTN, hx of lumbar fusion, Lyme's  POC expires Unit limit Auth Expiration date PT/OT/ST + Visit Limit?   24 BOMN 24 BOMN                           Visit/Unit Tracking  AUTH Status:  Date    60 visits Used 13 14 15 16 17 18 19 20 21 22 23 24 25 26    Remaining  17 16 45 44 43 42 41 40 39 38 37 36 35 34         Foto             Manuals                                                        Neuro Re-Ed             Scap retraction    x30    x30 x30    SL scap retraction iso X20 3\" X20 3\" X20 3\" X20 3\"    X20 3\" X20 3\"    TB row L only        X20   btb X20 btb    Perturbation in flex       4 x 15\"      Shoulder taps " against wall        x18                                Ther Ex             Pendulums        X20 ea X20 ea    Elbow flexion x20       x20 x20    R shoulder PROM DANIELA SUAREZ   DANIELA SUAREZ    Reynaldo x30 x30  X30  x30 x30    x20   AROM to 90*- flex, ABD     X10 ea X10 1# flx   0# abd X10  1# flex 0# abd       TB row X20 gtb X20 gtb X30  gtb X30 gtb  X30  btb X30 btb X30 btb    2x10  btb   TB ext X20 gtb X20 gtb X30  gtb X30 gtb  X30  btb X30 btb X30 btb   2x10  gtb   TB ER   X20 rtb X30  rtb X30 rtb  X20  rtb X20 rtb X18 rtb      TB IR  X20 rtb X30  rtb X30 rtb  X30  gtb X30 gtb X30 gtb      TB tricep extension  X20 btb X30  btb X30 btb  X30  btb X30 btb X30 btb      Table slide X20 flex  X15 flex and ABD X15 flex, scap, abd  X20  Flex, ABD X20 flex, ABD    x20   Finger ladder x10 x5 x8 X10  x10 X10  X10 with ecc lowering   x10   Pt edu GS            UBE     2'2'  2'2'      Ther Activity             Gripping         X30 blue                 Gait Training                                       Modalities

## 2024-06-25 ENCOUNTER — OFFICE VISIT (OUTPATIENT)
Dept: PHYSICAL THERAPY | Facility: CLINIC | Age: 59
End: 2024-06-25
Payer: OTHER MISCELLANEOUS

## 2024-06-25 ENCOUNTER — CLINICAL SUPPORT (OUTPATIENT)
Dept: BARIATRICS | Facility: CLINIC | Age: 59
End: 2024-06-25

## 2024-06-25 VITALS — BODY MASS INDEX: 47.57 KG/M2 | WEIGHT: 296 LBS | HEIGHT: 66 IN

## 2024-06-25 DIAGNOSIS — S93.402A SPRAIN OF UNSPECIFIED LIGAMENT OF LEFT ANKLE, INITIAL ENCOUNTER: ICD-10-CM

## 2024-06-25 DIAGNOSIS — M75.121 COMPLETE TEAR OF RIGHT ROTATOR CUFF, UNSPECIFIED WHETHER TRAUMATIC: ICD-10-CM

## 2024-06-25 DIAGNOSIS — S43.004D DISLOCATION OF RIGHT SHOULDER JOINT, SUBSEQUENT ENCOUNTER: Primary | ICD-10-CM

## 2024-06-25 DIAGNOSIS — R63.5 ABNORMAL WEIGHT GAIN: Primary | ICD-10-CM

## 2024-06-25 PROCEDURE — 97112 NEUROMUSCULAR REEDUCATION: CPT

## 2024-06-25 PROCEDURE — 97110 THERAPEUTIC EXERCISES: CPT

## 2024-06-25 PROCEDURE — RECHECK

## 2024-06-25 NOTE — PROGRESS NOTES
Weight Management Medical Nutrition Assessment  Name was verified by patient stating name? Yes   verified by patient stating? Yes  Verified the patient is alone? Yes  Offered patient a live visit but are now consenting to this virtual visit? Yes  Verified with the patient this visit will go towards their pre paid charges OR they will be contacted after the visit to collect payment? Yes   Verified with the patient they are in the state of [place]? Yes   Ananda provided a weight verbally. Today he weighs 296 lbs giving him a loss of 5 lbs in the last 4 weeks. He has started logging his food using carb manager.  He is eating around 60 - 80 gr of carb daily, usually does not go over 1500 - 1800 calories and has around 85 grams of protein.  He has some mobility issues and exercise is limited.  He has been follow the recommendation of PT and doing some chair exercises and stretching.  He is focusing on increasing his water and cutting back on diet soda.          Patient seen by Medical Provider in past 6 months:  yes  Requested to schedule appointment with Medical Provider: No        Anthropometric Measurements  Start Weight (#): 304.2  Current Weight (#): 296  TBW % Change from start weight:3%  Ideal Body Weight (#):142  Goal Weight (#):220  Highest:305  Lowest: 184      Weight Loss History  Previous weight loss attempts: Self Created Diets (Portion Control, Healthy Food Choices, etc.)     Food and Nutrition Related History  Wake up: 6 - 7 am        Bed Time:9- 10:00     Food Recall  Breakfast:cereal (honey bunches of oats) milk 1%                  Snack:none   Lunch:salad with protein or PBJ or lunch meat sandwich  Snack:fruit if needed  Dinner:chicken with couscous and carrots or meat loaf with potatoes and veg  Snack:pie or ice cream        Beverages: water and diet soda  Volume of beverage intake: 64 - 72 oz     Weekends: Same  Cravings: sweets ( pie or ice cream)  Trouble area of day:night     Frequency of Eating out:  irregularly  Food restrictions:none  Cooking: self   Food Shopping: self     Physical Activity Intake  Activity:none   Frequency: none  Physical limitations/barriers to exercise: knee issues and dislocated shoulder and tear     Estimated Needs  Energy     Sarah Castellano Energy Needs: BMR : 2128   1-2# loss weekly sedentary:  1554 - 2054             1-2# loss weekly lightly active:1926 - 2426  Maintenance calories for sedentary activity level: 2544  Protein:77 - 96      (1.2-1.5g/kg IBW)  Fluid: 76     (35mL/kg IBW)     Nutrition Diagnosis  Yes;   Nutrition Diagnosis  Overweight/obesity  related to Excess energy intake as evidenced by  BMI more than normative standard for age and sex (obesity-grade III 40+)     Nutrition Intervention     Nutrition Prescription  Calories:1800 - 2000  Protein:77 - 96  Fluid:76        Nutrition Education:    Calorie controlled menu  Lean protein food choices  Healthy snack options  Food journaling tips        Nutrition Counseling:  Strategies: meal planning, portion sizes, healthy snack choices, hydration, fiber intake, protein intake, exercise, food journal        Monitoring and Evaluation:  Evaluation criteria:  Energy Intake  Meet protein needs  Maintain adequate hydration  Monitor weekly weight  Meal planning/preparation  Food journal   Decreased portions at mealtimes and snacks  Physical activity      Barriers to learning:none  Readiness to change: Action:  (Changing behavior)  Comprehension: fair  Expected Compliance: fair

## 2024-06-25 NOTE — PROGRESS NOTES
"Daily Note     Today's date: 2024  Patient name: Osmin Hook  : 1965  MRN: [id number]  Referring provider: Tommie Baird DO  Dx:   Encounter Diagnosis     ICD-10-CM    1. Dislocation of right shoulder joint, subsequent encounter  S43.004D       2. Complete tear of right rotator cuff, unspecified whether traumatic  M75.121       3. Sprain of unspecified ligament of left ankle, initial encounter  S93.402A           Start Time: 1600  Stop Time: 1643  Total time in clinic (min): 43 minutes    Subjective: Patient reports that his shoulder continues to bother him at night.  He states that motions out to the side bother him the most.      Objective: See treatment diary below      Assessment: Patient demonstrates improving R shoulder PROM this session.  Patient completed today's session without increase in pain.  UBE performed in order to improve UE muscular endurance.  Initiated glenohumeral mobilizations in order to address remaining ROM deficits.  Future sessions should continue to progress ROM and strengthening exercises as able. Tolerated treatment well. Patient would benefit from continued PT    Plan: Continue per plan of care.      Precautions: HTN, hx of lumbar fusion, Lyme's  POC expires Unit limit Auth Expiration date PT/OT/ST + Visit Limit?   24 BOMN 24 BOMN                           Visit/Unit Tracking  AUTH Status:  Date    60 visits Used 13 14 15 16 17 18 19 20 21 22 23 24 25 26    Remaining  17 16 45 44 43 42 41 40 39 38 37 36 35 34         Foto             Manuals    R inf mobs        GS  Gr 3                                            Neuro Re-Ed             Scap retraction    x30     x30    SL scap retraction iso X20 3\" X20 3\" X20 3\" X20 3\"     X20 3\"    TB row L only         X20 btb    Perturbation in flex       4 x 15\" 3x15\"     Shoulder taps against wall   "      x18 x20                               Ther Ex             Pendulums         X20 ea    Elbow flexion x20        x20    R shoulder PROM  MD SUAREZ   DANIELA SUAREZ    Reynaldo x30 x30  X30  x30 x30    x20   AROM to 90*- flex, ABD     X10 ea X10 1# flx   0# abd X10  1# flex 0# abd  X15  1#  Flex  X10  1#  ABD     TB row X20 gtb X20 gtb X30  gtb X30 gtb  X30  btb X30 btb X30 btb  X30  black  2x10  btb   TB ext X20 gtb X20 gtb X30  gtb X30 gtb  X30  btb X30 btb X30 btb X30  black  2x10  gtb   TB ER   X20 rtb X30  rtb X30 rtb  X20  rtb X20 rtb X18 rtb X20  gtb     TB IR  X20 rtb X30  rtb X30 rtb  X30  gtb X30 gtb X30 gtb X30  btb     TB tricep extension  X20 btb X30  btb X30 btb  X30  btb X30 btb X30 btb X30  black     Table slide X20 flex  X15 flex and ABD X15 flex, scap, abd  X20  Flex, ABD X20 flex, ABD  X20 flex and ABD  PB  x20   Finger ladder x10 x5 x8 X10  x10 X10  X10 with ecc lowering   x10   Pt edu             UBE     2'2'  2'2' 3'3'     Ther Activity             Gripping         X30 blue                 Gait Training                                       Modalities

## 2024-06-27 ENCOUNTER — OFFICE VISIT (OUTPATIENT)
Dept: PHYSICAL THERAPY | Facility: CLINIC | Age: 59
End: 2024-06-27
Payer: OTHER MISCELLANEOUS

## 2024-06-27 DIAGNOSIS — S93.402A SPRAIN OF UNSPECIFIED LIGAMENT OF LEFT ANKLE, INITIAL ENCOUNTER: ICD-10-CM

## 2024-06-27 DIAGNOSIS — M75.121 COMPLETE TEAR OF RIGHT ROTATOR CUFF, UNSPECIFIED WHETHER TRAUMATIC: ICD-10-CM

## 2024-06-27 DIAGNOSIS — S43.004D DISLOCATION OF RIGHT SHOULDER JOINT, SUBSEQUENT ENCOUNTER: Primary | ICD-10-CM

## 2024-06-27 PROCEDURE — 97110 THERAPEUTIC EXERCISES: CPT

## 2024-06-27 NOTE — PROGRESS NOTES
"Daily Note     Today's date: 2024  Patient name: Osmin Hook  : 1965  MRN: [id number]  Referring provider: Tommie Baird DO  Dx:   Encounter Diagnosis     ICD-10-CM    1. Dislocation of right shoulder joint, subsequent encounter  S43.004D       2. Complete tear of right rotator cuff, unspecified whether traumatic  M75.121       3. Sprain of unspecified ligament of left ankle, initial encounter  S93.402A           Start Time: 1547  Stop Time: 1630  Total time in clinic (min): 43 minutes    Subjective: Patient reports his arm being sore today after reaching out in front of him to close the screen door at his house. Patient states he was sore after last session which only lasted into the night.      Objective: See treatment diary below      Assessment: Tolerated treatment well. Patient performed UBE to improve UE muscular endurance. Patient demonstrated increased ROM during PROM. Patient would benefit from continued PT.      Plan: Continue per plan of care.      Precautions: HTN, hx of lumbar fusion, Lyme's  POC expires Unit limit Auth Expiration date PT/OT/ST + Visit Limit?   24 BOMN 24 BOMN                           Visit/Unit Tracking  AUTH Status:  Date    60 visits Used 13 14 15 16 17 18 19 20 21 22 23 24 25 26    Remaining  17 16 45 44 43 42 41 40 39 38 37 36 35 34         Foto             Manuals    R inf mobs        GS  Gr 3 MD Gr 3                                           Neuro Re-Ed             Scap retraction    x30         SL scap retraction iso X20 3\" X20 3\" X20 3\" X20 3\"         TB row L only             Perturbation in flex       4 x 15\" 3x15\" 3x15\"    Shoulder taps against wall        x18 x20                               Ther Ex             Pendulums             Elbow flexion x20            R shoulder PROM  MD [facility]  DANIELA SUAREZ MD    Reynaldo x30 x30  " X30  x30 x30    x20   AROM to 90*- flex, ABD     X10 ea X10 1# flx   0# abd X10  1# flex 0# abd  X15  1#  Flex  X10  1#  ABD X15 1# Flex x10 1# ABD    TB row X20 gtb X20 gtb X30  gtb X30 gtb  X30  btb X30 btb X30 btb  X30  black X30 black  2x10  btb   TB ext X20 gtb X20 gtb X30  gtb X30 gtb  X30  btb X30 btb X30 btb X30  black X30 black  2x10  gtb   TB ER   X20 rtb X30  rtb X30 rtb  X20  rtb X20 rtb X18 rtb X20  gtb X20 gtb    TB IR  X20 rtb X30  rtb X30 rtb  X30  gtb X30 gtb X30 gtb X30  btb X30 btb    TB tricep extension  X20 btb X30  btb X30 btb  X30  btb X30 btb X30 btb X30  black X30 black     Table slide X20 flex  X15 flex and ABD X15 flex, scap, abd  X20  Flex, ABD X20 flex, ABD  X20 flex and ABD  PB X20 flex and ABD PB x20   Finger ladder x10 x5 x8 X10  x10 X10  X10 with ecc lowering   x10   Pt edu GS            UBE     2'2'  2'2' 3'3' 3'3'    Ther Activity             Gripping                          Gait Training                                       Modalities

## 2024-07-01 ENCOUNTER — OFFICE VISIT (OUTPATIENT)
Dept: OBGYN CLINIC | Facility: CLINIC | Age: 59
End: 2024-07-01

## 2024-07-01 ENCOUNTER — APPOINTMENT (OUTPATIENT)
Dept: PHYSICAL THERAPY | Facility: CLINIC | Age: 59
End: 2024-07-01
Payer: OTHER MISCELLANEOUS

## 2024-07-01 VITALS
DIASTOLIC BLOOD PRESSURE: 86 MMHG | HEART RATE: 80 BPM | BODY MASS INDEX: 47.57 KG/M2 | WEIGHT: 296 LBS | SYSTOLIC BLOOD PRESSURE: 132 MMHG | HEIGHT: 66 IN

## 2024-07-01 DIAGNOSIS — Z48.89 AFTERCARE FOLLOWING SURGERY: ICD-10-CM

## 2024-07-01 DIAGNOSIS — Z98.890 S/P RIGHT ROTATOR CUFF REPAIR: Primary | ICD-10-CM

## 2024-07-01 PROCEDURE — 99024 POSTOP FOLLOW-UP VISIT: CPT | Performed by: ORTHOPAEDIC SURGERY

## 2024-07-01 NOTE — PROGRESS NOTES
"Patient Name:  Osmin Hook  MRN:  [id number]    Assessment & Plan     1. S/P right rotator cuff repair  2. Aftercare following surgery      Approximately 12 week s/p Right shoulder arthroscopic rotator cuff repair, biceps tenotomy, acromioplasty performed on 04/11/2024.  Overall, patient improving since surgery   Advised patient to continue outpatient PT to work on motion, postural exercises, and stretching. Strongly recommended patient to continue home exercises for stretching and improving range of motion  Work note provided to patient, likely return to work with restrictions at that time.    Follow up in 6-8 weeks for reevaluation of Right shoulder      History of the Present Illness   Osmin Hook is a 59 y.o. male approximately 12 week s/p Right shoulder arthroscopic rotator cuff repair, biceps tenotomy, acromioplasty performed on 04/11/2024. Today, patient reports he is doing okay since surgery and is improving, but slowly. He admits the 1 lb weights are difficult to lift during therapy.          Review of Systems     Review of Systems   Constitutional:  Negative for chills and fever.   HENT:  Negative for ear pain and sore throat.    Eyes:  Negative for pain and visual disturbance.   Respiratory:  Negative for cough and shortness of breath.    Cardiovascular:  Negative for chest pain and palpitations.   Gastrointestinal:  Negative for abdominal pain and vomiting.   Genitourinary:  Negative for dysuria and hematuria.   Musculoskeletal:  Negative for arthralgias and back pain.   Skin:  Negative for color change and rash.   Neurological:  Negative for seizures and syncope.   All other systems reviewed and are negative.      Physical Exam     /86   Pulse 80   Ht 5' 6\" (1.676 m)   Wt 134 kg (296 lb)   BMI 47.78 kg/m²     Right Shoulder:   Surgical incisions well healed  Active range of motion    degrees forward flexion  100 degrees abduction  50 degrees external rotation   SI joint " internal rotation    Passive range of motion   140 degrees of forward flexion   Forward flexion testing 4+/5  External rotation testing 4/5  Internal rotation testing 4+/5  The patient is neurovascularly intact distally in the extremity.      Data Review     I have personally reviewed pertinent films in PACS, and my interpretation follows.    No new images     Social History     Tobacco Use    Smoking status: Former     Current packs/day: 0.00     Average packs/day: 0.3 packs/day for 5.8 years (1.4 ttl pk-yrs)     Types: Cigarettes     Start date: 1984     Quit date: 10/20/1989     Years since quittin.7    Smokeless tobacco: Former     Types: Chew     Quit date:    Vaping Use    Vaping status: Never Used   Substance Use Topics    Alcohol use: Not Currently     Alcohol/week: 4.0 standard drinks of alcohol     Types: 4 Standard drinks or equivalent per week    Drug use: Yes     Frequency: 7.0 times per week     Types: Marijuana     Comment: medical           Procedures  None     Kirsten Rodrigues PA-C

## 2024-07-01 NOTE — LETTER
July 1, 2024     Patient: Osmin Hook  YOB: 1965  Date of Visit: 7/1/2024      To Whom it May Concern:    Osmin Hook is under my professional care. Osmin was seen in my office on 7/1/2024. Osmin may not return to work at this time. He will follow up in office 6-8 weeks and new work note may be provided at that time.     If you have any questions or concerns, please don't hesitate to call.         Sincerely,          Tommie Baird,         CC: No Recipients

## 2024-07-02 ENCOUNTER — OFFICE VISIT (OUTPATIENT)
Age: 59
End: 2024-07-02
Payer: COMMERCIAL

## 2024-07-02 ENCOUNTER — TELEPHONE (OUTPATIENT)
Age: 59
End: 2024-07-02

## 2024-07-02 ENCOUNTER — APPOINTMENT (OUTPATIENT)
Dept: PHYSICAL THERAPY | Facility: CLINIC | Age: 59
End: 2024-07-02
Payer: OTHER MISCELLANEOUS

## 2024-07-02 VITALS
OXYGEN SATURATION: 98 % | TEMPERATURE: 97.1 F | WEIGHT: 300 LBS | DIASTOLIC BLOOD PRESSURE: 78 MMHG | SYSTOLIC BLOOD PRESSURE: 126 MMHG | HEIGHT: 66 IN | BODY MASS INDEX: 48.21 KG/M2 | HEART RATE: 78 BPM

## 2024-07-02 DIAGNOSIS — E66.01 MORBID OBESITY WITH BMI OF 45.0-49.9, ADULT (HCC): ICD-10-CM

## 2024-07-02 DIAGNOSIS — E78.00 HYPERCHOLESTEROLEMIA: Chronic | ICD-10-CM

## 2024-07-02 DIAGNOSIS — Z12.5 SCREENING FOR PROSTATE CANCER: ICD-10-CM

## 2024-07-02 DIAGNOSIS — I10 PRIMARY HYPERTENSION: Primary | Chronic | ICD-10-CM

## 2024-07-02 PROCEDURE — 99214 OFFICE O/P EST MOD 30 MIN: CPT | Performed by: INTERNAL MEDICINE

## 2024-07-02 RX ORDER — ASPIRIN 81 MG/1
81 TABLET, CHEWABLE ORAL DAILY
COMMUNITY

## 2024-07-02 NOTE — TELEPHONE ENCOUNTER
Caller: Mouna from [place]    Doctor/Office: Oli/Saud    CB#:        What needs to be faxed: OVN and work note from 7/1/24.    ATTN to: Mouna    Fax#: [phone]      Documents were successfully e-faxed

## 2024-07-02 NOTE — ASSESSMENT & PLAN NOTE
Weight Loss Tips:     Weight loss is NOT easy for anyone.  Achieving weight loss goals have been a challenge for many people and a quick fad diet will NOT work the way the product or commercials promises us it will work.  Life changes are long lasting.      Weight loss comes in time and with changes that are personalized for you, NOT the same for everyone.       7% weight loss of your body weight will MAKE a HUGE difference in your health status including improvement to Diabetes and high blood pressure management.       Here are some quick calculations for 7% weight loss:      If you weigh 180 pounds then GOAL 7% weight loss would be 12.5 pounds.  If you weigh 190 pounds then GOAL 7% weight loss would be 13.0 pounds.  If you weigh 200 pounds then GOAL 7% weight loss would be 14.0 pounds.  If you weigh 210 pounds then GOAL 7% weight loss would be 14.7 pounds.  If you weigh 220 pounds then GOAL 7% weight loss would be 15.5 pounds.  If you weigh 230 pounds then GOAL 7% weight loss would be 16.0 pounds.  If you weigh 240 pounds then GOAL 7% weight loss would be 17.0 pounds.     If you weight over 250 pounds then a weight loss of >17 pounds is recommended.       Quick tips:  1. Track food intake with an stacey or tracker like www.[facility].com, sparkpeople.com, loseit.com, calorieking.com.   Reduce calorie intake by 500 a day to help lose weight but do NOT eat less than 1400 calories a day as this will turn off your metabolism and result in no weight loss at all.       2. Higher protein and lower carbohydrate intake works well for many but do NOT eliminate ONE food group completely as this is difficult to maintain in the long-term and will result in gaining back the weight quickly.       3. Track your steps with stacey on phone, Heart stacey on iphone, or a pedometer such as fitbit or [employer] Up 24 to help you reach 10,000 steps daily.       4.  Many patients do well with a carbohydrate level daily that is less than 200 g of  carbs.  In addition the protein level should be over 75 g of protein in the full day.

## 2024-07-02 NOTE — PATIENT INSTRUCTIONS
"Patient Education     High cholesterol   The Basics   Written by the doctors and editors at [facility]   What is cholesterol? -- Cholesterol is a substance found in blood. Everyone has some. It is needed for good health. But people sometimes have too much cholesterol.  Compared with people with normal cholesterol, people with high cholesterol have a higher risk of heart attack, stroke, and other health problems. The higher your cholesterol, the higher your risk of these problems.  Are there different types of cholesterol? -- Yes, there are a few different types. If you get a cholesterol test, you might hear your doctor or nurse talk about:   Total cholesterol   LDL cholesterol - Some people call this the \"bad\" cholesterol. That's because having high LDL levels raises your risk of heart attack, stroke, and other health problems.   HDL cholesterol - Some people call this the \"good\" cholesterol. That's because people with high HDL levels tend to have a lower risk of heart attack, stroke, and other health problems.   Non-HDL cholesterol - Non-HDL cholesterol is your total cholesterol minus your HDL cholesterol.   Triglycerides - Triglycerides are not cholesterol. They are another type of fat. But they often get measured when cholesterol is measured. (Having high triglycerides also seems to increase the risk of heart attack and stroke.)  What should my numbers be? -- Ask your doctor or nurse what your numbers should be. Different people need different goals. If you live outside of the [place], see the table (table 1).  In general, people who do not already have heart disease should aim for:   Total cholesterol below 200   LDL cholesterol below 130, or much lower if they are at risk of heart attack or stroke   HDL cholesterol above 60   Non-HDL cholesterol below 160, or lower if they are at risk of heart attack or stroke   Triglycerides below 150  Remember, though, that many people who cannot meet these goals still have a low " "risk of heart attack and stroke.  What should I do if I have high cholesterol? -- Ask your doctor what your overall risk of heart attack and stroke is. Just having high cholesterol is not always a reason to worry. Having high cholesterol is just one of many things that can increase your risk of heart attack and stroke.  Other things that increase your risk include:   Smoking   High blood pressure   Having a parent or sibling who got heart disease at a young age - Young, in this case, means younger than 55 for males and younger than 65 for females.   A diet that is not heart healthy - A \"heart-healthy\" diet includes lots of fruits and vegetables, fiber, and healthy fats (like those found in fish, nuts, and certain oils). It also means limiting sugar and unhealthy fats.   Older age  If you are at high risk of heart attack and stroke, having high cholesterol is a problem. But if you are at low risk, high cholesterol might not need treatment.  Should I take medicine to lower cholesterol? -- Not everyone who has high cholesterol needs medicines. Your doctor or nurse will decide if you need them based on your age, family history, and other health concerns.  There are many different medicines used to lower cholesterol (table 2). Some help your body make less cholesterol. Some keep your body from absorbing cholesterol from foods. Some help your body get rid of cholesterol faster. The medicines most often used to treat high cholesterol are called \"statins.\"  You should probably take a statin if you:   Already had a heart attack or stroke   Have known heart disease   Have diabetes   Have a condition called \"peripheral artery disease,\" which makes it painful to walk, and happens when the arteries in your legs get clogged with fatty deposits   Have an \"abdominal aortic aneurysm,\" which is a widening of the main artery in the belly  Most people with any of the conditions listed above should take a statin no matter what their " "cholesterol level is. If your doctor or nurse prescribes a statin, it's important to keep taking it. The medicine might not make you feel any different. But it can help prevent heart attack, stroke, and death.  If your doctor or nurse recommends taking medicine to help lower your cholesterol, make sure that you know what it is called. Follow all the instructions for how to take it. For example, some medicines work better when you take them in the evening. Some need to be taken with food.  Tell your doctor or nurse if your medicine causes any side effects that bother you. They might be able to switch you to a different medicine.  Can I lower my cholesterol without medicines? -- Yes. You can help lower your cholesterol by doing these things:   You can lower your LDL, or \"bad,\" cholesterol by avoiding red meat, butter, fried foods, cheese, and other foods that have a lot of saturated fat.   You can lower triglycerides by avoiding sugary foods, fried foods, and excess alcohol.   If you have excess weight, it can help to lose weight. Your doctor or nurse can help you do this in a healthy way.   Try to get regular physical activity. Even gentle forms of exercise, like walking, are good for your health.  Even if these steps don't change your cholesterol very much, they can improve your health in many other ways.  All topics are updated as new evidence becomes available and our peer review process is complete.  This topic retrieved from [employer] on: Mar 01, 2024.  Topic 60382 Version 25.0  Release: 32.2.4 - C32.59  © 2024 UpToDate, Inc. and/or its affiliates. All rights reserved.  table 1: Cholesterol and triglyceride measurements in the [place] and elsewhere     Measurement used within the [place] Milligrams/deciliter (mg/dL)  Measurement used most places outside of the [place] Millimoles/liter (mmol/Liter)     Level to aim for  Level to aim for    Total cholesterol  Below 200 Below 5.17   LDL cholesterol  Below 130, or much lower if at " risk of heart attack and stroke Below 3.36, or much lower if at risk of heart attack and stroke   HDL cholesterol  Above 60 Above 1.55   Triglycerides  Below 150 Below 1.7   Cholesterol is measured differently in the [place] than it is in most other countries. This table shows values used within and outside of the [place]. It includes the cholesterol and triglyceride levels that most people who do not have heart disease should aim for.  Graphic 41986 Version 5.0  table 2: Lipid-lowering medicines  Generic name  Brand name    Statins    Atorvastatin Lipitor   Fluvastatin Lescol, Lescol XL   Lovastatin Mevacor, Altoprev   Pitavastatin Livalo   Pravastatin Pravachol   Rosuvastatin Crestor   Simvastatin Zocor   PCSK9 inhibitors    Alirocumab Praluent   Evolocumab Repatha, Repatha SureClick   Cholesterol absorption inhibitors    Ezetimibe Zetia   Bile acid sequestrants    Cholestyramine Prevalite, Questran, Questran Light   Colesevelam Welchol   Colestipol Colestid   Niacin (nicotinic acid)    Niacin immediate release     Niacin extended release Niaspan   Fibrates    Fenofibrate Fenoglide, Tricor, Triglide, others   Gemfibrozil Lopid   Brand names listed are for medicines available in the [place] and some other countries.  Graphic 09207 Version 7.0  Consumer Information Use and Disclaimer   Disclaimer: This generalized information is a limited summary of diagnosis, treatment, and/or medication information. It is not meant to be comprehensive and should be used as a tool to help the user understand and/or assess potential diagnostic and treatment options. It does NOT include all information about conditions, treatments, medications, side effects, or risks that may apply to a specific patient. It is not intended to be medical advice or a substitute for the medical advice, diagnosis, or treatment of a health care provider based on the health care provider's examination and assessment of a patient's specific and unique circumstances.  Patients must speak with a health care provider for complete information about their health, medical questions, and treatment options, including any risks or benefits regarding use of medications. This information does not endorse any treatments or medications as safe, effective, or approved for treating a specific patient. UpToDate, Inc. and its affiliates disclaim any warranty or liability relating to this information or the use thereof.The use of this information is governed by the Terms of Use, available at https://www.[employer]tersUnique Solutionsuwer.com/en/know/clinical-effectiveness-terms. 2024© UpToDate, Inc. and its affiliates and/or licensors. All rights reserved.  Copyright   © 2024 UpToDate, Inc. and/or its affiliates. All rights reserved.

## 2024-07-02 NOTE — ASSESSMENT & PLAN NOTE
Lab Results   Component Value Date    LDLCALC 69 12/26/2023     Continue statin as prescribed. Heart healthy diet.    Orders:    CBC and differential; Future    Comprehensive metabolic panel; Future    Lipid Panel with Direct LDL reflex; Future

## 2024-07-02 NOTE — PROGRESS NOTES
Assessment & Plan  Primary hypertension    Blood pressure controlled. Continue current anti-HTN regimen.    Hypercholesterolemia    Lab Results   Component Value Date    LDLCALC 69 12/26/2023     Continue statin as prescribed. Heart healthy diet.    Orders:    CBC and differential; Future    Comprehensive metabolic panel; Future    Lipid Panel with Direct LDL reflex; Future    Morbid obesity with BMI of 45.0-49.9, adult (HCC)    Weight Loss Tips:     Weight loss is NOT easy for anyone.  Achieving weight loss goals have been a challenge for many people and a quick fad diet will NOT work the way the product or commercials promises us it will work.  Life changes are long lasting.      Weight loss comes in time and with changes that are personalized for you, NOT the same for everyone.       7% weight loss of your body weight will MAKE a HUGE difference in your health status including improvement to Diabetes and high blood pressure management.       Here are some quick calculations for 7% weight loss:      If you weigh 180 pounds then GOAL 7% weight loss would be 12.5 pounds.  If you weigh 190 pounds then GOAL 7% weight loss would be 13.0 pounds.  If you weigh 200 pounds then GOAL 7% weight loss would be 14.0 pounds.  If you weigh 210 pounds then GOAL 7% weight loss would be 14.7 pounds.  If you weigh 220 pounds then GOAL 7% weight loss would be 15.5 pounds.  If you weigh 230 pounds then GOAL 7% weight loss would be 16.0 pounds.  If you weigh 240 pounds then GOAL 7% weight loss would be 17.0 pounds.     If you weight over 250 pounds then a weight loss of >17 pounds is recommended.       Quick tips:  1. Track food intake with an stacey or tracker like www.ascentify.com, sparkpeople.com, Zoe Majesteit.com, calorieking.com.   Reduce calorie intake by 500 a day to help lose weight but do NOT eat less than 1400 calories a day as this will turn off your metabolism and result in no weight loss at all.       2. Higher protein and  lower carbohydrate intake works well for many but do NOT eliminate ONE food group completely as this is difficult to maintain in the long-term and will result in gaining back the weight quickly.       3. Track your steps with stacey on phone, Heart stacey on iphone, or a pedometer such as fitbit or jawbone Up 24 to help you reach 10,000 steps daily.       4.  Many patients do well with a carbohydrate level daily that is less than 200 g of carbs.  In addition the protein level should be over 75 g of protein in the full day.      Screening for prostate cancer    Orders:    PSA, Total Screen; Future      Depression Screening and Follow-up Plan: Patient was screened for depression during today's encounter. They screened negative with a PHQ-2 score of 0.      History of Present Illness     History of Present Illness  The patient presents for routine follow-up.    During his last visit, the patient underwent a rotator cuff repair. He is currently undergoing physical therapy twice weekly, which he reports is progressing well. However, he experienced increased discomfort after stretching. Over the past two weeks, he has initiated a regimen of lifting a 1-pound weight, which he finds challenging. He also reports experiencing pain in his shoulder and neck, which began last Thursday during his therapy sessions until yesterday. This pain has prevented him from fully twisting his neck, and he continues to experience pain when turning his neck excessively.    The patient has joined a weight management program and has lost approximately 7 to 8 pounds. His daily carbohydrate intake ranges from 50 to 130. His daily caloric intake ranges between 12 and 1800.     Review of Systems   Constitutional: Negative.    Respiratory: Negative.     Cardiovascular: Negative.    Gastrointestinal: Negative.    Musculoskeletal:  Positive for arthralgias (right shoulder).     Objective     /78   Pulse 78   Temp (!) 97.1 °F (36.2 °C) (Tympanic)    "Ht 5' 6\" (1.676 m)   Wt 136 kg (300 lb)   SpO2 98%   BMI 48.42 kg/m²     Physical Exam  Constitutional:       General: He is not in acute distress.     Appearance: He is obese. He is not ill-appearing.   Cardiovascular:      Rate and Rhythm: Normal rate and regular rhythm.      Heart sounds: No murmur heard.  Pulmonary:      Effort: Pulmonary effort is normal. No respiratory distress.      Breath sounds: No wheezing.   Abdominal:      General: Abdomen is protuberant. Bowel sounds are normal. There is no distension.      Tenderness: There is no abdominal tenderness.   Musculoskeletal:      Right lower leg: No edema.      Left lower leg: No edema.   Neurological:      Mental Status: He is alert.       Duke Hurtado,    "

## 2024-07-03 ENCOUNTER — OFFICE VISIT (OUTPATIENT)
Dept: PHYSICAL THERAPY | Facility: CLINIC | Age: 59
End: 2024-07-03
Payer: OTHER MISCELLANEOUS

## 2024-07-03 DIAGNOSIS — S43.004D DISLOCATION OF RIGHT SHOULDER JOINT, SUBSEQUENT ENCOUNTER: Primary | ICD-10-CM

## 2024-07-03 DIAGNOSIS — M75.121 COMPLETE TEAR OF RIGHT ROTATOR CUFF, UNSPECIFIED WHETHER TRAUMATIC: ICD-10-CM

## 2024-07-03 PROCEDURE — 97110 THERAPEUTIC EXERCISES: CPT | Performed by: PHYSICAL THERAPIST

## 2024-07-03 PROCEDURE — 97140 MANUAL THERAPY 1/> REGIONS: CPT | Performed by: PHYSICAL THERAPIST

## 2024-07-03 NOTE — PROGRESS NOTES
"Daily Note     Today's date: 7/3/2024  Patient name: Osmin Hook  : 1965  MRN: [id number]  Referring provider: Tommie Baird DO  Dx:   Encounter Diagnosis     ICD-10-CM    1. Dislocation of right shoulder joint, subsequent encounter  S43.004D       2. Complete tear of right rotator cuff, unspecified whether traumatic  M75.121                      Subjective: Patient states that he has been experiencing some soreness throughout the week.  Notes that doing abduction based movements are difficult for him.        Objective: See treatment diary below      Assessment: Secondary to ongoing soreness, decreased resistance today.  Attempted to perform 1# elevation exercises in incline position vs standing, continues to have pain and difficulty with this.  Progress as able to tolerate nv.        Plan: Continue per plan of care.      Precautions: HTN, hx of lumbar fusion, Lyme's  POC expires Unit limit Auth Expiration date PT/OT/ST + Visit Limit?   24 BOMN 24 BOMN                           Visit/Unit Tracking  AUTH Status:  Date    60 visits Used 13 14 15 16 17 18 19 20 21 22 23 24 25 26    Remaining  17 16 45 44 43 42 41 40 39 38 37 36 35 34         Foto             Manuals 5/28 5/30 6/4 6/6  6/11 6/13 6/20  6/25 6/27 7/3   R inf mobs        GS  Gr 3 MD Gr 3                                           Neuro Re-Ed             Scap retraction    x30         SL scap retraction iso X20 3\" X20 3\" X20 3\" X20 3\"         TB row L only             Perturbation in flex       4 x 15\" 3x15\" 3x15\" 4x15\"   Shoulder taps against wall        x18 x20                               Ther Ex             Pendulums             Elbow flexion x20            R shoulder PROM DANIELA SUAREZ   DANIELA SUAREZ MD    Reynaldo x30 x30  X30  x30 x30    x20   AROM to 90*- flex, ABD     X10 ea X10 1# flx   0# abd X10  1# flex 0# abd  X15  1#  Flex  X10  1#  ABD X15 1# Flex " x10 1# ABD Supine elevation 1# 15x flx standing abd 10#   TB row X20 gtb X20 gtb X30  gtb X30 gtb  X30  btb X30 btb X30 btb  X30  black X30 black  30x Gtb   TB ext X20 gtb X20 gtb X30  gtb X30 gtb  X30  btb X30 btb X30 btb X30  black X30 black  2x10  gtb   TB ER   X20 rtb X30  rtb X30 rtb  X20  rtb X20 rtb X18 rtb X20  gtb X20 gtb 15x gtb    TB IR  X20 rtb X30  rtb X30 rtb  X30  gtb X30 gtb X30 gtb X30  btb X30 btb 20x gtb   TB tricep extension  X20 btb X30  btb X30 btb  X30  btb X30 btb X30 btb X30  black X30 black     Table slide X20 flex  X15 flex and ABD X15 flex, scap, abd  X20  Flex, ABD X20 flex, ABD  X20 flex and ABD  PB X20 flex and ABD PB 20x flex and abd seated with pball   Finger ladder x10 x5 x8 X10  x10 X10  X10 with ecc lowering      Pt edu GS            UBE     2'2'  2'2' 3'3' 3'3' 3'3'   Ther Activity             Gripping                          Gait Training                                       Modalities                                                                            Daily Note     Today's date: 7/3/2024  Patient name: Osmin Hook  : 1965  MRN: [id number]  Referring provider: Tommie Baird DO  Dx:   Encounter Diagnosis     ICD-10-CM    1. Dislocation of right shoulder joint, subsequent encounter  S43.004D       2. Complete tear of right rotator cuff, unspecified whether traumatic  M75.121                      Subjective: Patient reports his arm being sore today after reaching out in front of him to close the screen door at his house. Patient states he was sore after last session which only lasted into the night.      Objective: See treatment diary below      Assessment: Tolerated treatment well. Patient performed UBE to improve UE muscular endurance. Patient demonstrated increased ROM during PROM. Patient would benefit from continued PT.      Plan: Continue per plan of care.      Precautions: HTN, hx of lumbar fusion, Lyme's  POC expires Unit limit Auth Expiration  "date PT/OT/ST + Visit Limit?   4/16/24 BOMN 5/6/24 BOMN                           Visit/Unit Tracking  AUTH Status:  Date 4/23 4/25 4/30 5/2 5/6 5/9 5/14 5/16 5/21 5/23 5/28 5/30 6/4 6/6   60 visits Used 13 14 15 16 17 18 19 20 21 22 23 24 25 26    Remaining  17 16 45 44 43 42 41 40 39 38 37 36 35 34         Foto   6/4          Manuals 5/28 5/30 6/4 6/6  6/11 6/13 6/20  6/25 6/27 7/3   R inf mobs        GS  Gr 3 MD Gr 3 LH                                          Neuro Re-Ed          7/3   Scap retraction    x30         SL scap retraction iso X20 3\" X20 3\" X20 3\" X20 3\"         TB row L only             Perturbation in flex       4 x 15\" 3x15\" 3x15\"    Shoulder taps against wall        x18 x20                               Ther Ex          7/3   Pendulums             Elbow flexion x20            R shoulder PROM  MD [facility] MD MD DANIELA MILIAN    Reynaldo x30 x30  X30  x30 x30       AROM to 90*- flex, ABD     X10 ea X10 1# flx   0# abd X10  1# flex 0# abd  X15  1#  Flex  X10  1#  ABD X15 1# Flex x10 1# ABD    TB row X20 gtb X20 gtb X30  gtb X30 gtb  X30  btb X30 btb X30 btb  X30  black X30 black     TB ext X20 gtb X20 gtb X30  gtb X30 gtb  X30  btb X30 btb X30 btb X30  black X30 black     TB ER   X20 rtb X30  rtb X30 rtb  X20  rtb X20 rtb X18 rtb X20  gtb X20 gtb    TB IR  X20 rtb X30  rtb X30 rtb  X30  gtb X30 gtb X30 gtb X30  btb X30 btb    TB tricep extension  X20 btb X30  btb X30 btb  X30  btb X30 btb X30 btb X30  black X30 black     Table slide X20 flex  X15 flex and ABD X15 flex, scap, abd  X20  Flex, ABD X20 flex, ABD  X20 flex and ABD  PB X20 flex and ABD PB    Finger ladder x10 x5 x8 X10  x10 X10  X10 with ecc lowering      Pt edu GS            UBE     2'2'  2'2' 3'3' 3'3' 3'/3'   Ther Activity             Gripping                          Gait Training                                       Modalities                                                                              "

## 2024-07-09 ENCOUNTER — OFFICE VISIT (OUTPATIENT)
Dept: PHYSICAL THERAPY | Facility: CLINIC | Age: 59
End: 2024-07-09
Payer: OTHER MISCELLANEOUS

## 2024-07-09 DIAGNOSIS — M75.121 COMPLETE TEAR OF RIGHT ROTATOR CUFF, UNSPECIFIED WHETHER TRAUMATIC: ICD-10-CM

## 2024-07-09 DIAGNOSIS — S43.004D DISLOCATION OF RIGHT SHOULDER JOINT, SUBSEQUENT ENCOUNTER: Primary | ICD-10-CM

## 2024-07-09 PROCEDURE — 97140 MANUAL THERAPY 1/> REGIONS: CPT

## 2024-07-09 PROCEDURE — 97110 THERAPEUTIC EXERCISES: CPT

## 2024-07-09 NOTE — PROGRESS NOTES
" Daily Note     Today's date: 2024  Patient name: Osmin Hook  : 1965  MRN: [id number]  Referring provider: Tommie Baird DO  Dx:   Encounter Diagnosis     ICD-10-CM    1. Dislocation of right shoulder joint, subsequent encounter  S43.004D       2. Complete tear of right rotator cuff, unspecified whether traumatic  M75.121           Start Time: 1545  Stop Time: 1625  Total time in clinic (min): 40 minutes    Subjective: Patient reports his soreness decreased over the long weekend. Pt reports most of his symptom irritability is with shoulder flexion past 90.        Objective: See treatment diary below      Assessment: Tolerated treatment well. Pt was able to get more active ROM reps w/ resistance compared to previous visits. Manuals were able to increase PROM and decrease some irritability at end range. Pt most limited in ER and shoulder flexion. Pt had constant dull ache symptom irritability throughout the session. Patient demonstrated fatigue post treatment, exhibited good technique with therapeutic exercises, and would benefit from continued PT for increased ROM, increased strength, and decreased symptom irritability.       Plan: Continue per plan of care.      Precautions: HTN, hx of lumbar fusion, Lyme's  POC expires Unit limit Auth Expiration date PT/OT/ST + Visit Limit?   24 BOMN 24 BOMN                           Visit/Unit Tracking  AUTH Status:  Date    60 visits Used 13 14 15 16 17 18 19 20 21 22 23 24 25 26    Remaining  17 16 45 44 43 42 41 40 39 38 37 36 35 34         Foto              Manuals 5/28 5/30 6/4 6/6  6/11 6/13 6/20  6/25 6/27 7/3 7/9   R inf mobs        GS  Gr 3 MD Gr 3 LH JMK G3-4   R shoulder PROM           JMK   Median nerve glide           JMK                 Neuro Re-Ed          7/3    Scap retraction    x30          SL scap retraction iso X20 3\" X20 3\" X20 3\" X20 3\"          TB row L only " "             Perturbation in flex       4 x 15\" 3x15\" 3x15\"     Shoulder taps against wall        x18 x20                                  Ther Ex          7/3    Pendulums              Elbow flexion x20             R shoulder PROM GS MD SUAREZ   DANIELA SUAREZ MD     Reynaldo x30 x30  X30  x30 x30        AROM to 90*- flex, ABD     X10 ea X10 1# flx   0# abd X10  1# flex 0# abd  X15  1#  Flex  X10  1#  ABD X15 1# Flex x10 1# ABD  3x10 1# Flex 2x10 1# ABD   TB row X20 gtb X20 gtb X30  gtb X30 gtb  X30  btb X30 btb X30 btb  X30  black X30 black   3x10 BLTB   TB ext X20 gtb X20 gtb X30  gtb X30 gtb  X30  btb X30 btb X30 btb X30  black X30 black   3x10 BLTB   TB ER   X20 rtb X30  rtb X30 rtb  X20  rtb X20 rtb X18 rtb X20  gtb X20 gtb  2x10 GTB   TB IR  X20 rtb X30  rtb X30 rtb  X30  gtb X30 gtb X30 gtb X30  btb X30 btb  2x10 GTB   TB tricep extension  X20 btb X30  btb X30 btb  X30  btb X30 btb X30 btb X30  black X30 black   3x10 BLTB   Table slide X20 flex  X15 flex and ABD X15 flex, scap, abd  X20  Flex, ABD X20 flex, ABD  X20 flex and ABD  PB X20 flex and ABD PB     Finger ladder x10 x5 x8 X10  x10 X10  X10 with ecc lowering       Pt edu GS             UBE     2'2'  2'2' 3'3' 3'3' 3'/3' 3'/3'   Ther Activity              Gripping                            Gait Training                                          Modalities                                                                                 "

## 2024-07-11 ENCOUNTER — OFFICE VISIT (OUTPATIENT)
Dept: PHYSICAL THERAPY | Facility: CLINIC | Age: 59
End: 2024-07-11
Payer: OTHER MISCELLANEOUS

## 2024-07-11 DIAGNOSIS — M75.121 COMPLETE TEAR OF RIGHT ROTATOR CUFF, UNSPECIFIED WHETHER TRAUMATIC: ICD-10-CM

## 2024-07-11 DIAGNOSIS — S43.004D DISLOCATION OF RIGHT SHOULDER JOINT, SUBSEQUENT ENCOUNTER: Primary | ICD-10-CM

## 2024-07-11 DIAGNOSIS — S93.402A SPRAIN OF UNSPECIFIED LIGAMENT OF LEFT ANKLE, INITIAL ENCOUNTER: ICD-10-CM

## 2024-07-11 PROCEDURE — 97110 THERAPEUTIC EXERCISES: CPT

## 2024-07-11 PROCEDURE — 97140 MANUAL THERAPY 1/> REGIONS: CPT

## 2024-07-11 NOTE — PROGRESS NOTES
Daily Note     Today's date: 2024  Patient name: Osmin Hook  : 1965  MRN: [id number]  Referring provider: Tommie Baird DO  Dx:   Encounter Diagnosis     ICD-10-CM    1. Dislocation of right shoulder joint, subsequent encounter  S43.004D       2. Complete tear of right rotator cuff, unspecified whether traumatic  M75.121       3. Sprain of unspecified ligament of left ankle, initial encounter  S93.402A         Start Time: 1547  Stop Time: 1634  Total time in clinic (min): 47 minutes    Subjective: Patient reports no major changes since last visit. Pt had one episode of increased sharp pain a few nights ago that woke the Pt from his sleep. The pain subsided after a few minutes.        Objective: See treatment diary below      Assessment: Tolerated treatment well. Pt was challenged by exercise prescription. Increase rotator cuff exercise volume w/o increase in symptom irritability. Added scaption to program. Pt had increase in shoulder flexion following manuals. Patient demonstrated fatigue post treatment, exhibited good technique with therapeutic exercises, and would benefit from continued PT for increased ROM, increased strength, and decreased symptom irritability.       Plan: Continue per plan of care.      Precautions: HTN, hx of lumbar fusion, Lyme's  POC expires Unit limit Auth Expiration date PT/OT/ST + Visit Limit?   24 BOMN 24 BOMN                           Visit/Unit Tracking  AUTH Status:  Date    60 visits Used 13 14 15 16 17 18 19 20 21 22 23 24 25 26    Remaining  17 16 45 44 43 42 41 40 39 38 37 36 35 34         Foto               Manuals 5/28 5/30 6/4 6/6  6/11 6/13 6/20  6/25 6/27 7/3 7/9 7/11   R inf mobs        GS  Gr 3 MD Gr 3 LH JMK G3-4 JMK G3-4   R shoulder PROM           MITESHK JUAN A   Median nerve glide           JUAN A                   Neuro Re-Ed          7/3     Scap retraction    x30          "  SL scap retraction iso X20 3\" X20 3\" X20 3\" X20 3\"           TB row L only               Perturbation in flex       4 x 15\" 3x15\" 3x15\"      Shoulder taps against wall        x18 x20                                     Ther Ex          7/3     Pendulums               Elbow flexion x20              R shoulder PROM GS MD SUAREZ   DANIELA SUAREZ MD      Reynaldo x30 x30  X30  x30 x30         AROM to 90*- flex, ABD     X10 ea X10 1# flx   0# abd X10  1# flex 0# abd  X15  1#  Flex  X10  1#  ABD X15 1# Flex x10 1# ABD  3x10 1# Flex 2x10 1# ABD 3x10 1# Flex 3x10 1# ABD   AROM scaption            2x10 1#   TB row X20 gtb X20 gtb X30  gtb X30 gtb  X30  btb X30 btb X30 btb  X30  black X30 black   3x10 BLTB 3x10 BLTB   TB ext X20 gtb X20 gtb X30  gtb X30 gtb  X30  btb X30 btb X30 btb X30  black X30 black   3x10 BLTB 3x10 BLTB   TB ER   X20 rtb X30  rtb X30 rtb  X20  rtb X20 rtb X18 rtb X20  gtb X20 gtb  2x10 GTB 3x10 GTB   TB IR  X20 rtb X30  rtb X30 rtb  X30  gtb X30 gtb X30 gtb X30  btb X30 btb  2x10 GTB 3x10 GTB   TB tricep extension  X20 btb X30  btb X30 btb  X30  btb X30 btb X30 btb X30  black X30 black   3x10 BLTB 3x10 BLTB   Table slide X20 flex  X15 flex and ABD X15 flex, scap, abd  X20  Flex, ABD X20 flex, ABD  X20 flex and ABD  PB X20 flex and ABD PB      Finger ladder x10 x5 x8 X10  x10 X10  X10 with ecc lowering        Pt edu GS              UBE     2'2'  2'2' 3'3' 3'3' 3'/3' 3'/3' 3'/3'   Ther Activity               Gripping                              Gait Training                                             Modalities                                                                                    "

## 2024-07-16 ENCOUNTER — OFFICE VISIT (OUTPATIENT)
Dept: PHYSICAL THERAPY | Facility: CLINIC | Age: 59
End: 2024-07-16
Payer: OTHER MISCELLANEOUS

## 2024-07-16 DIAGNOSIS — M75.121 COMPLETE TEAR OF RIGHT ROTATOR CUFF, UNSPECIFIED WHETHER TRAUMATIC: ICD-10-CM

## 2024-07-16 DIAGNOSIS — S93.402A SPRAIN OF UNSPECIFIED LIGAMENT OF LEFT ANKLE, INITIAL ENCOUNTER: ICD-10-CM

## 2024-07-16 DIAGNOSIS — S43.004D DISLOCATION OF RIGHT SHOULDER JOINT, SUBSEQUENT ENCOUNTER: Primary | ICD-10-CM

## 2024-07-16 PROCEDURE — 97110 THERAPEUTIC EXERCISES: CPT

## 2024-07-16 PROCEDURE — 97112 NEUROMUSCULAR REEDUCATION: CPT

## 2024-07-16 PROCEDURE — 97140 MANUAL THERAPY 1/> REGIONS: CPT

## 2024-07-16 NOTE — PROGRESS NOTES
Daily Note     Today's date: 2024  Patient name: Osmin Hook  : 1965  MRN: [id number]  Referring provider: Tommie Baird DO  Dx:   Encounter Diagnosis     ICD-10-CM    1. Dislocation of right shoulder joint, subsequent encounter  S43.004D       2. Complete tear of right rotator cuff, unspecified whether traumatic  M75.121       3. Sprain of unspecified ligament of left ankle, initial encounter  S93.402A           Start Time: 1545  Stop Time: 1630  Total time in clinic (min): 45 minutes    Subjective: Patient reports no major changes. Has been getting more pain at night with a tension feeling stemming from neck into the forearm.       Objective: See treatment diary below      Assessment: Tolerated treatment well. Dye to Pt's limited mobility unable to properly test neurodynamic w/o causing symptom irritability in shoulder. Educated and provide Pt w/ median and ulnar nerve glides to perform at home to reduce the tension. Pt had more Median nerve tension w/ glide compared to Ulnar. Advance rows to Shellie MAXWELL. Pt still challenged by exercise prescription. Patient demonstrated fatigue post treatment, exhibited good technique with therapeutic exercises, and would benefit from continued PT for increased ROM, increased strength, and decreased symptom irritability.       Plan: Continue per plan of care.      Precautions: HTN, hx of lumbar fusion, Lyme's  POC expires Unit limit Auth Expiration date PT/OT/ST + Visit Limit?   24 BOMN 24 BOMN                           Visit/Unit Tracking  AUTH Status:  Date    60 visits Used 13 14 15 16 17 18 19 20 21 22 23 24 25 26    Remaining  17 16 45 44 43 42 41 40 39 38 37 36 35 34         Foto                Manuals 5/28 5/30 6/4 6/6  6/11 6/13 6/20  6/25 6/27 7/3 7/9 7/11 7/16   R inf mobs        GS  Gr 3 MD Gr 3 LH JMK G3-4 JMK G3-4 JMK G3-4   R shoulder PROM           JUAN A VELAZCO  "  Median nerve glide           JMK                     Neuro Re-Ed          7/3      Scap retraction    x30            SL scap retraction iso X20 3\" X20 3\" X20 3\" X20 3\"            TB row L only                Perturbation in flex       4 x 15\" 3x15\" 3x15\"       Shoulder taps against wall        x18 x20        Median, Ulnar, and Radial nerve glide             8'                   Ther Ex          7/3      Pendulums                Elbow flexion x20               R shoulder PROM  MD SUAREZ   DNAIELA SUAREZ MD       Reynaldo x30 x30  X30  x30 x30          AROM to 90*- flex, ABD     X10 ea X10 1# flx   0# abd X10  1# flex 0# abd  X15  1#  Flex  X10  1#  ABD X15 1# Flex x10 1# ABD  3x10 1# Flex 2x10 1# ABD 3x10 1# Flex 3x10 1# ABD 3x10 1# Flex 3x10 1# ABD   AROM scaption            2x10 1# 2x10 1#   TB row X20 gtb X20 gtb X30  gtb X30 gtb  X30  btb X30 btb X30 btb  X30  black X30 black   3x10 BLTB 3x10 BLTB 3x10 BLTB   TB ext X20 gtb X20 gtb X30  gtb X30 gtb  X30  btb X30 btb X30 btb X30  black X30 black   3x10 BLTB 3x10 BLTB 3x10 BLTB   TB ER   X20 rtb X30  rtb X30 rtb  X20  rtb X20 rtb X18 rtb X20  gtb X20 gtb  2x10 GTB 3x10 GTB 3x10 GTB   TB IR  X20 rtb X30  rtb X30 rtb  X30  gtb X30 gtb X30 gtb X30  btb X30 btb  2x10 GTB 3x10 GTB 3x10 GTB   TB tricep extension  X20 btb X30  btb X30 btb  X30  btb X30 btb X30 btb X30  black X30 black   3x10 BLTB 3x10 BLTB 3x10 BLTB   Table slide X20 flex  X15 flex and ABD X15 flex, scap, abd  X20  Flex, ABD X20 flex, ABD  X20 flex and ABD  PB X20 flex and ABD PB       Finger ladder x10 x5 x8 X10  x10 X10  X10 with ecc lowering         Pt edu GS               UBE     2'2'  2'2' 3'3' 3'3' 3'/3' 3'/3' 3'/3' 3'/3'   Shellie MAXWELL   Ther Activity                Gripping                                Gait Training                                                Modalities                                                         "

## 2024-07-18 ENCOUNTER — OFFICE VISIT (OUTPATIENT)
Dept: PHYSICAL THERAPY | Facility: CLINIC | Age: 59
End: 2024-07-18
Payer: OTHER MISCELLANEOUS

## 2024-07-18 DIAGNOSIS — S43.004D DISLOCATION OF RIGHT SHOULDER JOINT, SUBSEQUENT ENCOUNTER: Primary | ICD-10-CM

## 2024-07-18 DIAGNOSIS — M75.121 COMPLETE TEAR OF RIGHT ROTATOR CUFF, UNSPECIFIED WHETHER TRAUMATIC: ICD-10-CM

## 2024-07-18 DIAGNOSIS — S93.402A SPRAIN OF UNSPECIFIED LIGAMENT OF LEFT ANKLE, INITIAL ENCOUNTER: ICD-10-CM

## 2024-07-18 PROCEDURE — 97110 THERAPEUTIC EXERCISES: CPT

## 2024-07-18 PROCEDURE — 97140 MANUAL THERAPY 1/> REGIONS: CPT

## 2024-07-18 NOTE — PROGRESS NOTES
Daily Note     Today's date: 2024  Patient name: Osmin Hook  : 1965  MRN: [id number]  Referring provider: Tommie Baird DO  Dx:   Encounter Diagnosis     ICD-10-CM    1. Dislocation of right shoulder joint, subsequent encounter  S43.004D       2. Complete tear of right rotator cuff, unspecified whether traumatic  M75.121       3. Sprain of unspecified ligament of left ankle, initial encounter  S93.402A             Start Time: 1545  Stop Time: 1630  Total time in clinic (min): 45 minutes    Subjective: Patient reports some soreness from last visit, Pt willing to try to increase resistance today.       Objective: See treatment diary below      Assessment: Tolerated treatment well. While completing [place] row, Pt compensated by shrugging his upper traps causing soreness in the R side. Following cueing, Pt was able to properly retract his scapulas w/o causing pain in the R upper trap. Increased shoulder flexion resistance, was unable to increase scaption and abduction as it was too difficult. Pt reported soreness at end of session. Patient demonstrated fatigue post treatment, exhibited good technique with therapeutic exercises, and would benefit from continued PT for increased ROM, increased strength, and decreased symptom irritability.       Plan: Continue per plan of care.      Precautions: HTN, hx of lumbar fusion, Lyme's  POC expires Unit limit Auth Expiration date PT/OT/ST + Visit Limit?   24 BOMN 24 BOMN                           Visit/Unit Tracking  AUTH Status:  Date  5/   60 visits Used 13 14 15 16 17 18 19 20 21 22 23 24 25 26    Remaining  17 16 45 44 43 42 41 40 39 38 37 36 35 34         Foto                 Manuals 5/28 5/30 6/4 6/6  6/11 6/13 6/20  6/25 6/27 7/3 7/9 7/11 7/16 7/18   R inf mobs        GS  Gr 3 MD Gr 3 LH JMK G3-4 JMK G3-4 JMK G3-4 JMK G3-4   R shoulder PROM           JMK JMK JMK JMK   Median  "nerve glide           JMK                       Neuro Re-Ed          7/3       Scap retraction    x30             SL scap retraction iso X20 3\" X20 3\" X20 3\" X20 3\"             TB row L only                 Perturbation in flex       4 x 15\" 3x15\" 3x15\"        Shoulder taps against wall        x18 x20         Median, Ulnar, and Radial nerve glide             8'                     Ther Ex          7/3       Pendulums                 Elbow flexion x20                R shoulder PROM  MD SUAREZ   DANIELA SUAREZ MD        Reynaldo x30 x30  X30  x30 x30           AROM to 90*- flex, ABD     X10 ea X10 1# flx   0# abd X10  1# flex 0# abd  X15  1#  Flex  X10  1#  ABD X15 1# Flex x10 1# ABD  3x10 1# Flex 2x10 1# ABD 3x10 1# Flex 3x10 1# ABD 3x10 1# Flex 3x10 1# ABD 3x10 2# Flex 3x10 1# ABD   AROM scaption            2x10 1# 2x10 1# 3x10 1#   TB row X20 gtb X20 gtb X30  gtb X30 gtb  X30  btb X30 btb X30 btb  X30  black X30 black   3x10 BLTB 3x10 BLTB 3x10 BLTB    TB ext X20 gtb X20 gtb X30  gtb X30 gtb  X30  btb X30 btb X30 btb X30  black X30 black   3x10 BLTB 3x10 BLTB 3x10 BLTB 3x10 BLTB   TB ER   X20 rtb X30  rtb X30 rtb  X20  rtb X20 rtb X18 rtb X20  gtb X20 gtb  2x10 GTB 3x10 GTB 3x10 GTB 3x10 GTB   TB IR  X20 rtb X30  rtb X30 rtb  X30  gtb X30 gtb X30 gtb X30  btb X30 btb  2x10 GTB 3x10 GTB 3x10 GTB 3x10 GTB   TB tricep extension  X20 btb X30  btb X30 btb  X30  btb X30 btb X30 btb X30  black X30 black   3x10 BLTB 3x10 BLTB 3x10 BLTB 3x10 BLTB   Table slide X20 flex  X15 flex and ABD X15 flex, scap, abd  X20  Flex, ABD X20 flex, ABD  X20 flex and ABD  PB X20 flex and ABD PB        Finger ladder x10 x5 x8 X10  x10 X10  X10 with ecc lowering          Pt edu GS                UBE     2'2'  2'2' 3'3' 3'3' 3'/3' 3'/3' 3'/3' 3'/3' 3'/3'   [place] Row              NV 3x10 12#   Ther Activity                 Gripping                                  Gait Training                                                   Modalities           "

## 2024-07-23 ENCOUNTER — OFFICE VISIT (OUTPATIENT)
Dept: PHYSICAL THERAPY | Facility: CLINIC | Age: 59
End: 2024-07-23
Payer: OTHER MISCELLANEOUS

## 2024-07-23 DIAGNOSIS — S43.004D DISLOCATION OF RIGHT SHOULDER JOINT, SUBSEQUENT ENCOUNTER: Primary | ICD-10-CM

## 2024-07-23 DIAGNOSIS — M75.121 COMPLETE TEAR OF RIGHT ROTATOR CUFF, UNSPECIFIED WHETHER TRAUMATIC: ICD-10-CM

## 2024-07-23 DIAGNOSIS — S93.402A SPRAIN OF UNSPECIFIED LIGAMENT OF LEFT ANKLE, INITIAL ENCOUNTER: ICD-10-CM

## 2024-07-23 PROCEDURE — 97140 MANUAL THERAPY 1/> REGIONS: CPT

## 2024-07-23 PROCEDURE — 97110 THERAPEUTIC EXERCISES: CPT

## 2024-07-23 NOTE — ASSESSMENT & PLAN NOTE
Blood pressure controlled. Continue current anti-HTN regimen.     Pt has HMO under [facility] with Dr. Coker as her PCP. No PA required for x-rays.

## 2024-07-23 NOTE — PROGRESS NOTES
Daily Note     Today's date: 2024  Patient name: Osmin Hook  : 1965  MRN: [id number]  Referring provider: Tommie Baird DO  Dx:   Encounter Diagnosis     ICD-10-CM    1. Dislocation of right shoulder joint, subsequent encounter  S43.004D       2. Complete tear of right rotator cuff, unspecified whether traumatic  M75.121       3. Sprain of unspecified ligament of left ankle, initial encounter  S93.402A         Start Time: 1543  Stop Time: 1625  Total time in clinic (min): 42 minutes    Subjective: Patient reports that he was sore yesterday and that there have been no major changes since last visit. Pt reports no major soreness following last session.       Objective: See treatment diary below      Assessment: Tolerated treatment well. Increase rotator cuff TB resistance, Pt reported increased soreness during last ten reps of IR and half way through ER. Pt is slowly progressing in strength each visit. Pt no longer required cueing for rows as there was no compensation by shoulder shrug. Patient demonstrated fatigue post treatment, exhibited good technique with therapeutic exercises, and would benefit from continued PT for increased ROM, increased strength, and decreased symptom irritability.       Plan: Continue per plan of care.      Precautions: HTN, hx of lumbar fusion, Lyme's  POC expires Unit limit Auth Expiration date PT/OT/ST + Visit Limit?   24 BOMN 24 BOMN                           Visit/Unit Tracking  AUTH Status:  Date  5/   60 visits Used 13 14 15 16 17 18 19 20 21 22 23 24 25 26    Remaining  17 16 45 44 43 42 41 40 39 38 37 36 35 34         Foto           Manuals 6/25 6/27 7/3 7/9 7/11 7/16 7/18 7/23   R inf mobs GS  Gr 3 MD Gr 3 LH JMK G3-4 JMK G3-4 JMK G3-4 JMK G3-4 JMK G3-4   R shoulder PROM    JMK JMK JMK JMK JMK   Median nerve glide    JMK                  Neuro Re-Ed   7/3        Scap retraction          "  SL scap retraction iso           TB row L only           Perturbation in flex 3x15\" 3x15\"         Shoulder taps against wall  x20          Median, Ulnar, and Radial nerve glide      8'                Ther Ex   7/3        Pendulums           Elbow flexion           R shoulder PROM GS MD LH        Reynaldo           AROM to 90*- flex, ABD X15  1#  Flex  X10  1#  ABD X15 1# Flex x10 1# ABD  3x10 1# Flex 2x10 1# ABD 3x10 1# Flex 3x10 1# ABD 3x10 1# Flex 3x10 1# ABD 3x10 2# Flex 3x10 1# ABD 3x10 2# Flex 3x10 1# ABD   AROM scaption     2x10 1# 2x10 1# 3x10 1# 2x10 2#   TB row X30  black X30 black   3x10 BLTB 3x10 BLTB 3x10 BLTB     TB ext X30  black X30 black   3x10 BLTB 3x10 BLTB 3x10 BLTB 3x10 BLTB 3x10 BLTB   TB ER  X20  gtb X20 gtb  2x10 GTB 3x10 GTB 3x10 GTB 3x10 GTB 3x10 BTB   TB IR X30  btb X30 btb  2x10 GTB 3x10 GTB 3x10 GTB 3x10 GTB 3x10 BTB   TB tricep extension X30  black X30 black   3x10 BLTB 3x10 BLTB 3x10 BLTB 3x10 BLTB 3x10 BLTB   Table slide X20 flex and ABD  PB X20 flex and ABD PB         Finger ladder           Pt edu           UBE 3'3' 3'3' 3'/3' 3'/3' 3'/3' 3'/3' 3'/3' 3'/3'   Shellie Row       NV 3x10 12# 3x10 12#   Ther Activity           Gripping                      Gait Training                                 Modalities                                          "

## 2024-07-25 ENCOUNTER — OFFICE VISIT (OUTPATIENT)
Dept: PHYSICAL THERAPY | Facility: CLINIC | Age: 59
End: 2024-07-25
Payer: OTHER MISCELLANEOUS

## 2024-07-25 DIAGNOSIS — M75.121 COMPLETE TEAR OF RIGHT ROTATOR CUFF, UNSPECIFIED WHETHER TRAUMATIC: ICD-10-CM

## 2024-07-25 DIAGNOSIS — S93.402A SPRAIN OF UNSPECIFIED LIGAMENT OF LEFT ANKLE, INITIAL ENCOUNTER: ICD-10-CM

## 2024-07-25 DIAGNOSIS — S43.004D DISLOCATION OF RIGHT SHOULDER JOINT, SUBSEQUENT ENCOUNTER: Primary | ICD-10-CM

## 2024-07-25 PROCEDURE — 97140 MANUAL THERAPY 1/> REGIONS: CPT

## 2024-07-25 PROCEDURE — 97110 THERAPEUTIC EXERCISES: CPT

## 2024-07-25 NOTE — PROGRESS NOTES
Daily Note     Today's date: 2024  Patient name: Osmin Hook  : 1965  MRN: [id number]  Referring provider: Tommie Baird DO  Dx:   Encounter Diagnosis     ICD-10-CM    1. Dislocation of right shoulder joint, subsequent encounter  S43.004D       2. Complete tear of right rotator cuff, unspecified whether traumatic  M75.121       3. Sprain of unspecified ligament of left ankle, initial encounter  S93.402A         Start Time: 1542  Stop Time: 1625  Total time in clinic (min): 43 minutes    Subjective: Patient reports that he is a little sore w/ no major changes since last visit.       Objective: See treatment diary below      Assessment: Tolerated treatment well. Pt shoulder abduction ROM is WFL. Pt still has minor limitations w/ shoulder flexion, ER, IR, w/ flexion being the most limiting functionally. Pt did well w/ shoulder abduction resistance increase, reported minor increase in soreness. Measures Shoulder ROM NV. Patient demonstrated fatigue post treatment, exhibited good technique with therapeutic exercises, and would benefit from continued PT for increased ROM, increased strength, and decreased symptom irritability.       Plan: Continue per plan of care.      Precautions: HTN, hx of lumbar fusion, Lyme's  POC expires Unit limit Auth Expiration date PT/OT/ST + Visit Limit?   24 BOMN 24 BOMN                           Visit/Unit Tracking  AUTH Status:  Date    60 visits Used 13 14 15 16 17 18 19 20 21 22 23 24 25 26    Remaining  17 16 45 44 43 42 41 40 39 38 37 36 35 34         Foto            Manuals 6/25 6/27 7/3 7/9 7/11 7/16 7/18 7/23 7/25   R inf mobs GS  Gr 3 MD Gr 3 LH JMK G3-4 JMK G3-4 JMK G3-4 JMK G3-4 JMK G3-4 JMK G3-4   R shoulder PROM    JMK JMK JMK JMK JMK JMK   Median nerve glide    JMK                    Neuro Re-Ed   7/3         Scap retraction            SL scap retraction iso            TB row L  "only            Perturbation in flex 3x15\" 3x15\"          Shoulder taps against wall  x20           Median, Ulnar, and Radial nerve glide      8'                  Ther Ex   7/3         Pendulums            Elbow flexion         3x10 2#   R shoulder PROM GS MD LH         Reynaldo            AROM to 90*- flex, ABD X15  1#  Flex  X10  1#  ABD X15 1# Flex x10 1# ABD  3x10 1# Flex 2x10 1# ABD 3x10 1# Flex 3x10 1# ABD 3x10 1# Flex 3x10 1# ABD 3x10 2# Flex 3x10 1# ABD 3x10 2# Flex 3x10 1# ABD 3x10 2# Flex 3x10 2# ABD   AROM scaption     2x10 1# 2x10 1# 3x10 1# 2x10 2# 2x10 2#   TB row X30  black X30 black   3x10 BLTB 3x10 BLTB 3x10 BLTB      TB ext X30  black X30 black   3x10 BLTB 3x10 BLTB 3x10 BLTB 3x10 BLTB 3x10 BLTB 3x10 BLTB   TB ER  X20  gtb X20 gtb  2x10 GTB 3x10 GTB 3x10 GTB 3x10 GTB 3x10 BTB 3x10 BTB   TB IR X30  btb X30 btb  2x10 GTB 3x10 GTB 3x10 GTB 3x10 GTB 3x10 BTB 3x10 BTB   TB tricep extension X30  black X30 black   3x10 BLTB 3x10 BLTB 3x10 BLTB 3x10 BLTB 3x10 BLTB 3x10 BLTB   Table slide X20 flex and ABD  PB X20 flex and ABD PB          Finger ladder            Pt edu            UBE 3'3' 3'3' 3'/3' 3'/3' 3'/3' 3'/3' 3'/3' 3'/3' 3'/3'   Shellie Row       NV 3x10 12# 3x10 12# 3x10 12#   Ther Activity            Gripping                        Gait Training                                    Modalities                                             "

## 2024-07-30 ENCOUNTER — OFFICE VISIT (OUTPATIENT)
Dept: PHYSICAL THERAPY | Facility: CLINIC | Age: 59
End: 2024-07-30
Payer: OTHER MISCELLANEOUS

## 2024-07-30 DIAGNOSIS — S93.402A SPRAIN OF UNSPECIFIED LIGAMENT OF LEFT ANKLE, INITIAL ENCOUNTER: ICD-10-CM

## 2024-07-30 DIAGNOSIS — S43.004D DISLOCATION OF RIGHT SHOULDER JOINT, SUBSEQUENT ENCOUNTER: Primary | ICD-10-CM

## 2024-07-30 DIAGNOSIS — M75.121 COMPLETE TEAR OF RIGHT ROTATOR CUFF, UNSPECIFIED WHETHER TRAUMATIC: ICD-10-CM

## 2024-07-30 PROCEDURE — 97110 THERAPEUTIC EXERCISES: CPT

## 2024-07-30 PROCEDURE — 97140 MANUAL THERAPY 1/> REGIONS: CPT

## 2024-07-30 NOTE — PROGRESS NOTES
Daily Note     Today's date: 2024  Patient name: Osmin Hook  : 1965  MRN: [id number]  Referring provider: Tommie Baird DO  Dx:   Encounter Diagnosis     ICD-10-CM    1. Dislocation of right shoulder joint, subsequent encounter  S43.004D       2. Complete tear of right rotator cuff, unspecified whether traumatic  M75.121       3. Sprain of unspecified ligament of left ankle, initial encounter  S93.402A           Start Time: 1543  Stop Time: 1628  Total time in clinic (min): 45 minutes    Subjective: Patient reports no major changes since last visit.       Objective: See treatment diary below    R Shoulder PROM:  Flexion- 150  ER- 50  IR- WFL  Abduction- 128    Assessment: Tolerated treatment well. Recorded new PROM measurements, Pt had end range pain w/ all motions. Pt did well w/ all exercises, no increase in symptom irritability. Continue to progress and challenge Pt NV. Patient demonstrated fatigue post treatment, exhibited good technique with therapeutic exercises, and would benefit from continued PT for increased ROM, increased strength, and decreased symptom irritability.       Plan: Continue per plan of care.      Precautions: HTN, hx of lumbar fusion, Lyme's  POC expires Unit limit Auth Expiration date PT/OT/ST + Visit Limit?   24 BOMN 24 BOMN                           Visit/Unit Tracking  AUTH Status:  Date    60 visits Used 13 14 15 16 17 18 19 20 21 22 23 24 25 26    Remaining  17 16 45 44 43 42 41 40 39 38 37 36 35 34         Foto             Manuals 6/25 6/27 7/3 7/9 7/11 7/16 7/18 7/23 7/25 7/30   R inf mobs GS  Gr 3 MD Gr 3 LH JMK G3-4 JMK G3-4 JMK G3-4 JMK G3-4 JMK G3-4 JMK G3-4 JMK G3-4   R shoulder PROM    JMK JMK JMK JMK JMK JMK JMK   Median nerve glide    JMK                      Neuro Re-Ed   7/3          Scap retraction             SL scap retraction iso             TB row L only            "  Perturbation in flex 3x15\" 3x15\"           Shoulder taps against wall  x20            Median, Ulnar, and Radial nerve glide      8'                    Ther Ex   7/3          Pendulums             Elbow flexion         2# 3x10 2# 3x10   R shoulder PROM GS MD LH          Reynaldo             AROM to 90*- flex, ABD X15  1#  Flex  X10  1#  ABD X15 1# Flex x10 1# ABD  3x10 1# Flex 2x10 1# ABD 3x10 1# Flex 3x10 1# ABD 3x10 1# Flex 3x10 1# ABD 3x10 2# Flex 3x10 1# ABD 3x10 2# Flex 3x10 1# ABD 3x10 2# Flex 3x10 2# ABD 3x10 2# Flex 3x10 2# ABD   AROM scaption     2x10 1# 2x10 1# 3x10 1# 2x10 2# 2x10 2# 2x10 2#   TB row X30  black X30 black   3x10 BLTB 3x10 BLTB 3x10 BLTB       TB ext X30  black X30 black   3x10 BLTB 3x10 BLTB 3x10 BLTB 3x10 BLTB 3x10 BLTB 3x10 BLTB 3x10 BLTB   TB ER  X20  gtb X20 gtb  2x10 GTB 3x10 GTB 3x10 GTB 3x10 GTB 3x10 BTB 3x10 BTB 3x10 BTB   TB IR X30  btb X30 btb  2x10 GTB 3x10 GTB 3x10 GTB 3x10 GTB 3x10 BTB 3x10 BTB 3x10 BTB   TB tricep extension X30  black X30 black   3x10 BLTB 3x10 BLTB 3x10 BLTB 3x10 BLTB 3x10 BLTB 3x10 BLTB 3x10 BLTB   Table slide X20 flex and ABD  PB X20 flex and ABD PB           Finger ladder             Pt edu             UBE 3'3' 3'3' 3'/3' 3'/3' 3'/3' 3'/3' 3'/3' 3'/3' 3'/3' 3'/3'   Shellie Row       NV 3x10 12# 3x10 12# 3x10 12# 3x10 12#   Ther Activity             Gripping                          Gait Training                                       Modalities                                                "

## 2024-08-01 ENCOUNTER — OFFICE VISIT (OUTPATIENT)
Dept: PHYSICAL THERAPY | Facility: CLINIC | Age: 59
End: 2024-08-01
Payer: OTHER MISCELLANEOUS

## 2024-08-01 DIAGNOSIS — S43.004D DISLOCATION OF RIGHT SHOULDER JOINT, SUBSEQUENT ENCOUNTER: Primary | ICD-10-CM

## 2024-08-01 DIAGNOSIS — S93.402A SPRAIN OF UNSPECIFIED LIGAMENT OF LEFT ANKLE, INITIAL ENCOUNTER: ICD-10-CM

## 2024-08-01 DIAGNOSIS — M75.121 COMPLETE TEAR OF RIGHT ROTATOR CUFF, UNSPECIFIED WHETHER TRAUMATIC: ICD-10-CM

## 2024-08-01 PROCEDURE — 97140 MANUAL THERAPY 1/> REGIONS: CPT

## 2024-08-01 PROCEDURE — 97110 THERAPEUTIC EXERCISES: CPT

## 2024-08-01 NOTE — PROGRESS NOTES
Daily Note     Today's date: 2024  Patient name: Osmin Hook  : 1965  MRN: [id number]  Referring provider: Tommie Baird DO  Dx:   Encounter Diagnosis     ICD-10-CM    1. Dislocation of right shoulder joint, subsequent encounter  S43.004D       2. Complete tear of right rotator cuff, unspecified whether traumatic  M75.121       3. Sprain of unspecified ligament of left ankle, initial encounter  S93.402A         Start Time: 1545  Stop Time: 1625  Total time in clinic (min): 40 minutes    Subjective: Patient reports yesterday he felt a pull in his bicep while picking up a toy from the ground. The pull sensation returned a few more times today but at a less intensity.       Objective: See treatment diary below    R Shoulder PROM:  Flexion- 150  ER- 50  IR- WFL  Abduction- 128    Assessment: Tolerated treatment well. Pt had increased pain during rows and during shoulder scaption. Reduced reps for scaption and decreased weight for rows. Pt did not have pain w/ neutral  bicep curls. Pt reported feeling sore after therapy today, monitor Pt NV to see if bicep irritation subsided. Patient demonstrated fatigue post treatment, exhibited good technique with therapeutic exercises, and would benefit from continued PT for increased ROM, increased strength, and decreased symptom irritability.       Plan: Continue per plan of care.      Precautions: HTN, hx of lumbar fusion, Lyme's  POC expires Unit limit Auth Expiration date PT/OT/ST + Visit Limit?   24 BOMN 24 BOMN                           Visit/Unit Tracking  AUTH Status:  Date    60 visits Used 13 14 15 16 17 18 19 20 21 22 23 24 25 26    Remaining  17 16 45 44 43 42 41 40 39 38 37 36 35 34         Foto              Manuals 6/25 6/27 7/3 7/9 7/11 7/16 7/18 7/23 7/25 7/30 8/1   R inf mobs GS  Gr 3 MD Gr 3 LH JMK G3-4 JMK G3-4 JMK G3-4 JMK G3-4 JMK G3-4 JMK G3-4 JMK G3-4 JMK G3-4  "  R shoulder PROM    JMK JUAN A VELAZCO   Median nerve glide    JMK                        Neuro Re-Ed   7/3           Scap retraction              SL scap retraction iso              TB row L only              Perturbation in flex 3x15\" 3x15\"            Shoulder taps against wall  x20             Median, Ulnar, and Radial nerve glide      8'                      Ther Ex   7/3           Pendulums              Elbow flexion         2# 3x10 2# 3x10 2# 3x10   R shoulder PROM GS MD LH           Reynaldo              AROM to 90*- flex, ABD X15  1#  Flex  X10  1#  ABD X15 1# Flex x10 1# ABD  3x10 1# Flex 2x10 1# ABD 3x10 1# Flex 3x10 1# ABD 3x10 1# Flex 3x10 1# ABD 3x10 2# Flex 3x10 1# ABD 3x10 2# Flex 3x10 1# ABD 3x10 2# Flex 3x10 2# ABD 3x10 2# Flex 3x10 2# ABD 3x10 2# Flex 3x10 2# ABD   AROM scaption     2x10 1# 2x10 1# 3x10 1# 2x10 2# 2x10 2# 2x10 2# X10 2#   TB row X30  black X30 black   3x10 BLTB 3x10 BLTB 3x10 BLTB     3x10 BLTB   TB ext X30  black X30 black   3x10 BLTB 3x10 BLTB 3x10 BLTB 3x10 BLTB 3x10 BLTB 3x10 BLTB 3x10 BLTB 3x10 BLTB   TB ER  X20  gtb X20 gtb  2x10 GTB 3x10 GTB 3x10 GTB 3x10 GTB 3x10 BTB 3x10 BTB 3x10 BTB 3x10 BTB   TB IR X30  btb X30 btb  2x10 GTB 3x10 GTB 3x10 GTB 3x10 GTB 3x10 BTB 3x10 BTB 3x10 BTB 3x10 BTB   TB tricep extension X30  black X30 black   3x10 BLTB 3x10 BLTB 3x10 BLTB 3x10 BLTB 3x10 BLTB 3x10 BLTB 3x10 BLTB 3x10 BLTB   Table slide X20 flex and ABD  PB X20 flex and ABD PB            Finger ladder              Pt edu              UBE 3'3' 3'3' 3'/3' 3'/3' 3'/3' 3'/3' 3'/3' 3'/3' 3'/3' 3'/3' 3'/3'   Shellie Row       NV 3x10 12# 3x10 12# 3x10 12# 3x10 12#    Ther Activity              Gripping                            Gait Training                                          Modalities                                                   "

## 2024-08-06 ENCOUNTER — APPOINTMENT (OUTPATIENT)
Dept: PHYSICAL THERAPY | Facility: CLINIC | Age: 59
End: 2024-08-06
Payer: OTHER MISCELLANEOUS

## 2024-08-06 ENCOUNTER — OFFICE VISIT (OUTPATIENT)
Dept: PHYSICAL THERAPY | Facility: CLINIC | Age: 59
End: 2024-08-06
Payer: OTHER MISCELLANEOUS

## 2024-08-06 ENCOUNTER — CLINICAL SUPPORT (OUTPATIENT)
Dept: BARIATRICS | Facility: CLINIC | Age: 59
End: 2024-08-06

## 2024-08-06 VITALS — WEIGHT: 296.6 LBS | BODY MASS INDEX: 47.67 KG/M2 | HEIGHT: 66 IN

## 2024-08-06 DIAGNOSIS — S93.402A SPRAIN OF UNSPECIFIED LIGAMENT OF LEFT ANKLE, INITIAL ENCOUNTER: ICD-10-CM

## 2024-08-06 DIAGNOSIS — S43.004D DISLOCATION OF RIGHT SHOULDER JOINT, SUBSEQUENT ENCOUNTER: Primary | ICD-10-CM

## 2024-08-06 DIAGNOSIS — M75.121 COMPLETE TEAR OF RIGHT ROTATOR CUFF, UNSPECIFIED WHETHER TRAUMATIC: ICD-10-CM

## 2024-08-06 DIAGNOSIS — R63.5 ABNORMAL WEIGHT GAIN: Primary | ICD-10-CM

## 2024-08-06 PROCEDURE — WMDI30

## 2024-08-06 PROCEDURE — 97110 THERAPEUTIC EXERCISES: CPT

## 2024-08-06 PROCEDURE — 97140 MANUAL THERAPY 1/> REGIONS: CPT

## 2024-08-06 PROCEDURE — RECHECK

## 2024-08-06 NOTE — PROGRESS NOTES
Weight Management Medical Nutrition Assessment  Ananda was here today for meal planning and weighs 296.6 lbs.  He has maintained his weigth from last virtual visit.     He continues logging his food using carb manager.  He is eating around 60 - 80 gr of carb daily, usually does not go over 1500 - 1800 calories and has around 85 grams of protein.  He has some mobility issues and exercise is limited.  He has been follow the recommendation of PT and doing some chair exercises and stretching.  He is focusing on increasing his water and cutting back on diet soda.  He is using 2 ls weights for his arms after shoulder surgery per PT and is improving.     Patient seen by Medical Provider in past 6 months:  yes  Requested to schedule appointment with Medical Provider: No        Anthropometric Measurements  Start Weight (#): 304.2  Current Weight (#): 296  TBW % Change from start weight:3%  Ideal Body Weight (#):142  Goal Weight (#):220  Highest:305  Lowest: 184      Weight Loss History  Previous weight loss attempts: Self Created Diets (Portion Control, Healthy Food Choices, etc.)     Food and Nutrition Related History  Wake up: 6 - 7 am        Bed Time:9- 10:00     Food Recall  Breakfast: skips or will have eggs or will have fiber one cereal or yougrt and fruit              Snack:none   Lunch:salad with protein   Snack:fruit if needed  Dinner:shrimp, corn  pulled pork  Snack:         Beverages: water and diet soda (trying cut back on that)  Volume of beverage intake: 64 - 72 oz     Weekends: Same  Cravings: sweets ( pie or ice cream)  Trouble area of day:night     Frequency of Eating out: irregularly  Food restrictions:none  Cooking: self   Food Shopping: self     Physical Activity Intake  Activity:  PT 2 a45    Frequency: none  Physical limitations/barriers to exercise: knee issues and dislocated shoulder and tear     Estimated Needs  Energy     Sarah Castellano Energy Needs: BMR : 2128   1-2# loss weekly sedentary:  1554 -  2054             1-2# loss weekly lightly active:1926 - 2426  Maintenance calories for sedentary activity level: 2544  Protein:77 - 96      (1.2-1.5g/kg IBW)  Fluid: 76     (35mL/kg IBW)     Nutrition Diagnosis  Yes;   Nutrition Diagnosis  Overweight/obesity  related to Excess energy intake as evidenced by  BMI more than normative standard for age and sex (obesity-grade III 40+)     Nutrition Intervention     Nutrition Prescription  Calories:1800 - 2000  Protein:77 - 96  Fluid:76        Nutrition Education:    Calorie controlled menu  Lean protein food choices  Healthy snack options  Food journaling tips        Nutrition Counseling:  Strategies: meal planning, portion sizes, healthy snack choices, hydration, fiber intake, protein intake, exercise, food journal        Monitoring and Evaluation:  Evaluation criteria:  Energy Intake  Meet protein needs  Maintain adequate hydration  Monitor weekly weight  Meal planning/preparation  Food journal   Decreased portions at mealtimes and snacks  Physical activity      Barriers to learning:none  Readiness to change: Action:  (Changing behavior)  Comprehension: fair  Expected Compliance: fair

## 2024-08-06 NOTE — PROGRESS NOTES
Daily Note     Today's date: 2024  Patient name: Osmin Hook  : 1965  MRN: [id number]  Referring provider: Tommie Baird DO  Dx:   Encounter Diagnosis     ICD-10-CM    1. Dislocation of right shoulder joint, subsequent encounter  S43.004D       2. Complete tear of right rotator cuff, unspecified whether traumatic  M75.121       3. Sprain of unspecified ligament of left ankle, initial encounter  S93.402A         Start Time: 1625  Stop Time: 1710  Total time in clinic (min): 45 minutes    Subjective: Patient reports he is sore from the weekend due to his reunion. Pt was functional all weekend but was fatigued.       Objective: See treatment diary below    R Shoulder PROM:  Flexion- 150  ER- 50  IR- WFL  Abduction- 128    Assessment: Tolerated treatment well. Pt reported no major changes in symptom irritability today. Pt was able to complete the entire exercise program w/o increase in pain. Pt is still slowly progressing in overall strength. Increase resistance of bands NV depending on response to today's session. Patient demonstrated fatigue post treatment, exhibited good technique with therapeutic exercises, and would benefit from continued PT for increased ROM, increased strength, and decreased symptom irritability.       Plan: Continue per plan of care.      Precautions: HTN, hx of lumbar fusion, Lyme's  POC expires Unit limit Auth Expiration date PT/OT/ST + Visit Limit?   24 BOMN 24 BOMN                           Visit/Unit Tracking  AUTH Status:  Date  5   60 visits Used 13 14 15 16 17 18 19 20 21 22 23 24 25 26    Remaining  17 16 45 44 43 42 41 40 39 38 37 36 35 34         Foto               Manuals 6/25 6/27 7/3 7/9 7/11 7/16 7/18 7/23 7/25 7/30 8/1 8/6   R inf mobs GS  Gr 3 MD Gr 3 LH JMK G3-4 JMK G3-4 JMK G3-4 JMK G3-4 JMK G3-4 JMK G3-4 JMK G3-4 JMK G3-4 JMK G3-4   R shoulder PROM    JMK JMK JMK JMK JMK JMK JMK  "JMK JMK   Median nerve glide    JMK                          Neuro Re-Ed   7/3            Scap retraction               SL scap retraction iso               TB row L only               Perturbation in flex 3x15\" 3x15\"             Shoulder taps against wall  x20              Median, Ulnar, and Radial nerve glide      8'                        Ther Ex   7/3            Pendulums               Elbow flexion         2# 3x10 2# 3x10 2# 3x10 2# 3x10   R shoulder PROM GS MD LH            Reynaldo               AROM to 90*- flex, ABD X15  1#  Flex  X10  1#  ABD X15 1# Flex x10 1# ABD  3x10 1# Flex 2x10 1# ABD 3x10 1# Flex 3x10 1# ABD 3x10 1# Flex 3x10 1# ABD 3x10 2# Flex 3x10 1# ABD 3x10 2# Flex 3x10 1# ABD 3x10 2# Flex 3x10 2# ABD 3x10 2# Flex 3x10 2# ABD 3x10 2# Flex 3x10 2# ABD 3x10 2# Flex 3x10 2# ABD   AROM scaption     2x10 1# 2x10 1# 3x10 1# 2x10 2# 2x10 2# 2x10 2# X10 2# 3x10 2#   TB row X30  black X30 black   3x10 BLTB 3x10 BLTB 3x10 BLTB     3x10 BLTB    TB ext X30  black X30 black   3x10 BLTB 3x10 BLTB 3x10 BLTB 3x10 BLTB 3x10 BLTB 3x10 BLTB 3x10 BLTB 3x10 BLTB 3x10 BLTB   TB ER  X20  gtb X20 gtb  2x10 GTB 3x10 GTB 3x10 GTB 3x10 GTB 3x10 BTB 3x10 BTB 3x10 BTB 3x10 BTB 3x10 BTB   TB IR X30  btb X30 btb  2x10 GTB 3x10 GTB 3x10 GTB 3x10 GTB 3x10 BTB 3x10 BTB 3x10 BTB 3x10 BTB 3x10 BTB   TB tricep extension X30  black X30 black   3x10 BLTB 3x10 BLTB 3x10 BLTB 3x10 BLTB 3x10 BLTB 3x10 BLTB 3x10 BLTB 3x10 BLTB 3x10 BLTB   Table slide X20 flex and ABD  PB X20 flex and ABD PB             Finger ladder               Pt edu               UBE 3'3' 3'3' 3'/3' 3'/3' 3'/3' 3'/3' 3'/3' 3'/3' 3'/3' 3'/3' 3'/3' 3'/3'   Shellie Haile       NV 3x10 12# 3x10 12# 3x10 12# 3x10 12#  3x10 12#   Ther Activity               Gripping                              Gait Training                                             Modalities                                                      "

## 2024-08-08 ENCOUNTER — OFFICE VISIT (OUTPATIENT)
Dept: PHYSICAL THERAPY | Facility: CLINIC | Age: 59
End: 2024-08-08
Payer: OTHER MISCELLANEOUS

## 2024-08-08 DIAGNOSIS — S93.402A SPRAIN OF UNSPECIFIED LIGAMENT OF LEFT ANKLE, INITIAL ENCOUNTER: ICD-10-CM

## 2024-08-08 DIAGNOSIS — M75.121 COMPLETE TEAR OF RIGHT ROTATOR CUFF, UNSPECIFIED WHETHER TRAUMATIC: ICD-10-CM

## 2024-08-08 DIAGNOSIS — S43.004D DISLOCATION OF RIGHT SHOULDER JOINT, SUBSEQUENT ENCOUNTER: Primary | ICD-10-CM

## 2024-08-08 PROCEDURE — 97110 THERAPEUTIC EXERCISES: CPT

## 2024-08-08 PROCEDURE — 97112 NEUROMUSCULAR REEDUCATION: CPT

## 2024-08-08 NOTE — PROGRESS NOTES
"Daily Note     Today's date: 2024  Patient name: Osmin Hook  : 1965  MRN: [id number]  Referring provider: Tommie Baird DO  Dx:   Encounter Diagnosis     ICD-10-CM    1. Dislocation of right shoulder joint, subsequent encounter  S43.004D       2. Complete tear of right rotator cuff, unspecified whether traumatic  M75.121       3. Sprain of unspecified ligament of left ankle, initial encounter  S93.402A                      Subjective: Pt reports his R shoulder is sore upon arrival to session.  He states he was sore following last session but felt better by the next day.      Objective: See treatment diary below      Assessment: Progressing well.  Some discomfort with resisted elbow flexion today but able to complete within tolerance.  Still presenting with ROM deficits in all planes.  Patient remains challenged with current resistance program.  Pt would benefit from continued PT in order to improve R shoulder ROM and strength for improved function during daily activities.      Plan: Continue per plan of care.      Precautions: HTN, hx of lumbar fusion, Lyme's  POC expires Unit limit Auth Expiration date PT/OT/ST + Visit Limit?   24 BOMN 24 BOMN                           Visit/Unit Tracking  AUTH Status:  Date    60 visits Used 13 14 15 16 17 18 19 20 21 22 23 24 25 26    Remaining  17 16 45 44 43 42 41 40 39 38 37 36 35 34         Foto               Manuals 8/8 6/27 7/3 7/9 7/11 7/16 7/18 7/23 7/25 7/30 8/1 8/6   R inf mobs  MD Gr 3 LH JMK G3-4 JMK G3-4 JMK G3-4 JMK G3-4 JMK G3-4 JMK G3-4 JMK G3-4 JMK G3-4 JMK G3-4   R shoulder PROM AF   JMK JMK JMK JMK JMK JMK JMK JMK JMK   Median nerve glide    JMK                          Neuro Re-Ed   7/3            Scap retraction               SL scap retraction iso               TB row L only               Perturbation in flex  3x15\"             Shoulder taps against wall         "        Median, Ulnar, and Radial nerve glide      8'                        Ther Ex   7/3            Pendulums               Elbow flexion 2# 3x10        2# 3x10 2# 3x10 2# 3x10 2# 3x10   R shoulder PROM  MD LH            Reynaldo               AROM to 90*- flex, ABD 2# 3x10, flex/abd X15 1# Flex x10 1# ABD  3x10 1# Flex 2x10 1# ABD 3x10 1# Flex 3x10 1# ABD 3x10 1# Flex 3x10 1# ABD 3x10 2# Flex 3x10 1# ABD 3x10 2# Flex 3x10 1# ABD 3x10 2# Flex 3x10 2# ABD 3x10 2# Flex 3x10 2# ABD 3x10 2# Flex 3x10 2# ABD 3x10 2# Flex 3x10 2# ABD   AROM scaption 2# 2x10    2x10 1# 2x10 1# 3x10 1# 2x10 2# 2x10 2# 2x10 2# X10 2# 3x10 2#   TB row  X30 black   3x10 BLTB 3x10 BLTB 3x10 BLTB     3x10 BLTB    TB ext Blk 3x10 X30 black   3x10 BLTB 3x10 BLTB 3x10 BLTB 3x10 BLTB 3x10 BLTB 3x10 BLTB 3x10 BLTB 3x10 BLTB 3x10 BLTB   TB ER  BTB 3x10 X20 gtb  2x10 GTB 3x10 GTB 3x10 GTB 3x10 GTB 3x10 BTB 3x10 BTB 3x10 BTB 3x10 BTB 3x10 BTB   TB IR 3x10 BTB X30 btb  2x10 GTB 3x10 GTB 3x10 GTB 3x10 GTB 3x10 BTB 3x10 BTB 3x10 BTB 3x10 BTB 3x10 BTB   TB tricep extension 3x10 blk X30 black   3x10 BLTB 3x10 BLTB 3x10 BLTB 3x10 BLTB 3x10 BLTB 3x10 BLTB 3x10 BLTB 3x10 BLTB 3x10 BLTB   Table slide  X20 flex and ABD PB             Finger ladder               Pt edu               UBE 3'/3' 3'3' 3'/3' 3'/3' 3'/3' 3'/3' 3'/3' 3'/3' 3'/3' 3'/3' 3'/3' 3'/3'   [place] Row  3x10 12#     NV 3x10 12# 3x10 12# 3x10 12# 3x10 12#  3x10 12#   Ther Activity               Gripping                              Gait Training                                             Modalities

## 2024-08-13 ENCOUNTER — OFFICE VISIT (OUTPATIENT)
Dept: PHYSICAL THERAPY | Facility: CLINIC | Age: 59
End: 2024-08-13
Payer: OTHER MISCELLANEOUS

## 2024-08-13 DIAGNOSIS — S43.004D DISLOCATION OF RIGHT SHOULDER JOINT, SUBSEQUENT ENCOUNTER: Primary | ICD-10-CM

## 2024-08-13 DIAGNOSIS — M75.121 COMPLETE TEAR OF RIGHT ROTATOR CUFF, UNSPECIFIED WHETHER TRAUMATIC: ICD-10-CM

## 2024-08-13 PROCEDURE — 97112 NEUROMUSCULAR REEDUCATION: CPT

## 2024-08-13 PROCEDURE — 97110 THERAPEUTIC EXERCISES: CPT

## 2024-08-13 PROCEDURE — 97140 MANUAL THERAPY 1/> REGIONS: CPT

## 2024-08-13 NOTE — PROGRESS NOTES
Daily Note     Today's date: 2024  Patient name: Osmin Hook  : 1965  MRN: [id number]  Referring provider: Tommie Baird DO  Dx:   Encounter Diagnosis     ICD-10-CM    1. Dislocation of right shoulder joint, subsequent encounter  S43.004D       2. Complete tear of right rotator cuff, unspecified whether traumatic  M75.121                      Subjective: Pt reports being a little sore today.       Objective: See treatment diary below      Assessment: Tolerated treatment well. Patient exhibited good technique with therapeutic exercises      Plan: Continue per plan of care.      Precautions: HTN, hx of lumbar fusion, Lyme's  POC expires Unit limit Auth Expiration date PT/OT/ST + Visit Limit?   24 BOMN 24 BOMN                           Visit/Unit Tracking  AUTH Status:  Date    60 visits Used 13 14 15 16 17 18 19 20 21 22 23 24 25 26    Remaining  17 16 45 44 43 42 41 40 39 38 37 36 35 34         Foto               Manuals    R inf mobs    JMK G3-4 JMK G3-4 JMK G3-4 JMK G3-4 JMK G3-4 JMK G3-4 JMK G3-4 JMK G3-4 JMK G3-4   R shoulder PROM AF HA  JMK JMK JMK JMK JMK JMK JMK JMK JMK   Median nerve glide    JMK                          Neuro Re-Ed               Scap retraction               SL scap retraction iso               TB row L only               Perturbation in flex               Shoulder taps against wall                Median, Ulnar, and Radial nerve glide      8'                        Ther Ex               Pendulums               Elbow flexion 2# 3x10 2# 3x10       2# 3x10 2# 3x10 2# 3x10 2# 3x10   R shoulder PROM               Pulley               AROM to 90*- flex, ABD 2# 3x10, flex/abd 2# 3x10, flex/abd  3x10 1# Flex 2x10 1# ABD 3x10 1# Flex 3x10 1# ABD 3x10 1# Flex 3x10 1# ABD 3x10 2# Flex 3x10 1# ABD 3x10 2# Flex 3x10 1# ABD 3x10 2# Flex 3x10 2# ABD 3x10 2#  Flex 3x10 2# ABD 3x10 2# Flex 3x10 2# ABD 3x10 2# Flex 3x10 2# ABD   AROM scaption 2# 2x10 2# 2x10   2x10 1# 2x10 1# 3x10 1# 2x10 2# 2x10 2# 2x10 2# X10 2# 3x10 2#   TB row    3x10 BLTB 3x10 BLTB 3x10 BLTB     3x10 BLTB    TB ext Blk 3x10 Blk 3x10  3x10 BLTB 3x10 BLTB 3x10 BLTB 3x10 BLTB 3x10 BLTB 3x10 BLTB 3x10 BLTB 3x10 BLTB 3x10 BLTB   TB ER  BTB 3x10 BTB 3x10  2x10 GTB 3x10 GTB 3x10 GTB 3x10 GTB 3x10 BTB 3x10 BTB 3x10 BTB 3x10 BTB 3x10 BTB   TB IR 3x10 BTB BTB 3x10  2x10 GTB 3x10 GTB 3x10 GTB 3x10 GTB 3x10 BTB 3x10 BTB 3x10 BTB 3x10 BTB 3x10 BTB   TB tricep extension 3x10 blk 3x10 blk  3x10 BLTB 3x10 BLTB 3x10 BLTB 3x10 BLTB 3x10 BLTB 3x10 BLTB 3x10 BLTB 3x10 BLTB 3x10 BLTB   Table slide               Finger ladder               Pt edu               UBE 3'/3' 3'/3'  3'/3' 3'/3' 3'/3' 3'/3' 3'/3' 3'/3' 3'/3' 3'/3' 3'/3'   [place] [address]  3x10 12# 3x10 12#    NV 3x10 12# 3x10 12# 3x10 12# 3x10 12#  3x10 12#   Ther Activity               Gripping                              Gait Training                                             Modalities

## 2024-08-15 ENCOUNTER — APPOINTMENT (OUTPATIENT)
Dept: PHYSICAL THERAPY | Facility: CLINIC | Age: 59
End: 2024-08-15
Payer: OTHER MISCELLANEOUS

## 2024-08-19 ENCOUNTER — OFFICE VISIT (OUTPATIENT)
Dept: OBGYN CLINIC | Facility: CLINIC | Age: 59
End: 2024-08-19
Payer: OTHER MISCELLANEOUS

## 2024-08-19 VITALS
BODY MASS INDEX: 48.08 KG/M2 | HEART RATE: 73 BPM | HEIGHT: 66 IN | DIASTOLIC BLOOD PRESSURE: 100 MMHG | SYSTOLIC BLOOD PRESSURE: 146 MMHG | WEIGHT: 299.2 LBS

## 2024-08-19 DIAGNOSIS — Z98.890 S/P RIGHT ROTATOR CUFF REPAIR: Primary | ICD-10-CM

## 2024-08-19 PROCEDURE — 99213 OFFICE O/P EST LOW 20 MIN: CPT | Performed by: ORTHOPAEDIC SURGERY

## 2024-08-19 NOTE — LETTER
August 19, 2024     Patient: Osmin Hook  YOB: 1965  Date of Visit: 8/19/2024      To Whom it May Concern:    Osmin Hook is under my professional care. Osmin was seen in my office on 8/19/2024. Osmin may return to work with limitations including :    No overhead activity.  No lifting, pushing, or pulling.  No climbing.    If you have any questions or concerns, please don't hesitate to call.         Sincerely,          Tommie Baird,         CC: No Recipients

## 2024-08-19 NOTE — PROGRESS NOTES
Patient Name:  Osmin Hook  MRN:  [id number]    Assessment & Plan     1. S/P right rotator cuff repair      Approximately 4 months s/p Right shoulder arthroscopic rotator cuff repair, biceps tenotomy, acromioplasty performed on 04/11/2024.  The patient continues to progress as expected post-operatively.   Discussed with patient that his strength is acceptable at this point in the post-operative protocol.   Continue physical therapy, per protocol. He was advised to focus on range of motion and stretching.   The patient may return to driving activity.   The patient was provided an updated note for work, with restrictions as discussed in office.   Follow up in 6 weeks for re-evaluation of Right shoulder        History of the Present Illness   Osmin Hook is a 59 y.o. male approximately 4 months s/p Right shoulder arthroscopic rotator cuff repair, biceps tenotomy, acromioplasty performed on 04/11/2024. The patient reports that his pain has improved mildly since his last visit, he reports his pain as a 6/10. He reports pain at the end of the night. He reports continued limitations with strength and motion. He states that he continues to be restricted at 2lb lifting. The patient has continued to participate in physical therapy. He reports intermittent soreness with physical therapy. 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 He denies numbness and tingling.       Review of Systems     Review of Systems   Constitutional:  Negative for chills and fever.   HENT:  Negative for ear pain and sore throat.    Eyes:  Negative for pain and visual disturbance.   Respiratory:  Negative for cough and shortness of breath.    Cardiovascular:  Negative for chest pain and palpitations.   Gastrointestinal:  Negative for abdominal pain and vomiting.   Genitourinary:  Negative for dysuria and hematuria.   Musculoskeletal:  Negative for arthralgias and back pain.   Skin:   "Negative for color change and rash.   Neurological:  Negative for seizures and syncope.   All other systems reviewed and are negative.      Physical Exam     /100   Pulse 73   Ht 5' 6\" (1.676 m)   Wt 136 kg (299 lb 3.2 oz)   BMI 48.29 kg/m²     Right Shoulder:   Surgical incisions well healed  Active range of motion   100 degrees forward flexion  95 degrees abduction  60 degrees external rotation   SI joint internal rotation    Passive range of motion   150 degrees of forward flexion   Forward flexion testing 4+/5  External rotation testing 4/5  Internal rotation testing 4+/5  The patient is neurovascularly intact distally in the extremity.      Data Review     No new images to review.    Social History     Tobacco Use    Smoking status: Former     Current packs/day: 0.00     Average packs/day: 0.3 packs/day for 5.8 years (1.4 ttl pk-yrs)     Types: Cigarettes     Start date: 1984     Quit date: 10/20/1989     Years since quittin.8    Smokeless tobacco: Former     Types: Chew     Quit date:    Vaping Use    Vaping status: Never Used   Substance Use Topics    Alcohol use: Not Currently     Alcohol/week: 4.0 standard drinks of alcohol     Types: 4 Standard drinks or equivalent per week    Drug use: Yes     Frequency: 7.0 times per week     Types: Marijuana     Comment: medical         Scribe Attestation      I,:  Litzy Martinez am acting as a scribe while in the presence of the attending physician.:       I,:  Tommie Baird, DO personally performed the services described in this documentation    as scribed in my presence.:                 "

## 2024-08-20 ENCOUNTER — TELEPHONE (OUTPATIENT)
Age: 59
End: 2024-08-20

## 2024-08-20 ENCOUNTER — OFFICE VISIT (OUTPATIENT)
Dept: PHYSICAL THERAPY | Facility: CLINIC | Age: 59
End: 2024-08-20
Payer: OTHER MISCELLANEOUS

## 2024-08-20 DIAGNOSIS — S93.402A SPRAIN OF UNSPECIFIED LIGAMENT OF LEFT ANKLE, INITIAL ENCOUNTER: ICD-10-CM

## 2024-08-20 DIAGNOSIS — M75.121 COMPLETE TEAR OF RIGHT ROTATOR CUFF, UNSPECIFIED WHETHER TRAUMATIC: ICD-10-CM

## 2024-08-20 DIAGNOSIS — S43.004D DISLOCATION OF RIGHT SHOULDER JOINT, SUBSEQUENT ENCOUNTER: Primary | ICD-10-CM

## 2024-08-20 PROCEDURE — 97110 THERAPEUTIC EXERCISES: CPT

## 2024-08-20 PROCEDURE — 97140 MANUAL THERAPY 1/> REGIONS: CPT

## 2024-08-20 NOTE — TELEPHONE ENCOUNTER
Caller: Maia    Doctor: Oli    Reason for call: Request copy ovn/work status 8/19. Faxed to [phone]    Call back#: [phone]

## 2024-08-20 NOTE — PROGRESS NOTES
Daily Note     Today's date: 2024  Patient name: Osmin Hook  : 1965  MRN: [id number]  Referring provider: Tommie Baird DO  Dx:   Encounter Diagnosis     ICD-10-CM    1. Dislocation of right shoulder joint, subsequent encounter  S43.004D       2. Complete tear of right rotator cuff, unspecified whether traumatic  M75.121       3. Sprain of unspecified ligament of left ankle, initial encounter  S93.402A         Start Time: 1540  Stop Time: 1622  Total time in clinic (min): 42 minutes    Subjective: Pt reports no major changes since last visit. Pt had follow-up w/ Physician yesterday for check up- allowed to return to light duty at work.       Objective: See treatment diary below      Assessment: Tolerated treatment well. Increased resistance for forward flexion, Pt reported heavy fatigue and had visible shaking at rep 8. Pt was able to complete two sets before an increase in pain. Increased all parascapular exercises to [place] to further challenge Pt, reported more difficulty than the Thera bands. Patient demonstrated fatigue post treatment, exhibited good technique with therapeutic exercises, and would benefit from continued PT for increased mobility, increased strength, and decreased symptom irritability while completing functional activities in order to return to work.       Plan: Continue per plan of care.      Precautions: HTN, hx of lumbar fusion, Lyme's  POC expires Unit limit Auth Expiration date PT/OT/ST + Visit Limit?   24 BOMN 24 BOMN                           Visit/Unit Tracking  AUTH Status:  Date    60 visits Used 13 14 15 16 17 18 19 20 21 22 23 24 25 26    Remaining  17 16 45 44 43 42 41 40 39 38 37 36 35 34         Foto               Manuals             R inf mobs   JMK            R shoulder PROM AF HAMILTON JMK            Median nerve glide                              Neuro Re-Ed                Scap retraction               SL scap retraction iso               TB row L only               Perturbation in flex               Shoulder taps against wall                Median, Ulnar, and Radial nerve glide                              Ther Ex               Pendulums               Elbow flexion 2# 3x10 2# 3x10 3# 3x10            R shoulder PROM               Pulley               AROM to 90*- flex, ABD 2# 3x10, flex/abd 2# 3x10, flex/abd Flex 2x10 3#/ ABD 3x10 2#            AROM scaption 2# 2x10 2# 2x10 2# 2x10            TB row               TB ext Blk 3x10 Blk 3x10 Shellie 3x10 7#            TB ER  BTB 3x10 BTB 3x10 BLTB 3x10            TB IR 3x10 BTB BTB 3x10 BLTB 3x10            TB tricep extension 3x10 blk 3x10 blk Shellie 3x10 15#            Table slide               Finger ladder               Pt edu               UBE 3'/3' 3'/3' 3'/3'            [place] [address]  3x10 12# 3x10 12# 3x10 14#            Ther Activity               Gripping                              Gait Training                                             Modalities

## 2024-08-22 ENCOUNTER — APPOINTMENT (OUTPATIENT)
Dept: PHYSICAL THERAPY | Facility: CLINIC | Age: 59
End: 2024-08-22
Payer: OTHER MISCELLANEOUS

## 2024-08-27 ENCOUNTER — OFFICE VISIT (OUTPATIENT)
Dept: PHYSICAL THERAPY | Facility: CLINIC | Age: 59
End: 2024-08-27
Payer: OTHER MISCELLANEOUS

## 2024-08-27 DIAGNOSIS — M75.121 COMPLETE TEAR OF RIGHT ROTATOR CUFF, UNSPECIFIED WHETHER TRAUMATIC: ICD-10-CM

## 2024-08-27 DIAGNOSIS — S43.004D DISLOCATION OF RIGHT SHOULDER JOINT, SUBSEQUENT ENCOUNTER: Primary | ICD-10-CM

## 2024-08-27 DIAGNOSIS — S93.402A SPRAIN OF UNSPECIFIED LIGAMENT OF LEFT ANKLE, INITIAL ENCOUNTER: ICD-10-CM

## 2024-08-27 PROCEDURE — 97140 MANUAL THERAPY 1/> REGIONS: CPT

## 2024-08-27 PROCEDURE — 97110 THERAPEUTIC EXERCISES: CPT

## 2024-08-27 NOTE — PROGRESS NOTES
Daily Note     Today's date: 2024  Patient name: Osmin Hook  : 1965  MRN: [id number]  Referring provider: Tommie Baird DO  Dx:   Encounter Diagnosis     ICD-10-CM    1. Dislocation of right shoulder joint, subsequent encounter  S43.004D       2. Complete tear of right rotator cuff, unspecified whether traumatic  M75.121       3. Sprain of unspecified ligament of left ankle, initial encounter  S93.402A           Start Time: 1625  Stop Time: 1705  Total time in clinic (min): 40 minutes    Subjective: Pt reports that he is feeling better following his cold, but his shoulder has been sore the last few days.       Objective: See treatment diary below      Assessment: Tolerated treatment well. Educated Pt on the possibility that the increased soreness could be from his recent cold. Pt was able to increase resistance for all exercises today. Pt only reported increase in symptom irritability while completing flexion and scaption exercise today. Patient demonstrated fatigue post treatment, exhibited good technique with therapeutic exercises, and would benefit from continued PT for increased mobility, increased strength, and decreased symptom irritability while completing functional activities in order to return to work.       Plan: Continue per plan of care.      Precautions: HTN, hx of lumbar fusion, Lyme's  POC expires Unit limit Auth Expiration date PT/OT/ST + Visit Limit?   24 BOMN 24 BOMN                           Visit/Unit Tracking  AUTH Status:  Date  5/ 5   60 visits Used 13 14 15 16 17 18 19 20 21 22 23 24 25 26    Remaining  17 16 45 44 43 42 41 40 39 38 37 36 35 34         Foto               Manuals            R inf mobs   JUAN A VELAZCO           R shoulder PROM AF HAMILTON JUAN A VELAZCO           Median nerve glide                              Neuro Re-Ed               Scap retraction               SL scap retraction  iso               TB row L only               Perturbation in flex               Shoulder taps against wall                Median, Ulnar, and Radial nerve glide                              Ther Ex               Pendulums               Elbow flexion 2# 3x10 2# 3x10 3# 3x10 3# 3x10           R shoulder PROM               Pulley               AROM to 90*- flex, ABD 2# 3x10, flex/abd 2# 3x10, flex/abd Flex 2x10 3#/ ABD 3x10 2# Flex 2x10 3#/ ABD 2x10 3#           AROM scaption 2# 2x10 2# 2x10 2# 2x10 3# 2x10           TB row               TB ext Blk 3x10 Blk 3x10 Shellie 3x10 7# [place] 3x10 8#           TB ER  BTB 3x10 BTB 3x10 BLTB 3x10 BLTB 3x10           TB IR 3x10 BTB BTB 3x10 BLTB 3x10 BLTB 3x10           TB tricep extension 3x10 blk 3x10 blk [place] 3x10 15# Shellie 3x10 16#           Table slide               Finger ladder               Pt edu               UBE 3'/3' 3'/3' 3'/3' 3'/3'           [place] Row  3x10 12# 3x10 12# 3x10 14# 3x10 16#           Ther Activity               Gripping                              Gait Training                                             Modalities

## 2024-08-28 ENCOUNTER — TELEPHONE (OUTPATIENT)
Age: 59
End: 2024-08-28

## 2024-08-28 NOTE — TELEPHONE ENCOUNTER
Patient states he has a head cold that will not go away. He is requesting something be prescribed to help him out. He requests a call to advise.

## 2024-08-29 ENCOUNTER — OFFICE VISIT (OUTPATIENT)
Age: 59
End: 2024-08-29
Payer: COMMERCIAL

## 2024-08-29 ENCOUNTER — APPOINTMENT (OUTPATIENT)
Dept: PHYSICAL THERAPY | Facility: CLINIC | Age: 59
End: 2024-08-29
Payer: OTHER MISCELLANEOUS

## 2024-08-29 VITALS
HEART RATE: 66 BPM | RESPIRATION RATE: 18 BRPM | SYSTOLIC BLOOD PRESSURE: 128 MMHG | HEIGHT: 66 IN | WEIGHT: 297.4 LBS | OXYGEN SATURATION: 97 % | DIASTOLIC BLOOD PRESSURE: 76 MMHG | TEMPERATURE: 96.5 F | BODY MASS INDEX: 47.8 KG/M2

## 2024-08-29 DIAGNOSIS — J00 HEAD COLD: Primary | ICD-10-CM

## 2024-08-29 PROCEDURE — 99214 OFFICE O/P EST MOD 30 MIN: CPT | Performed by: INTERNAL MEDICINE

## 2024-08-29 RX ORDER — PREDNISONE 10 MG/1
TABLET ORAL
Qty: 30 TABLET | Refills: 0 | Status: SHIPPED | OUTPATIENT
Start: 2024-08-29 | End: 2024-09-08

## 2024-08-29 NOTE — PROGRESS NOTES
"  Assessment & Plan  Head cold    No evidence of bacterial sinusitis. Recommend course of prednisone. Discussed signs/symptoms to monitor for. If not getting better by Monday, let me know.    Orders:    predniSONE 10 mg tablet; 4 tab x 4 days, 3 tabs x 3 days, 2 tabs x 2 days, 1 tab x 1 day then stop      History of Present Illness     History of Present Illness  The patient presents for evaluation of a runny nose, head congestion.    She began experiencing symptoms last week, coinciding with her son's illness. She has not been tested for COVID-19. Her symptoms include an increased nasal discharge that is greenish-yellow in color. She previously experienced pressure behind her eyes, but this has since subsided. She denies any difficulty in breathing but reports a wheezing sound. This morning, she had a cough and took a decongestant for her sinuses.     Review of Systems   Constitutional:  Negative for chills, fatigue and fever.   HENT:  Positive for congestion, postnasal drip, rhinorrhea and sinus pressure (getting better).    Respiratory:  Positive for chest tightness. Negative for cough, shortness of breath and wheezing.    Cardiovascular: Negative.    Gastrointestinal: Negative.      Objective     /76 (BP Location: Left arm, Patient Position: Sitting, Cuff Size: Large)   Pulse 66   Temp (!) 96.5 °F (35.8 °C) (Tympanic)   Resp 18   Ht 5' 6\" (1.676 m)   Wt 135 kg (297 lb 6.4 oz)   SpO2 97%   BMI 48.00 kg/m²     Physical Exam  Constitutional:       General: He is not in acute distress.     Appearance: He is obese. He is not ill-appearing.   HENT:      Right Ear: Tympanic membrane, ear canal and external ear normal. There is no impacted cerumen.      Left Ear: Tympanic membrane, ear canal and external ear normal. There is no impacted cerumen.      Nose: Congestion and rhinorrhea (clear) present.      Mouth/Throat:      Pharynx: Oropharynx is clear. No oropharyngeal exudate or posterior oropharyngeal " erythema.   Cardiovascular:      Rate and Rhythm: Normal rate and regular rhythm.      Heart sounds: No murmur heard.  Pulmonary:      Effort: Pulmonary effort is normal. No respiratory distress.      Breath sounds: No wheezing.   Neurological:      Mental Status: He is alert.       Duke Andrewsyadira, DO

## 2024-09-01 DIAGNOSIS — I10 ESSENTIAL HYPERTENSION: ICD-10-CM

## 2024-09-01 RX ORDER — AMLODIPINE BESYLATE 10 MG/1
TABLET ORAL
Qty: 90 TABLET | Refills: 3 | Status: SHIPPED | OUTPATIENT
Start: 2024-09-01

## 2024-09-17 ENCOUNTER — OFFICE VISIT (OUTPATIENT)
Dept: PHYSICAL THERAPY | Facility: CLINIC | Age: 59
End: 2024-09-17
Payer: OTHER MISCELLANEOUS

## 2024-09-17 DIAGNOSIS — S93.402A SPRAIN OF UNSPECIFIED LIGAMENT OF LEFT ANKLE, INITIAL ENCOUNTER: ICD-10-CM

## 2024-09-17 DIAGNOSIS — S43.004D DISLOCATION OF RIGHT SHOULDER JOINT, SUBSEQUENT ENCOUNTER: Primary | ICD-10-CM

## 2024-09-17 DIAGNOSIS — M75.121 COMPLETE TEAR OF RIGHT ROTATOR CUFF, UNSPECIFIED WHETHER TRAUMATIC: ICD-10-CM

## 2024-09-17 PROCEDURE — 97110 THERAPEUTIC EXERCISES: CPT

## 2024-09-17 PROCEDURE — 97140 MANUAL THERAPY 1/> REGIONS: CPT

## 2024-09-17 NOTE — PROGRESS NOTES
Daily Note     Today's date: 2024  Patient name: Osmin Hook  : 1965  MRN: [id number]  Referring provider: Tommie Baird DO  Dx:   Encounter Diagnosis     ICD-10-CM    1. Dislocation of right shoulder joint, subsequent encounter  S43.004D       2. Complete tear of right rotator cuff, unspecified whether traumatic  M75.121       3. Sprain of unspecified ligament of left ankle, initial encounter  S93.402A         Start Time: 1030  Stop Time: 1108  Total time in clinic (min): 38 minutes    Subjective: Pt reports that his shoulder has been okay the last two weeks. Pt reports he was on prednisone due to his cold which decreased the symptom irritability for a short period of time.       Objective: See treatment diary below      Assessment: Tolerated treatment well. Pt was able to increase parascapular exercise resistance. Pt PROM is WFL, still limited to 120 degrees active flexion and 90 degrees active abduction. Patient demonstrated fatigue post treatment, exhibited good technique with therapeutic exercises, and would benefit from continued PT for increased mobility, increased strength, and decreased symptom irritability while completing functional activities in order to return to work.       Plan: Continue per plan of care.      Precautions: HTN, hx of lumbar fusion, Lyme's  POC expires Unit limit Auth Expiration date PT/OT/ST + Visit Limit?   24 BOMN 24 BOMN                           Visit/Unit Tracking  AUTH Status:  Date  5/   60 visits Used 13 14 15 16 17 18 19 20 21 22 23 24 25 26    Remaining  17 16 45 44 43 42 41 40 39 38 37 36 35 34         Foto               Manuals           R inf mobs   JUAN A VELAZCO           R shoulder PROM AF HA JUAN A VELAZCO          Median nerve glide                              Neuro Re-Ed               Scap retraction               SL scap retraction iso               TB row L  only               Perturbation in flex               Shoulder taps against wall                Median, Ulnar, and Radial nerve glide                              Ther Ex               Pendulums               Elbow flexion 2# 3x10 2# 3x10 3# 3x10 3# 3x10 3# 3x10          R shoulder PROM               [place]ley               AROM to 90*- flex, ABD 2# 3x10, flex/abd 2# 3x10, flex/abd Flex 2x10 3#/ ABD 3x10 2# Flex 2x10 3#/ ABD 2x10 3# Flex 3x10 3#/ ABD 3x10 3#          AROM scaption 2# 2x10 2# 2x10 2# 2x10 3# 2x10 3# 3x10          TB row               TB ext Blk 3x10 Blk 3x10 Shellie 3x10 7# [place] 3x10 8# [place] 3x10 10#          TB ER  BTB 3x10 BTB 3x10 BLTB 3x10 BLTB 3x10 BLTB 3x10          TB IR 3x10 BTB BTB 3x10 BLTB 3x10 BLTB 3x10 BLTB 3x10          TB tricep extension 3x10 blk 3x10 blk Shellie 3x10 15# Shellie 3x10 16# Shellie 3x10 18#          Table slide               Finger ladder               Pt edu               UBE 3'/3' 3'/3' 3'/3' 3'/3' 3'/3'          [place] Row  3x10 12# 3x10 12# 3x10 14# 3x10 16# Shellie 3x10 18#          Ther Activity               Gripping                              Gait Training                                             Modalities

## 2024-09-19 ENCOUNTER — OFFICE VISIT (OUTPATIENT)
Dept: PHYSICAL THERAPY | Facility: CLINIC | Age: 59
End: 2024-09-19
Payer: OTHER MISCELLANEOUS

## 2024-09-19 DIAGNOSIS — M75.121 COMPLETE TEAR OF RIGHT ROTATOR CUFF, UNSPECIFIED WHETHER TRAUMATIC: ICD-10-CM

## 2024-09-19 DIAGNOSIS — S93.402A SPRAIN OF UNSPECIFIED LIGAMENT OF LEFT ANKLE, INITIAL ENCOUNTER: ICD-10-CM

## 2024-09-19 DIAGNOSIS — S43.004D DISLOCATION OF RIGHT SHOULDER JOINT, SUBSEQUENT ENCOUNTER: Primary | ICD-10-CM

## 2024-09-19 PROCEDURE — 97140 MANUAL THERAPY 1/> REGIONS: CPT

## 2024-09-19 PROCEDURE — 97110 THERAPEUTIC EXERCISES: CPT

## 2024-09-19 NOTE — PROGRESS NOTES
Daily Note     Today's date: 2024  Patient name: Osmin Hook  : 1965  MRN: [id number]  Referring provider: Tommie Baird DO  Dx:   Encounter Diagnosis     ICD-10-CM    1. Dislocation of right shoulder joint, subsequent encounter  S43.004D       2. Complete tear of right rotator cuff, unspecified whether traumatic  M75.121       3. Sprain of unspecified ligament of left ankle, initial encounter  S93.402A         Start Time: 1543  Stop Time: 1625  Total time in clinic (min): 42 minutes    Subjective: Pt reports that his bicep is sore today. No other major changes since last visit.       Objective: See treatment diary below      Assessment: Tolerated treatment well. Pt continues to be challenged by active ROM exercises. Biceps curls did not irritate Pt's R bicep. Continue to slowly advance Pt each visit. Pt Patient demonstrated fatigue post treatment, exhibited good technique with therapeutic exercises, and would benefit from continued PT for increased mobility, increased strength, and decreased symptom irritability while completing functional activities in order to return to work.       Plan: Continue per plan of care.      Precautions: HTN, hx of lumbar fusion, Lyme's  POC expires Unit limit Auth Expiration date PT/OT/ST + Visit Limit?   24 BOMN 24 BOMN                           Visit/Unit Tracking  AUTH Status:  Date    60 visits Used 13 14 15 16 17 18 19 20 21 22 23 24 25 26    Remaining  17 16 45 44 43 42 41 40 39 38 37 36 35 34         Foto               Manuals          R inf mobs   MITESHK JUAN A           R shoulder PROM AF HA JMK JUAN A VELAZCO         Median nerve glide                              Neuro Re-Ed               Scap retraction               SL scap retraction iso               TB row L only               Perturbation in flex               Shoulder taps against wall                 Median, Ulnar, and Radial nerve glide                              Ther Ex               Pendulums               Elbow flexion 2# 3x10 2# 3x10 3# 3x10 3# 3x10 3# 3x10 3# 3x10         R shoulder PROM               [place]               AROM to 90*- flex, ABD 2# 3x10, flex/abd 2# 3x10, flex/abd Flex 2x10 3#/ ABD 3x10 2# Flex 2x10 3#/ ABD 2x10 3# Flex 3x10 3#/ ABD 3x10 3# Flex 3x10 3#/ ABD 3x10 3#         AROM scaption 2# 2x10 2# 2x10 2# 2x10 3# 2x10 3# 3x10 3# 3x10         TB row               TB ext Blk 3x10 Blk 3x10 Shellie 3x10 7# Shellie 3x10 8# [place] 3x10 10# Shellie 3x10 10#         TB ER  BTB 3x10 BTB 3x10 BLTB 3x10 BLTB 3x10 BLTB 3x10 BLTB 3x12         TB IR 3x10 BTB BTB 3x10 BLTB 3x10 BLTB 3x10 BLTB 3x10 BLTB 3x12         TB tricep extension 3x10 blk 3x10 blk Shellie 3x10 15# Shellie 3x10 16# Shellie 3x10 18# Shellie 3x10 18#         Table slide               Finger ladder               Pt edu               UBE 3'/3' 3'/3' 3'/3' 3'/3' 3'/3' 3'/3'         Shellie Row  3x10 12# 3x10 12# 3x10 14# 3x10 16# Shellie 3x10 18# [place] 3x10 18#         Ther Activity               Gripping                              Gait Training                                             Modalities

## 2024-09-24 ENCOUNTER — OFFICE VISIT (OUTPATIENT)
Dept: PHYSICAL THERAPY | Facility: CLINIC | Age: 59
End: 2024-09-24
Payer: OTHER MISCELLANEOUS

## 2024-09-24 DIAGNOSIS — M75.121 COMPLETE TEAR OF RIGHT ROTATOR CUFF, UNSPECIFIED WHETHER TRAUMATIC: ICD-10-CM

## 2024-09-24 DIAGNOSIS — S93.402A SPRAIN OF UNSPECIFIED LIGAMENT OF LEFT ANKLE, INITIAL ENCOUNTER: ICD-10-CM

## 2024-09-24 DIAGNOSIS — S43.004D DISLOCATION OF RIGHT SHOULDER JOINT, SUBSEQUENT ENCOUNTER: Primary | ICD-10-CM

## 2024-09-24 PROCEDURE — 97110 THERAPEUTIC EXERCISES: CPT

## 2024-09-24 PROCEDURE — 97140 MANUAL THERAPY 1/> REGIONS: CPT

## 2024-09-24 NOTE — PROGRESS NOTES
Daily Note     Today's date: 2024  Patient name: Osmin Hook  : 1965  MRN: [id number]  Referring provider: Tommie Baird DO  Dx:   Encounter Diagnosis     ICD-10-CM    1. Dislocation of right shoulder joint, subsequent encounter  S43.004D       2. Complete tear of right rotator cuff, unspecified whether traumatic  M75.121       3. Sprain of unspecified ligament of left ankle, initial encounter  S93.402A         Start Time: 1540  Stop Time: 1615  Total time in clinic (min): 35 minutes    Subjective: Pt reports that his bicep and shoulder has been sore the last two days. Pt reports the bicep is the most bothersome.       Objective: See treatment diary below      Assessment: Tolerated treatment well. Pt was challenged by exercise prescription. Increased resistance for parascapular exercises, Pt reported increased soreness following exercises. Patient demonstrated fatigue post treatment, exhibited good technique with therapeutic exercises, and would benefit from continued PT for increased mobility, increased strength, and decreased symptom irritability while completing functional activities in order to return to work.       Plan: Continue per plan of care.      Precautions: HTN, hx of lumbar fusion, Lyme's  POC expires Unit limit Auth Expiration date PT/OT/ST + Visit Limit?   24 BOMN 24 BOMN                           Visit/Unit Tracking  AUTH Status:  Date    60 visits Used 13 14 15 16 17 18 19 20 21 22 23 24 25 26    Remaining  17 16 45 44 43 42 41 40 39 38 37 36 35 34         Foto               Manuals         R inf mobs   JUAN A VELAZCO           R shoulder PROM AF HA JUAN A VELAZCO        Median nerve glide                              Neuro Re-Ed               Scap retraction               SL scap retraction iso               TB row L only               Perturbation in flex                Shoulder taps against wall                Median, Ulnar, and Radial nerve glide                              Ther Ex               Pendulums               Elbow flexion 2# 3x10 2# 3x10 3# 3x10 3# 3x10 3# 3x10 3# 3x10 3# 3x10        R shoulder PROM               [place]               AROM to 90*- flex, ABD 2# 3x10, flex/abd 2# 3x10, flex/abd Flex 2x10 3#/ ABD 3x10 2# Flex 2x10 3#/ ABD 2x10 3# Flex 3x10 3#/ ABD 3x10 3# Flex 3x10 3#/ ABD 3x10 3# Flex 3x10 3#/ ABD 3x10 3#        AROM scaption 2# 2x10 2# 2x10 2# 2x10 3# 2x10 3# 3x10 3# 3x10 3# 3x10        TB row               TB ext Blk 3x10 Blk 3x10 Shellie 3x10 7# [place] 3x10 8# [place] 3x10 10# Shellie 3x10 10# [place] 3x10 10#        TB ER  BTB 3x10 BTB 3x10 BLTB 3x10 BLTB 3x10 BLTB 3x10 BLTB 3x12 BLTB 3x12        TB IR 3x10 BTB BTB 3x10 BLTB 3x10 BLTB 3x10 BLTB 3x10 BLTB 3x12 BLTB 3x12        TB tricep extension 3x10 blk 3x10 blk [place] 3x10 15# Shellie 3x10 16# [place] 3x10 18# Shellie 3x10 18# Shellie 3x10 20#        Table slide               Finger ladder               Pt edu               UBE 3'/3' 3'/3' 3'/3' 3'/3' 3'/3' 3'/3' 3'/3'        [place] Row  3x10 12# 3x10 12# 3x10 14# 3x10 16# Shellie 3x10 18# Shellie 3x10 18# Shellie 3x10 20#        Ther Activity               Gripping                              Gait Training                                             Modalities

## 2024-09-26 ENCOUNTER — APPOINTMENT (OUTPATIENT)
Dept: PHYSICAL THERAPY | Facility: CLINIC | Age: 59
End: 2024-09-26
Payer: OTHER MISCELLANEOUS

## 2024-09-30 ENCOUNTER — OFFICE VISIT (OUTPATIENT)
Dept: OBGYN CLINIC | Facility: CLINIC | Age: 59
End: 2024-09-30
Payer: OTHER MISCELLANEOUS

## 2024-09-30 VITALS
HEART RATE: 67 BPM | DIASTOLIC BLOOD PRESSURE: 89 MMHG | SYSTOLIC BLOOD PRESSURE: 134 MMHG | BODY MASS INDEX: 48.82 KG/M2 | WEIGHT: 303.8 LBS | HEIGHT: 66 IN

## 2024-09-30 DIAGNOSIS — Z98.890 S/P RIGHT ROTATOR CUFF REPAIR: Primary | ICD-10-CM

## 2024-09-30 DIAGNOSIS — Z48.89 AFTERCARE FOLLOWING SURGERY: ICD-10-CM

## 2024-09-30 PROCEDURE — 99213 OFFICE O/P EST LOW 20 MIN: CPT | Performed by: ORTHOPAEDIC SURGERY

## 2024-09-30 NOTE — PROGRESS NOTES
Patient Name:  Osmin Hook  MRN:  [id number]    Assessment & Plan     1. S/P right rotator cuff repair  2. Aftercare following surgery        Approximately 6 months s/p Right shoulder arthroscopic rotator cuff repair, biceps tenotomy, acromioplasty performed on 04/11/2024.  Overall, the patient continues to progress well s/p operative management in regards to range of motion and strength but has residual deficits, especially in stamina.  Continue physical therapy focused on improving range of motion and progressing strength  Continue home exercises. Incorporate right shoulder more in normal ADLs  Continue work restrictions. Work note provided with the following restrictions: no overhead activity, no lifting, pushing, or pulling, no climbing  Follow up 6 weeks for reevaluation. Tentative plan to increase work allowances vs return to full duty pending evaluation at that time.       History of the Present Illness   Osmin Hook is a 59 y.o. male approximately 6 months s/p Right shoulder arthroscopic rotator cuff repair, biceps tenotomy, acromioplasty performed on 04/11/2024. Today the patient reports his pain is improving. Most of his pain is in his biceps. The patient reports he has been dropping items due to biceps pain. He has numbness and tingling in his fingers. No radiating pain. He reports restrictions in range of motion and strength. He continues physical therapy.       Review of Systems     Review of Systems   Constitutional:  Negative for chills and fever.   HENT:  Negative for ear pain and sore throat.    Eyes:  Negative for pain and visual disturbance.   Respiratory:  Negative for cough and shortness of breath.    Cardiovascular:  Negative for chest pain and palpitations.   Gastrointestinal:  Negative for abdominal pain and vomiting.   Genitourinary:  Negative for dysuria and hematuria.   Musculoskeletal:  Negative for arthralgias and back pain.   Skin:  Negative for color change and rash.  "  Neurological:  Negative for seizures and syncope.   All other systems reviewed and are negative.      Physical Exam     /89   Pulse 67   Ht 5' 6\" (1.676 m)   Wt (!) 138 kg (303 lb 12.8 oz)   BMI 49.03 kg/m²     Right Shoulder:   Surgical incisions well healed  Active range of motion   120 degrees forward flexion  130 degrees abduction  45 degrees external rotation   SI joint internal rotation    Passive range of motion   160 degrees of forward flexion   Forward flexion testing 4+/5 improving  External rotation testing 4+/5  Internal rotation testing 4+/5  The patient is neurovascularly intact distally in the extremity.      Data Review     No new images to review.    Social History     Tobacco Use    Smoking status: Former     Current packs/day: 0.00     Average packs/day: 0.3 packs/day for 5.8 years (1.4 ttl pk-yrs)     Types: Cigarettes     Start date: 1984     Quit date: 10/20/1989     Years since quittin.9    Smokeless tobacco: Current     Types: Chew     Last attempt to quit: 2019   Vaping Use    Vaping status: Never Used   Substance Use Topics    Alcohol use: Yes     Alcohol/week: 4.0 standard drinks of alcohol     Types: 4 Standard drinks or equivalent per week    Drug use: Yes     Frequency: 7.0 times per week     Types: Marijuana     Comment: medical         Scribe Attestation      I,:  Kaylan Vera am acting as a scribe while in the presence of the attending physician.:       I,:  Tommie Baird, DO personally performed the services described in this documentation    as scribed in my presence.:                 "

## 2024-09-30 NOTE — LETTER
September 30, 2024     Patient: Osmin Hook  YOB: 1965  Date of Visit: 9/30/2024      To Whom it May Concern:    Osmin Hook is under my professional care. Osmin was seen in my office on 9/30/2024. Osmin may return to work with limitations including :     No overhead activity.  No lifting, pushing, or pulling.  No climbing..    If you have any questions or concerns, please don't hesitate to call.         Sincerely,          Tommie Baird,         CC: No Recipients

## 2024-10-01 ENCOUNTER — OFFICE VISIT (OUTPATIENT)
Dept: PHYSICAL THERAPY | Facility: CLINIC | Age: 59
End: 2024-10-01
Payer: OTHER MISCELLANEOUS

## 2024-10-01 ENCOUNTER — TELEPHONE (OUTPATIENT)
Age: 59
End: 2024-10-01

## 2024-10-01 DIAGNOSIS — S43.004D DISLOCATION OF RIGHT SHOULDER JOINT, SUBSEQUENT ENCOUNTER: Primary | ICD-10-CM

## 2024-10-01 DIAGNOSIS — S93.402A SPRAIN OF UNSPECIFIED LIGAMENT OF LEFT ANKLE, INITIAL ENCOUNTER: ICD-10-CM

## 2024-10-01 DIAGNOSIS — M75.121 COMPLETE TEAR OF RIGHT ROTATOR CUFF, UNSPECIFIED WHETHER TRAUMATIC: ICD-10-CM

## 2024-10-01 PROCEDURE — 97110 THERAPEUTIC EXERCISES: CPT

## 2024-10-01 PROCEDURE — 97140 MANUAL THERAPY 1/> REGIONS: CPT

## 2024-10-01 NOTE — PROGRESS NOTES
Daily Note     Today's date: 10/1/2024  Patient name: Osmin Hook  : 1965  MRN: [id number]  Referring provider: Tommie Baird DO  Dx:   Encounter Diagnosis     ICD-10-CM    1. Dislocation of right shoulder joint, subsequent encounter  S43.004D       2. Complete tear of right rotator cuff, unspecified whether traumatic  M75.121       3. Sprain of unspecified ligament of left ankle, initial encounter  S93.402A         Start Time: 1541  Stop Time: 1622  Total time in clinic (min): 41 minutes    Subjective: Pt reports no changes since last visit. Pt saw physician and has another follow-up in 6 weeks.       Objective: See treatment diary below      Assessment: Tolerated treatment well. Increased shoulder flexion and scaption resistance, Pt reported increased in symptom irritability while completing the exercises. Monitor Pt soreness and symptom irritability NV. Patient demonstrated fatigue post treatment, exhibited good technique with therapeutic exercises, and would benefit from continued PT for increased mobility, increased strength, and decreased symptom irritability while completing functional activities in order to return to work.       Plan: Continue per plan of care.      Precautions: HTN, hx of lumbar fusion, Lyme's  POC expires Unit limit Auth Expiration date PT/OT/ST + Visit Limit?   24 BOMN 24 BOMN                           Visit/Unit Tracking  AUTH Status:  Date    60 visits Used 13 14 15 16 17 18 19 20 21 22 23 24 25 26    Remaining  17 16 45 44 43 42 41 40 39 38 37 36 35 34         Foto               Manuals 8/8 8/13 8/20 8/27 9/17 9/19 9/24 10/1       R inf mobs   MITESHK JUAN A           R shoulder PROM AF HA MITESHK JUAN A VELAZCO       Median nerve glide                              Neuro Re-Ed               Scap retraction               SL scap retraction iso               TB row L only               Perturbation in  flex               Shoulder taps against wall                Median, Ulnar, and Radial nerve glide                              Ther Ex               Pendulums               Elbow flexion 2# 3x10 2# 3x10 3# 3x10 3# 3x10 3# 3x10 3# 3x10 3# 3x10 4# 3x10       R shoulder PROM               [place]               AROM to 90*- flex, ABD 2# 3x10, flex/abd 2# 3x10, flex/abd Flex 2x10 3#/ ABD 3x10 2# Flex 2x10 3#/ ABD 2x10 3# Flex 3x10 3#/ ABD 3x10 3# Flex 3x10 3#/ ABD 3x10 3# Flex 3x10 3#/ ABD 3x10 3# Flex 2x10 4#/ ABD 3x10 3#       AROM scaption 2# 2x10 2# 2x10 2# 2x10 3# 2x10 3# 3x10 3# 3x10 3# 3x10 4# 2x10       TB row               TB ext Blk 3x10 Blk 3x10 [place] 3x10 7# [place] 3x10 8# [place] 3x10 10# Shellie 3x10 10# Shellie 3x10 10# [place] 3x10 10#       TB ER  BTB 3x10 BTB 3x10 BLTB 3x10 BLTB 3x10 BLTB 3x10 BLTB 3x12 BLTB 3x12 BLTB 3x12       TB IR 3x10 BTB BTB 3x10 BLTB 3x10 BLTB 3x10 BLTB 3x10 BLTB 3x12 BLTB 3x12 BLTB 3x12       TB tricep extension 3x10 blk 3x10 blk Shellie 3x10 15# [place] 3x10 16# [place] 3x10 18# Shellie 3x10 18# Shellie 3x10 20# [place] 3x10 20#       Table slide               Finger ladder               Pt edu               UBE 3'/3' 3'/3' 3'/3' 3'/3' 3'/3' 3'/3' 3'/3' 3'/3'       Shellie Row  3x10 12# 3x10 12# 3x10 14# 3x10 16# Shellie 3x10 18# [place] 3x10 18# [place] 3x10 20# [place] 3x10 20#       Ther Activity               Gripping                              Gait Training                                             Modalities

## 2024-10-01 NOTE — TELEPHONE ENCOUNTER
Caller: Mercedez    Doctor/Office: Jemma MURCIA#: [phone]      What needs to be faxed: Office note and work note from 9/30    ATTN to: Mouna    Fax#: [phone]       Documents were successfully e-faxed

## 2024-10-03 ENCOUNTER — OFFICE VISIT (OUTPATIENT)
Dept: PHYSICAL THERAPY | Facility: CLINIC | Age: 59
End: 2024-10-03
Payer: OTHER MISCELLANEOUS

## 2024-10-03 DIAGNOSIS — M75.121 COMPLETE TEAR OF RIGHT ROTATOR CUFF, UNSPECIFIED WHETHER TRAUMATIC: ICD-10-CM

## 2024-10-03 DIAGNOSIS — S43.004D DISLOCATION OF RIGHT SHOULDER JOINT, SUBSEQUENT ENCOUNTER: Primary | ICD-10-CM

## 2024-10-03 DIAGNOSIS — S93.402A SPRAIN OF UNSPECIFIED LIGAMENT OF LEFT ANKLE, INITIAL ENCOUNTER: ICD-10-CM

## 2024-10-03 PROCEDURE — 97140 MANUAL THERAPY 1/> REGIONS: CPT

## 2024-10-03 PROCEDURE — 97110 THERAPEUTIC EXERCISES: CPT

## 2024-10-03 NOTE — PROGRESS NOTES
Daily Note     Today's date: 10/3/2024  Patient name: Osmin Hook  : 1965  MRN: [id number]  Referring provider: Tommie Baird DO  Dx:   Encounter Diagnosis     ICD-10-CM    1. Dislocation of right shoulder joint, subsequent encounter  S43.004D       2. Complete tear of right rotator cuff, unspecified whether traumatic  M75.121       3. Sprain of unspecified ligament of left ankle, initial encounter  S93.402A         Start Time: 1540  Stop Time: 1620  Total time in clinic (min): 40 minutes    Subjective: Pt reports mild increased soreness due to increase in resistance. No increase in pain.       Objective: See treatment diary below      Assessment: Tolerated treatment well. Increased parascapular resistance for today's session. Pt reported no increase in symptom irritability. Pt was able to get more reps w/ 4# weight compared to previous visit. Patient demonstrated fatigue post treatment, exhibited good technique with therapeutic exercises, and would benefit from continued PT for increased mobility, increased strength, and decreased symptom irritability while completing functional activities in order to return to work.       Plan: Continue per plan of care.      Precautions: HTN, hx of lumbar fusion, Lyme's  POC expires Unit limit Auth Expiration date PT/OT/ST + Visit Limit?   24 BOMN 24 BOMN                           Visit/Unit Tracking  AUTH Status:  Date    60 visits Used 13 14 15 16 17 18 19 20 21 22 23 24 25 26    Remaining  17 16 45 44 43 42 41 40 39 38 37 36 35 34         Foto               Manuals 8/8 8/13 8/20 8/27 9/17 9/19 9/24 10/1 10/3      R inf mobs   MITESHK JUAN A           R shoulder PROM AF HA JUAN A VELAZCO      Median nerve glide                              Neuro Re-Ed               Scap retraction               SL scap retraction iso               TB row L only               Perturbation in flex                Shoulder taps against wall                Median, Ulnar, and Radial nerve glide                              Ther Ex               Pendulums               Elbow flexion 2# 3x10 2# 3x10 3# 3x10 3# 3x10 3# 3x10 3# 3x10 3# 3x10 4# 3x10 4# 3x10      R shoulder PROM               P[place]ley               AROM to 90*- flex, ABD 2# 3x10, flex/abd 2# 3x10, flex/abd Flex 2x10 3#/ ABD 3x10 2# Flex 2x10 3#/ ABD 2x10 3# Flex 3x10 3#/ ABD 3x10 3# Flex 3x10 3#/ ABD 3x10 3# Flex 3x10 3#/ ABD 3x10 3# Flex 2x10 4#/ ABD 3x10 3# Flex 3x10 4#/ ABD 3x10 3#      AROM scaption 2# 2x10 2# 2x10 2# 2x10 3# 2x10 3# 3x10 3# 3x10 3# 3x10 4# 2x10 4# 3x10      TB row               TB ext Blk 3x10 Blk 3x10 Shellie 3x10 7# Shellie 3x10 8# Shellie 3x10 10# Backus 3x10 10# Backus 3x10 10# [place] 3x10 10# [place] 3x10 12#      TB ER  BTB 3x10 BTB 3x10 BLTB 3x10 BLTB 3x10 BLTB 3x10 BLTB 3x12 BLTB 3x12 BLTB 3x12 BLTB 3x12      TB IR 3x10 BTB BTB 3x10 BLTB 3x10 BLTB 3x10 BLTB 3x10 BLTB 3x12 BLTB 3x12 BLTB 3x12 BLTB 3x12      TB tricep extension 3x10 blk 3x10 blk [place] 3x10 15# [place] 3x10 16# [place] 3x10 18# [place] 3x10 18# Shellie 3x10 20# Shellie 3x10 20# Back[place] 3x10 22#      Table slide               Finger ladder               Pt edu               UBE 3'/3' 3'/3' 3'/3' 3'/3' 3'/3' 3'/3' 3'/3' 3'/3' 3'/3'      Shellie Row  3x10 12# 3x10 12# 3x10 14# 3x10 16# Shellie 3x10 18# Shellie 3x10 18# [place] 3x10 20# Shellie 3x10 20# Shellie 3x10 22#      Ther Activity               Gripping                              Gait Training                                             Modalities

## 2024-10-08 ENCOUNTER — OFFICE VISIT (OUTPATIENT)
Dept: PHYSICAL THERAPY | Facility: CLINIC | Age: 59
End: 2024-10-08
Payer: OTHER MISCELLANEOUS

## 2024-10-08 DIAGNOSIS — S43.004D DISLOCATION OF RIGHT SHOULDER JOINT, SUBSEQUENT ENCOUNTER: Primary | ICD-10-CM

## 2024-10-08 DIAGNOSIS — S93.402A SPRAIN OF UNSPECIFIED LIGAMENT OF LEFT ANKLE, INITIAL ENCOUNTER: ICD-10-CM

## 2024-10-08 DIAGNOSIS — M75.121 COMPLETE TEAR OF RIGHT ROTATOR CUFF, UNSPECIFIED WHETHER TRAUMATIC: ICD-10-CM

## 2024-10-08 PROCEDURE — 97140 MANUAL THERAPY 1/> REGIONS: CPT

## 2024-10-08 PROCEDURE — 97110 THERAPEUTIC EXERCISES: CPT

## 2024-10-08 NOTE — PROGRESS NOTES
Daily Note     Today's date: 10/8/2024  Patient name: Osmin Hook  : 1965  MRN: [id number]  Referring provider: Tommie Baird DO  Dx:   Encounter Diagnosis     ICD-10-CM    1. Dislocation of right shoulder joint, subsequent encounter  S43.004D       2. Complete tear of right rotator cuff, unspecified whether traumatic  M75.121       3. Sprain of unspecified ligament of left ankle, initial encounter  S93.402A           Start Time: 1543  Stop Time: 1630  Total time in clinic (min): 47 minutes    Subjective: Pt reports usual amount of soreness but no pains.       Objective: See treatment diary below      Assessment: Tolerated treatment well. Increased parascapular resistance for today's session. Pt reported no increase in symptom irritability. Pt was able to get more reps w/ 4# weight compared to previous visit. Patient demonstrated fatigue post treatment, exhibited good technique with therapeutic exercises, and would benefit from continued PT for increased mobility, increased strength, and decreased symptom irritability while completing functional activities in order to return to work.       Plan: Continue per plan of care.      Precautions: HTN, hx of lumbar fusion, Lyme's  POC expires Unit limit Auth Expiration date PT/OT/ST + Visit Limit?   24 BOMN 24 BOMN                           Visit/Unit Tracking  AUTH Status:  Date 5/2 5/6 5/9 5/14 5/16 5/21 5/23 5/28   5/30 6/4 6/6   60 visits Used 16 17 18 19 20 21 22 23 24 25 26    Remaining  44 43 42 41 40 39 38 37 36 35 34         Foto               Manuals 8/8 8/13 8/20 8/27 9/17 9/19 9/24 10/1 10/3 10/8     R inf mobs   MITESHK JUAN A           R shoulder PROM AF HA JMK MITESHK JMK MITESHK JUAN A SAGASTUMEK JUAN A AL     Median nerve glide                              Neuro Re-Ed               Scap retraction               SL scap retraction iso               TB row L only               Perturbation in flex               Shoulder taps against wall                Median,  Ulnar, and Radial nerve glide                              Ther Ex               Pendulums               Elbow flexion 2# 3x10 2# 3x10 3# 3x10 3# 3x10 3# 3x10 3# 3x10 3# 3x10 4# 3x10 4# 3x10 4# 3x10     R shoulder PROM               [place]               AROM to 90*- flex, ABD 2# 3x10, flex/abd 2# 3x10, flex/abd Flex 2x10 3#/ ABD 3x10 2# Flex 2x10 3#/ ABD 2x10 3# Flex 3x10 3#/ ABD 3x10 3# Flex 3x10 3#/ ABD 3x10 3# Flex 3x10 3#/ ABD 3x10 3# Flex 2x10 4#/ ABD 3x10 3# Flex 3x10 4#/ ABD 3x10 3# Flex 3x10 4#/ ABD 3x10 4#     AROM scaption 2# 2x10 2# 2x10 2# 2x10 3# 2x10 3# 3x10 3# 3x10 3# 3x10 4# 2x10 4# 3x10 4# 2x10     TB row               TB ext Blk 3x10 Blk 3x10 [place] 3x10 7# [place] 3x10 8# [place] 3x10 10# Shellie 3x10 10# [place] 3x10 10# [place] 3x10 10# Shellie 3x10 12# [place] 3x10 12#     TB ER  BTB 3x10 BTB 3x10 BLTB 3x10 BLTB 3x10 BLTB 3x10 BLTB 3x12 BLTB 3x12 BLTB 3x12 BLTB 3x12 BLTB 3x12     TB IR 3x10 BTB BTB 3x10 BLTB 3x10 BLTB 3x10 BLTB 3x10 BLTB 3x12 BLTB 3x12 BLTB 3x12 BLTB 3x12 BLTB 3x12     TB tricep extension 3x10 blk 3x10 blk [place] 3x10 15# Shellie 3x10 16# [place] 3x10 18# [place] 3x10 18# [place] 3x10 20# [place] 3x10 20# Shellie 3x10 22# Shellie 3x10 22#     Table slide               Finger ladder               Pt edu               UBE 3'/3' 3'/3' 3'/3' 3'/3' 3'/3' 3'/3' 3'/3' 3'/3' 3'/3' 3'/3'     [place] Row  3x10 12# 3x10 12# 3x10 14# 3x10 16# [place] 3x10 18# Shellie 3x10 18# [place] 3x10 20# Shellie 3x10 20# [place] 3x10 22# Shellie 3x10 22#     Ther Activity               Gripping                              Gait Training                                             Modalities

## 2024-10-10 ENCOUNTER — OFFICE VISIT (OUTPATIENT)
Dept: PHYSICAL THERAPY | Facility: CLINIC | Age: 59
End: 2024-10-10
Payer: OTHER MISCELLANEOUS

## 2024-10-10 DIAGNOSIS — S93.402A SPRAIN OF UNSPECIFIED LIGAMENT OF LEFT ANKLE, INITIAL ENCOUNTER: ICD-10-CM

## 2024-10-10 DIAGNOSIS — M75.121 COMPLETE TEAR OF RIGHT ROTATOR CUFF, UNSPECIFIED WHETHER TRAUMATIC: ICD-10-CM

## 2024-10-10 DIAGNOSIS — S43.004D DISLOCATION OF RIGHT SHOULDER JOINT, SUBSEQUENT ENCOUNTER: Primary | ICD-10-CM

## 2024-10-10 PROCEDURE — 97110 THERAPEUTIC EXERCISES: CPT

## 2024-10-10 PROCEDURE — 97140 MANUAL THERAPY 1/> REGIONS: CPT

## 2024-10-10 NOTE — PROGRESS NOTES
Daily Note     Today's date: 10/10/2024  Patient name: Osmin Hook  : 1965  MRN: [id number]  Referring provider: Tommie Baird DO  Dx:   Encounter Diagnosis     ICD-10-CM    1. Dislocation of right shoulder joint, subsequent encounter  S43.004D       2. Complete tear of right rotator cuff, unspecified whether traumatic  M75.121       3. Sprain of unspecified ligament of left ankle, initial encounter  S93.402A         Start Time: 1543  Stop Time: 1625  Total time in clinic (min): 42 minutes    Subjective: Pt reports that he is sore today following last session.       Objective: See treatment diary below      Assessment: Tolerated treatment well. Increased parascapular resistance today, Reported minimal increase in soreness. Pt has full PROM in R shoulder w/ mild end range symptom irritability. Patient demonstrated fatigue post treatment, exhibited good technique with therapeutic exercises, and would benefit from continued PT for increased mobility, increased strength, and decreased symptom irritability while completing functional activities in order to return to work.       Plan: Continue per plan of care.      Precautions: HTN, hx of lumbar fusion, Lyme's  POC expires Unit limit Auth Expiration date PT/OT/ST + Visit Limit?   24 BOMN 24 BOMN                           Visit/Unit Tracking  AUTH Status:  Date    60 visits Used 16 17 18 19 20 21 22 23 24 25 26    Remaining  44 43 42 41 40 39 38 37 36 35 34         Foto               Manuals 8/8 8/13 8/20 8/27 9/17 9/19 9/24 10/1 10/3 10/8 10/10    R inf mobs   JMK JMK       MITESHK    R shoulder PROM AF HA JMK JMK JMK JMK JMK JMK JMK AL JMK    Median nerve glide                              Neuro Re-Ed               Scap retraction               SL scap retraction iso               TB row L only               Perturbation in flex               Shoulder taps against wall                Median,  Ulnar, and Radial nerve glide                              Ther Ex               Pendulums               Elbow flexion 2# 3x10 2# 3x10 3# 3x10 3# 3x10 3# 3x10 3# 3x10 3# 3x10 4# 3x10 4# 3x10 4# 3x10 4# 3x10    R shoulder PROM               [place]               AROM to 90*- flex, ABD 2# 3x10, flex/abd 2# 3x10, flex/abd Flex 2x10 3#/ ABD 3x10 2# Flex 2x10 3#/ ABD 2x10 3# Flex 3x10 3#/ ABD 3x10 3# Flex 3x10 3#/ ABD 3x10 3# Flex 3x10 3#/ ABD 3x10 3# Flex 2x10 4#/ ABD 3x10 3# Flex 3x10 4#/ ABD 3x10 3# Flex 3x10 4#/ ABD 3x10 4# Flex 3x10 4#/ ABD 3x10 4#    AROM scaption 2# 2x10 2# 2x10 2# 2x10 3# 2x10 3# 3x10 3# 3x10 3# 3x10 4# 2x10 4# 3x10 4# 2x10 4# 3x10    TB row               TB ext Blk 3x10 Blk 3x10 Shellie 3x10 7# Shellie 3x10 8# [place] 3x10 10# [place] 3x10 10# [place] 3x10 10# [place] 3x10 10# [place] 3x10 12# Shellie 3x10 12#     TB ER  BTB 3x10 BTB 3x10 BLTB 3x10 BLTB 3x10 BLTB 3x10 BLTB 3x12 BLTB 3x12 BLTB 3x12 BLTB 3x12 BLTB 3x12 BLTB 3x12    TB IR 3x10 BTB BTB 3x10 BLTB 3x10 BLTB 3x10 BLTB 3x10 BLTB 3x12 BLTB 3x12 BLTB 3x12 BLTB 3x12 BLTB 3x12 BLTB 3x12    TB tricep extension 3x10 blk 3x10 blk [place] 3x10 15# [place] 3x10 16# Shellie 3x10 18# Shellie 3x10 18# [place] 3x10 20# [place] 3x10 20# [place] 3x10 22# [place] 3x10 22# Shellie 3x10 25#    Table slide               Finger ladder               Pt edu               UBE 3'/3' 3'/3' 3'/3' 3'/3' 3'/3' 3'/3' 3'/3' 3'/3' 3'/3' 3'/3' 3'/3'    [place]ie Row  3x10 12# 3x10 12# 3x10 14# 3x10 16# Shellie 3x10 18# Shellie 3x10 18# Shellie 3x10 20# Shellie 3x10 20# [place] 3x10 22# [place] 3x10 22# Shellie 3x10 25#    Ther Activity               Gripping                              Gait Training                                             Modalities

## 2024-10-15 ENCOUNTER — OFFICE VISIT (OUTPATIENT)
Dept: PHYSICAL THERAPY | Facility: CLINIC | Age: 59
End: 2024-10-15
Payer: OTHER MISCELLANEOUS

## 2024-10-15 DIAGNOSIS — S93.402A SPRAIN OF UNSPECIFIED LIGAMENT OF LEFT ANKLE, INITIAL ENCOUNTER: ICD-10-CM

## 2024-10-15 DIAGNOSIS — S43.004D DISLOCATION OF RIGHT SHOULDER JOINT, SUBSEQUENT ENCOUNTER: Primary | ICD-10-CM

## 2024-10-15 DIAGNOSIS — M75.121 COMPLETE TEAR OF RIGHT ROTATOR CUFF, UNSPECIFIED WHETHER TRAUMATIC: ICD-10-CM

## 2024-10-15 PROCEDURE — 97140 MANUAL THERAPY 1/> REGIONS: CPT

## 2024-10-15 PROCEDURE — 97110 THERAPEUTIC EXERCISES: CPT

## 2024-10-15 NOTE — PROGRESS NOTES
Daily Note     Today's date: 10/15/2024  Patient name: Osmin Hook  : 1965  MRN: [id number]  Referring provider: Tommie Baird DO  Dx:   Encounter Diagnosis     ICD-10-CM    1. Dislocation of right shoulder joint, subsequent encounter  S43.004D       2. Complete tear of right rotator cuff, unspecified whether traumatic  M75.121       3. Sprain of unspecified ligament of left ankle, initial encounter  S93.402A         Start Time: 1540  Stop Time: 1625  Total time in clinic (min): 45 minutes    Subjective: Pt reports that his soreness is better today compared to previous visit. Pt reports primary symptom irritability in R bicep.       Objective: See treatment diary below      Assessment: Tolerated treatment well. Pt still challenged by exercise prescription, advance depending on symptom presentation. Pt had some symptom irritability w/ PROM during ER today.  Patient demonstrated fatigue post treatment, exhibited good technique with therapeutic exercises, and would benefit from continued PT for increased mobility, increased strength, and decreased symptom irritability while completing functional activities in order to return to work.       Plan: Continue per plan of care.      Precautions: HTN, hx of lumbar fusion, Lyme's  POC expires Unit limit Auth Expiration date PT/OT/ST + Visit Limit?   24 BOMN 24 BOMN                           Visit/Unit Tracking  AUTH Status:  Date    60 visits Used 16 17 18 19 20 21 22 23 24 25 26    Remaining  44 43 42 41 40 39 38 37 36 35 34         Foto               Manuals 8/8 8/13 8/20 8/27 9/17 9/19 9/24 10/1 10/3 10/8 10/10 10/15   R inf mobs   JMK JMK       JMK JMK   R shoulder PROM AF HA JMK JMK MITESHK MITESHK MITESHK JUAN A VELAZCO AL JUAN A VELAZCO   Median nerve glide                              Neuro Re-Ed               Scap retraction               SL scap retraction iso               TB row L only               Perturbation  in flex               Shoulder taps against wall                Median, Ulnar, and Radial nerve glide                              Ther Ex               Pendulums               Elbow flexion 2# 3x10 2# 3x10 3# 3x10 3# 3x10 3# 3x10 3# 3x10 3# 3x10 4# 3x10 4# 3x10 4# 3x10 4# 3x10 4# 3x10   R shoulder PROM               [place]               AROM to 90*- flex, ABD 2# 3x10, flex/abd 2# 3x10, flex/abd Flex 2x10 3#/ ABD 3x10 2# Flex 2x10 3#/ ABD 2x10 3# Flex 3x10 3#/ ABD 3x10 3# Flex 3x10 3#/ ABD 3x10 3# Flex 3x10 3#/ ABD 3x10 3# Flex 2x10 4#/ ABD 3x10 3# Flex 3x10 4#/ ABD 3x10 3# Flex 3x10 4#/ ABD 3x10 4# Flex 3x10 4#/ ABD 3x10 4# Flex 3x10 4#/ ABD 3x10 4#   AROM scaption 2# 2x10 2# 2x10 2# 2x10 3# 2x10 3# 3x10 3# 3x10 3# 3x10 4# 2x10 4# 3x10 4# 2x10 4# 3x10 4# 3x10   TB row               TB ext Blk 3x10 Blk 3x10 [place] 3x10 7# [place] 3x10 8# [place] 3x10 10# [place] 3x10 10# Shellie 3x10 10# Shellie 3x10 10# Shellie 3x10 12# [place] 3x10 12# Shellie 3x10 12# [place] 3x10 14#   TB ER  BTB 3x10 BTB 3x10 BLTB 3x10 BLTB 3x10 BLTB 3x10 BLTB 3x12 BLTB 3x12 BLTB 3x12 BLTB 3x12 BLTB 3x12 BLTB 3x12 BLTB 3x12   TB IR 3x10 BTB BTB 3x10 BLTB 3x10 BLTB 3x10 BLTB 3x10 BLTB 3x12 BLTB 3x12 BLTB 3x12 BLTB 3x12 BLTB 3x12 BLTB 3x12 BLTB 3x12   TB tricep extension 3x10 blk 3x10 blk [place] 3x10 15# [place] 3x10 16# [place] 3x10 18# [place] 3x10 18# [place] 3x10 20# [place] 3x10 20# [place] 3x10 22# Shellie 3x10 22# Shellie 3x10 25# Shellie 3x10 25#   Table slide               Finger ladder               Pt edu               UBE 3'/3' 3'/3' 3'/3' 3'/3' 3'/3' 3'/3' 3'/3' 3'/3' 3'/3' 3'/3' 3'/3' 3'/3'   [place] Row  3x10 12# 3x10 12# 3x10 14# 3x10 16# Shellie 3x10 18# [place] 3x10 18# [place] 3x10 20# [place] 3x10 20# [place] 3x10 22# Shellie 3x10 22# Shellie 3x10 25# [place] 3x10 25#   Ther Activity               Gripping                              Gait Training                                             Modalities

## 2024-10-17 ENCOUNTER — OFFICE VISIT (OUTPATIENT)
Dept: PHYSICAL THERAPY | Facility: CLINIC | Age: 59
End: 2024-10-17
Payer: OTHER MISCELLANEOUS

## 2024-10-17 DIAGNOSIS — M75.121 COMPLETE TEAR OF RIGHT ROTATOR CUFF, UNSPECIFIED WHETHER TRAUMATIC: ICD-10-CM

## 2024-10-17 DIAGNOSIS — S93.402A SPRAIN OF UNSPECIFIED LIGAMENT OF LEFT ANKLE, INITIAL ENCOUNTER: ICD-10-CM

## 2024-10-17 DIAGNOSIS — S43.004D DISLOCATION OF RIGHT SHOULDER JOINT, SUBSEQUENT ENCOUNTER: Primary | ICD-10-CM

## 2024-10-17 PROCEDURE — 97110 THERAPEUTIC EXERCISES: CPT

## 2024-10-17 PROCEDURE — 97140 MANUAL THERAPY 1/> REGIONS: CPT

## 2024-10-17 NOTE — PROGRESS NOTES
Daily Note     Today's date: 10/17/2024  Patient name: Osmin Hook  : 1965  MRN: [id number]  Referring provider: Tommie Baird DO  Dx:   Encounter Diagnosis     ICD-10-CM    1. Dislocation of right shoulder joint, subsequent encounter  S43.004D       2. Complete tear of right rotator cuff, unspecified whether traumatic  M75.121       3. Sprain of unspecified ligament of left ankle, initial encounter  S93.402A           Start Time: 1543  Stop Time: 1621  Total time in clinic (min): 38 minutes    Subjective: Pt reports that he was very sore following last session and that his shoulder is still bothersome today.       Objective: See treatment diary below      Assessment: Tolerated treatment well. Due to increased symptom irritability, did not increase resistance of exercises. Pt is still challenged by exercise prescription. Patient demonstrated fatigue post treatment, exhibited good technique with therapeutic exercises, and would benefit from continued PT for increased mobility, increased strength, and decreased symptom irritability while completing functional activities in order to return to work.       Plan: Continue per plan of care.      Precautions: HTN, hx of lumbar fusion, Lyme's  POC expires Unit limit Auth Expiration date PT/OT/ST + Visit Limit?   24 BOMN 24 BOMN                           Visit/Unit Tracking  AUTH Status:  Date 5/2 5/6 5/9 5/14 5/16 5/21 5/23 5/28   5/30 6/4 6/6   60 visits Used 16 17 18 19 20 21 22 23 24 25 26    Remaining  44 43 42 41 40 39 38 37 36 35 34         Foto                Manuals 8/8 8/13 8/20 8/27 9/17 9/19 9/24 10/1 10/3 10/8 10/10 10/15 10/17   R inf mobs   JMK JMK       JMK MITESHK JMK   R shoulder PROM AF HA JMK JMK JMK JMK JMK MITESHK MITESHK AL JUAN A SAGASTUMEK   Median nerve glide                                Neuro Re-Ed                Scap retraction                SL scap retraction iso                TB row L only                Perturbation in flex                 Shoulder taps against wall                 Median, Ulnar, and Radial nerve glide                                Ther Ex                Pendulums                Elbow flexion 2# 3x10 2# 3x10 3# 3x10 3# 3x10 3# 3x10 3# 3x10 3# 3x10 4# 3x10 4# 3x10 4# 3x10 4# 3x10 4# 3x10 4# 3x10   R shoulder PROM                [place]                AROM to 90*- flex, ABD 2# 3x10, flex/abd 2# 3x10, flex/abd Flex 2x10 3#/ ABD 3x10 2# Flex 2x10 3#/ ABD 2x10 3# Flex 3x10 3#/ ABD 3x10 3# Flex 3x10 3#/ ABD 3x10 3# Flex 3x10 3#/ ABD 3x10 3# Flex 2x10 4#/ ABD 3x10 3# Flex 3x10 4#/ ABD 3x10 3# Flex 3x10 4#/ ABD 3x10 4# Flex 3x10 4#/ ABD 3x10 4# Flex 3x10 4#/ ABD 3x10 4# Flex 3x10 4#/ ABD 3x10 4#   AROM scaption 2# 2x10 2# 2x10 2# 2x10 3# 2x10 3# 3x10 3# 3x10 3# 3x10 4# 2x10 4# 3x10 4# 2x10 4# 3x10 4# 3x10 4# 2x10   TB row                TB ext Blk 3x10 Blk 3x10 [place] 3x10 7# [employer] [place] 3x10 8# Shellie 3x10 10# Shellie 3x10 10# Shellie 3x10 10# Shellie 3x10 10# [employer] [place] 3x10 12# [employer] [place] 3x10 12# Shellie 3x10 12# [employer] 3x10 14# [employer] 3x12 14#   TB ER  BTB 3x10 BTB 3x10 BLTB 3x10 BLTB 3x10 BLTB 3x10 BLTB 3x12 BLTB 3x12 BLTB 3x12 BLTB 3x12 BLTB 3x12 BLTB 3x12 BLTB 3x12 BLTB 3x12   TB IR 3x10 BTB BTB 3x10 BLTB 3x10 BLTB 3x10 BLTB 3x10 BLTB 3x12 BLTB 3x12 BLTB 3x12 BLTB 3x12 BLTB 3x12 BLTB 3x12 BLTB 3x12 BLTB 3x12   TB tricep extension 3x10 blk 3x10 blk [employer] [place] 3x10 15# [employer] 3x10 16# Shellie 3x10 18# [employer] 3x10 18# Shellie 3x10 20# [employer] [place] 3x10 20# Shellie 3x10 22# [employer] [place] 3x10 22# [employer] 3x10 25# Shellie 3x10 25# [employer] 3x12 25#   Table slide                Finger ladder                Pt edu                UBE 3'/3' 3'/3' 3'/3' 3'/3' 3'/3' 3'/3' 3'/3' 3'/3' 3'/3' 3'/3' 3'/3' 3'/3'    [employer] [place] Row  3x10 12# 3x10 12# 3x10 14# 3x10 16# [employer] [place] 3x10 18# [employer] [place] 3x10 18# [employer] [place] 3x10 20# [employer] [place] 3x10 20# Shellie 3x10 22# Shellie 3x10 22# Shellie 3x10 25# Shellie 3x10 25# Shellie 3x12 25#   Ther Activity                Gripping                                Gait  Training                                                Modalities

## 2024-10-22 ENCOUNTER — OFFICE VISIT (OUTPATIENT)
Dept: PHYSICAL THERAPY | Facility: CLINIC | Age: 59
End: 2024-10-22
Payer: OTHER MISCELLANEOUS

## 2024-10-22 DIAGNOSIS — S43.004D DISLOCATION OF RIGHT SHOULDER JOINT, SUBSEQUENT ENCOUNTER: Primary | ICD-10-CM

## 2024-10-22 DIAGNOSIS — M75.121 COMPLETE TEAR OF RIGHT ROTATOR CUFF, UNSPECIFIED WHETHER TRAUMATIC: ICD-10-CM

## 2024-10-22 DIAGNOSIS — S93.402A SPRAIN OF UNSPECIFIED LIGAMENT OF LEFT ANKLE, INITIAL ENCOUNTER: ICD-10-CM

## 2024-10-22 PROCEDURE — 97110 THERAPEUTIC EXERCISES: CPT

## 2024-10-22 PROCEDURE — 97140 MANUAL THERAPY 1/> REGIONS: CPT

## 2024-10-22 NOTE — PROGRESS NOTES
Daily Note     Today's date: 10/22/2024  Patient name: Osmin Hook  : 1965  MRN: [id number]  Referring provider: Tommie Baird DO  Dx:   Encounter Diagnosis     ICD-10-CM    1. Dislocation of right shoulder joint, subsequent encounter  S43.004D       2. Complete tear of right rotator cuff, unspecified whether traumatic  M75.121       3. Sprain of unspecified ligament of left ankle, initial encounter  S93.402A         Start Time: 1542  Stop Time: 1628  Total time in clinic (min): 46 minutes    Subjective: Pt reports that his shoulder has been bothersome and he is feeling a little under the weather today.       Objective: See treatment diary below      Assessment: Tolerated treatment well. Patient demonstrated fatigue post treatment, exhibited good technique with therapeutic exercises, and would benefit from continued PT for increased AROM and decreased symptom irritability. Continue to challenge and progress Pt.       Plan: Continue per plan of care.      Precautions: HTN, hx of lumbar fusion, Lyme's  POC expires Unit limit Auth Expiration date PT/OT/ST + Visit Limit?   24 BOMN 24 BOMN                           Visit/Unit Tracking  AUTH Status:  Date    60 visits Used 16 17 18 19 20 21 22 23 24 25 26    Remaining  44 43 42 41 40 39 38 37 36 35 34         Foto           Manuals 9/24 10/1 10/3 10/8 10/10 10/15 10/17 10/22   R inf mobs     MITESHK JUAN A VELAZCO   R shoulder PROM JMK JUAN A VELAZCO AL MITESHK MITESHK MITESHK MITESHK   Median nerve glide                      Neuro Re-Ed           Scap retraction           SL scap retraction iso           TB row L only           Perturbation in flex           Shoulder taps against wall            Median, Ulnar, and Radial nerve glide                      Ther Ex           Pendulums           Elbow flexion 3# 3x10 4# 3x10 4# 3x10 4# 3x10 4# 3x10 4# 3x10 4# 3x10 4# 3x10   R shoulder PROM           [place]           AROM to 90*-  flex, ABD Flex 3x10 3#/ ABD 3x10 3# Flex 2x10 4#/ ABD 3x10 3# Flex 3x10 4#/ ABD 3x10 3# Flex 3x10 4#/ ABD 3x10 4# Flex 3x10 4#/ ABD 3x10 4# Flex 3x10 4#/ ABD 3x10 4# Flex 3x10 4#/ ABD 3x10 4# Flex 3x10 4#/ ABD 3x10 4#   AROM scaption 3# 3x10 4# 2x10 4# 3x10 4# 2x10 4# 3x10 4# 3x10 4# 2x10 4# 3x10   TB row           TB ext [place] 3x10 10# Shellie 3x10 10# [place] 3x10 12# Shellie 3x10 12# Shellie 3x10 12# Shellie 3x10 14# [place] 3x12 14# Shellie 3x12 14#   TB ER  BLTB 3x12 BLTB 3x12 BLTB 3x12 BLTB 3x12 BLTB 3x12 BLTB 3x12 BLTB 3x12 BLTB 3x12   TB IR BLTB 3x12 BLTB 3x12 BLTB 3x12 BLTB 3x12 BLTB 3x12 BLTB 3x12 BLTB 3x12 BLTB 3x12   TB tricep extension [place] 3x10 20# [place] 3x10 20# [place] 3x10 22# [place] 3x10 22# Shellie 3x10 25# Shellie 3x10 25# [place] 3x12 25# Shellie 3x12 25#   Table slide           Finger ladder           Pt edu           UBE 3'/3' 3'/3' 3'/3' 3'/3' 3'/3' 3'/3' 3'/3' 3'/3'   [place] Row  Shellie 3x10 20# [place] 3x10 20# [place] 3x10 22# Shellie 3x10 22# Shellie 3x10 25# Shellie 3x10 25# Shellie 3x12 25# Shellie 3x12 25#   Ther Activity           Gripping                      Gait Training                                 Modalities

## 2024-10-24 ENCOUNTER — APPOINTMENT (OUTPATIENT)
Dept: PHYSICAL THERAPY | Facility: CLINIC | Age: 59
End: 2024-10-24
Payer: OTHER MISCELLANEOUS

## 2024-10-29 ENCOUNTER — OFFICE VISIT (OUTPATIENT)
Dept: PHYSICAL THERAPY | Facility: CLINIC | Age: 59
End: 2024-10-29
Payer: OTHER MISCELLANEOUS

## 2024-10-29 DIAGNOSIS — I10 ESSENTIAL HYPERTENSION: ICD-10-CM

## 2024-10-29 DIAGNOSIS — S93.402A SPRAIN OF UNSPECIFIED LIGAMENT OF LEFT ANKLE, INITIAL ENCOUNTER: ICD-10-CM

## 2024-10-29 DIAGNOSIS — M75.121 COMPLETE TEAR OF RIGHT ROTATOR CUFF, UNSPECIFIED WHETHER TRAUMATIC: ICD-10-CM

## 2024-10-29 DIAGNOSIS — S43.004D DISLOCATION OF RIGHT SHOULDER JOINT, SUBSEQUENT ENCOUNTER: Primary | ICD-10-CM

## 2024-10-29 PROCEDURE — 97140 MANUAL THERAPY 1/> REGIONS: CPT

## 2024-10-29 PROCEDURE — 97110 THERAPEUTIC EXERCISES: CPT

## 2024-10-29 RX ORDER — OLMESARTAN MEDOXOMIL 40 MG/1
40 TABLET ORAL DAILY
Qty: 90 TABLET | Refills: 3 | Status: SHIPPED | OUTPATIENT
Start: 2024-10-29

## 2024-10-29 RX ORDER — METOPROLOL SUCCINATE 50 MG/1
50 TABLET, EXTENDED RELEASE ORAL DAILY
Qty: 90 TABLET | Refills: 3 | Status: SHIPPED | OUTPATIENT
Start: 2024-10-29

## 2024-10-29 NOTE — PROGRESS NOTES
Daily Note     Today's date: 10/29/2024  Patient name: Osmin Hook  : 1965  MRN: [id number]  Referring provider: Tommie Baird DO  Dx:   Encounter Diagnosis     ICD-10-CM    1. Dislocation of right shoulder joint, subsequent encounter  S43.004D       2. Complete tear of right rotator cuff, unspecified whether traumatic  M75.121       3. Sprain of unspecified ligament of left ankle, initial encounter  S93.402A         Start Time: 1544  Stop Time: 1630  Total time in clinic (min): 46 minutes    Subjective: Pt reports that his soreness is okay today, but he is still battling a cold. Pt reports no major changes in functional ability.       Objective: See treatment diary below      Assessment: Tolerated treatment well. Increased resistance for parascapular exercises, reported increased fatigue and soreness. No increase in symptom irritability. Patient demonstrated fatigue post treatment, exhibited good technique with therapeutic exercises, and would benefit from continued PT for increased AROM and decreased symptom irritability. Continue to challenge and progress Pt.       Plan: Continue per plan of care.      Precautions: HTN, hx of lumbar fusion, Lyme's  POC expires Unit limit Auth Expiration date PT/OT/ST + Visit Limit?   24 BOMN 24 BOMN                           Visit/Unit Tracking  AUTH Status:  Date    60 visits Used 16 17 18 19 20 21 22 23 24 25 26    Remaining  44 43 42 41 40 39 38 37 36 35 34         Foto            Manuals 9/24 10/1 10/3 10/8 10/10 10/15 10/17 10/22 10/29   R inf mobs     JMK JMK JMK MITESHK JUAN A   R shoulder PROM JMK MITESHK JMK AL JMK JMK JMK MITESHK MITESHK   Median nerve glide                        Neuro Re-Ed            Scap retraction            SL scap retraction iso            TB row L only            Perturbation in flex            Shoulder taps against wall             Median, Ulnar, and Radial nerve glide                         Ther Ex            Pendulums            Elbow flexion 3# 3x10 4# 3x10 4# 3x10 4# 3x10 4# 3x10 4# 3x10 4# 3x10 4# 3x10 4# 3x10   R shoulder PROM            [place]            AROM to 90*- flex, ABD Flex 3x10 3#/ ABD 3x10 3# Flex 2x10 4#/ ABD 3x10 3# Flex 3x10 4#/ ABD 3x10 3# Flex 3x10 4#/ ABD 3x10 4# Flex 3x10 4#/ ABD 3x10 4# Flex 3x10 4#/ ABD 3x10 4# Flex 3x10 4#/ ABD 3x10 4# Flex 3x10 4#/ ABD 3x10 4# Flex 3x10 4#/ ABD 3x10 4#   AROM scaption 3# 3x10 4# 2x10 4# 3x10 4# 2x10 4# 3x10 4# 3x10 4# 2x10 4# 3x10 4# 3x10   TB row            TB ext [place] 3x10 10# Shellie 3x10 10# [place] 3x10 12# Shellie 3x10 12# Shellie 3x10 12# Shellie 3x10 14# [place] 3x12 14# [place] 3x12 14# [place] 3x12 14#   TB ER  BLTB 3x12 BLTB 3x12 BLTB 3x12 BLTB 3x12 BLTB 3x12 BLTB 3x12 BLTB 3x12 BLTB 3x12 BLTB 3x12   TB IR BLTB 3x12 BLTB 3x12 BLTB 3x12 BLTB 3x12 BLTB 3x12 BLTB 3x12 BLTB 3x12 BLTB 3x12 BLTB 3x12   TB tricep extension [place] 3x10 20# [place] 3x10 20# Shellie 3x10 22# [place] 3x10 22# [place] 3x10 25# [place] 3x10 25# [place] 3x12 25# [place] 3x12 25# [place] 3x10 28#   Table slide            Finger ladder            Pt edu            UBE 3'/3' 3'/3' 3'/3' 3'/3' 3'/3' 3'/3' 3'/3' 3'/3' 3'/3'   Shellie Row  [place] 3x10 20# [place] 3x10 20# [place] 3x10 22# [place] 3x10 22# Shellie 3x10 25# Shellie 3x10 25# [place] 3x12 25# Shellie 3x12 25# [place] 3x10 28#   Ther Activity            Gripping                        Gait Training                                    Modalities

## 2024-10-31 ENCOUNTER — OFFICE VISIT (OUTPATIENT)
Dept: PHYSICAL THERAPY | Facility: CLINIC | Age: 59
End: 2024-10-31
Payer: OTHER MISCELLANEOUS

## 2024-10-31 DIAGNOSIS — M75.121 COMPLETE TEAR OF RIGHT ROTATOR CUFF, UNSPECIFIED WHETHER TRAUMATIC: ICD-10-CM

## 2024-10-31 DIAGNOSIS — S93.402A SPRAIN OF UNSPECIFIED LIGAMENT OF LEFT ANKLE, INITIAL ENCOUNTER: ICD-10-CM

## 2024-10-31 DIAGNOSIS — S43.004D DISLOCATION OF RIGHT SHOULDER JOINT, SUBSEQUENT ENCOUNTER: Primary | ICD-10-CM

## 2024-10-31 PROCEDURE — 97110 THERAPEUTIC EXERCISES: CPT

## 2024-10-31 PROCEDURE — 97140 MANUAL THERAPY 1/> REGIONS: CPT

## 2024-10-31 PROCEDURE — 97112 NEUROMUSCULAR REEDUCATION: CPT

## 2024-10-31 NOTE — PROGRESS NOTES
Daily Note     Today's date: 10/31/2024  Patient name: Osmin Hook  : 1965  MRN: [id number]  Referring provider: Tommie Baird DO  Dx:   Encounter Diagnosis     ICD-10-CM    1. Dislocation of right shoulder joint, subsequent encounter  S43.004D       2. Complete tear of right rotator cuff, unspecified whether traumatic  M75.121       3. Sprain of unspecified ligament of left ankle, initial encounter  S93.402A           Start Time: 925  Stop Time:   Total time in clinic (min): 50 minutes    Subjective: Pt reports that he is feeling better this morning.       Objective: See treatment diary below      Assessment: Tolerated treatment well. Added new exercises to further stabilize GH joint. Pt had the most difficulty w/ body blade exercises as the exercise caused increased fatigue. Patient demonstrated fatigue post treatment, exhibited good technique with therapeutic exercises, and would benefit from continued PT for increased AROM and decreased symptom irritability. Continue to challenge and progress Pt.       Plan: Continue per plan of care.      Precautions: HTN, hx of lumbar fusion, Lyme's  POC expires Unit limit Auth Expiration date PT/OT/ST + Visit Limit?   24 BOMN 24 BOMN                           Visit/Unit Tracking  AUTH Status:  Date    60 visits Used 16 17 18 19 20 21 22 23 24 25 26    Remaining  44 43 42 41 40 39 38 37 36 35 34         Foto             Manuals 9/24 10/1 10/3 10/8 10/10 10/15 10/17 10/22 10/29 10/30   R inf mobs     JMK MITESHK JMK MITESHK MITESHK JUAN A   R shoulder PROM JMK MITESHK JUAN A AL JMK MITESHK MITESHK MITESHK MITESHK MITESHK   Median nerve glide                          Neuro Re-Ed             Scap retraction             SL scap retraction iso             TB row L only             Perturbation in flex             Shoulder taps against wall              Median, Ulnar, and Radial nerve glide             Supine shoulder flexion w/ med ball       "    3x10 YMB   Body blade x2          30\" x3 ea   Ther Ex             Pendulums             Elbow flexion 3# 3x10 4# 3x10 4# 3x10 4# 3x10 4# 3x10 4# 3x10 4# 3x10 4# 3x10 4# 3x10 4# 3x10   R shoulder PROM             Pulley             AROM to 90*- flex, ABD Flex 3x10 3#/ ABD 3x10 3# Flex 2x10 4#/ ABD 3x10 3# Flex 3x10 4#/ ABD 3x10 3# Flex 3x10 4#/ ABD 3x10 4# Flex 3x10 4#/ ABD 3x10 4# Flex 3x10 4#/ ABD 3x10 4# Flex 3x10 4#/ ABD 3x10 4# Flex 3x10 4#/ ABD 3x10 4# Flex 3x10 4#/ ABD 3x10 4# Flex 3x10 4#/ ABD 3x10 4#   AROM scaption 3# 3x10 4# 2x10 4# 3x10 4# 2x10 4# 3x10 4# 3x10 4# 2x10 4# 3x10 4# 3x10 4# 3x10   TB row             TB ext Shellie 3x10 10# [place] 3x10 10# [place] 3x10 12# Shellie 3x10 12# Shellie 3x10 12# [place] 3x10 14# [place] 3x12 14# Shellie 3x12 14# Shellie 3x12 14#    TB ER  BLTB 3x12 BLTB 3x12 BLTB 3x12 BLTB 3x12 BLTB 3x12 BLTB 3x12 BLTB 3x12 BLTB 3x12 BLTB 3x12 BLTB 3x12   TB IR BLTB 3x12 BLTB 3x12 BLTB 3x12 BLTB 3x12 BLTB 3x12 BLTB 3x12 BLTB 3x12 BLTB 3x12 BLTB 3x12 BLTB 3x12   TB tricep extension Shellie 3x10 20# [place] 3x10 20# [place] 3x10 22# [place] 3x10 22# [place] 3x10 25# Shellie 3x10 25# Shellie 3x12 25# [place] 3x12 25# [place] 3x10 28#    Table slide             Finger ladder             Pt edu             UBE 3'/3' 3'/3' 3'/3' 3'/3' 3'/3' 3'/3' 3'/3' 3'/3' 3'/3' 3'/3'   [place] Row  Shellie 3x10 20# Shellie 3x10 20# Shellie 3x10 22# Shellie 3x10 22# Shellie 3x10 25# [place] 3x10 25# [place] 3x12 25# Shellie 3x12 25# [place] 3x10 28#    Ther Activity             Gripping                          Gait Training                                       Modalities                                                    "

## 2024-11-05 ENCOUNTER — OFFICE VISIT (OUTPATIENT)
Dept: PHYSICAL THERAPY | Facility: CLINIC | Age: 59
End: 2024-11-05
Payer: COMMERCIAL

## 2024-11-05 DIAGNOSIS — M75.121 COMPLETE TEAR OF RIGHT ROTATOR CUFF, UNSPECIFIED WHETHER TRAUMATIC: ICD-10-CM

## 2024-11-05 DIAGNOSIS — S93.402A SPRAIN OF UNSPECIFIED LIGAMENT OF LEFT ANKLE, INITIAL ENCOUNTER: ICD-10-CM

## 2024-11-05 DIAGNOSIS — S43.004D DISLOCATION OF RIGHT SHOULDER JOINT, SUBSEQUENT ENCOUNTER: Primary | ICD-10-CM

## 2024-11-05 PROCEDURE — 97140 MANUAL THERAPY 1/> REGIONS: CPT

## 2024-11-05 PROCEDURE — 97110 THERAPEUTIC EXERCISES: CPT

## 2024-11-05 PROCEDURE — 97112 NEUROMUSCULAR REEDUCATION: CPT

## 2024-11-05 NOTE — PROGRESS NOTES
Daily Note     Today's date: 2024  Patient name: Osmin Hook  : 1965  MRN: [id number]  Referring provider: Tommie Baird DO  Dx:   Encounter Diagnosis     ICD-10-CM    1. Dislocation of right shoulder joint, subsequent encounter  S43.004D       2. Complete tear of right rotator cuff, unspecified whether traumatic  M75.121       3. Sprain of unspecified ligament of left ankle, initial encounter  S93.402A         Start Time: 1535  Stop Time: 1628  Total time in clinic (min): 53 minutes    Subjective: Pt reports that his shoulder is very sore today, but he felt okay following new exercises last visit.       Objective: See treatment diary below      Assessment: Tolerated treatment well. Pt was challenged by exercise prescription. Pt had minor stiffness w/ passive shoulder flexion today due to the increased soreness. Monitor Pt's symptoms NV and advance Pt is applicable. Patient demonstrated fatigue post treatment, exhibited good technique with therapeutic exercises, and would benefit from continued PT for increased AROM and decreased symptom irritability. Continue to challenge and progress Pt.       Plan: Continue per plan of care.      Precautions: HTN, hx of lumbar fusion, Lyme's  POC expires Unit limit Auth Expiration date PT/OT/ST + Visit Limit?   24 BOMN 24 BOMN                           Visit/Unit Tracking  AUTH Status:  Date    60 visits Used 16 17 18 19 20 21 22 23 24 25 26    Remaining  44 43 42 41 40 39 38 37 36 35 34         Foto              Manuals 9/24 10/1 10/3 10/8 10/10 10/15 10/17 10/22 10/29 10/30 11/5   R inf mobs     JMK JMK JMK JMK JMK JMK MITESHK   R shoulder PROM JMK JMK MITESHK AL JMK JMK JMK JMK MITESHK MITESHK MITESHK   Median nerve glide                            Neuro Re-Ed              Scap retraction              SL scap retraction iso              TB row L only              Perturbation in flex              Shoulder taps  "against wall               Median, Ulnar, and Radial nerve glide              Supine shoulder flexion w/ med ball          3x10 YMB 3x10 YMB   Body blade x2          30\" x3 ea 30\" x3 ea   Ther Ex              Pendulums              Elbow flexion 3# 3x10 4# 3x10 4# 3x10 4# 3x10 4# 3x10 4# 3x10 4# 3x10 4# 3x10 4# 3x10 4# 3x10 4# 3x10   R shoulder PROM              Pulley              AROM to 90*- flex, ABD Flex 3x10 3#/ ABD 3x10 3# Flex 2x10 4#/ ABD 3x10 3# Flex 3x10 4#/ ABD 3x10 3# Flex 3x10 4#/ ABD 3x10 4# Flex 3x10 4#/ ABD 3x10 4# Flex 3x10 4#/ ABD 3x10 4# Flex 3x10 4#/ ABD 3x10 4# Flex 3x10 4#/ ABD 3x10 4# Flex 3x10 4#/ ABD 3x10 4# Flex 3x10 4#/ ABD 3x10 4# Flex 3x10 4#/ ABD 3x10 4#   AROM scaption 3# 3x10 4# 2x10 4# 3x10 4# 2x10 4# 3x10 4# 3x10 4# 2x10 4# 3x10 4# 3x10 4# 3x10 4# 3x10   TB row              TB ext Shellie 3x10 10# [place] 3x10 10# [place] 3x10 12# [place] 3x10 12# [place] 3x10 12# Shellie 3x10 14# [place] 3x12 14# [place] 3x12 14# Shellie 3x12 14#     TB ER  BLTB 3x12 BLTB 3x12 BLTB 3x12 BLTB 3x12 BLTB 3x12 BLTB 3x12 BLTB 3x12 BLTB 3x12 BLTB 3x12 BLTB 3x12    TB IR BLTB 3x12 BLTB 3x12 BLTB 3x12 BLTB 3x12 BLTB 3x12 BLTB 3x12 BLTB 3x12 BLTB 3x12 BLTB 3x12 BLTB 3x12    TB tricep extension Shellie 3x10 20# [place] 3x10 20# [place] 3x10 22# Shellie 3x10 22# [place] 3x10 25# Shellie 3x10 25# [place] 3x12 25# [place] 3x12 25# Shellie 3x10 28#  Shellie 3x10 28#   Table slide              Finger ladder              Pt edu              UBE 3'/3' 3'/3' 3'/3' 3'/3' 3'/3' 3'/3' 3'/3' 3'/3' 3'/3' 3'/3' 3'/3'   [place]  Shellie 3x10 20# [place] 3x10 20# [place] 3x10 22# Shellie 3x10 22# Shellie 3x10 25# [place] 3x10 25# [place] 3x12 25# Shellie 3x12 25# Shellie 3x10 28#  Shellie 3x10 28#   Ther Activity              Gripping                            Gait Training                                          Modalities                                                       "

## 2024-11-07 ENCOUNTER — OFFICE VISIT (OUTPATIENT)
Dept: PHYSICAL THERAPY | Facility: CLINIC | Age: 59
End: 2024-11-07
Payer: COMMERCIAL

## 2024-11-07 DIAGNOSIS — M75.121 COMPLETE TEAR OF RIGHT ROTATOR CUFF, UNSPECIFIED WHETHER TRAUMATIC: ICD-10-CM

## 2024-11-07 DIAGNOSIS — S93.402A SPRAIN OF UNSPECIFIED LIGAMENT OF LEFT ANKLE, INITIAL ENCOUNTER: ICD-10-CM

## 2024-11-07 DIAGNOSIS — S43.004D DISLOCATION OF RIGHT SHOULDER JOINT, SUBSEQUENT ENCOUNTER: Primary | ICD-10-CM

## 2024-11-07 PROCEDURE — 97110 THERAPEUTIC EXERCISES: CPT

## 2024-11-07 PROCEDURE — 97112 NEUROMUSCULAR REEDUCATION: CPT

## 2024-11-07 PROCEDURE — 97140 MANUAL THERAPY 1/> REGIONS: CPT

## 2024-11-07 NOTE — PROGRESS NOTES
"Daily Note     Today's date: 2024  Patient name: Osmin Hook  : 1965  MRN: [id number]  Referring provider: Tommie Baird DO  Dx:   Encounter Diagnosis     ICD-10-CM    1. Dislocation of right shoulder joint, subsequent encounter  S43.004D       2. Complete tear of right rotator cuff, unspecified whether traumatic  M75.121       3. Sprain of unspecified ligament of left ankle, initial encounter  S93.402A         Start Time: 1543  Stop Time: 1630  Total time in clinic (min): 47 minutes    Subjective: Pt reports that R upper trap is very sore and starting to cause Pt to get a \"light headed\" sensation       Objective: See treatment diary below      Assessment: Tolerated treatment well. Pt reported moderate relief in R upper trap w/ IASTM. Pt is still challenged by exercise prescription, Pt making slow progress towards normal function w/ RUE. Patient demonstrated fatigue post treatment, exhibited good technique with therapeutic exercises, and would benefit from continued PT for increased AROM and decreased symptom irritability. Continue to challenge and progress Pt.       Plan: Continue per plan of care.      Precautions: HTN, hx of lumbar fusion, Lyme's  POC expires Unit limit Auth Expiration date PT/OT/ST + Visit Limit?   24 BOMN 24 BOMN                           Visit/Unit Tracking  AUTH Status:  Date    60 visits Used 16 17 18 19 20 21 22 23 24 25 26    Remaining  44 43 42 41 40 39 38 37 36 35 34         Foto               Manuals 9/24 10/1 10/3 10/8 10/10 10/15 10/17 10/22 10/29 10/30 11/5 11/7   R inf mobs     JMK JMK JMK JMK JMK JMK JMK JMK   R shoulder PROM JMK JMK JMK AL JMK JMK JMK JMK JMK JMK JMK JMK   Median nerve glide               IASTM R upper trap            JMK   Neuro Re-Ed               Scap retraction               SL scap retraction iso               TB row L only               Perturbation in flex             " "  Shoulder taps against wall                Median, Ulnar, and Radial nerve glide               Supine shoulder flexion w/ med ball          3x10 YMB 3x10 YMB 3x10 YMB   Body blade x2          30\" x3 ea 30\" x3 ea 30\" x3 ea   Ther Ex               Pendulums               Elbow flexion 3# 3x10 4# 3x10 4# 3x10 4# 3x10 4# 3x10 4# 3x10 4# 3x10 4# 3x10 4# 3x10 4# 3x10 4# 3x10 4# 3x10   R shoulder PROM               Pulley               AROM to 90*- flex, ABD Flex 3x10 3#/ ABD 3x10 3# Flex 2x10 4#/ ABD 3x10 3# Flex 3x10 4#/ ABD 3x10 3# Flex 3x10 4#/ ABD 3x10 4# Flex 3x10 4#/ ABD 3x10 4# Flex 3x10 4#/ ABD 3x10 4# Flex 3x10 4#/ ABD 3x10 4# Flex 3x10 4#/ ABD 3x10 4# Flex 3x10 4#/ ABD 3x10 4# Flex 3x10 4#/ ABD 3x10 4# Flex 3x10 4#/ ABD 3x10 4# Flex 3x10 4#/ ABD 3x10 4#   AROM scaption 3# 3x10 4# 2x10 4# 3x10 4# 2x10 4# 3x10 4# 3x10 4# 2x10 4# 3x10 4# 3x10 4# 3x10 4# 3x10 4# 3x10   TB row               TB ext Shellie 3x10 10# Shell[place] 3x10 10# [place] 3x10 12# [place] 3x10 12# Shellie 3x10 12# [place] 3x10 14# [place] 3x12 14# Shellie 3x12 14# Shellie 3x12 14#      TB ER  BLTB 3x12 BLTB 3x12 BLTB 3x12 BLTB 3x12 BLTB 3x12 BLTB 3x12 BLTB 3x12 BLTB 3x12 BLTB 3x12 BLTB 3x12     TB IR BLTB 3x12 BLTB 3x12 BLTB 3x12 BLTB 3x12 BLTB 3x12 BLTB 3x12 BLTB 3x12 BLTB 3x12 BLTB 3x12 BLTB 3x12     TB tricep extension [place] 3x10 20# [place] 3x10 20# Shellie 3x10 22# Shellie 3x10 22# Shellie 3x10 25# Shellie 3x10 25# Shellie 3x12 25# [place] 3x12 25# Shellie 3x10 28#  [place] 3x10 28#    Table slide               Finger ladder               Pt edu               UBE 3'/3' 3'/3' 3'/3' 3'/3' 3'/3' 3'/3' 3'/3' 3'/3' 3'/3' 3'/3' 3'/3' 3'/3'   [place] Row  Shellie 3x10 20# [place] 3x10 20# [place] 3x10 22# Shellie 3x10 22# [place] 3x10 25# Shellie 3x10 25# [place] 3x12 25# [place] 3x12 25# Shellie 3x10 28#  Shellie 3x10 28#    Ther Activity               Gripping                              Gait Training                                             Modalities                      "

## 2024-11-08 ENCOUNTER — TELEPHONE (OUTPATIENT)
Dept: PAIN MEDICINE | Facility: CLINIC | Age: 59
End: 2024-11-08

## 2024-11-11 ENCOUNTER — OFFICE VISIT (OUTPATIENT)
Dept: OBGYN CLINIC | Facility: CLINIC | Age: 59
End: 2024-11-11
Payer: COMMERCIAL

## 2024-11-11 VITALS
SYSTOLIC BLOOD PRESSURE: 140 MMHG | HEART RATE: 67 BPM | HEIGHT: 66 IN | BODY MASS INDEX: 49.66 KG/M2 | WEIGHT: 309 LBS | DIASTOLIC BLOOD PRESSURE: 92 MMHG

## 2024-11-11 DIAGNOSIS — G89.29 CHRONIC RIGHT SHOULDER PAIN: ICD-10-CM

## 2024-11-11 DIAGNOSIS — M25.511 CHRONIC RIGHT SHOULDER PAIN: ICD-10-CM

## 2024-11-11 DIAGNOSIS — Z48.89 AFTERCARE FOLLOWING SURGERY: ICD-10-CM

## 2024-11-11 DIAGNOSIS — Z98.890 S/P RIGHT ROTATOR CUFF REPAIR: Primary | ICD-10-CM

## 2024-11-11 PROCEDURE — 99213 OFFICE O/P EST LOW 20 MIN: CPT | Performed by: ORTHOPAEDIC SURGERY

## 2024-11-11 NOTE — LETTER
November 11, 2024     Patient: Osmin Hook  YOB: 1965  Date of Visit: 11/11/2024      To Whom it May Concern:    Osmin Hook is under my professional care. Osmin was seen in my office on 11/11/2024. Osmin may return to work with the following restrictions:  No overhead activity.  No lifting, pushing, or pulling.  No climbing.    If you have any questions or concerns, please don't hesitate to call.         Sincerely,          Tommie Baird,         CC: No Recipients

## 2024-11-11 NOTE — PROGRESS NOTES
Patient Name:  Osmin Hook  MRN:  [id number]    Assessment & Plan     1. S/P right rotator cuff repair  -     MRI shoulder right wo contrast; Future; Expected date: 11/11/2024  2. Aftercare following surgery  -     MRI shoulder right wo contrast; Future; Expected date: 11/11/2024  3. Chronic right shoulder pain          Approximately 7 months s/p Right shoulder arthroscopic rotator cuff repair, biceps tenotomy, acromioplasty performed on 04/11/2024.  Overall, the patient continues to progress s/p operative management in regards to range of motion and strength though he has persistent pain.  Continue physical therapy and home exercises as prescribed  Right shoulder MRI study ordered today due to persistent pain and difficulty with range of motion and strengthening to evaluate for rotator cuff healing and to rule out retear   OTC analgesics as needed  Continue work restrictions of no overhead activity, no lifting, pushing, or pulling, no climbing  Patient will follow up after MRI study      History of the Present Illness   Osmin Hook is a 59 y.o. male approximately 7 months s/p Right shoulder arthroscopic rotator cuff repair, biceps tenotomy, acromioplasty performed on 04/11/2024. Today the patient reports his pain is about the same as his last visit. He is complaining of pain in his trapezius that PT has treated with graston. He has increased pain when laying down. He is numbness and tingling going down his arm.       Review of Systems     Review of Systems   Constitutional:  Negative for chills and fever.   HENT:  Negative for ear pain and sore throat.    Eyes:  Negative for pain and visual disturbance.   Respiratory:  Negative for cough and shortness of breath.    Cardiovascular:  Negative for chest pain and palpitations.   Gastrointestinal:  Negative for abdominal pain and vomiting.   Genitourinary:  Negative for dysuria and hematuria.   Musculoskeletal:  Negative for arthralgias and back pain.   Skin:   "Negative for color change and rash.   Neurological:  Negative for seizures and syncope.   All other systems reviewed and are negative.      Physical Exam     /92   Pulse 67   Ht 5' 6\" (1.676 m)   Wt (!) 140 kg (309 lb)   BMI 49.87 kg/m²     Right Shoulder:   Surgical incisions well healed  Active range of motion   140 degrees forward flexion  160 degrees abduction  60 degrees external rotation   SI joint internal rotation    Passive range of motion   170 degrees of forward flexion   No tenderness to palpation over the shoulder  Forward flexion testing 4+/5 improving  External rotation testing 5/5  Internal rotation testing 5/5  The patient is neurovascularly intact distally in the extremity.       Data Review     No new images to review.    Social History     Tobacco Use    Smoking status: Former     Current packs/day: 0.00     Average packs/day: 0.3 packs/day for 5.8 years (1.4 ttl pk-yrs)     Types: Cigarettes     Start date: 1984     Quit date: 10/20/1989     Years since quittin.0    Smokeless tobacco: Current     Types: Chew     Last attempt to quit: 2019   Vaping Use    Vaping status: Never Used   Substance Use Topics    Alcohol use: Yes     Alcohol/week: 4.0 standard drinks of alcohol     Types: 4 Standard drinks or equivalent per week    Drug use: Yes     Frequency: 7.0 times per week     Types: Marijuana     Comment: medical         Scribe Attestation      I,:  Kaylan Vera am acting as a scribe while in the presence of the attending physician.:       I,:  Tommie Baird, DO personally performed the services described in this documentation    as scribed in my presence.:                 "

## 2024-11-12 ENCOUNTER — OFFICE VISIT (OUTPATIENT)
Dept: PHYSICAL THERAPY | Facility: CLINIC | Age: 59
End: 2024-11-12
Payer: COMMERCIAL

## 2024-11-12 ENCOUNTER — TELEPHONE (OUTPATIENT)
Age: 59
End: 2024-11-12

## 2024-11-12 DIAGNOSIS — S93.402A SPRAIN OF UNSPECIFIED LIGAMENT OF LEFT ANKLE, INITIAL ENCOUNTER: ICD-10-CM

## 2024-11-12 DIAGNOSIS — M25.50 POLYARTHRALGIA: Primary | ICD-10-CM

## 2024-11-12 DIAGNOSIS — S43.004D DISLOCATION OF RIGHT SHOULDER JOINT, SUBSEQUENT ENCOUNTER: Primary | ICD-10-CM

## 2024-11-12 DIAGNOSIS — M75.121 COMPLETE TEAR OF RIGHT ROTATOR CUFF, UNSPECIFIED WHETHER TRAUMATIC: ICD-10-CM

## 2024-11-12 PROCEDURE — 97140 MANUAL THERAPY 1/> REGIONS: CPT

## 2024-11-12 PROCEDURE — 97110 THERAPEUTIC EXERCISES: CPT

## 2024-11-12 PROCEDURE — 97112 NEUROMUSCULAR REEDUCATION: CPT

## 2024-11-12 NOTE — TELEPHONE ENCOUNTER
Patient  suggested he get a lymes disease check to check his levels again due to seeing if his aches are from the work place accident in January and not lyme. Please advise and call patient once orders are placed.

## 2024-11-12 NOTE — PROGRESS NOTES
Daily Note     Today's date: 2024  Patient name: Osmin Hook  : 1965  MRN: [id number]  Referring provider: Tommie Baird DO  Dx:   Encounter Diagnosis     ICD-10-CM    1. Dislocation of right shoulder joint, subsequent encounter  S43.004D       2. Complete tear of right rotator cuff, unspecified whether traumatic  M75.121       3. Sprain of unspecified ligament of left ankle, initial encounter  S93.402A         Start Time: 1542  Stop Time: 1625  Total time in clinic (min): 43 minutes    Subjective: Pt reports that he will be getting a new MRI of the R shoulder to make sure surgery is still intact due to the heavy pain Pt stll experiencing.       Objective: See treatment diary below      Assessment: Tolerated treatment well. Pt still challenged by exercise prescription, continue to challenge Pt to facilitate return to normal function. Patient demonstrated fatigue post treatment, exhibited good technique with therapeutic exercises, and would benefit from continued PT for increased AROM and decreased symptom irritability. Continue to challenge and progress Pt.       Plan: Continue per plan of care.      Precautions: HTN, hx of lumbar fusion, Lyme's  POC expires Unit limit Auth Expiration date PT/OT/ST + Visit Limit?   24 BOMN 24 BOMN                           Visit/Unit Tracking  AUTH Status:  Date 5/2 5/6 5/9 5/14 5/16 5/21 5/23 5/28   5/30 6/4 6/6   60 visits Used 16 17 18 19 20 21 22 23 24 25 26    Remaining  44 43 42 41 40 39 38 37 36 35 34         Foto                Manuals 9/24 10/1 10/3 10/8 10/10 10/15 10/17 10/22 10/29 10/30 11/5 11/7 11/12   R inf mobs     JMK JMK JMK JMK JMK JMK JMK JMK JMK   R shoulder PROM JMK JMK JMK AL JMK JMK JMK JMK JMK JMK JMK JMK JMK   Median nerve glide                IASTM R upper trap            JMK MITESHK   Neuro Re-Ed                Scap retraction                SL scap retraction iso                TB row L only                Perturbation in  "flex                Shoulder taps against wall                 Median, Ulnar, and Radial nerve glide                Supine shoulder flexion w/ med ball          3x10 YMB 3x10 YMB 3x10 YMB 3x10 YMB   Body blade x2          30\" x3 ea 30\" x3 ea 30\" x3 ea 30\" x3 ea   Ther Ex                Pendulums                Elbow flexion 3# 3x10 4# 3x10 4# 3x10 4# 3x10 4# 3x10 4# 3x10 4# 3x10 4# 3x10 4# 3x10 4# 3x10 4# 3x10 4# 3x10 4# 3x10   R shoulder PROM                Pulley                AROM to 90*- flex, ABD Flex 3x10 3#/ ABD 3x10 3# Flex 2x10 4#/ ABD 3x10 3# Flex 3x10 4#/ ABD 3x10 3# Flex 3x10 4#/ ABD 3x10 4# Flex 3x10 4#/ ABD 3x10 4# Flex 3x10 4#/ ABD 3x10 4# Flex 3x10 4#/ ABD 3x10 4# Flex 3x10 4#/ ABD 3x10 4# Flex 3x10 4#/ ABD 3x10 4# Flex 3x10 4#/ ABD 3x10 4# Flex 3x10 4#/ ABD 3x10 4# Flex 3x10 4#/ ABD 3x10 4# Flex 3x10 4#/ ABD 3x10 4#   AROM scaption 3# 3x10 4# 2x10 4# 3x10 4# 2x10 4# 3x10 4# 3x10 4# 2x10 4# 3x10 4# 3x10 4# 3x10 4# 3x10 4# 3x10 4# 3x10   TB row                TB ext Shellie 3x10 10# Shellie 3x10 10# Shellie 3x10 12# [place] 3x10 12# Shellie 3x10 12# [place] 3x10 14# Shellie 3x12 14# Shellie 3x12 14# Shellie 3x12 14#       TB ER  BLTB 3x12 BLTB 3x12 BLTB 3x12 BLTB 3x12 BLTB 3x12 BLTB 3x12 BLTB 3x12 BLTB 3x12 BLTB 3x12 BLTB 3x12      TB IR BLTB 3x12 BLTB 3x12 BLTB 3x12 BLTB 3x12 BLTB 3x12 BLTB 3x12 BLTB 3x12 BLTB 3x12 BLTB 3x12 BLTB 3x12      TB tricep extension Shellie 3x10 20# Shellie 3x10 20# Shellie 3x10 22# Shellie 3x10 22# [place] 3x10 25# Shellie 3x10 25# [place] 3x12 25# [place] 3x12 25# Shellie 3x10 28#  [place] 3x10 28#     Table slide                Finger ladder                Pt edu                UBE 3'/3' 3'/3' 3'/3' 3'/3' 3'/3' 3'/3' 3'/3' 3'/3' 3'/3' 3'/3' 3'/3' 3'/3' 3'/3'   Shellie Row  [place] 3x10 20# Shellie 3x10 20# [place] 3x10 22# Shellie 3x10 22# Shellie 3x10 25# Shellie 3x10 25# Shellie 3x12 25# [place] 3x12 25# Shellie 3x10 28#  [place] 3x10 28#     Ther Activity                Gripping                   "              Gait Training                                                Modalities

## 2024-11-13 ENCOUNTER — OFFICE VISIT (OUTPATIENT)
Dept: PHYSICAL THERAPY | Facility: CLINIC | Age: 59
End: 2024-11-13
Payer: COMMERCIAL

## 2024-11-13 DIAGNOSIS — S43.004D DISLOCATION OF RIGHT SHOULDER JOINT, SUBSEQUENT ENCOUNTER: Primary | ICD-10-CM

## 2024-11-13 DIAGNOSIS — M75.121 COMPLETE TEAR OF RIGHT ROTATOR CUFF, UNSPECIFIED WHETHER TRAUMATIC: ICD-10-CM

## 2024-11-13 DIAGNOSIS — S93.402A SPRAIN OF UNSPECIFIED LIGAMENT OF LEFT ANKLE, INITIAL ENCOUNTER: ICD-10-CM

## 2024-11-13 PROCEDURE — 97140 MANUAL THERAPY 1/> REGIONS: CPT

## 2024-11-13 PROCEDURE — 97110 THERAPEUTIC EXERCISES: CPT

## 2024-11-13 PROCEDURE — 97112 NEUROMUSCULAR REEDUCATION: CPT

## 2024-11-13 NOTE — PROGRESS NOTES
Daily Note     Today's date: 2024  Patient name: Osmin Hook  : 1965  MRN: [id number]  Referring provider: Tommie Baird DO  Dx:   Encounter Diagnosis     ICD-10-CM    1. Dislocation of right shoulder joint, subsequent encounter  S43.004D       2. Complete tear of right rotator cuff, unspecified whether traumatic  M75.121       3. Sprain of unspecified ligament of left ankle, initial encounter  S93.402A         Start Time: 1410  Stop Time: 1458  Total time in clinic (min): 48 minutes    Subjective: Pt reports that his shoulder is feeling better today and that his upper trap felt better following last session.       Objective: See treatment diary below      Assessment: Tolerated treatment well. Added thoracic extensions and open books to promote more movement in thoracic spine. Pt reported that thoracici extensions were successful in reducing tension in mid back. Pt still challenged by exercise prescription. Patient demonstrated fatigue post treatment, exhibited good technique with therapeutic exercises, and would benefit from continued PT for increased AROM and decreased symptom irritability. Continue to challenge and progress Pt.       Plan: Continue per plan of care.      Precautions: HTN, hx of lumbar fusion, Lyme's  POC expires Unit limit Auth Expiration date PT/OT/ST + Visit Limit?   24 BOMN 24 BOMN                           Visit/Unit Tracking  AUTH Status:  Date    60 visits Used 16 17 18 19 20 21 22 23 24 25 26    Remaining  44 43 42 41 40 39 38 37 36 35 34         Foto           Manuals 10/17 10/22 10/29 10/30 11/5 11/7 11/12 11/13   R inf mobs JMK JMK JMK JMK JMK JMK JMK JMK   R shoulder PROM JMK JMK JMK JMK JMK JMK JMK JMK   Median nerve glide           IASTM R upper trap      JMK JUAN A    Neuro Re-Ed           Scap retraction           SL scap retraction iso           TB row L only           Perturbation in flex          "  Shoulder taps against wall            Median, Ulnar, and Radial nerve glide           Supine shoulder flexion w/ med ball    3x10 YMB 3x10 YMB 3x10 YMB 3x10 YMB 3x10 YMB   Body blade x2    30\" x3 ea 30\" x3 ea 30\" x3 ea 30\" x3 ea 30\" x3 ea   Ther Ex           Pendulums           Elbow flexion 4# 3x10 4# 3x10 4# 3x10 4# 3x10 4# 3x10 4# 3x10 4# 3x10    R shoulder PROM           P[place]           AROM to 90*- flex, ABD Flex 3x10 4#/ ABD 3x10 4# Flex 3x10 4#/ ABD 3x10 4# Flex 3x10 4#/ ABD 3x10 4# Flex 3x10 4#/ ABD 3x10 4# Flex 3x10 4#/ ABD 3x10 4# Flex 3x10 4#/ ABD 3x10 4# Flex 3x10 4#/ ABD 3x10 4#    AROM scaption 4# 2x10 4# 3x10 4# 3x10 4# 3x10 4# 3x10 4# 3x10 4# 3x10    TB row           TB ext Shellie 3x12 14# Shellie 3x12 14# [place] 3x12 14#     Shellie 3x12 15#   TB ER  BLTB 3x12 BLTB 3x12 BLTB 3x12 BLTB 3x12    BLTB 3x12   TB IR BLTB 3x12 BLTB 3x12 BLTB 3x12 BLTB 3x12    BLTB 3x12   TB tricep extension [place] 3x12 25# Shellie 3x12 25# [place] 3x10 28#  Shellie 3x10 28#   Shellie 3x10 30#   Table slide           Finger ladder           Pt edu           UBE 3'/3' 3'/3' 3'/3' 3'/3' 3'/3' 3'/3' 3'/3' 3'/3'   [place] Row  Shellie 3x12 25# [place] 3x12 25# Shellie 3x10 28#  Shellie 3x10 28#   [place] 3x10 30#   Thoracic extensions        3\" x30   Open books        X15 ea   Ther Activity           Gripping                      Gait Training                                 Modalities                                              "

## 2024-11-15 ENCOUNTER — TELEPHONE (OUTPATIENT)
Age: 59
End: 2024-11-15

## 2024-11-15 NOTE — TELEPHONE ENCOUNTER
Caller: Rocío [facility]in [facility]    Doctor/Office: Oli / Saud    #: [phone]      What needs to be faxed: HERBIE 11/11.    ATTN to: Rocío    Fax#: [phone]      Documents were successfully e-faxed

## 2024-11-19 ENCOUNTER — IMMUNIZATIONS (OUTPATIENT)
Age: 59
End: 2024-11-19
Payer: COMMERCIAL

## 2024-11-19 DIAGNOSIS — Z23 ENCOUNTER FOR IMMUNIZATION: Primary | ICD-10-CM

## 2024-11-19 PROCEDURE — 90673 RIV3 VACCINE NO PRESERV IM: CPT

## 2024-11-19 PROCEDURE — 90471 IMMUNIZATION ADMIN: CPT

## 2024-11-20 ENCOUNTER — OFFICE VISIT (OUTPATIENT)
Dept: PHYSICAL THERAPY | Facility: CLINIC | Age: 59
End: 2024-11-20
Payer: COMMERCIAL

## 2024-11-20 DIAGNOSIS — M75.121 COMPLETE TEAR OF RIGHT ROTATOR CUFF, UNSPECIFIED WHETHER TRAUMATIC: ICD-10-CM

## 2024-11-20 DIAGNOSIS — S43.004D DISLOCATION OF RIGHT SHOULDER JOINT, SUBSEQUENT ENCOUNTER: Primary | ICD-10-CM

## 2024-11-20 DIAGNOSIS — S93.402A SPRAIN OF UNSPECIFIED LIGAMENT OF LEFT ANKLE, INITIAL ENCOUNTER: ICD-10-CM

## 2024-11-20 PROCEDURE — 97112 NEUROMUSCULAR REEDUCATION: CPT

## 2024-11-20 PROCEDURE — 97140 MANUAL THERAPY 1/> REGIONS: CPT

## 2024-11-20 PROCEDURE — 97110 THERAPEUTIC EXERCISES: CPT

## 2024-11-20 NOTE — PROGRESS NOTES
Daily Note     Today's date: 2024  Patient name: Osmin Hook  : 1965  MRN: [id number]  Referring provider: Tommie Baird DO  Dx:   Encounter Diagnosis     ICD-10-CM    1. Dislocation of right shoulder joint, subsequent encounter  S43.004D       2. Complete tear of right rotator cuff, unspecified whether traumatic  M75.121       3. Sprain of unspecified ligament of left ankle, initial encounter  S93.402A         Start Time: 1410  Stop Time: 1514  Total time in clinic (min): 64 minutes    Subjective: Pt reports that he was in heavy pain since Monday. Pt reports that heavy pain killers were not successful in reducing symptoms.       Objective: See treatment diary below      Assessment: Tolerated treatment well. Performed IASTM and cupping to R upper trap to relieve symptom irritability, neither technique was successful in reducing Pt's overall symptoms. Due to increased pain, it was difficult for Pt to complete strength training exercises. Pt did well w/ thoracic mobility exercises, no changes in symptoms following. Monitor Pt NV and add parascapular strengthening back into program. Patient demonstrated fatigue post treatment, exhibited good technique with therapeutic exercises, and would benefit from continued PT for increased AROM and decreased symptom irritability. Continue to challenge and progress Pt.       Plan: Continue per plan of care.      Precautions: HTN, hx of lumbar fusion, Lyme's  POC expires Unit limit Auth Expiration date PT/OT/ST + Visit Limit?   24 BOMN 24 BOMN                           Visit/Unit Tracking  AUTH Status:  Date    60 visits Used 16 17 18 19 20 21 22 23 24 25 26    Remaining  44 43 42 41 40 39 38 37 36 35 34         Foto            Manuals 10/17 10/22 10/29 10/30 11/5 11/7 11/12 11/13 11/20   R inf mobs JMK JMK JMK JMK JMK JMK JMK JUAN A    R shoulder PROM JMK JMK JMK JMK JMK JMK JMK MITESHK MITESHK   Median nerve  "glide            IASTM R upper trap      JMK JUAN A VELAZCO   R upper trap cupping         JMK   Neuro Re-Ed            Scap retraction            SL scap retraction iso            TB row L only            Perturbation in flex            Shoulder taps against wall             Median, Ulnar, and Radial nerve glide            Supine shoulder flexion w/ med ball    3x10 YMB 3x10 YMB 3x10 YMB 3x10 YMB 3x10 YMB 3x10 YMB   Body blade x2    30\" x3 ea 30\" x3 ea 30\" x3 ea 30\" x3 ea 30\" x3 ea 30\" x3 ea   Ther Ex            Pendulums            Elbow flexion 4# 3x10 4# 3x10 4# 3x10 4# 3x10 4# 3x10 4# 3x10 4# 3x10  4# 3x10   R shoulder PROM            [place]            AROM to 90*- flex, ABD Flex 3x10 4#/ ABD 3x10 4# Flex 3x10 4#/ ABD 3x10 4# Flex 3x10 4#/ ABD 3x10 4# Flex 3x10 4#/ ABD 3x10 4# Flex 3x10 4#/ ABD 3x10 4# Flex 3x10 4#/ ABD 3x10 4# Flex 3x10 4#/ ABD 3x10 4#  Flex 3x10 4#/ ABD 2x10 4#   AROM scaption 4# 2x10 4# 3x10 4# 3x10 4# 3x10 4# 3x10 4# 3x10 4# 3x10     TB row            TB ext [place] 3x12 14# [place] 3x12 14# [place] 3x12 14#     [place] 3x12 15#    TB ER  BLTB 3x12 BLTB 3x12 BLTB 3x12 BLTB 3x12    BLTB 3x12    TB IR BLTB 3x12 BLTB 3x12 BLTB 3x12 BLTB 3x12    BLTB 3x12    TB tricep extension [place] 3x12 25# [place] 3x12 25# [place] 3x10 28#  Shellie 3x10 28#   Shellie 3x10 30#    Table slide            Finger ladder            Pt edu            UBE 3'/3' 3'/3' 3'/3' 3'/3' 3'/3' 3'/3' 3'/3' 3'/3' 3'/3'   [place] Row  [place] 3x12 25# [place] 3x12 25# Shellie 3x10 28#  [place] 3x10 28#   [place] 3x10 30#    Thoracic extensions        3\" x30 3\" x30   Open books        X15 ea X20 ea   Ther Activity            Gripping                        Gait Training                                    Modalities                                                 "

## 2024-11-23 DIAGNOSIS — E78.00 HYPERCHOLESTEROLEMIA: Chronic | ICD-10-CM

## 2024-11-24 RX ORDER — ROSUVASTATIN CALCIUM 5 MG/1
5 TABLET, COATED ORAL DAILY
Qty: 90 TABLET | Refills: 3 | Status: SHIPPED | OUTPATIENT
Start: 2024-11-24

## 2024-11-26 ENCOUNTER — OFFICE VISIT (OUTPATIENT)
Dept: PHYSICAL THERAPY | Facility: CLINIC | Age: 59
End: 2024-11-26
Payer: COMMERCIAL

## 2024-11-26 DIAGNOSIS — S43.004D DISLOCATION OF RIGHT SHOULDER JOINT, SUBSEQUENT ENCOUNTER: Primary | ICD-10-CM

## 2024-11-26 DIAGNOSIS — M75.121 COMPLETE TEAR OF RIGHT ROTATOR CUFF, UNSPECIFIED WHETHER TRAUMATIC: ICD-10-CM

## 2024-11-26 DIAGNOSIS — S93.402A SPRAIN OF UNSPECIFIED LIGAMENT OF LEFT ANKLE, INITIAL ENCOUNTER: ICD-10-CM

## 2024-11-26 PROCEDURE — 97140 MANUAL THERAPY 1/> REGIONS: CPT | Performed by: PHYSICAL THERAPIST

## 2024-11-26 PROCEDURE — 97110 THERAPEUTIC EXERCISES: CPT | Performed by: PHYSICAL THERAPIST

## 2024-11-26 PROCEDURE — 97112 NEUROMUSCULAR REEDUCATION: CPT | Performed by: PHYSICAL THERAPIST

## 2024-11-26 NOTE — PROGRESS NOTES
Daily Note     Today's date: 2024  Patient name: Osmin Hook  : 1965  MRN: [id number]  Referring provider: Tommie Baird DO  Dx:   Encounter Diagnosis     ICD-10-CM    1. Dislocation of right shoulder joint, subsequent encounter  S43.004D       2. Complete tear of right rotator cuff, unspecified whether traumatic  M75.121       3. Sprain of unspecified ligament of left ankle, initial encounter  S93.402A                      Subjective: Pt reports that he is better than last time. The last 2 days have been better.       Objective: See treatment diary below      Assessment: Tolerated treatment well. Patient demonstrated fatigue post treatment, exhibited good technique with therapeutic exercises, and would benefit from continued PT. He did have soreness at the conclusion of session and would like to defer gabriel strengthening this visit.       Plan: Continue per plan of care.  Progress treatment as tolerated.       Precautions: HTN, hx of lumbar fusion, Lyme's  POC expires Unit limit Auth Expiration date PT/OT/ST + Visit Limit?   24 BOMN 24 BOMN                           Visit/Unit Tracking  AUTH Status:  Date     Used 16 17 18 19 20 21 22 23 24 25 26    Remaining  44 43 42 41 40 39 38 37 36 35 34         Foto             Manuals 10/17 10/22 10/29 10/30 11/5 11/7 11/12 11/13 11/20 11/26   R inf mobs JMK JMK JMK JMK JMK MITESHK MITESHK MITESHK     R shoulder PROM JMK JMK JMK JMK JMK JMK JMK JMK JMK KB   Median nerve glide             IASTM R upper trap      JMK JMK  JMK    R upper trap cupping         JMK    Neuro Re-Ed             Scap retraction             SL scap retraction iso             TB row L only             Perturbation in flex             Shoulder taps against wall              Median, Ulnar, and Radial nerve glide             Supine shoulder flexion w/ med ball    3x10 YMB 3x10 YMB 3x10 YMB 3x10 YMB 3x10 YMB 3x10 YMB 3x10 YMB            "     Body blade x2    30\" x3 ea 30\" x3 ea 30\" x3 ea 30\" x3 ea 30\" x3 ea 30\" x3 ea 30\" x3 ea   Ther Ex             Pendulums             Elbow flexion 4# 3x10 4# 3x10 4# 3x10 4# 3x10 4# 3x10 4# 3x10 4# 3x10  4# 3x10 4# 3x10   R shoulder PROM             [place]             AROM to 90*- flex, ABD Flex 3x10 4#/ ABD 3x10 4# Flex 3x10 4#/ ABD 3x10 4# Flex 3x10 4#/ ABD 3x10 4# Flex 3x10 4#/ ABD 3x10 4# Flex 3x10 4#/ ABD 3x10 4# Flex 3x10 4#/ ABD 3x10 4# Flex 3x10 4#/ ABD 3x10 4#  Flex 3x10 4#/ ABD 2x10 4# Flex 3x10 4#/ ABD 2x10 4#   AROM scaption 4# 2x10 4# 3x10 4# 3x10 4# 3x10 4# 3x10 4# 3x10 4# 3x10      TB row             TB ext [place] 3x12 14# [place] 3x12 14# [place]ie 3x12 14#     [place] 3x12 15#     TB ER  BLTB 3x12 BLTB 3x12 BLTB 3x12 BLTB 3x12    BLTB 3x12     TB IR BLTB 3x12 BLTB 3x12 BLTB 3x12 BLTB 3x12    BLTB 3x12     TB tricep extension [place] 3x12 25# [place] 3x12 25# [place] 3x10 28#  [place]ie 3x10 28#   [place] 3x10 30#     Table slide             Finger ladder             Pt edu             UBE 3'/3' 3'/3' 3'/3' 3'/3' 3'/3' 3'/3' 3'/3' 3'/3' 3'/3' 3'/3'   [place] Row  Shellie 3x12 25# [place] 3x12 25# [place] 3x10 28#  [place] 3x10 28#   [place] 3x10 30#     Thoracic extensions        3\" x30 3\" x30 3\" x30   Open books        X15 ea X20 ea X20 ea   Ther Activity             Gripping                          Gait Training                                       Modalities                                                      "

## 2024-11-27 ENCOUNTER — TELEPHONE (OUTPATIENT)
Age: 59
End: 2024-11-27

## 2024-11-27 NOTE — TELEPHONE ENCOUNTER
Caller: Patient    Doctor: Dr. Baird    Reason for call: Patient calling stating that he received an approval letter for his upcoming MRI on 12/11/24 from his personal insurance, [employer].  Patient stating that it is a workman's comp injury and that his personal insurance should not be involved at all.  Patient asking for clarification as this is not to go through his personal insurance.       Call back#: [phone]

## 2024-12-03 ENCOUNTER — OFFICE VISIT (OUTPATIENT)
Dept: PHYSICAL THERAPY | Facility: CLINIC | Age: 59
End: 2024-12-03
Payer: COMMERCIAL

## 2024-12-03 DIAGNOSIS — S43.004D DISLOCATION OF RIGHT SHOULDER JOINT, SUBSEQUENT ENCOUNTER: Primary | ICD-10-CM

## 2024-12-03 DIAGNOSIS — S93.402A SPRAIN OF UNSPECIFIED LIGAMENT OF LEFT ANKLE, INITIAL ENCOUNTER: ICD-10-CM

## 2024-12-03 DIAGNOSIS — M75.121 COMPLETE TEAR OF RIGHT ROTATOR CUFF, UNSPECIFIED WHETHER TRAUMATIC: ICD-10-CM

## 2024-12-03 PROCEDURE — 97112 NEUROMUSCULAR REEDUCATION: CPT

## 2024-12-03 PROCEDURE — 97140 MANUAL THERAPY 1/> REGIONS: CPT

## 2024-12-03 PROCEDURE — 97110 THERAPEUTIC EXERCISES: CPT

## 2024-12-03 NOTE — PROGRESS NOTES
Daily Note     Today's date: 12/3/2024  Patient name: Osmin Hook  : 1965  MRN: [id number]  Referring provider: Tommie Baird DO  Dx:   Encounter Diagnosis     ICD-10-CM    1. Dislocation of right shoulder joint, subsequent encounter  S43.004D       2. Complete tear of right rotator cuff, unspecified whether traumatic  M75.121       3. Sprain of unspecified ligament of left ankle, initial encounter  S93.402A         Start Time: 1501  Stop Time: 1558  Total time in clinic (min): 57 minutes    Subjective: Pt reports that he is feeling okay today.       Objective: See treatment diary below      Assessment: Tolerated treatment well. Pt had full PROM today w/ no pain at end range. IASTM of R upper trap helped to reduce tension and symptom irritability. Pt did well w/ [place] exercises and stabilization exercises. Pt only reports pain in R bicep during activity. Patient demonstrated fatigue post treatment, exhibited good technique with therapeutic exercises, and would benefit from continued PT for increased strength and decreased symptom irritability.       Plan: Continue per plan of care.  Progress treatment as tolerated.       Precautions: HTN, hx of lumbar fusion, Lyme's  POC expires Unit limit Auth Expiration date PT/OT/ST + Visit Limit?   24 BOMN 24 BOMN                           Visit/Unit Tracking  AUTH Status:  Date 5/2 5/6 5/9 5/14 5/16 5/21 5/23 5/28   5/30 6/4 6/6    Used 16 17 18 19 20 21 22 23 24 25 26    Remaining  44 43 42 41 40 39 38 37 36 35 34         Foto              Manuals 10/17 10/22 10/29 10/30 11/5 11/7 11/12 11/13 11/20 11/26 12/3   R inf mobs JMK JMK JMK JMK JMK JMK JMK JMK   JMK   R shoulder PROM JMK JMK JMK JMK JMK JMK JMK JMK JMK KB JMK   Median nerve glide              IASTM R upper trap      JMK JMK  JMK  JMK   R upper trap cupping         JMK     Neuro Re-Ed              Scap retraction              SL scap retraction iso              TB row L only             "  Perturbation in flex              Shoulder taps against wall               Median, Ulnar, and Radial nerve glide              Supine shoulder flexion w/ med ball    3x10 YMB 3x10 YMB 3x10 YMB 3x10 YMB 3x10 YMB 3x10 YMB 3x10 YMB 3x10 YMB                 Body blade x2    30\" x3 ea 30\" x3 ea 30\" x3 ea 30\" x3 ea 30\" x3 ea 30\" x3 ea 30\" x3 ea 30\" x3 ea   Ther Ex              Pendulums              Elbow flexion 4# 3x10 4# 3x10 4# 3x10 4# 3x10 4# 3x10 4# 3x10 4# 3x10  4# 3x10 4# 3x10 4# 3x10   R shoulder PROM              [place]              AROM to 90*- flex, ABD Flex 3x10 4#/ ABD 3x10 4# Flex 3x10 4#/ ABD 3x10 4# Flex 3x10 4#/ ABD 3x10 4# Flex 3x10 4#/ ABD 3x10 4# Flex 3x10 4#/ ABD 3x10 4# Flex 3x10 4#/ ABD 3x10 4# Flex 3x10 4#/ ABD 3x10 4#  Flex 3x10 4#/ ABD 2x10 4# Flex 3x10 4#/ ABD 2x10 4# Flex 3x10 4#/ ABD 3x10 4#   AROM scaption 4# 2x10 4# 3x10 4# 3x10 4# 3x10 4# 3x10 4# 3x10 4# 3x10    3x10 4#   TB row              TB ext [place] 3x12 14# [place] 3x12 14# [place] 3x12 14#     [place] 3x12 15#   [place]ie 3x10 15#   TB ER  BLTB 3x12 BLTB 3x12 BLTB 3x12 BLTB 3x12    BLTB 3x12      TB IR BLTB 3x12 BLTB 3x12 BLTB 3x12 BLTB 3x12    BLTB 3x12      TB tricep extension [place] 3x12 25# [place] 3x12 25# [place] 3x10 28#  [place] 3x10 28#   [place] 3x10 30#      Table slide              Finger ladder              Pt edu              UBE 3'/3' 3'/3' 3'/3' 3'/3' 3'/3' 3'/3' 3'/3' 3'/3' 3'/3' 3'/3' 3'/3'   [place] Row  [place]ie 3x12 25# [place] 3x12 25# [place] 3x10 28#  [place] 3x10 28#   [place] 3x10 30#   [place] 3x10 30#   Thoracic extensions        3\" x30 3\" x30 3\" x30    Open books        X15 ea X20 ea X20 ea    Ther Activity              Gripping                            Gait Training                                          Modalities                                                         "

## 2024-12-05 ENCOUNTER — OFFICE VISIT (OUTPATIENT)
Dept: PHYSICAL THERAPY | Facility: CLINIC | Age: 59
End: 2024-12-05
Payer: COMMERCIAL

## 2024-12-05 DIAGNOSIS — M75.121 COMPLETE TEAR OF RIGHT ROTATOR CUFF, UNSPECIFIED WHETHER TRAUMATIC: ICD-10-CM

## 2024-12-05 DIAGNOSIS — S93.402A SPRAIN OF UNSPECIFIED LIGAMENT OF LEFT ANKLE, INITIAL ENCOUNTER: ICD-10-CM

## 2024-12-05 DIAGNOSIS — S43.004D DISLOCATION OF RIGHT SHOULDER JOINT, SUBSEQUENT ENCOUNTER: Primary | ICD-10-CM

## 2024-12-05 PROCEDURE — 97112 NEUROMUSCULAR REEDUCATION: CPT

## 2024-12-05 PROCEDURE — 97140 MANUAL THERAPY 1/> REGIONS: CPT

## 2024-12-05 PROCEDURE — 97110 THERAPEUTIC EXERCISES: CPT

## 2024-12-05 NOTE — PROGRESS NOTES
Daily Note     Today's date: 2024  Patient name: Osmin Hook  : 1965  MRN: [id number]  Referring provider: Tommie Baird DO  Dx:   Encounter Diagnosis     ICD-10-CM    1. Dislocation of right shoulder joint, subsequent encounter  S43.004D       2. Complete tear of right rotator cuff, unspecified whether traumatic  M75.121       3. Sprain of unspecified ligament of left ankle, initial encounter  S93.402A         Start Time: 1450  Stop Time: 1545  Total time in clinic (min): 55 minutes    Subjective: Pt reports that he reached for an objects this morning and felt a sharp pain in his bicep and shoulder. The pain lasted a few minutes and slowly dissipated. Pt is sore in the area due to the sharp pain.       Objective: See treatment diary below      Assessment: Tolerated treatment well. Pt has full PROM. FOTO score improved significantly compared to last measure passing MDC. Added manual resisted rhythmic stabilization to increase shoulder stability and strength. Patient demonstrated fatigue post treatment, exhibited good technique with therapeutic exercises, and would benefit from continued PT for increased strength and decreased symptom irritability.       Plan: Continue per plan of care.  Progress treatment as tolerated.       Precautions: HTN, hx of lumbar fusion, Lyme's  POC expires Unit limit Auth Expiration date PT/OT/ST + Visit Limit?   24 BOMN 24 BOMN                           Visit/Unit Tracking  AUTH Status:  Date     Used 16 17 18 19 20 21 22 23 24 25 26    Remaining  44 43 42 41 40 39 38 37 36 35 34         Foto               Manuals 10/17 10/22 10/29 10/30 11/5 11/7 11/12 11/13 11/20 11/26 12/3 12/5   R inf mobs JMK JMK JMK JMK JMK JMK JMK JMK   JMK JMK   R shoulder PROM JMK JMK JMK JMK JMK JMK JMK JMK JMK KB JMK JMK   Manual rhythmic stabilization            JMK   Median nerve glide               IASTM R upper trap      JMK  "JMK  JMK  JMK    R upper trap cupping         JMK      Neuro Re-Ed               Scap retraction               SL scap retraction iso               TB row L only               Perturbation in flex               Shoulder taps against wall                Median, Ulnar, and Radial nerve glide               Supine shoulder flexion w/ med ball    3x10 YMB 3x10 YMB 3x10 YMB 3x10 YMB 3x10 YMB 3x10 YMB 3x10 YMB 3x10 YMB 3x10 YMB                  Body blade x2    30\" x3 ea 30\" x3 ea 30\" x3 ea 30\" x3 ea 30\" x3 ea 30\" x3 ea 30\" x3 ea 30\" x3 ea 30\" x3 ea   Ther Ex               Pendulums               Elbow flexion 4# 3x10 4# 3x10 4# 3x10 4# 3x10 4# 3x10 4# 3x10 4# 3x10  4# 3x10 4# 3x10 4# 3x10 4# 3x10   R shoulder PROM               [place]               AROM to 90*- flex, ABD Flex 3x10 4#/ ABD 3x10 4# Flex 3x10 4#/ ABD 3x10 4# Flex 3x10 4#/ ABD 3x10 4# Flex 3x10 4#/ ABD 3x10 4# Flex 3x10 4#/ ABD 3x10 4# Flex 3x10 4#/ ABD 3x10 4# Flex 3x10 4#/ ABD 3x10 4#  Flex 3x10 4#/ ABD 2x10 4# Flex 3x10 4#/ ABD 2x10 4# Flex 3x10 4#/ ABD 3x10 4# Flex 3x10 4#/ ABD 3x10 4#   AROM scaption 4# 2x10 4# 3x10 4# 3x10 4# 3x10 4# 3x10 4# 3x10 4# 3x10    3x10 4# 3x10 4#   TB row               TB ext [place] 3x12 14# [place] 3x12 14# [place] 3x12 14#     [place] 3x12 15#   [place] 3x10 15#    TB ER  BLTB 3x12 BLTB 3x12 BLTB 3x12 BLTB 3x12    BLTB 3x12       TB IR BLTB 3x12 BLTB 3x12 BLTB 3x12 BLTB 3x12    BLTB 3x12       TB tricep extension [place] 3x12 25# [place] 3x12 25# [place] 3x10 28#  [place] 3x10 28#   Shellie 3x10 30#       Table slide               Finger ladder               Pt edu               UBE 3'/3' 3'/3' 3'/3' 3'/3' 3'/3' 3'/3' 3'/3' 3'/3' 3'/3' 3'/3' 3'/3'    Shellie Haile  [place] 3x12 25# [place] 3x12 25# [place] 3x10 28#  [place] 3x10 28#   Shellie 3x10 30#   Shellie 3x10 30#    Thoracic extensions        3\" x30 3\" x30 3\" x30     Open books        X15 ea X20 ea X20 ea     Ther Activity               Gripping                              Gait " Training                                             Modalities

## 2024-12-10 ENCOUNTER — OFFICE VISIT (OUTPATIENT)
Dept: PHYSICAL THERAPY | Facility: CLINIC | Age: 59
End: 2024-12-10
Payer: COMMERCIAL

## 2024-12-10 DIAGNOSIS — S93.402A SPRAIN OF UNSPECIFIED LIGAMENT OF LEFT ANKLE, INITIAL ENCOUNTER: ICD-10-CM

## 2024-12-10 DIAGNOSIS — M75.121 COMPLETE TEAR OF RIGHT ROTATOR CUFF, UNSPECIFIED WHETHER TRAUMATIC: ICD-10-CM

## 2024-12-10 DIAGNOSIS — S43.004D DISLOCATION OF RIGHT SHOULDER JOINT, SUBSEQUENT ENCOUNTER: Primary | ICD-10-CM

## 2024-12-10 PROCEDURE — 97110 THERAPEUTIC EXERCISES: CPT

## 2024-12-10 PROCEDURE — 97112 NEUROMUSCULAR REEDUCATION: CPT

## 2024-12-10 PROCEDURE — 97140 MANUAL THERAPY 1/> REGIONS: CPT

## 2024-12-10 NOTE — PROGRESS NOTES
Daily Note     Today's date: 12/10/2024  Patient name: Osmin Hook  : 1965  MRN: [id number]  Referring provider: Tommie Baird DO  Dx:   Encounter Diagnosis     ICD-10-CM    1. Dislocation of right shoulder joint, subsequent encounter  S43.004D       2. Complete tear of right rotator cuff, unspecified whether traumatic  M75.121       3. Sprain of unspecified ligament of left ankle, initial encounter  S93.402A         Start Time: 1453  Stop Time: 1540  Total time in clinic (min): 47 minutes    Subjective: Pt reports that he is very sore today. Pt has MRI tomorrow morning.       Objective: See treatment diary below      Assessment: Tolerated treatment well. No changes in strength or symptom irritability during today's session. Pt's PROM remains in WFL. Patient demonstrated fatigue post treatment, exhibited good technique with therapeutic exercises, and would benefit from continued PT for increased strength and decreased symptom irritability.       Plan: Continue per plan of care.  Progress treatment as tolerated.       Precautions: HTN, hx of lumbar fusion, Lyme's  POC expires Unit limit Auth Expiration date PT/OT/ST + Visit Limit?   24 BOMN 24 BOMN                           Visit/Unit Tracking  AUTH Status:  Date     Used 16 17 18 19 20 21 22 23 24 25 26    Remaining  44 43 42 41 40 39 38 37 36 35 34         Foto                Manuals 10/17 10/22 10/29 10/30 11/5 11/7 11/12 11/13 11/20 11/26 12/3 12/5 12/10   R inf mobs JMK JMK JMK JMK JMK JMK JMK JMK   JMK JMK JMK   R shoulder PROM JMK JMK JMK JMK JMK JMK JMK JMK JMK KB JMK JMK JMK   Manual rhythmic stabilization            JMK    Median nerve glide                IASTM R upper trap      JMK JMK  JMK  JMK     R upper trap cupping         JMK       Neuro Re-Ed                Scap retraction                SL scap retraction iso                TB row L only                Perturbation in  "flex                Shoulder taps against wall                 Median, Ulnar, and Radial nerve glide                Supine shoulder flexion w/ med ball    3x10 YMB 3x10 YMB 3x10 YMB 3x10 YMB 3x10 YMB 3x10 YMB 3x10 YMB 3x10 YMB 3x10 YMB 3x10 BTB                   Body blade x2    30\" x3 ea 30\" x3 ea 30\" x3 ea 30\" x3 ea 30\" x3 ea 30\" x3 ea 30\" x3 ea 30\" x3 ea 30\" x3 ea 30\" x3 ea   Ther Ex                Pendulums                Elbow flexion 4# 3x10 4# 3x10 4# 3x10 4# 3x10 4# 3x10 4# 3x10 4# 3x10  4# 3x10 4# 3x10 4# 3x10 4# 3x10 4# 3x10   R shoulder PROM                [place]                AROM to 90*- flex, ABD Flex 3x10 4#/ ABD 3x10 4# Flex 3x10 4#/ ABD 3x10 4# Flex 3x10 4#/ ABD 3x10 4# Flex 3x10 4#/ ABD 3x10 4# Flex 3x10 4#/ ABD 3x10 4# Flex 3x10 4#/ ABD 3x10 4# Flex 3x10 4#/ ABD 3x10 4#  Flex 3x10 4#/ ABD 2x10 4# Flex 3x10 4#/ ABD 2x10 4# Flex 3x10 4#/ ABD 3x10 4# Flex 3x10 4#/ ABD 3x10 4# Flex 3x10 4#/ ABD 3x10 4#   AROM scaption 4# 2x10 4# 3x10 4# 3x10 4# 3x10 4# 3x10 4# 3x10 4# 3x10    3x10 4# 3x10 4# 3x10 4#   TB row                TB ext [place] 3x12 14# [place] 3x12 14# [place] 3x12 14#     Shellie 3x12 15#   Shellie 3x10 15#     TB ER  BLTB 3x12 BLTB 3x12 BLTB 3x12 BLTB 3x12    BLTB 3x12        TB IR BLTB 3x12 BLTB 3x12 BLTB 3x12 BLTB 3x12    BLTB 3x12        TB tricep extension [place] 3x12 25# [place] 3x12 25# [place] 3x10 28#  [place]ie 3x10 28#   [place] 3x10 30#        Table slide                Finger ladder                Pt edu                UBE 3'/3' 3'/3' 3'/3' 3'/3' 3'/3' 3'/3' 3'/3' 3'/3' 3'/3' 3'/3' 3'/3' 3'/3' 3'/3'   Shellie Haile  [place] 3x12 25# [place] 3x12 25# [place] 3x10 28#  [place] 3x10 28#   [place] 3x10 30#   [place] 3x10 30#     Thoracic extensions        3\" x30 3\" x30 3\" x30      Open books        X15 ea X20 ea X20 ea   X20 ea   Ther Activity                Gripping                                Gait Training                                                Modalities                                 "

## 2024-12-11 ENCOUNTER — HOSPITAL ENCOUNTER (OUTPATIENT)
Dept: MRI IMAGING | Facility: HOSPITAL | Age: 59
Discharge: HOME | End: 2024-12-11
Attending: ORTHOPAEDIC SURGERY
Payer: OTHER MISCELLANEOUS

## 2024-12-11 DIAGNOSIS — Z48.89 AFTERCARE FOLLOWING SURGERY: ICD-10-CM

## 2024-12-11 DIAGNOSIS — Z98.890 S/P RIGHT ROTATOR CUFF REPAIR: ICD-10-CM

## 2024-12-11 PROCEDURE — 73221 MRI JOINT UPR EXTREM W/O DYE: CPT

## 2024-12-12 ENCOUNTER — OFFICE VISIT (OUTPATIENT)
Dept: PHYSICAL THERAPY | Facility: CLINIC | Age: 59
End: 2024-12-12
Payer: COMMERCIAL

## 2024-12-12 DIAGNOSIS — M75.121 COMPLETE TEAR OF RIGHT ROTATOR CUFF, UNSPECIFIED WHETHER TRAUMATIC: ICD-10-CM

## 2024-12-12 DIAGNOSIS — S43.004D DISLOCATION OF RIGHT SHOULDER JOINT, SUBSEQUENT ENCOUNTER: Primary | ICD-10-CM

## 2024-12-12 PROCEDURE — 97110 THERAPEUTIC EXERCISES: CPT

## 2024-12-12 PROCEDURE — 97112 NEUROMUSCULAR REEDUCATION: CPT

## 2024-12-12 NOTE — PROGRESS NOTES
Daily Note     Today's date: 2024  Patient name: Osmin Hook  : 1965  MRN: [id number]  Referring provider: Tommie Baird DO  Dx:   Encounter Diagnosis     ICD-10-CM    1. Dislocation of right shoulder joint, subsequent encounter  S43.004D       2. Complete tear of right rotator cuff, unspecified whether traumatic  M75.121         Start Time: 1453  Stop Time: 1548  Total time in clinic (min): 55 minutes    Subjective: Pt reports that he is sore but his MRI went well.       Objective: See treatment diary below      Assessment: Tolerated treatment well. Pt was able to increase resistance on parascapular exercises. Pt demonstrated less fatigue w/ active shoulder ROM w/ resistance, advance to 5# NV. Pt still has R side upper trap/clavicular pain. Patient demonstrated fatigue post treatment, exhibited good technique with therapeutic exercises, and would benefit from continued PT for increased strength and decreased symptom irritability.       Plan: Continue per plan of care.  Progress treatment as tolerated.       Precautions: HTN, hx of lumbar fusion, Lyme's  POC expires Unit limit Auth Expiration date PT/OT/ST + Visit Limit?   24 BOMN 24 BOMN                           Visit/Unit Tracking  AUTH Status:  Date     Used 16 17 18 19 20 21 22 23 24 25 26    Remaining  44 43 42 41 40 39 38 37 36 35 34         Foto            Manuals 11/7 11/12 11/13 11/20 11/26 12/3 12/5 12/10 12/12   R inf mobs JMK JUAN A SAGASTUMEK MITESHK    R shoulder PROM JMK MITESHK JMK JMK KB JMK JMK JMK JMK   Manual rhythmic stabilization       JMK     Median nerve glide            IASTM R upper trap JMK JUAN A SAGASTUMEK      R upper trap cupping    [id number]        Neuro Re-Ed            Scap retraction            SL scap retraction iso            TB row L only            Perturbation in flex            Shoulder taps against wall             Median, Ulnar, and Radial nerve glide         "    Supine shoulder flexion w/ med ball 3x10 YMB 3x10 YMB 3x10 YMB 3x10 YMB 3x10 YMB 3x10 YMB 3x10 YMB 3x10 BTB 3x10 BTB               Body blade x2 30\" x3 ea 30\" x3 ea 30\" x3 ea 30\" x3 ea 30\" x3 ea 30\" x3 ea 30\" x3 ea 30\" x3 ea 30\" x3 ea   Ther Ex            Pendulums            Elbow flexion 4# 3x10 4# 3x10  4# 3x10 4# 3x10 4# 3x10 4# 3x10 4# 3x10 4# 3x10   R shoulder PROM            [place]            AROM to 90*- flex, ABD Flex 3x10 4#/ ABD 3x10 4# Flex 3x10 4#/ ABD 3x10 4#  Flex 3x10 4#/ ABD 2x10 4# Flex 3x10 4#/ ABD 2x10 4# Flex 3x10 4#/ ABD 3x10 4# Flex 3x10 4#/ ABD 3x10 4# Flex 3x10 4#/ ABD 3x10 4# Flex 3x10 4#/ ABD 3x10 4#   AROM scaption 4# 3x10 4# 3x10    3x10 4# 3x10 4# 3x10 4# 3x10 4#   TB row            TB ext   [place] 3x12 15#   Shellie 3x10 15#   Shellie 3x10 15#   TB ER    BLTB 3x12         TB IR   BLTB 3x12         TB tricep extension   Shellie 3x10 30#         Table slide            Finger ladder            Pt edu            UBE 3'/3' 3'/3' 3'/3' 3'/3' 3'/3' 3'/3' 3'/3' 3'/3' 3'/3'   [place] Row    [place] 3x10 30#   Shellie 3x10 30#   Shellie 3x10 35#   Thoracic extensions   3\" x30 3\" x30 3\" x30       Open books   X15 ea X20 ea X20 ea   X20 ea X20 ea   Ther Activity            Gripping                        Gait Training                                    Modalities                                                   "

## 2024-12-17 ENCOUNTER — OFFICE VISIT (OUTPATIENT)
Dept: PHYSICAL THERAPY | Facility: CLINIC | Age: 59
End: 2024-12-17
Payer: OTHER MISCELLANEOUS

## 2024-12-17 DIAGNOSIS — S43.004D DISLOCATION OF RIGHT SHOULDER JOINT, SUBSEQUENT ENCOUNTER: Primary | ICD-10-CM

## 2024-12-17 DIAGNOSIS — S93.402A SPRAIN OF UNSPECIFIED LIGAMENT OF LEFT ANKLE, INITIAL ENCOUNTER: ICD-10-CM

## 2024-12-17 DIAGNOSIS — M75.121 COMPLETE TEAR OF RIGHT ROTATOR CUFF, UNSPECIFIED WHETHER TRAUMATIC: ICD-10-CM

## 2024-12-17 NOTE — PROGRESS NOTES
"Daily Note     Today's date: 2024  Patient name: Osmin Hook  : 1965  MRN: [id number]  Referring provider: Tommie Baird DO  Dx: No diagnosis found.               Subjective: ***      Objective: See treatment diary below      Assessment: Tolerated treatment well. Patient demonstrated fatigue post treatment, exhibited good technique with therapeutic exercises, and would benefit from continued PT      Plan: Continue per plan of care.  Progress treatment as tolerated.       Precautions: HTN, hx of lumbar fusion, Lyme's  POC expires Unit limit Auth Expiration date PT/OT/ST + Visit Limit?   24 BOMN 24 BOMN                           Visit/Unit Tracking  AUTH Status:  Date     Used 16 17 18 19 20 21 22 23 24 25 26    Remaining  44 43 42 41 40 39 38 37 36 35 34         Foto            Manuals 11/7 11/12 11/13 11/20 11/26 12/3 12/5 12/10 12/12   R inf mobs JMK JMK JMK   JMK JMK JMK    R shoulder PROM JMK JMK JMK JMK KB JMK JMK JMK JMK   Manual rhythmic stabilization       JMK     Median nerve glide            IASTM R upper trap JMK JMK  JMK  JMK      R upper trap cupping    JMK        Neuro Re-Ed            Scap retraction            SL scap retraction iso            TB row L only            Perturbation in flex            Shoulder taps against wall             Median, Ulnar, and Radial nerve glide            Supine shoulder flexion w/ med ball 3x10 YMB 3x10 YMB 3x10 YMB 3x10 YMB 3x10 YMB 3x10 YMB 3x10 YMB 3x10 BTB 3x10 BTB               Body blade x2 30\" x3 ea 30\" x3 ea 30\" x3 ea 30\" x3 ea 30\" x3 ea 30\" x3 ea 30\" x3 ea 30\" x3 ea 30\" x3 ea   Ther Ex            Pendulums            Elbow flexion 4# 3x10 4# 3x10  4# 3x10 4# 3x10 4# 3x10 4# 3x10 4# 3x10 4# 3x10   R shoulder PROM            [place]            AROM to 90*- flex, ABD Flex 3x10 4#/ ABD 3x10 4# Flex 3x10 4#/ ABD 3x10 4#  Flex 3x10 4#/ ABD 2x10 4# Flex 3x10 4#/ ABD 2x10 4# Flex 3x10 " "4#/ ABD 3x10 4# Flex 3x10 4#/ ABD 3x10 4# Flex 3x10 4#/ ABD 3x10 4# Flex 3x10 4#/ ABD 3x10 4#   AROM scaption 4# 3x10 4# 3x10    3x10 4# 3x10 4# 3x10 4# 3x10 4#   TB row            TB ext   [place] 3x12 15#   Shellie 3x10 15#   Shellie 3x10 15#   TB ER    BLTB 3x12         TB IR   BLTB 3x12         TB tricep extension   Shellie 3x10 30#         Table slide            Finger ladder            Pt edu            UBE 3'/3' 3'/3' 3'/3' 3'/3' 3'/3' 3'/3' 3'/3' 3'/3' 3'/3'   [place] Row    [place] 3x10 30#   Shellie 3x10 30#   [place] 3x10 35#   Thoracic extensions   3\" x30 3\" x30 3\" x30       Open books   X15 ea X20 ea X20 ea   X20 ea X20 ea   Ther Activity            Gripping                        Gait Training                                    Modalities                                                     "

## 2024-12-19 ENCOUNTER — APPOINTMENT (OUTPATIENT)
Dept: PHYSICAL THERAPY | Facility: CLINIC | Age: 59
End: 2024-12-19
Payer: OTHER MISCELLANEOUS

## 2024-12-23 ENCOUNTER — APPOINTMENT (OUTPATIENT)
Dept: PHYSICAL THERAPY | Facility: CLINIC | Age: 59
End: 2024-12-23
Payer: OTHER MISCELLANEOUS

## 2024-12-23 ENCOUNTER — OFFICE VISIT (OUTPATIENT)
Dept: OBGYN CLINIC | Facility: CLINIC | Age: 59
End: 2024-12-23
Payer: OTHER MISCELLANEOUS

## 2024-12-23 VITALS — HEIGHT: 66 IN | BODY MASS INDEX: 50.62 KG/M2 | WEIGHT: 315 LBS

## 2024-12-23 DIAGNOSIS — G89.29 CHRONIC RIGHT SHOULDER PAIN: ICD-10-CM

## 2024-12-23 DIAGNOSIS — Z98.890 S/P RIGHT ROTATOR CUFF REPAIR: ICD-10-CM

## 2024-12-23 DIAGNOSIS — M25.511 CHRONIC RIGHT SHOULDER PAIN: ICD-10-CM

## 2024-12-23 DIAGNOSIS — M75.121 COMPLETE TEAR OF RIGHT ROTATOR CUFF, UNSPECIFIED WHETHER TRAUMATIC: Primary | ICD-10-CM

## 2024-12-23 PROCEDURE — 99214 OFFICE O/P EST MOD 30 MIN: CPT | Performed by: ORTHOPAEDIC SURGERY

## 2024-12-23 RX ORDER — CHLORHEXIDINE GLUCONATE 40 MG/ML
SOLUTION TOPICAL DAILY PRN
OUTPATIENT
Start: 2024-12-23

## 2024-12-23 RX ORDER — CHLORHEXIDINE GLUCONATE ORAL RINSE 1.2 MG/ML
15 SOLUTION DENTAL ONCE
OUTPATIENT
Start: 2024-12-23 | End: 2024-12-23

## 2024-12-23 NOTE — H&P (VIEW-ONLY)
Patient Name:  Osmin Hook  MRN:  [id number]    Assessment & Plan     1. Complete tear of right rotator cuff, unspecified whether traumatic  -     Case request operating room: REPAIR ROTATOR CUFF  ARTHROSCOPIC- Right shoulder arthroscopic revision repair with possible collagen implant augmentation; Standing  -     Ambulatory referral to Family Practice; Future  -     CBC and Platelet; Future  -     Comprehensive metabolic panel; Future  -     EKG 12 lead; Future  -     XR chest pa and lateral; Future; Expected date: 12/23/2024  -     Case request operating room: REPAIR ROTATOR CUFF  ARTHROSCOPIC- Right shoulder arthroscopic revision repair with possible collagen implant augmentation  2. S/P right rotator cuff repair  3. Chronic right shoulder pain      Right shoulder rotator cuff re-tear s/p Right shoulder arthroscopic rotator cuff repair, biceps tenotomy, acromioplasty, extensive debridement performed on 04/11/2024  MRI reviewed with patient and educated patient about risk of re-tear with large, retracted rotator cuff tears.  Educated patient about nonoperative vs surgical management   Discussed the patient's diagnosis and associated treatment options including nonoperative and surgical treatment.    Nonoperative management by means of outpatient physical therapy, home exercises for ROM and strength, activity modifications, injection therapy, and OTC topical and oral analgesics. Risks of nonoperative management include progression of tear size, persistent pain, increased weakness, increased tendon retraction, increased fatty infiltration, decreased potential for repair and healing in the future due to tear progression/retraction/fatty infiltration/increasing age, and future development of rotator cuff arthropathy.    Surgical intervention was also discussed by means of Right shoulder arthroscopy with rotator cuff repair with possible collagen implant augmentation vs superior capsular reconstruction. Educated  "patient about different surgical options including reverse shoulder arthroplasty, but would not recommend at this time secondary to age, limited degenerative change, and limited rotator muscle atrophy currently present.   Risks of surgery, including but not limited to, anesthesia complications, infection, iatrogenic fracture, damage to nerves and blood vessels, blood clots, postoperative stiffness, residual pain, inability to repair the torn tendon, failure of tendon healing, residual weakness, need for subsequent surgery  Discussed procedure and intraoperative findings, post operative immobilization, outpatient PT and following post op protocol, return to driving, return to AROM and lifting, return to unrestricted activity  Educated patient rotator cuff may be retracted and unable to be repaired. Patient agreeable.  Will go to OR on 01/02/2024  Follow up post operatively.       History of the Present Illness   Osmin Hook is a 59 y.o. male approximately 8.5 month s/p Right shoulder arthroscopic rotator cuff repair, biceps tenotomy, acromioplasty, extensive debridement performed on 04/11/2024. Today, patient reports he has pain with overhead motion and locates some residual discomfort to the upper trapezius.        Review of Systems     Review of Systems   Constitutional:  Negative for chills and fever.   HENT:  Negative for ear pain and sore throat.    Eyes:  Negative for pain and visual disturbance.   Respiratory:  Negative for cough and shortness of breath.    Cardiovascular:  Negative for chest pain and palpitations.   Gastrointestinal:  Negative for abdominal pain and vomiting.   Genitourinary:  Negative for dysuria and hematuria.   Musculoskeletal:  Negative for arthralgias and back pain.   Skin:  Negative for color change and rash.   Neurological:  Negative for seizures and syncope.   All other systems reviewed and are negative.      Physical Exam     Ht 5' 6\" (1.676 m)   Wt (!) 144 kg (318 lb)   BMI " 51.33 kg/m²     Right Shoulder:   Surgical incisions well healed  Active range of motion   150 degrees forward flexion  140 degrees abduction  40 degrees external rotation   SI joint internal rotation    Passive range of motion   170 degrees of forward flexion  Forward flexion testing 4/5  External rotation testing 4+/5  Internal rotation testing 4+/5  The patient is neurovascularly intact distally in the extremity.      Data Review     I have personally reviewed pertinent films in PACS, and my interpretation follows.    MRI of Right shoulder performed 2024 demonstrates re-tear of supraspinatus and infraspinatus with retraction with minimal to mild muscle atrophy.     Social History     Tobacco Use    Smoking status: Former     Current packs/day: 0.00     Average packs/day: 0.3 packs/day for 5.8 years (1.4 ttl pk-yrs)     Types: Cigarettes     Start date: 1984     Quit date: 10/20/1989     Years since quittin.2    Smokeless tobacco: Current     Types: Chew     Last attempt to quit: 2019   Vaping Use    Vaping status: Never Used   Substance Use Topics    Alcohol use: Yes     Alcohol/week: 4.0 standard drinks of alcohol     Types: 4 Standard drinks or equivalent per week    Drug use: Yes     Frequency: 7.0 times per week     Types: Marijuana     Comment: medical           Procedures  None     Tommie Baird DO

## 2024-12-23 NOTE — PROGRESS NOTES
Patient Name:  Osmin Hook  MRN:  [id number]    Assessment & Plan     1. Complete tear of right rotator cuff, unspecified whether traumatic  -     Case request operating room: REPAIR ROTATOR CUFF  ARTHROSCOPIC- Right shoulder arthroscopic revision repair with possible collagen implant augmentation; Standing  -     Ambulatory referral to Family Practice; Future  -     CBC and Platelet; Future  -     Comprehensive metabolic panel; Future  -     EKG 12 lead; Future  -     XR chest pa and lateral; Future; Expected date: 12/23/2024  -     Case request operating room: REPAIR ROTATOR CUFF  ARTHROSCOPIC- Right shoulder arthroscopic revision repair with possible collagen implant augmentation  2. S/P right rotator cuff repair  3. Chronic right shoulder pain      Right shoulder rotator cuff re-tear s/p Right shoulder arthroscopic rotator cuff repair, biceps tenotomy, acromioplasty, extensive debridement performed on 04/11/2024  MRI reviewed with patient and educated patient about risk of re-tear with large, retracted rotator cuff tears.  Educated patient about nonoperative vs surgical management   Discussed the patient's diagnosis and associated treatment options including nonoperative and surgical treatment.    Nonoperative management by means of outpatient physical therapy, home exercises for ROM and strength, activity modifications, injection therapy, and OTC topical and oral analgesics. Risks of nonoperative management include progression of tear size, persistent pain, increased weakness, increased tendon retraction, increased fatty infiltration, decreased potential for repair and healing in the future due to tear progression/retraction/fatty infiltration/increasing age, and future development of rotator cuff arthropathy.    Surgical intervention was also discussed by means of Right shoulder arthroscopy with rotator cuff repair with possible collagen implant augmentation vs superior capsular reconstruction. Educated  "patient about different surgical options including reverse shoulder arthroplasty, but would not recommend at this time secondary to age, limited degenerative change, and limited rotator muscle atrophy currently present.   Risks of surgery, including but not limited to, anesthesia complications, infection, iatrogenic fracture, damage to nerves and blood vessels, blood clots, postoperative stiffness, residual pain, inability to repair the torn tendon, failure of tendon healing, residual weakness, need for subsequent surgery  Discussed procedure and intraoperative findings, post operative immobilization, outpatient PT and following post op protocol, return to driving, return to AROM and lifting, return to unrestricted activity  Educated patient rotator cuff may be retracted and unable to be repaired. Patient agreeable.  Will go to OR on 01/02/2024  Follow up post operatively.       History of the Present Illness   Osmin Hook is a 59 y.o. male approximately 8.5 month s/p Right shoulder arthroscopic rotator cuff repair, biceps tenotomy, acromioplasty, extensive debridement performed on 04/11/2024. Today, patient reports he has pain with overhead motion and locates some residual discomfort to the upper trapezius.        Review of Systems     Review of Systems   Constitutional:  Negative for chills and fever.   HENT:  Negative for ear pain and sore throat.    Eyes:  Negative for pain and visual disturbance.   Respiratory:  Negative for cough and shortness of breath.    Cardiovascular:  Negative for chest pain and palpitations.   Gastrointestinal:  Negative for abdominal pain and vomiting.   Genitourinary:  Negative for dysuria and hematuria.   Musculoskeletal:  Negative for arthralgias and back pain.   Skin:  Negative for color change and rash.   Neurological:  Negative for seizures and syncope.   All other systems reviewed and are negative.      Physical Exam     Ht 5' 6\" (1.676 m)   Wt (!) 144 kg (318 lb)   BMI " 51.33 kg/m²     Right Shoulder:   Surgical incisions well healed  Active range of motion   150 degrees forward flexion  140 degrees abduction  40 degrees external rotation   SI joint internal rotation    Passive range of motion   170 degrees of forward flexion  Forward flexion testing 4/5  External rotation testing 4+/5  Internal rotation testing 4+/5  The patient is neurovascularly intact distally in the extremity.      Data Review     I have personally reviewed pertinent films in PACS, and my interpretation follows.    MRI of Right shoulder performed 2024 demonstrates re-tear of supraspinatus and infraspinatus with retraction with minimal to mild muscle atrophy.     Social History     Tobacco Use    Smoking status: Former     Current packs/day: 0.00     Average packs/day: 0.3 packs/day for 5.8 years (1.4 ttl pk-yrs)     Types: Cigarettes     Start date: 1984     Quit date: 10/20/1989     Years since quittin.2    Smokeless tobacco: Current     Types: Chew     Last attempt to quit: 2019   Vaping Use    Vaping status: Never Used   Substance Use Topics    Alcohol use: Yes     Alcohol/week: 4.0 standard drinks of alcohol     Types: 4 Standard drinks or equivalent per week    Drug use: Yes     Frequency: 7.0 times per week     Types: Marijuana     Comment: medical           Procedures  None     Tommie Baird DO

## 2024-12-24 ENCOUNTER — OFFICE VISIT (OUTPATIENT)
Dept: LAB | Facility: HOSPITAL | Age: 59
End: 2024-12-24
Payer: OTHER MISCELLANEOUS

## 2024-12-24 ENCOUNTER — APPOINTMENT (OUTPATIENT)
Dept: LAB | Facility: HOSPITAL | Age: 59
End: 2024-12-24
Payer: OTHER MISCELLANEOUS

## 2024-12-24 ENCOUNTER — HOSPITAL ENCOUNTER (OUTPATIENT)
Dept: RADIOLOGY | Facility: HOSPITAL | Age: 59
Discharge: HOME | End: 2024-12-24
Payer: OTHER MISCELLANEOUS

## 2024-12-24 ENCOUNTER — TELEPHONE (OUTPATIENT)
Dept: OBGYN CLINIC | Facility: CLINIC | Age: 59
End: 2024-12-24

## 2024-12-24 DIAGNOSIS — M75.121 COMPLETE TEAR OF RIGHT ROTATOR CUFF, UNSPECIFIED WHETHER TRAUMATIC: ICD-10-CM

## 2024-12-24 LAB
ALBUMIN SERPL BCG-MCNC: 4.2 G/DL (ref 3.5–5)
ALP SERPL-CCNC: 58 U/L (ref 34–104)
ALT SERPL W P-5'-P-CCNC: 22 U/L (ref 7–52)
ANION GAP SERPL CALCULATED.3IONS-SCNC: 3 MMOL/L (ref 4–13)
AST SERPL W P-5'-P-CCNC: 16 U/L (ref 13–39)
ATRIAL RATE: 60 BPM
BILIRUB SERPL-MCNC: 0.53 MG/DL (ref 0.2–1)
BUN SERPL-MCNC: 12 MG/DL (ref 5–25)
CALCIUM SERPL-MCNC: 9.3 MG/DL (ref 8.4–10.2)
CHLORIDE SERPL-SCNC: 104 MMOL/L (ref 96–108)
CO2 SERPL-SCNC: 33 MMOL/L (ref 21–32)
CREAT SERPL-MCNC: 0.86 MG/DL (ref 0.6–1.3)
ERYTHROCYTE [DISTWIDTH] IN BLOOD BY AUTOMATED COUNT: 13.2 % (ref 11.6–15.1)
GFR SERPL CREATININE-BSD FRML MDRD: 95 ML/MIN/1.73SQ M
GLUCOSE P FAST SERPL-MCNC: 95 MG/DL (ref 65–99)
HCT VFR BLD AUTO: 44.9 % (ref 36.5–49.3)
HGB BLD-MCNC: 14.3 G/DL (ref 12–17)
MCH RBC QN AUTO: 27.7 PG (ref 26.8–34.3)
MCHC RBC AUTO-ENTMCNC: 31.8 G/DL (ref 31.4–37.4)
MCV RBC AUTO: 87 FL (ref 82–98)
P AXIS: 24 DEGREES
PLATELET # BLD AUTO: 160 THOUSANDS/UL (ref 149–390)
PMV BLD AUTO: 8.4 FL (ref 8.9–12.7)
POTASSIUM SERPL-SCNC: 4.2 MMOL/L (ref 3.5–5.3)
PR INTERVAL: 156 MS
PROT SERPL-MCNC: 7.1 G/DL (ref 6.4–8.4)
QRS AXIS: 5 DEGREES
QRSD INTERVAL: 94 MS
QT INTERVAL: 438 MS
QTC INTERVAL: 438 MS
RBC # BLD AUTO: 5.17 MILLION/UL (ref 3.88–5.62)
SODIUM SERPL-SCNC: 140 MMOL/L (ref 135–147)
T WAVE AXIS: 11 DEGREES
VENTRICULAR RATE: 60 BPM
WBC # BLD AUTO: 7.55 THOUSAND/UL (ref 4.31–10.16)

## 2024-12-24 PROCEDURE — 71046 X-RAY EXAM CHEST 2 VIEWS: CPT

## 2024-12-24 PROCEDURE — 80053 COMPREHEN METABOLIC PANEL: CPT

## 2024-12-24 PROCEDURE — 36415 COLL VENOUS BLD VENIPUNCTURE: CPT

## 2024-12-24 PROCEDURE — 93010 ELECTROCARDIOGRAM REPORT: CPT | Performed by: INTERNAL MEDICINE

## 2024-12-24 PROCEDURE — 85027 COMPLETE CBC AUTOMATED: CPT

## 2024-12-24 PROCEDURE — 93005 ELECTROCARDIOGRAM TRACING: CPT

## 2024-12-24 NOTE — TELEPHONE ENCOUNTER
Patient is scheduled for surgery on 1/2/2025 with Dr. Baird at the [facility].    Patient is aware that he will need to complete Labs, CXR and EKG prior to seeing his PCP on 12/27/24. He was informed that these tests are walk-in services and no appointment will be necessary.   All testing must be done at a [facility] facility.     My direct phone number was given and patient was informed to call me if they have a any questions related to their surgery.     Patient is aware that the hospital will call the afternoon prior, after two pm, with his surgery check in time.     The patient should not eat or drink anything after midnight the night before surgery.    Patient will need a ride home once discharged from the hospital.     Soap instructions were reviewed with the patient.       Post Op :  1/13/25 at 3:30 with Dr. Baird in [place] office.     MC : Dr. Malone 12/27/24 at 5:00.

## 2024-12-26 ENCOUNTER — APPOINTMENT (OUTPATIENT)
Dept: PHYSICAL THERAPY | Facility: CLINIC | Age: 59
End: 2024-12-26
Payer: OTHER MISCELLANEOUS

## 2024-12-27 ENCOUNTER — CONSULT (OUTPATIENT)
Age: 59
End: 2024-12-27
Payer: OTHER MISCELLANEOUS

## 2024-12-27 VITALS
RESPIRATION RATE: 18 BRPM | BODY MASS INDEX: 50.62 KG/M2 | TEMPERATURE: 98.1 F | OXYGEN SATURATION: 98 % | WEIGHT: 315 LBS | DIASTOLIC BLOOD PRESSURE: 78 MMHG | SYSTOLIC BLOOD PRESSURE: 140 MMHG | HEART RATE: 68 BPM | HEIGHT: 66 IN

## 2024-12-27 DIAGNOSIS — I10 PRIMARY HYPERTENSION: Chronic | ICD-10-CM

## 2024-12-27 DIAGNOSIS — E78.00 HYPERCHOLESTEROLEMIA: Chronic | ICD-10-CM

## 2024-12-27 DIAGNOSIS — Z01.818 PREOPERATIVE EXAMINATION: Primary | ICD-10-CM

## 2024-12-27 DIAGNOSIS — M75.121 COMPLETE TEAR OF RIGHT ROTATOR CUFF, UNSPECIFIED WHETHER TRAUMATIC: ICD-10-CM

## 2024-12-27 PROCEDURE — 99243 OFF/OP CNSLTJ NEW/EST LOW 30: CPT | Performed by: STUDENT IN AN ORGANIZED HEALTH CARE EDUCATION/TRAINING PROGRAM

## 2024-12-27 NOTE — PROGRESS NOTES
Pre-operative Clearance  Name: Osmin Hook      : 1965      MRN: [id number]  Encounter Provider: Atilio Malone MD  Encounter Date: 2024   Encounter department: [facility] [place]    Assessment & Plan  Preoperative examination  Patient is medically optimized for upcoming procedure. RCRI = 0, meaning he is very low risk for cardiac event. He is able to complete 4+ METS of activity without recent angina or RUEDA. He has had surgery under general anesthesia without complications. No perioperative bleeding/infectious complications previously.  Hold aspirin 7 days prior to surgery       Complete tear of right rotator cuff, unspecified whether traumatic  Had surgery for this in January. Is going for repeat surgery.    Orders:    Ambulatory referral to Family Practice    Primary hypertension  BP well controlled. Hold olmesartan morning of surgery. Patient usually takes at night, so can continue his nighttime dose the evening prior to surgery. Can take amlodipine through surgery.       Hypercholesterolemia  Can continue crestor through surgery.       Pre-operative Clearance:     Revised Cardiac Risk Index:  RCI RISK CLASS I (0 risk factors, risk of major cardiac complications approximately 0.5%)    Clearance:  Patient is medically optimized (CLEARED) for proposed surgery without any additional cardiac testing.      Medication Instructions:   - Avoid herbs or non-directed vitamins one week prior to surgery    - Avoid aspirin containing medications or non-steroidal anti-inflammatory drugs one week preceding surgery    - May take tylenol for pain up until the night before surgery    - ACE Inhibitors or ARBs: Continue this medication up to the evening before surgery/procedure, but do not take the morning of the day of surgery.  - Beta blockers:  Continue to take this medication on your normal schedule.  - Calcium channel blockers: Continue to take this medication on your normal  schedule.  - Hyperlipidemia meds: Continue to take this medication on your normal schedule.       History of Present Illness     Osmin Hook is a 58 yo M with PMH of HTN, HLD, GERD who presents today for preoperative exam. Patient has history of surgery under general anesthesia without complications. No history of perioperative bleeding/infectious complications. He is able to complete 4+ METS of activity without recent angina or RUEDA. He is on daily baby aspirin.    Pre-op Exam  Surgery: REPAIR ROTATOR CUFF  ARTHROSCOPIC- Right shoulder arthroscopic revision repair with possible collagen implant augmentation  Anticipated Date of Surgery: 1/2/2025  Surgeon: Tommie Baird, DO    Previous history of bleeding disorders or clots?: No  Previous Anesthesia reaction?: No  Prolonged steroid use in the last 6 months?: No    Assessment of Cardiac Risk:   - Unstable or severe angina or MI in the last 6 weeks or history of stent placement in the last year?: No   - Decompensated heart failure (e.g. New onset heart failure, NYHA  Class IV heart failure, or worsening existing heart failure)?: No  - Significant arrhythmias such as high grade AV block, symptomatic ventricular arrhythmia, newly recognized ventricular tachycardia, supraventricular tachycardia with resting heart rate >100, or symptomatic bradycardia?: No  - Severe heart valve disease including aortic stenosis or symptomatic mitral stenosis?: No      Pre-operative Risk Factors:  Elevated-risk surgery: No    History of cerebrovascular disease: No    History of ischemic heart disease: No  Pre-operative treatment with insulin: No  Pre-operative creatinine >2 mg/dL: No    History of congestive heart failure: No    Duke Activity Status Index (DASI):   DASI Total Score: 32.2  METs: 6.7    Medications of Perioperative Concern:   Anti-platelet (aspirin, clopidogrel, ticagrelor, prasugrel, cilostazol, dipyridamole), Calcium channel blockers, Beta blockers and ACE  inhibitors/ARBs    Review of Systems   Constitutional:  Negative for appetite change, chills and fever.   HENT:  Negative for congestion and sore throat.    Eyes:  Negative for visual disturbance.   Respiratory:  Negative for cough and shortness of breath.    Cardiovascular:  Negative for chest pain and leg swelling.   Gastrointestinal:  Negative for abdominal pain, constipation, diarrhea and nausea.   Genitourinary:  Negative for difficulty urinating and dysuria.   Musculoskeletal:  Positive for arthralgias. Negative for myalgias.        R shoulder and bilateral knee pain   Skin:  Negative for rash.   Neurological:  Negative for dizziness, light-headedness and headaches.   Psychiatric/Behavioral:  Negative for confusion.      Past Medical History   Past Medical History:   Diagnosis Date    Arthritis 2017    Colon polyp     GERD (gastroesophageal reflux disease)     Hypercholesterolemia     Hypertension     Lyme disease 03/19/2019    Obesity     Sexual dysfunction     Psychosexual dysfunction with inhibited sexual excitement      Past Surgical History:   Procedure Laterality Date    BACK SURGERY      spinal fusion, LUMBAR    CARDIAC CATHETERIZATION      CARDIAC CATHETERIZATION      CARDIAC CATHETERIZATION      COLONOSCOPY  2019    EGD AND COLONOSCOPY N/A 06/15/2018    Procedure: EGD AND COLONOSCOPY;  Surgeon: Jaciel Johnson MD;  Location: [facility];  Service: Gastroenterology    NJ COLONOSCOPY FLX DX W/COLLJ SPEC WHEN PFRMD N/A 03/26/2019    Procedure: COLONOSCOPY;  Surgeon: Kvng Cerna MD;  Location: [facility];  Service: Gastroenterology    NJ ESOPHAGOGASTRODUODENOSCOPY TRANSORAL DIAGNOSTIC N/A 03/26/2019    Procedure: ESOPHAGOGASTRODUODENOSCOPY (EGD);  Surgeon: Kvng Cerna MD;  Location: [facility];  Service: Gastroenterology    NJ SURGICAL ARTHROSCOPY SHOULDER W/ROTATOR CUFF RPR Right 04/11/2024    Procedure: RIGHT SHOULDER  ARTHROSCOPIC,SUPRASPINATUS, INFRASPINATUS, SUBSCAPULARIS REPAIR,BICEPS  TENOTOMY, ACROMIOPLASTY, EXTENSIVE DEBRIDEMENT;  Surgeon: Tommie Baird DO;  Location: [facility];  Service: Orthopedics    SEPTOPLASTY      SHOULDER SURGERY Right 2024    SPINE SURGERY  2016    UPPER GASTROINTESTINAL ENDOSCOPY  2019     Family History   Problem Relation Age of Onset    Atrial fibrillation Mother     COPD Mother     Coronary artery disease Mother     Lung cancer Mother     Rheum arthritis Mother     Stroke Mother         Syndrome    Arthritis Mother     Cancer Mother     Hypertension Mother     Lung cancer Father     Cancer Father     Prostate cancer Maternal Grandfather     Heart defect Daughter     Arthritis Family      Social History     Tobacco Use    Smoking status: Former     Current packs/day: 0.00     Average packs/day: 0.3 packs/day for 5.8 years (1.4 ttl pk-yrs)     Types: Cigarettes     Start date: 1984     Quit date: 10/20/1989     Years since quittin.2    Smokeless tobacco: Current     Types: Chew     Last attempt to quit: 2019   Vaping Use    Vaping status: Never Used   Substance and Sexual Activity    Alcohol use: Yes     Alcohol/week: 4.0 standard drinks of alcohol     Types: 4 Standard drinks or equivalent per week     Comment: occassional    Drug use: Yes     Frequency: 7.0 times per week     Types: Marijuana     Comment: medical    Sexual activity: Yes     Partners: Female     Current Outpatient Medications on File Prior to Visit   Medication Sig    amLODIPine (NORVASC) 10 mg tablet TAKE 1 TABLET BY MOUTH EVERY DAY    aspirin 81 mg chewable tablet Chew 81 mg daily    metoprolol succinate (TOPROL-XL) 50 mg 24 hr tablet TAKE 1 TABLET BY MOUTH EVERY DAY    NON FORMULARY [facility] as needed    olmesartan (BENICAR) 40 mg tablet TAKE 1 TABLET BY MOUTH EVERY DAY    pantoprazole (PROTONIX) 40 mg tablet Take 1 tablet (40 mg total) by mouth daily    rosuvastatin (CRESTOR) 5 mg tablet TAKE 1 TABLET BY MOUTH EVERY DAY     No Known Allergies  Objective   BP  "140/78 (BP Location: Left arm, Patient Position: Sitting, Cuff Size: Large)   Pulse 68   Temp 98.1 °F (36.7 °C) (Tympanic)   Resp 18   Ht 5' 6\" (1.676 m)   Wt (!) 145 kg (320 lb 9.6 oz)   SpO2 98%   BMI 51.75 kg/m²     Physical Exam  Constitutional:       General: He is not in acute distress.  HENT:      Right Ear: Tympanic membrane, ear canal and external ear normal.      Left Ear: Tympanic membrane, ear canal and external ear normal.      Nose: Nose normal.      Mouth/Throat:      Mouth: Mucous membranes are moist.      Pharynx: Oropharynx is clear.   Eyes:      Conjunctiva/sclera: Conjunctivae normal.      Pupils: Pupils are equal, round, and reactive to light.   Neck:      Thyroid: No thyroid mass or thyroid tenderness.   Cardiovascular:      Rate and Rhythm: Normal rate and regular rhythm.      Heart sounds: Normal heart sounds.   Pulmonary:      Effort: Pulmonary effort is normal.      Breath sounds: Normal breath sounds.   Abdominal:      General: There is no distension.      Tenderness: There is no abdominal tenderness.   Musculoskeletal:      Cervical back: Neck supple.      Right lower le+ Pitting Edema present.      Left lower le+ Pitting Edema present.   Skin:     General: Skin is warm and dry.   Neurological:      Mental Status: He is alert.      Sensory: Sensation is intact.      Motor: Motor function is intact.   Psychiatric:         Mood and Affect: Mood normal.         Speech: Speech normal.         Behavior: Behavior normal. Behavior is cooperative.           Atilio Malone MD  "

## 2024-12-27 NOTE — ASSESSMENT & PLAN NOTE
BP well controlled. Hold olmesartan morning of surgery. Patient usually takes at night, so can continue his nighttime dose the evening prior to surgery. Can take amlodipine through surgery.

## 2024-12-27 NOTE — ASSESSMENT & PLAN NOTE
Had surgery for this in January. Is going for repeat surgery.    Orders:    Ambulatory referral to Family Practice

## 2024-12-27 NOTE — PATIENT INSTRUCTIONS
Please do not take olmesartan the morning of surgery. Please do not take aspirin within 7 days of surgery.

## 2024-12-27 NOTE — PRE-PROCEDURE INSTRUCTIONS
Pre-Surgery Instructions:   Medication Instructions    amLODIPine (NORVASC) 10 mg tablet Take night before surgery    aspirin 81 mg chewable tablet Stop taking 7 days prior to surgery.-if ok with PCP    metoprolol succinate (TOPROL-XL) 50 mg 24 hr tablet Take night before surgery    NON FORMULARY Uses PRN- DO NOT take day of surgery    olmesartan (BENICAR) 40 mg tablet Take night before surgery    pantoprazole (PROTONIX) 40 mg tablet Take night before surgery    rosuvastatin (CRESTOR) 5 mg tablet Take night before surgery      Medication instructions for day surgery reviewed. Please use only a sip of water to take your instructed medications. Avoid all over the counter vitamins, supplements and NSAIDS for one week prior to surgery per anesthesia guidelines. Tylenol is ok to take as needed.     You will receive a call one business day prior to surgery with an arrival time and hospital directions. If your surgery is scheduled on a Monday, the hospital will be calling you on the Friday prior to your surgery. If you have not heard from anyone by 8pm, please call the hospital supervisor through the hospital  at [phone]. ([place] [phone] or [place] [phone]).    Do not eat or drink anything after midnight the night before your surgery, including candy, mints, lifesavers, or chewing gum. Do not drink alcohol 24hrs before your surgery. Try not to smoke at least 24hrs before your surgery.       Follow the pre surgery showering instructions as listed in the “My Surgical Experience Booklet” or otherwise provided by your surgeon's office. Do not use a blade to shave the surgical area 1 week before surgery. It is okay to use a clean electric clippers up to 24 hours before surgery. Do not apply any lotions, creams, including makeup, cologne, deodorant, or perfumes after showering on the day of your surgery. Do not use dry shampoo, hair spray, hair gel, or any type of hair products.     No contact lenses,  eye make-up, or artificial eyelashes. Remove nail polish, including gel polish, and any artificial, gel, or acrylic nails if possible. Remove all jewelry including rings and body piercing jewelry.     Wear causal clothing that is easy to take on and off. Consider your type of surgery.    Keep any valuables, jewelry, piercings at home. Please bring any specially ordered equipment (sling, braces) if indicated.    Arrange for a responsible person to drive you to and from the hospital on the day of your surgery. Please confirm the visitor policy for the day of your procedure when you receive your phone call with an arrival time.     Call the surgeon's office with any new illnesses, exposures, or additional questions prior to surgery.    Please reference your “My Surgical Experience Booklet” for additional information to prepare for your upcoming surgery.   3

## 2024-12-30 ENCOUNTER — TELEPHONE (OUTPATIENT)
Dept: OBGYN CLINIC | Facility: CLINIC | Age: 59
End: 2024-12-30

## 2024-12-30 ENCOUNTER — APPOINTMENT (OUTPATIENT)
Dept: PHYSICAL THERAPY | Facility: CLINIC | Age: 59
End: 2024-12-30
Payer: OTHER MISCELLANEOUS

## 2024-12-30 NOTE — TELEPHONE ENCOUNTER
Called pt because the Auth Team is having trouble getting a hold of his WC . I asked pt to confirm his  information. The  is Ron Adler at [phone]. His email is [email or web address]. The patient said if Ron is out of the office, there is usually the name of someone that covers for him on his voicemail. Customer Service is [phone].     I sent this information to Marge with the Auth Team.

## 2025-01-02 ENCOUNTER — HOSPITAL ENCOUNTER (OUTPATIENT)
Facility: HOSPITAL | Age: 60
Setting detail: OUTPATIENT SURGERY
Discharge: HOME | End: 2025-01-02
Attending: ORTHOPAEDIC SURGERY | Admitting: ORTHOPAEDIC SURGERY
Payer: OTHER MISCELLANEOUS

## 2025-01-02 ENCOUNTER — ANESTHESIA EVENT (OUTPATIENT)
Dept: PERIOP | Facility: HOSPITAL | Age: 60
End: 2025-01-02
Payer: OTHER MISCELLANEOUS

## 2025-01-02 ENCOUNTER — ANESTHESIA (OUTPATIENT)
Dept: PERIOP | Facility: HOSPITAL | Age: 60
End: 2025-01-02
Payer: OTHER MISCELLANEOUS

## 2025-01-02 VITALS
OXYGEN SATURATION: 94 % | SYSTOLIC BLOOD PRESSURE: 125 MMHG | RESPIRATION RATE: 23 BRPM | DIASTOLIC BLOOD PRESSURE: 69 MMHG | WEIGHT: 315 LBS | BODY MASS INDEX: 50.62 KG/M2 | TEMPERATURE: 97 F | HEIGHT: 66 IN | HEART RATE: 85 BPM

## 2025-01-02 DIAGNOSIS — Z98.890 S/P ARTHROSCOPY OF RIGHT SHOULDER: Primary | ICD-10-CM

## 2025-01-02 PROCEDURE — C1713 ANCHOR/SCREW BN/BN,TIS/BN: HCPCS | Performed by: ORTHOPAEDIC SURGERY

## 2025-01-02 PROCEDURE — 29827 SHO ARTHRS SRG RT8TR CUF RPR: CPT | Performed by: PHYSICIAN ASSISTANT

## 2025-01-02 PROCEDURE — 29827 SHO ARTHRS SRG RT8TR CUF RPR: CPT | Performed by: ORTHOPAEDIC SURGERY

## 2025-01-02 DEVICE — BONE ANCHORS 3 WITH ARTHROSCOPIC DELIVERY SYSTEM ADVANCED
Type: IMPLANTABLE DEVICE | Site: SHOULDER | Status: FUNCTIONAL
Brand: BONE ANCHORS WITH ARTHROSCOPIC DELIVERY SYSTEM - ADVANCED

## 2025-01-02 DEVICE — SP FIBERTAK RC, DBLOAD TAPE BL/W, BLK/W
Type: IMPLANTABLE DEVICE | Site: SHOULDER | Status: FUNCTIONAL
Brand: ARTHREX®

## 2025-01-02 DEVICE — IMPLANTABLE DEVICE
Type: IMPLANTABLE DEVICE | Site: SHOULDER | Status: FUNCTIONAL
Brand: ROTATION MEDICAL TENDON STAPLES

## 2025-01-02 DEVICE — SWIVELOCK, SP BC KL 4.75MM
Type: IMPLANTABLE DEVICE | Site: SHOULDER | Status: FUNCTIONAL
Brand: ARTHREX®

## 2025-01-02 DEVICE — IMPLANTABLE DEVICE
Type: IMPLANTABLE DEVICE | Site: SHOULDER | Status: FUNCTIONAL
Brand: BIOINDUCTIVE IMPLANT WITH ARTHROSCOPIC DELIVERY SYSTEM - LARGE

## 2025-01-02 RX ORDER — FENTANYL CITRATE/PF 50 MCG/ML
50 SYRINGE (ML) INJECTION
Refills: 0 | Status: DISCONTINUED | OUTPATIENT
Start: 2025-01-02 | End: 2025-01-02 | Stop reason: HOSPADM

## 2025-01-02 RX ORDER — ASPIRIN 81 MG/1
81 TABLET ORAL 2 TIMES DAILY
Qty: 28 TABLET | Refills: 0 | Status: SHIPPED | OUTPATIENT
Start: 2025-01-02

## 2025-01-02 RX ORDER — PROMETHAZINE HYDROCHLORIDE 25 MG/ML
12.5 INJECTION, SOLUTION INTRAMUSCULAR; INTRAVENOUS ONCE AS NEEDED
Status: DISCONTINUED | OUTPATIENT
Start: 2025-01-02 | End: 2025-01-02 | Stop reason: HOSPADM

## 2025-01-02 RX ORDER — FENTANYL CITRATE 50 UG/ML
INJECTION, SOLUTION INTRAMUSCULAR; INTRAVENOUS AS NEEDED
Status: DISCONTINUED | OUTPATIENT
Start: 2025-01-02 | End: 2025-01-02

## 2025-01-02 RX ORDER — LIDOCAINE HYDROCHLORIDE 20 MG/ML
INJECTION, SOLUTION EPIDURAL; INFILTRATION; INTRACAUDAL; PERINEURAL AS NEEDED
Status: DISCONTINUED | OUTPATIENT
Start: 2025-01-02 | End: 2025-01-02

## 2025-01-02 RX ORDER — EPHEDRINE SULFATE 50 MG/ML
INJECTION INTRAVENOUS AS NEEDED
Status: DISCONTINUED | OUTPATIENT
Start: 2025-01-02 | End: 2025-01-02

## 2025-01-02 RX ORDER — MIDAZOLAM HYDROCHLORIDE 2 MG/2ML
INJECTION, SOLUTION INTRAMUSCULAR; INTRAVENOUS AS NEEDED
Status: DISCONTINUED | OUTPATIENT
Start: 2025-01-02 | End: 2025-01-02

## 2025-01-02 RX ORDER — CHLORHEXIDINE GLUCONATE 40 MG/ML
SOLUTION TOPICAL DAILY PRN
Status: DISCONTINUED | OUTPATIENT
Start: 2025-01-02 | End: 2025-01-02 | Stop reason: HOSPADM

## 2025-01-02 RX ORDER — SODIUM CHLORIDE, SODIUM LACTATE, POTASSIUM CHLORIDE, CALCIUM CHLORIDE 600; 310; 30; 20 MG/100ML; MG/100ML; MG/100ML; MG/100ML
INJECTION, SOLUTION INTRAVENOUS CONTINUOUS PRN
Status: DISCONTINUED | OUTPATIENT
Start: 2025-01-02 | End: 2025-01-02

## 2025-01-02 RX ORDER — BUPIVACAINE HYDROCHLORIDE 5 MG/ML
INJECTION, SOLUTION EPIDURAL; INTRACAUDAL
Status: COMPLETED | OUTPATIENT
Start: 2025-01-02 | End: 2025-01-02

## 2025-01-02 RX ORDER — OXYCODONE AND ACETAMINOPHEN 5; 325 MG/1; MG/1
1 TABLET ORAL ONCE
Refills: 0 | Status: CANCELLED | OUTPATIENT
Start: 2025-01-02 | End: 2025-01-02

## 2025-01-02 RX ORDER — IPRATROPIUM BROMIDE AND ALBUTEROL SULFATE 2.5; .5 MG/3ML; MG/3ML
3 SOLUTION RESPIRATORY (INHALATION)
Status: DISCONTINUED | OUTPATIENT
Start: 2025-01-02 | End: 2025-01-02 | Stop reason: HOSPADM

## 2025-01-02 RX ORDER — HYDROMORPHONE HCL/PF 1 MG/ML
0.5 SYRINGE (ML) INJECTION
Status: DISCONTINUED | OUTPATIENT
Start: 2025-01-02 | End: 2025-01-02 | Stop reason: HOSPADM

## 2025-01-02 RX ORDER — IBUPROFEN 800 MG/1
TABLET, FILM COATED ORAL
Qty: 30 TABLET | Refills: 0 | Status: SHIPPED | OUTPATIENT
Start: 2025-01-02

## 2025-01-02 RX ORDER — CHLORHEXIDINE GLUCONATE ORAL RINSE 1.2 MG/ML
15 SOLUTION DENTAL ONCE
Status: COMPLETED | OUTPATIENT
Start: 2025-01-02 | End: 2025-01-02

## 2025-01-02 RX ORDER — DEXAMETHASONE SODIUM PHOSPHATE 10 MG/ML
INJECTION, SOLUTION INTRAMUSCULAR; INTRAVENOUS AS NEEDED
Status: DISCONTINUED | OUTPATIENT
Start: 2025-01-02 | End: 2025-01-02

## 2025-01-02 RX ORDER — SODIUM CHLORIDE, SODIUM LACTATE, POTASSIUM CHLORIDE, AND CALCIUM CHLORIDE .6; .31; .03; .02 G/100ML; G/100ML; G/100ML; G/100ML
IRRIGANT IRRIGATION AS NEEDED
Status: DISCONTINUED | OUTPATIENT
Start: 2025-01-02 | End: 2025-01-02 | Stop reason: HOSPADM

## 2025-01-02 RX ORDER — PHENYLEPHRINE HCL IN 0.9% NACL 1 MG/10 ML
SYRINGE (ML) INTRAVENOUS AS NEEDED
Status: DISCONTINUED | OUTPATIENT
Start: 2025-01-02 | End: 2025-01-02

## 2025-01-02 RX ORDER — PROPOFOL 10 MG/ML
INJECTION, EMULSION INTRAVENOUS AS NEEDED
Status: DISCONTINUED | OUTPATIENT
Start: 2025-01-02 | End: 2025-01-02

## 2025-01-02 RX ORDER — OXYCODONE AND ACETAMINOPHEN 5; 325 MG/1; MG/1
1 TABLET ORAL EVERY 4 HOURS PRN
Qty: 15 TABLET | Refills: 0 | Status: SHIPPED | OUTPATIENT
Start: 2025-01-02

## 2025-01-02 RX ORDER — ROCURONIUM BROMIDE 10 MG/ML
INJECTION, SOLUTION INTRAVENOUS AS NEEDED
Status: DISCONTINUED | OUTPATIENT
Start: 2025-01-02 | End: 2025-01-02

## 2025-01-02 RX ORDER — DIPHENHYDRAMINE HYDROCHLORIDE 50 MG/ML
12.5 INJECTION INTRAMUSCULAR; INTRAVENOUS ONCE AS NEEDED
Status: DISCONTINUED | OUTPATIENT
Start: 2025-01-02 | End: 2025-01-02 | Stop reason: HOSPADM

## 2025-01-02 RX ORDER — KETOROLAC TROMETHAMINE 30 MG/ML
15 INJECTION, SOLUTION INTRAMUSCULAR; INTRAVENOUS ONCE
Status: COMPLETED | OUTPATIENT
Start: 2025-01-02 | End: 2025-01-02

## 2025-01-02 RX ADMIN — MIDAZOLAM HYDROCHLORIDE 1 MG: 1 INJECTION, SOLUTION INTRAMUSCULAR; INTRAVENOUS at 10:43

## 2025-01-02 RX ADMIN — SODIUM CHLORIDE, SODIUM LACTATE, POTASSIUM CHLORIDE, AND CALCIUM CHLORIDE: .6; .31; .03; .02 INJECTION, SOLUTION INTRAVENOUS at 11:55

## 2025-01-02 RX ADMIN — IPRATROPIUM BROMIDE AND ALBUTEROL SULFATE 3 ML: 2.5; .5 SOLUTION RESPIRATORY (INHALATION) at 13:49

## 2025-01-02 RX ADMIN — CHLORHEXIDINE GLUCONATE 0.12% ORAL RINSE 15 ML: 1.2 LIQUID ORAL at 08:04

## 2025-01-02 RX ADMIN — BUPIVACAINE HYDROCHLORIDE 5 ML: 5 INJECTION, SOLUTION EPIDURAL; INTRACAUDAL at 09:10

## 2025-01-02 RX ADMIN — KETOROLAC TROMETHAMINE 15 MG: 30 INJECTION, SOLUTION INTRAMUSCULAR at 13:31

## 2025-01-02 RX ADMIN — BUPIVACAINE 20 ML: 13.3 INJECTION, SUSPENSION, LIPOSOMAL INFILTRATION at 09:10

## 2025-01-02 RX ADMIN — EPHEDRINE SULFATE 5 MG: 50 INJECTION, SOLUTION INTRAVENOUS at 11:40

## 2025-01-02 RX ADMIN — SODIUM CHLORIDE, SODIUM LACTATE, POTASSIUM CHLORIDE, AND CALCIUM CHLORIDE: .6; .31; .03; .02 INJECTION, SOLUTION INTRAVENOUS at 10:35

## 2025-01-02 RX ADMIN — CEFAZOLIN 3000 MG: 1 INJECTION, POWDER, FOR SOLUTION INTRAMUSCULAR; INTRAVENOUS at 11:02

## 2025-01-02 RX ADMIN — SUGAMMADEX 300 MG: 100 INJECTION, SOLUTION INTRAVENOUS at 13:02

## 2025-01-02 RX ADMIN — ROCURONIUM BROMIDE 50 MG: 10 INJECTION, SOLUTION INTRAVENOUS at 10:53

## 2025-01-02 RX ADMIN — FENTANYL CITRATE 50 MCG: 50 INJECTION, SOLUTION INTRAMUSCULAR; INTRAVENOUS at 09:05

## 2025-01-02 RX ADMIN — Medication 100 MCG: at 11:12

## 2025-01-02 RX ADMIN — FENTANYL CITRATE 50 MCG: 50 INJECTION, SOLUTION INTRAMUSCULAR; INTRAVENOUS at 10:51

## 2025-01-02 RX ADMIN — LIDOCAINE HYDROCHLORIDE 100 MG: 20 INJECTION, SOLUTION EPIDURAL; INFILTRATION; INTRACAUDAL; PERINEURAL at 10:51

## 2025-01-02 RX ADMIN — EPHEDRINE SULFATE 5 MG: 50 INJECTION, SOLUTION INTRAVENOUS at 11:35

## 2025-01-02 RX ADMIN — Medication 100 MCG: at 11:53

## 2025-01-02 RX ADMIN — Medication 100 MCG: at 11:48

## 2025-01-02 RX ADMIN — DEXAMETHASONE SODIUM PHOSPHATE 10 MG: 10 INJECTION, SOLUTION INTRAMUSCULAR; INTRAVENOUS at 10:53

## 2025-01-02 RX ADMIN — Medication 100 MCG: at 11:43

## 2025-01-02 RX ADMIN — MIDAZOLAM HYDROCHLORIDE 1 MG: 1 INJECTION, SOLUTION INTRAMUSCULAR; INTRAVENOUS at 09:05

## 2025-01-02 RX ADMIN — EPHEDRINE SULFATE 5 MG: 50 INJECTION, SOLUTION INTRAVENOUS at 11:29

## 2025-01-02 RX ADMIN — PROPOFOL 200 MG: 10 INJECTION, EMULSION INTRAVENOUS at 10:52

## 2025-01-02 NOTE — ANESTHESIA POSTPROCEDURE EVALUATION
Post-Op Assessment Note    CV Status:  Stable  Pain Score: 1    Pain management: adequate    Multimodal analgesia used between 6 hours prior to anesthesia start to PACU discharge    Mental Status:  Alert and awake   Hydration Status:  Euvolemic   PONV Controlled:  Controlled   Airway Patency:  Patent  Two or more mitigation strategies used for obstructive sleep apnea   Post Op Vitals Reviewed: Yes    No anethesia notable event occurred.    Staff: CRNA           Last Filed PACU Vitals:  Vitals Value Taken Time   Temp     Pulse 79 01/02/25 1321   /96 01/02/25 1321   Resp 19 01/02/25 1321   SpO2 96 % 01/02/25 1321   Vitals shown include unfiled device data.

## 2025-01-02 NOTE — ANESTHESIA PREPROCEDURE EVALUATION
Procedure:  REPAIR ROTATOR CUFF  ARTHROSCOPIC- Right shoulder arthroscopic revision repair with possible collagen implant augmentation (Right: Shoulder)    Relevant Problems   CARDIO   (+) Hypercholesterolemia   (+) Hypertension      GI/HEPATIC   (+) Gastroesophageal reflux disease without esophagitis      MUSCULOSKELETAL   (+) Cervical spondylosis   (+) Chronic bilateral low back pain without sciatica      NEURO/PSYCH   (+) Chronic bilateral low back pain without sciatica        Physical Exam    Airway    Mallampati score: III  TM Distance: >3 FB  Neck ROM: full     Dental   No notable dental hx     Cardiovascular  Cardiovascular exam normal    Pulmonary  Pulmonary exam normal     Other Findings        Anesthesia Plan  ASA Score- 3     Anesthesia Type- general with ASA Monitors.         Additional Monitors:     Airway Plan: ETT.    Comment: States last time block worked well but had some dyspnea while it was in effect. Counseled that this may happen again. Patient also reportedly cannot take pain pills because of lyme disease he had in the past so we feel benefits of block > risks.       Plan Factors-Exercise tolerance (METS): >4 METS.    Chart reviewed. EKG reviewed.  Existing labs reviewed. Patient summary reviewed.    Patient is not a current smoker.              Induction- intravenous.    Postoperative Plan- Plan for postoperative opioid use.         Informed Consent- Anesthetic plan and risks discussed with patient.  I personally reviewed this patient with the CRNA. Discussed and agreed on the Anesthesia Plan with the CRNA..

## 2025-01-02 NOTE — ANESTHESIA PROCEDURE NOTES
Peripheral Block    Patient location during procedure: holding area  Start time: 1/2/2025 9:10 AM  Reason for block: at surgeon's request and post-op pain management  Staffing  Performed by: Venkat Fernandez MD  Authorized by: Venkat Fernandez MD    Preanesthetic Checklist  Completed: patient identified, IV checked, site marked, risks and benefits discussed, surgical consent, monitors and equipment checked, pre-op evaluation and timeout performed  Peripheral Block  Patient position: supine  Prep: ChloraPrep  Patient monitoring: heart rate, continuous pulse ox and frequent blood pressure checks  Block type: Interscalene  Laterality: right  Injection technique: single-shot  Procedures: ultrasound guided, Ultrasound guidance required for the procedure to increase accuracy and safety of medication placement and decrease risk of complications.  Ultrasound permanent image saved  bupivacaine liposomal (EXPAREL) 1.3 % injection 20 mL - Perineural   20 mL - 1/2/2025 9:10:00 AM  bupivacaine (PF) (MARCAINE) 0.5 % injection 20 mL - Perineural   5 mL - 1/2/2025 9:10:00 AM  Needle  Needle type: Stimuplex   Needle gauge: 22 G  Needle length: 4 in  Needle localization: ultrasound guidance  Assessment  Injection assessment: incremental injection, local visualized surrounding nerve on ultrasound, negative aspiration for heme, no paresthesia on injection, frequent aspiration, needle tip visualized at all times, negative aspiration, no symptoms of intraneural/intravenous injection, negative for heart rate change and injected with ease  Paresthesia pain: none  Post-procedure:  site cleaned  patient tolerated the procedure well with no immediate complications

## 2025-01-02 NOTE — DISCHARGE INSTR - AVS FIRST PAGE
Shoulder Surgery: Postoperative Instructions  Dr. Tommie Baird  Diet    You may resume your regular diet as soon as possible  Medication    Take the pain medication as prescribed   Take pain medication with food   While taking pain medications, you may NOT operate a vehicle, heavy machinery, or appliances   While taking pain medication, you may NOT drink alcoholic beverages   If you have any reactions to your medications, stop taking them and call my office   Please keep in mind that constipation is a very common side effect of taking narcotic pain medication. Take precautions to prevent constipation:  o Drink plenty of water (6-8 glasses of 8 oz. a day)  o Avoid alcohol, caffeine, and dairy products  o Eat plenty of fiber (fruits, vegetables, and whole grains)  o Take an over the counter stool softener (Colace or Dulcolax)  o Patients that have had upper extremity surgery should take frequent walks  Activity    Minimize activity the day of surgery    DO NOT USE HEAT    Please keep ice applied to the shoulder for at least the first 72 hours or as long as pain or swelling persists. Do not apply ice directly to skin, or allow water to leak on your dressing.   Place a pillow behind your elbow while lying down or sleeping. Sleeping in a more upright position (recliner) may be more comfortable initially. You should NOT roll onto the operative shoulder.   You are in an immobilizer or sling and it should remain on at all times except when showering until your first postoperative visit   Open and close your hand 10 times every day for about 1 minute. You may also flex and extend your elbow each day when your sling is removed for showering. Be sure to keep your elbow at your side.   DO NOT actively (on your own) lift your operative arm away from the side of your body or rotate it out away from your body.    DO NOT use exercise equipment unless otherwise instructed.  Sling/ Immobilizer    Use the sling/immobilizer at  all times and while sleeping until your next office appointment.  Showering    You may shower 3 days after surgery unless told otherwise. DO NOT immerse the shoulder under water and DO NOT rub the incision. Place new Band-Aids over the sutures after showering.    You may sponge bathe for the first 72 hours, taking care to keep the dressing clean and dry.  Dressing Care    It is normal to get some bloody wound seepage through the bandage. DO NOT BE ALARMED.    If the dressing gets soaked with wound seepage, place more gauze over the dressing and secure with tape. If this soaks through remove the entire dressing and replace with STERILE gauze and tape    Remove all dressings at 72 hours after surgery. If there is still some wound seepage, apply a fresh STERILE gauze over the incisions and secure with tape.    DO NOT TOUCH OR REMOVE THE SUTURES!!  Emergency/Follow-Up   Please notify my office at [phone] if you develop any fever (101 deg or above), unexpected warmth, redness or swelling. Please call if your fingers become cold, purple, numb, or there is excessive bleeding.   Please call the office within 24 hours at [phone] to schedule a follow up appt within 7-10 days from surgery.

## 2025-01-02 NOTE — INTERVAL H&P NOTE
H&P reviewed. After examining the patient I find no changes in the patients condition since the H&P had been written. Patient was seen and evaluated in the office where continued nonoperative vs surgical management of Right shoulder rotator cuff re-tear was discussed. In light of patients persistent pain, MRI evidence of re-tear of rotator cuff, pain with function, patient would like to move forward with surgical intervention. Risks of surgical intervention discussed in the office with patient and detailed consent obtained. Patient will go to OR on 01/02/2025 with Dr Baird for Right shoulder arthroscopic revision rotator cuff repair, possible bioinductive implant augmentation.     14 point ROS in office   Musculoskeletal Right shoulder pain      Heart RRR  Lungs CTA BL  Abdomen Present nontender      Right shoulder  150 degrees forward flexion with pain  140 degrees ABD with pain  40 degrees ER  SI joint IR  4/5 forward flexion testing  4+/5 ER testing  4+/5 IR testing             Vitals:    01/02/25 0758   BP: 153/90   Pulse: 75   Resp: 16   Temp: 98 °F (36.7 °C)   SpO2: 97%

## 2025-01-02 NOTE — OP NOTE
OPERATIVE REPORT  PATIENT NAME: Osmin Hook    :  1965  MRN: [id number]  Pt Location: [facility] OR ROOM 05    SURGERY DATE: 2025    Surgeons and Role:     * Tommie Baird,  - Primary     * Kirsten Rodrigues PA-C - Assisting    Preop Diagnosis:  Complete tear of right rotator cuff, unspecified whether traumatic [M75.121]  Full-thickness retracted supraspinatus infraspinatus tendon tears    Post-Op Diagnosis Codes:     * Complete tear of right rotator cuff, unspecified whether traumatic [M75.121]  Full-thickness retracted supraspinatus tear extending into anterior spinatus tendon    Procedure(s):  Right - REPAIR ROTATOR CUFF  ARTHROSCOPIC- Right shoulder arthroscopic revision rotator cuff repair with collagen implant augmentation., debridement    Specimen(s):  * No specimens in log *    Estimated Blood Loss:   Minimal    Drains:  * No LDAs found *    Anesthesia Type:   General w/ Interscalene Block    Operative Indications:  Complete tear of right rotator cuff, unspecified whether traumatic [M75.121]  Recurrent full-thickness tear of supraspinatus and anterior spinatus tendons    Operative Findings:  Full-thickness retracted supraspinatus tear extending into anterior infraspinatus tendon      Complications:   None    Procedure and Technique:    Antibiotics: 3 g Ancef  Implants: Arthrex 5.5 mm double loaded fiber tack anchor x 2, 4.75 mm bio composite self punching swivel lock anchor x 2, [employer] Regeneten 30 x 25 mm bio inductive implant with 1 peek staple and 6 PDS tendon staples    The patient was seen in the preoperative holding area and the appropriate site of surgery was confirmed and the patient was marked. Upon bringing the patient back to the operating room he was placed supine on the operating room table and general anesthesia was provided. The patient was placed in the lateral decubitus position with the right side up. The patient was held in position with a beanbag reinforce with a seat  belt and tape. All bony prominences were appropriately padded and the brachial plexus was offloaded with the appropriate ramp pillow functioning as an axillary roll. An exam under anesthesia was performed and displayed near full passive range of motion without instability. The right upper extremity was prepped and draped in the usual sterile fashion and placed in 15 lbs of in-line traction. A preoperative time-out was performed to once again confirm the site of surgery and procedure.    A posterior arthroscopic viewing portal was made with an 11 blade. The arthroscopic camera was inserted. Upon entering the glenohumeral joint space an anterior portal was made under direct visualization with the aid of an 18 gauge spinal needle. An anterior cannula was inserted, followed by an arthroscopic probe, and a diagnostic arthroscopy was performed. Diagnostic arthroscopy revealed absent proximal bicep tendon secondary to previous tenotomy.  Degenerative fraying was noted of the superior and anterior labrum.  No full-thickness chondral defects were present.  Subscapularis tendon was intact status post previous repair.  Full-thickness retracted tearing was noted of supraspinatus tendon into anterior margin of infraspinatus tendon.  Healing was noted to most posterior aspect of repair.  Sutures were present at previously placed suture anchors. At this time an arthroscopic shaver used to debride the frayed edges of superior and anterior labrum.  Thickened anterior capsular tissue was appreciated.  Radiofrequency device was used to debride the rotator interval including anterior capsule to reveal coracoid process.  This was also used to free adhesions at the anterior margin of the supraspinatus.  Arthroscopic shaver was then used to abrade the undersurface of the supraspinatus tear.  Upon completion of arthroscopic work within the glenohumeral joint, the arthroscope was removed and subsequently placed into the subacromial  space.    A lateral portal was made 3 cm off of the lateral aspect of the acromion in line with the rotator cuff tear. The arthroscopic shaver was inserted and a bursectomy was performed. Arthroscopic radiofrequency ablation device was used to remove soft tissue off the undersurface of the acromion. At this time, the arthroscopic camera was placed in the lateral portal and the remainder of the bursectomy was performed posteriorly.  Significant partial improvement noted posteriorly and laterally.  Next, attention was turned to the rotator cuff tear.  An L-shaped tear was noted with retraction of the supraspinatus anterior medially.  Previous sutures remained intact and anterior anchor and appeared to have pulled out from prior tissue.  Meniscal biter was used to cut suture ends which were subsequently pulled out of anchors and arthroscopic grasper.  Posterior aspect of repair remained intact.  Radiofrequency device was used to further debride retracted supraspinatus tendon to free for more lateral mobilization.  Soft tissue was removed off the footprint on greater tuberosity. An arthroscopic shaver was used to lightly decorticate the rotator cuff footprint. The supraspinatus was properly released with radiofrequency device and an arthroscopic shaver and mobilized to the appropriate point on the rotator cuff footprint without over tensioning. Next, an accessory portal was made off of the lateral acromion for placement of anchors.  Radiofrequency device was used to medialize the repair footprint approximately 5 mm removing 3 mm superior humeral head articular cartilage.  This was followed by arthroscopic shaver was used to lightly decorticate this area.  A 2.6 mm double loaded fiber tack anchor was inserted in the anterior aspect of the medial row fixation.  This process was repeated with a second anchor inserted in the posterior aspect of the medial row. At this time sutures were passed through the rotator cuff tendon  near the musculotendinous junction from posterior to anterior in horizontal mattress fashion.  The most posterior suture was passed to the infraspinatus with the corresponding suture passed through the apex of the supraspinatus tear.  Remaining sutures were passed through the supraspinatus tendon.  Sutures were subsequently tied in horizontal mattress fashion, noting proper reduction and tensioning of the medial row fixation.  Attention was then turned to lateral row.  1 suture from each knot was retrieved out of the lateral portal.  These were loaded into a self punching swivel lock anchor.  Sutures were appropriately tensioned and anchor was inserted in the anterior aspect of the lateral fixation.  This process was repeated with remaining sutures with a second swivel lock anchor used in the posterior aspect of the lateral row fixation.  Repair and tensioning of rotator cuff repair was noted through probing and internal and external rotation.  Due to the revision nature of the tear, to improve local biology, a bio inductive collagen implant was then inserted.  Upon proper insertion, PDS tendon staples were utilized to secure the implant to the tendon medially.  A peak bone anchor was utilized laterally.  Appropriate position of implant was secured and final arthroscopic pictures were taken.     Arthroscopic incisions were closed with 3-0 nylon suture. A sterile dressing was applied and the patient was placed in a sling and abduction pillow. The patient tolerated the procedure well and was transferred to the PACU without complication.  No qualified resident was available for this surgical case.        I was present for the entire procedure. A qualified resident physician was not available, and A physician assistant, Kirsten Rodrigues PA-C,  was required during the procedure for patient positioning, tissue handling, assistance with arthroscopic camera and equipment due to the minimally invasive nature of surgical case,  anchor insertion, and suturing.    Postoperatively the patient be nonweightbearing right upper extremity with sling and abduction pillow.  He is instructed take aspirin 81 mg twice daily for DVT prophylaxis for 2 weeks postoperatively.  Due to the size of his tear, retraction, tissue quality, and revision nature, we will hold off on formal physical therapy until 6 weeks postoperatively.  No active range of motion until 6 to 8 weeks postoperatively.  I will see him in the office as scheduled on 1/13/2025 for suture removal and reevaluation.    Patient Disposition:  PACU              SIGNATURE: Tommie Baird DO  DATE: January 2, 2025  TIME: 1:09 PM

## 2025-01-04 ENCOUNTER — APPOINTMENT (OUTPATIENT)
Age: 60
End: 2025-01-04
Payer: COMMERCIAL

## 2025-01-04 DIAGNOSIS — Z12.5 SCREENING FOR PROSTATE CANCER: ICD-10-CM

## 2025-01-04 DIAGNOSIS — E78.00 HYPERCHOLESTEROLEMIA: Chronic | ICD-10-CM

## 2025-01-04 DIAGNOSIS — M25.50 POLYARTHRALGIA: ICD-10-CM

## 2025-01-04 LAB
ALBUMIN SERPL BCG-MCNC: 4 G/DL (ref 3.5–5)
ALP SERPL-CCNC: 52 U/L (ref 34–104)
ALT SERPL W P-5'-P-CCNC: 21 U/L (ref 7–52)
ANION GAP SERPL CALCULATED.3IONS-SCNC: 7 MMOL/L (ref 4–13)
AST SERPL W P-5'-P-CCNC: 17 U/L (ref 13–39)
BASOPHILS # BLD AUTO: 0.04 THOUSANDS/ΜL (ref 0–0.1)
BASOPHILS NFR BLD AUTO: 1 % (ref 0–1)
BILIRUB SERPL-MCNC: 0.37 MG/DL (ref 0.2–1)
BUN SERPL-MCNC: 17 MG/DL (ref 5–25)
CALCIUM SERPL-MCNC: 8.7 MG/DL (ref 8.4–10.2)
CHLORIDE SERPL-SCNC: 104 MMOL/L (ref 96–108)
CHOLEST SERPL-MCNC: 130 MG/DL (ref ?–200)
CO2 SERPL-SCNC: 29 MMOL/L (ref 21–32)
CREAT SERPL-MCNC: 0.9 MG/DL (ref 0.6–1.3)
CRP SERPL QL: 5 MG/L
EOSINOPHIL # BLD AUTO: 0.14 THOUSAND/ΜL (ref 0–0.61)
EOSINOPHIL NFR BLD AUTO: 2 % (ref 0–6)
ERYTHROCYTE [DISTWIDTH] IN BLOOD BY AUTOMATED COUNT: 13.2 % (ref 11.6–15.1)
ERYTHROCYTE [SEDIMENTATION RATE] IN BLOOD: 27 MM/HOUR (ref 0–19)
GFR SERPL CREATININE-BSD FRML MDRD: 93 ML/MIN/1.73SQ M
GLUCOSE P FAST SERPL-MCNC: 94 MG/DL (ref 65–99)
HCT VFR BLD AUTO: 44.9 % (ref 36.5–49.3)
HDLC SERPL-MCNC: 45 MG/DL
HGB BLD-MCNC: 13.9 G/DL (ref 12–17)
IMM GRANULOCYTES # BLD AUTO: 0.04 THOUSAND/UL (ref 0–0.2)
IMM GRANULOCYTES NFR BLD AUTO: 1 % (ref 0–2)
LDLC SERPL CALC-MCNC: 67 MG/DL (ref 0–100)
LYMPHOCYTES # BLD AUTO: 2.47 THOUSANDS/ΜL (ref 0.6–4.47)
LYMPHOCYTES NFR BLD AUTO: 29 % (ref 14–44)
MCH RBC QN AUTO: 27.4 PG (ref 26.8–34.3)
MCHC RBC AUTO-ENTMCNC: 31 G/DL (ref 31.4–37.4)
MCV RBC AUTO: 88 FL (ref 82–98)
MONOCYTES # BLD AUTO: 0.56 THOUSAND/ΜL (ref 0.17–1.22)
MONOCYTES NFR BLD AUTO: 7 % (ref 4–12)
NEUTROPHILS # BLD AUTO: 5.34 THOUSANDS/ΜL (ref 1.85–7.62)
NEUTS SEG NFR BLD AUTO: 60 % (ref 43–75)
NRBC BLD AUTO-RTO: 0 /100 WBCS
PLATELET # BLD AUTO: 197 THOUSANDS/UL (ref 149–390)
PMV BLD AUTO: 9.3 FL (ref 8.9–12.7)
POTASSIUM SERPL-SCNC: 3.9 MMOL/L (ref 3.5–5.3)
PROT SERPL-MCNC: 6.7 G/DL (ref 6.4–8.4)
PSA SERPL-MCNC: 0.42 NG/ML (ref 0–4)
RBC # BLD AUTO: 5.08 MILLION/UL (ref 3.88–5.62)
SODIUM SERPL-SCNC: 140 MMOL/L (ref 135–147)
TRIGL SERPL-MCNC: 90 MG/DL (ref ?–150)
URATE SERPL-MCNC: 4.5 MG/DL (ref 3.5–8.5)
WBC # BLD AUTO: 8.59 THOUSAND/UL (ref 4.31–10.16)

## 2025-01-04 PROCEDURE — 80061 LIPID PANEL: CPT

## 2025-01-04 PROCEDURE — 84550 ASSAY OF BLOOD/URIC ACID: CPT

## 2025-01-04 PROCEDURE — 86235 NUCLEAR ANTIGEN ANTIBODY: CPT

## 2025-01-04 PROCEDURE — 86038 ANTINUCLEAR ANTIBODIES: CPT

## 2025-01-04 PROCEDURE — 80053 COMPREHEN METABOLIC PANEL: CPT

## 2025-01-04 PROCEDURE — 36415 COLL VENOUS BLD VENIPUNCTURE: CPT

## 2025-01-04 PROCEDURE — 85652 RBC SED RATE AUTOMATED: CPT

## 2025-01-04 PROCEDURE — 86225 DNA ANTIBODY NATIVE: CPT

## 2025-01-04 PROCEDURE — 86430 RHEUMATOID FACTOR TEST QUAL: CPT

## 2025-01-04 PROCEDURE — 86618 LYME DISEASE ANTIBODY: CPT

## 2025-01-04 PROCEDURE — 86200 CCP ANTIBODY: CPT

## 2025-01-04 PROCEDURE — G0103 PSA SCREENING: HCPCS

## 2025-01-04 PROCEDURE — 85025 COMPLETE CBC W/AUTO DIFF WBC: CPT

## 2025-01-04 PROCEDURE — 86140 C-REACTIVE PROTEIN: CPT

## 2025-01-04 PROCEDURE — 86617 LYME DISEASE ANTIBODY: CPT

## 2025-01-05 LAB
B BURGDOR IGG SERPL QL IA: NEGATIVE
B BURGDOR IGG+IGM SER QL IA: POSITIVE
B BURGDOR IGM SERPL QL IA: NEGATIVE
RHEUMATOID FACT SER QL LA: NEGATIVE

## 2025-01-06 LAB
CCP AB SER IA-ACNC: 1 (ref ?–10)
ENA SS-A AB SER IA-ACNC: <0.5 U/ML (ref ?–10)
ENA SS-B IGG SER IA-ACNC: <0.6 U/ML (ref ?–10)

## 2025-01-07 LAB
DSDNA IGG SERPL IA-ACNC: <0.9 IU/ML (ref ?–15)
NUCLEAR IGG SER IA-RTO: 0.2 RATIO (ref ?–1)

## 2025-01-09 ENCOUNTER — TELEPHONE (OUTPATIENT)
Dept: OBGYN CLINIC | Facility: CLINIC | Age: 60
End: 2025-01-09

## 2025-01-09 ENCOUNTER — TELEPHONE (OUTPATIENT)
Age: 60
End: 2025-01-09

## 2025-01-09 NOTE — TELEPHONE ENCOUNTER
Caller: Kristel from [place]    Doctor: Dr. Baird    Reason for call: Kristel  calling asking if OVN from 12/24/25 can be faxed to the number below.    Attn: Kristel  Fax: [phone]    Call back: [phone]

## 2025-01-13 ENCOUNTER — OFFICE VISIT (OUTPATIENT)
Dept: OBGYN CLINIC | Facility: CLINIC | Age: 60
End: 2025-01-13

## 2025-01-13 VITALS — HEIGHT: 66 IN | WEIGHT: 315 LBS | BODY MASS INDEX: 50.62 KG/M2

## 2025-01-13 DIAGNOSIS — Z98.890 S/P RIGHT ROTATOR CUFF REPAIR: Primary | ICD-10-CM

## 2025-01-13 DIAGNOSIS — Z48.89 AFTERCARE FOLLOWING SURGERY: ICD-10-CM

## 2025-01-13 PROCEDURE — 99024 POSTOP FOLLOW-UP VISIT: CPT | Performed by: ORTHOPAEDIC SURGERY

## 2025-01-13 NOTE — PROGRESS NOTES
Patient Name:  Osmin Hook  MRN:  [id number]    Assessment & Plan     1. S/P right rotator cuff repair  2. Aftercare following surgery      Approximately 11 day s/p Right shoulder arthroscopic revision rotator cuff repair with bioinductive collagen implant augmentation, extensive debridement performed on 01/02/2025  Overall, patient doing well s/p surgical intervention  Images reviewed and sutures removed without complications  Maintain nonweightbearing status Right upper extremity with sling intact at all times except for hygenic purposes and PT sessions.  No active range of motion of the shoulder at this time. Strongly recommended patient to maintain the neck strap throughout the day and when sleeping.   Patient will start outpatient PT 6 weeks post operatively. Script placed post operatively  Provided patient with home exercise sheet  Continue OTC medications   Follow up 4 weeks for reevaluation    History of the Present Illness   Osmin Hook is a 59 y.o. male approximately 11 day s/p Right shoulder arthroscopic revision rotator cuff repair with bioinductive collagen implant augmentation, extensive debridement performed on 01/02/2025. Today, patient reports he is feeling good post operatively. He has maintained the sling without complications. He takes the sling off his neck when he is sitting or sleeping.         Review of Systems     Review of Systems   Constitutional:  Negative for chills and fever.   HENT:  Negative for ear pain and sore throat.    Eyes:  Negative for pain and visual disturbance.   Respiratory:  Negative for cough and shortness of breath.    Cardiovascular:  Negative for chest pain and palpitations.   Gastrointestinal:  Negative for abdominal pain and vomiting.   Genitourinary:  Negative for dysuria and hematuria.   Musculoskeletal:  Negative for arthralgias and back pain.   Skin:  Negative for color change and rash.   Neurological:  Negative for seizures and syncope.   All other  "systems reviewed and are negative.      Physical Exam     Ht 5' 6\" (1.676 m)   Wt (!) 143 kg (315 lb)   BMI 50.84 kg/m²     Right Shoulder:   Surgical incisions well healing with nylon suture intact  Elbow ROM 5 to approximately 140 degrees  PROM shoulder forward flexion 40 degrees without pain  The patient is neurovascularly intact distally in the extremity.      Data Review     I have personally reviewed pertinent films in PACS, and my interpretation follows.    No new images     Social History     Tobacco Use    Smoking status: Former     Current packs/day: 0.00     Average packs/day: 0.3 packs/day for 5.8 years (1.4 ttl pk-yrs)     Types: Cigarettes     Start date: 1984     Quit date: 10/20/1989     Years since quittin.2    Smokeless tobacco: Current     Types: Chew     Last attempt to quit: 2019   Vaping Use    Vaping status: Never Used   Substance Use Topics    Alcohol use: Yes     Alcohol/week: 4.0 standard drinks of alcohol     Types: 4 Standard drinks or equivalent per week     Comment: occassional    Drug use: Yes     Frequency: 7.0 times per week     Types: Marijuana     Comment: medical           Procedures  None     Kirsten Rodrigues PA-C     "

## 2025-01-14 ENCOUNTER — OFFICE VISIT (OUTPATIENT)
Age: 60
End: 2025-01-14
Payer: COMMERCIAL

## 2025-01-14 VITALS
DIASTOLIC BLOOD PRESSURE: 70 MMHG | HEIGHT: 66 IN | OXYGEN SATURATION: 99 % | TEMPERATURE: 95.6 F | HEART RATE: 103 BPM | SYSTOLIC BLOOD PRESSURE: 130 MMHG | WEIGHT: 315 LBS | BODY MASS INDEX: 50.62 KG/M2

## 2025-01-14 DIAGNOSIS — E78.00 HYPERCHOLESTEROLEMIA: Chronic | ICD-10-CM

## 2025-01-14 DIAGNOSIS — I10 PRIMARY HYPERTENSION: Primary | Chronic | ICD-10-CM

## 2025-01-14 DIAGNOSIS — Z00.00 ANNUAL PHYSICAL EXAM: ICD-10-CM

## 2025-01-14 DIAGNOSIS — E66.01 MORBID OBESITY WITH BMI OF 50.0-59.9, ADULT (HCC): Chronic | ICD-10-CM

## 2025-01-14 PROBLEM — M76.829 POSTERIOR TIBIALIS MUSCLE DYSFUNCTION: Status: RESOLVED | Noted: 2021-01-22 | Resolved: 2025-01-14

## 2025-01-14 PROBLEM — M72.2 PLANTAR FASCIITIS, BILATERAL: Status: RESOLVED | Noted: 2021-01-29 | Resolved: 2025-01-14

## 2025-01-14 PROCEDURE — 99214 OFFICE O/P EST MOD 30 MIN: CPT | Performed by: INTERNAL MEDICINE

## 2025-01-14 PROCEDURE — 99396 PREV VISIT EST AGE 40-64: CPT | Performed by: INTERNAL MEDICINE

## 2025-01-14 NOTE — ASSESSMENT & PLAN NOTE
Cholesterol is well controlled. Continue statin therapy as prescribed.    Orders:  •  Basic metabolic panel; Future  •  CBC; Future  •  Lipid Panel with Direct LDL reflex; Future

## 2025-01-14 NOTE — PATIENT INSTRUCTIONS
IMPROVING YOUR HEALTH:    Exercise:  150 minutes of moderate intensity workout for overall general health  200-300 minutes of moderate intensity workout for weight loss.   The first 30 minutes of exercising, the body will take energy from what we ate that day. Then after that 30-minute david, the body will take energy from the stored fats.  Therefore, it will be more beneficial to do longer sessions and less frequently in a week to help with our weight loss journey.  I would recommend 60-minute sessions 4 times a week   Strength training 2 times a week for good bone health    Nutritional intake (diet):  Remember, it is not about finding a diet to lose weight quickly, rather adjusting your lifestyle for healthy living long-term.   When we build good habits, it does not become difficult to maintain it.  Focus on a low-carb diet.  The best diet is Mediterranean diet, which is a low-carb diet.  There are good carbs and bad carbs, minimize the bad carbs.    Bad carbs: Refined carbohydrates or simple carbohydrates that have been processed and stripped of their natural fiber and nutrients.          These carbohydrates can lead to rapid spikes in blood sugar levels and are often associated with low nutritional value. The big 4: Bread, Rice, Pasta, Potatoes  Refined grains-white bread, white rice, pasta made from refined flour.  Sugary foods and beverages: Candy, pastries, sugary cereals, soda, and other sugary drinks.  Processed snacks: Chips, cookies, sugary granola bars  Sweetened breakfast cereals: Cereals with added sugars.  Sweets and desserts: Cakes, ice cream, pies  Good carbs: These are referred to complex carbohydrates that are unprocessed or minimally processed, providing more nutrients.  Here examples of good carbs:  Whole grains: Brown rice, quinoa, oats, whole-wheat products   Legumes: Lentils, chickpeas, black beans, kidney beans  Starchy vegetables: Sweet potatoes, butternut squash  Whole fruits: Berries,  apples, oranges, bananas  Vegetables: Broccoli, spinach, kale, [place] sprouts  Nuts and seeds: Almonds, walnuts, [place] seeds, flaxseeds.    Focus on eating whole foods.  What are whole foods?  Whole Foods refer to natural, unprocessed, or minimally processed foods that are as close to the original form as possible.  These foods are in their natural state and have undergone little to no refined or alteration.  Whole Foods are valued for their nutrient density, providing essential vitamins, minerals, fiber, and other beneficial compounds.  Examples of whole foods include:  Fruits and vegetables without added preservatives or processing.    Whole grains-unrefined grains like brown rice, quinoa, and whole-wheat, which retain their brain, germ, and endosperm.  Legumes-beans, lentils, and peas in their national unprocessed date.  Nuts and seeds-on roasted, unsalted nuts and seeds without added oils or seasonings.  Meat and fish-fresh, unprocessed meats and fish without additives or preservatives.  Dairy-unprocessed dairy products such as milk, yogurt, and cheese without added sugars or artificial ingredients.  Eggs-eggs in their natural form without additives.    Protein requirement  Calculate your protein requirement in grams by multiplying your weight in kilograms by the recommended protein intake per kilogram.  This gives you an estimate of your daily protein requirement.  Age 15-18: 0.9 grams of protein per kilogram of body weight from male gender, 0.9 g of protein per kilogram of body weight for female gender.  Age 19+: 0.8 g of protein per kilogram of body weight for both male and female gender.  If you are physically active or trying to build muscle: 1.2-2.2 g/kg  Example: if you weigh 70 kg, to calculate your protein intake per day multiply 70 by 0.8 = 56 grams per day  To convert your weight to kilograms from pounds: Take your weight in pounds and divided by 2.2046 to get your weight to  kilograms.    Sodium/salt  Compare sodium in foods like soup, bread, frozen and packaged meals, then choose the one with lower numbers.  Most Americans should limit their sodium intake to less than 2,300 mg/day.  All -Americans, people with diabetes, high blood pressure, kidney disease, should limit their sodium intake to 1,500 mg/day.    Cooking oil  Avoid the following oil for cooking: Canola, corn, vegetable, sunflower, peanut, sesame, grapeseed, flaxseed.  Even though it states it is heart healthy, however, they are significantly processed and refined.   Would recommend instead the following cooking oil: Olive oil, coconut oil, avocado oil, ghee, butter.    Intermittent fasting:   There are several benefits of intermittent fasting including weight loss, improving insulin sensitivity, improving cardiovascular health by reducing risk factors like blood pressure, cholesterol levels, and triglycerides. It also promotes brain health, longevity, reduction in inflammatory markers, improves metabolic health, and some studies even suggest intermittent fasting to be protective against certain types of cancers.     The goal of intermittent fasting is to shutdown the insulin hormone, as we discussed, which is a hormone that negatively affects our health when it is around in high levels chronically.     Start intermittent fasting for 14 hours initially, then increase to 16 hours, with a goal to reach 18 hours.  During fasting - may drink water without any additives such as sweeteners, black coffee, tea without any additives including milk.   Eat nutritious meals 2 times a day  Avoid snacking in between meals.  If you end up snacking, snack on fruits/vegetables.  Initially it might be difficult, however, over time you will notice a positive change in your energy, mood, and weight.

## 2025-01-14 NOTE — ASSESSMENT & PLAN NOTE
Discussed need to work on getting weight down after he recovers from surgery. His insurance does not cover Wegovy or Zepbound. He could consider using a service like [employer] or [employer] which sells compounded version.    Diet:  [x] Emphasize a balanced diet rich in vegetables, lean proteins, and whole grains  [x] Limit intake of sugars and refined carbohydrates. Choose complex carbohydrates like whole grains, fruits, and vegetables over refined carbs like white bread and sugary drinks.     Exercise:  [x] Recommend at least 150 minutes of moderate-intensity aerobic activity per week  [x] Include resistance training exercises at least twice a week

## 2025-01-14 NOTE — PROGRESS NOTES
Adult Annual Physical  Name: Osmin Hook      : 1965      MRN: [id number]  Encounter Provider: Duke Hurtado DO  Encounter Date: 2025   Encounter department: [facility] [place]    Assessment & Plan  Primary hypertension    Stable and controlled. Work on weight loss. Continue anti-HTN therapy.    Hypercholesterolemia    Cholesterol is well controlled. Continue statin therapy as prescribed.    Orders:  •  Basic metabolic panel; Future  •  CBC; Future  •  Lipid Panel with Direct LDL reflex; Future    Morbid obesity with BMI of 50.0-59.9, adult (HCC)    Discussed need to work on getting weight down after he recovers from surgery. His insurance does not cover Wegovy or Zepbound. He could consider using a service like [employer] or [employer] which sells compounded version.    Diet:  [x] Emphasize a balanced diet rich in vegetables, lean proteins, and whole grains  [x] Limit intake of sugars and refined carbohydrates. Choose complex carbohydrates like whole grains, fruits, and vegetables over refined carbs like white bread and sugary drinks.     Exercise:  [x] Recommend at least 150 minutes of moderate-intensity aerobic activity per week  [x] Include resistance training exercises at least twice a week    Annual physical exam    Immunizations and preventive care screenings were discussed with patient today. Appropriate education was printed on patient's after visit summary.    Counseling:  Alcohol/drug use: discussed moderation in alcohol intake, the recommendations for healthy alcohol use, and avoidance of illicit drug use.  Dental Health: discussed importance of regular tooth brushing, flossing, and dental visits.  Injury prevention: discussed safety/seat belts, safety helmets, smoke detectors, carbon monoxide detectors, and smoking near bedding or upholstery.  Sexual health: discussed sexually transmitted diseases, partner selection, use of condoms, avoidance of unintended pregnancy, and  contraceptive alternatives.  Exercise: the importance of regular exercise/physical activity was discussed. Recommend exercise 3-5 times per week for at least 30 minutes.       Depression Screening and Follow-up Plan: Patient was screened for depression during today's encounter. They screened negative with a PHQ-2 score of 0.       History of Present Illness     Adult Annual Physical:  Patient presents for annual physical. He is s/p right rotator cuff repair (12 days out). He saw Dr. Baird yesterday. He has gained a lot of weight since I last saw him. He is getting over a cold. He denies any shortness of breath, fever, or chills. He had lab work done which I reviewed with him.     Diet and Physical Activity:  - Diet/Nutrition: poor diet.  - Exercise: no formal exercise.    Depression Screening:  - PHQ-2 Score: 0    General Health:  - Sleep: sleeps well.  - Hearing: normal hearing bilateral ears.  - Vision: wears glasses and most recent eye exam > 1 year ago.  - Dental: regular dental visits and brushes teeth twice daily.     Health:  - History of STDs: no.   - Urinary symptoms: none.     Advanced Care Planning:  - Has an advanced directive?: yes    - Has a durable medical POA?: yes    - ACP document given to patient?: no      Review of Systems   Constitutional:  Negative for activity change, appetite change and fatigue.   HENT:  Positive for congestion and rhinorrhea.    Respiratory:  Positive for cough. Negative for apnea, chest tightness, shortness of breath and wheezing.    Cardiovascular:  Negative for chest pain, palpitations and leg swelling.   Gastrointestinal:  Negative for abdominal distention, abdominal pain, blood in stool, constipation, diarrhea, nausea and vomiting.   Musculoskeletal:  Positive for arthralgias.   Neurological:  Negative for dizziness, weakness, light-headedness, numbness and headaches.   Psychiatric/Behavioral:  Negative for behavioral problems, confusion, hallucinations, sleep  "disturbance and suicidal ideas. The patient is not nervous/anxious.      Medical History Reviewed by provider this encounter:  Tobacco  Allergies  Meds  Problems  Med Hx  Surg Hx  Fam Hx         Objective   /70 (Patient Position: Sitting, Cuff Size: Large)   Pulse 103   Temp (!) 95.6 °F (35.3 °C)   Ht 5' 6\" (1.676 m)   Wt (!) 144 kg (318 lb)   SpO2 99%   BMI 51.33 kg/m²     Physical Exam  Constitutional:       General: He is not in acute distress.     Appearance: He is obese. He is not ill-appearing.   HENT:      Right Ear: Tympanic membrane, ear canal and external ear normal. There is no impacted cerumen.      Left Ear: Tympanic membrane, ear canal and external ear normal. There is no impacted cerumen.   Cardiovascular:      Rate and Rhythm: Normal rate and regular rhythm.      Heart sounds: No murmur heard.  Pulmonary:      Effort: Pulmonary effort is normal. No respiratory distress.      Breath sounds: No wheezing.   Abdominal:      General: Abdomen is protuberant. Bowel sounds are normal. There is no distension.      Tenderness: There is no abdominal tenderness.   Musculoskeletal:      Right lower leg: No edema.      Left lower leg: No edema.   Neurological:      General: No focal deficit present.      Mental Status: He is alert. Mental status is at baseline.   Psychiatric:         Mood and Affect: Mood normal.         Behavior: Behavior normal.       Duke Hurtado DO   "

## 2025-01-26 DIAGNOSIS — K21.9 GASTROESOPHAGEAL REFLUX DISEASE WITHOUT ESOPHAGITIS: ICD-10-CM

## 2025-01-27 RX ORDER — PANTOPRAZOLE SODIUM 40 MG/1
40 TABLET, DELAYED RELEASE ORAL DAILY
Qty: 90 TABLET | Refills: 3 | Status: SHIPPED | OUTPATIENT
Start: 2025-01-27

## 2025-01-28 ENCOUNTER — TELEPHONE (OUTPATIENT)
Dept: OBGYN CLINIC | Facility: HOSPITAL | Age: 60
End: 2025-01-28

## 2025-01-28 DIAGNOSIS — Z98.890 S/P RIGHT ROTATOR CUFF REPAIR: Primary | ICD-10-CM

## 2025-01-28 DIAGNOSIS — Z48.89 AFTERCARE FOLLOWING SURGERY: ICD-10-CM

## 2025-01-28 DIAGNOSIS — M25.511 CHRONIC RIGHT SHOULDER PAIN: ICD-10-CM

## 2025-01-28 DIAGNOSIS — M75.121 COMPLETE TEAR OF RIGHT ROTATOR CUFF, UNSPECIFIED WHETHER TRAUMATIC: ICD-10-CM

## 2025-01-28 DIAGNOSIS — G89.29 CHRONIC RIGHT SHOULDER PAIN: ICD-10-CM

## 2025-01-28 NOTE — TELEPHONE ENCOUNTER
Caller: Pt     Doctor: Oli     Reason for call: 1/2/25- R shoulder- pt is requesting a note stating that is medically necessary for pt to have a recliner.     Pt states it is very difficult to sleep. Preciously used the wedge system and for 2.5 weeks haven't been able to sleep comfortably     Email: [email or web address]  Ph: [phone]      If letter is going to be done please send through [employer] as well     Call back#: Phone:   [phone]

## 2025-01-29 NOTE — TELEPHONE ENCOUNTER
Caller: Ananda    Doctor: Oli     Reason for call: Patient is calling to follow up on phone call regarding recliner.     Call back#: [phone]

## 2025-01-31 NOTE — TELEPHONE ENCOUNTER
Caller: patient    Doctor: Dr. membreno    Reason for call: Requesting a script for a recliner to be faxed to   [phone] Attn:irving don    Call back#: [phone]

## 2025-02-03 PROBLEM — Z48.89 AFTERCARE FOLLOWING SURGERY: Status: ACTIVE | Noted: 2025-02-03

## 2025-02-03 PROBLEM — M25.511 CHRONIC RIGHT SHOULDER PAIN: Status: ACTIVE | Noted: 2025-02-03

## 2025-02-03 PROBLEM — G89.29 CHRONIC RIGHT SHOULDER PAIN: Status: ACTIVE | Noted: 2025-02-03

## 2025-02-03 PROBLEM — Z98.890 S/P RIGHT ROTATOR CUFF REPAIR: Status: ACTIVE | Noted: 2025-02-03

## 2025-02-04 NOTE — TELEPHONE ENCOUNTER
Caller: Patient    Doctor: Dr. Baird    Reason for call:     Confirming if the script for the recliner was faxed to [facility], confirmed it was sent over on 2/3/25.  Thanks     Call back#: n/a

## 2025-02-05 ENCOUNTER — TELEPHONE (OUTPATIENT)
Age: 60
End: 2025-02-05

## 2025-02-05 NOTE — TELEPHONE ENCOUNTER
Caller: zac from [facility]    Doctor: Dr. Baird    Reason for call: requesting OVN from 1/13/25 and work status letter to be faxed to [phone]    Call back#: [phone]

## 2025-02-06 NOTE — TELEPHONE ENCOUNTER
Spoke to patient,patient will try to reach out to [place] from workers comp and will give us a call back

## 2025-02-07 ENCOUNTER — DOCUMENTATION (OUTPATIENT)
Dept: OBGYN CLINIC | Facility: HOSPITAL | Age: 60
End: 2025-02-07

## 2025-02-07 NOTE — TELEPHONE ENCOUNTER
Caller: Patient     Doctor: Dr. Baird    Reason for call: Patient called back per patient Kristel Nurse  from / is not to have access to OVN, or work notes pertaining to his care. Patient stated his  is sending a notice to Kristel today. Please advise.    Call back#: [phone]

## 2025-02-12 ENCOUNTER — TELEPHONE (OUTPATIENT)
Age: 60
End: 2025-02-12

## 2025-02-12 NOTE — TELEPHONE ENCOUNTER
Caller: [facility]    Doctor: Dr. Baird    Reason for call: Calling regarding recliner chair, received referral but wanted to confirm patients contact number-confirmed    Call back#: [phone]

## 2025-02-13 ENCOUNTER — EVALUATION (OUTPATIENT)
Dept: PHYSICAL THERAPY | Facility: CLINIC | Age: 60
End: 2025-02-13
Payer: OTHER MISCELLANEOUS

## 2025-02-13 DIAGNOSIS — Z98.890 S/P ARTHROSCOPY OF RIGHT SHOULDER: Primary | ICD-10-CM

## 2025-02-13 PROCEDURE — 97161 PT EVAL LOW COMPLEX 20 MIN: CPT

## 2025-02-13 NOTE — PROGRESS NOTES
"Patient Name:  Osmin Hook  MRN:  [id number]    Assessment & Plan     1. S/P right rotator cuff repair  2. Aftercare following surgery      Approximately 6 week s/p Right shoulder arthroscopic revision rotator cuff repair with bioinductive collagen implant augmentation, extensive debridement performed on 01/02/2025   Overall, patient doing very well s/p surgical intervention  Patient may discontinue sling at this time and being cautious about reaching and lifting. No lifting with Right upper extremity  At this time, advised against participating in chair yoga. Encouraged patient to continue with proper diet and water intake for weight loss.   Continue outpatient PT to work on passive and starting passive range of motion, progressing to AAROM and AROM per protocol.  Follow up 6 weeks     History of the Present Illness   Osmin Hook is a 60 y.o. male approximately 6 week s/p Right shoulder arthroscopic revision rotator cuff repair with bioinductive collagen implant augmentation, extensive debridement performed on 01/02/2025. Today, patient reports he is doing well. He has maintained the sling. He had first PT evaluation yesterday and is performing home exercises daily. He admits he is feeling better overall compared to initial surgery.          Review of Systems     Review of Systems   Constitutional:  Negative for chills and fever.   HENT:  Negative for ear pain and sore throat.    Eyes:  Negative for pain and visual disturbance.   Respiratory:  Negative for cough and shortness of breath.    Cardiovascular:  Negative for chest pain and palpitations.   Gastrointestinal:  Negative for abdominal pain and vomiting.   Genitourinary:  Negative for dysuria and hematuria.   Musculoskeletal:  Negative for arthralgias and back pain.   Skin:  Negative for color change and rash.   Neurological:  Negative for seizures and syncope.   All other systems reviewed and are negative.      Physical Exam     Resp 18   Ht 5' 6\" " (1.676 m)   Wt (!) 144 kg (318 lb)   BMI 51.33 kg/m²     Right Shoulder:   Surgical incisions well healing  Passive range of motion   80 degrees of forward flexion   There is no tenderness present over the shoulder.  Elbow range of motion 2-0 to approximately 120 degrees without pain   The patient is neurovascularly intact distally in the extremity.      Data Review     I have personally reviewed pertinent films in PACS, and my interpretation follows.    No new images     Social History     Tobacco Use    Smoking status: Former     Current packs/day: 0.00     Average packs/day: 0.3 packs/day for 5.8 years (1.4 ttl pk-yrs)     Types: Cigarettes     Start date: 1984     Quit date: 10/20/1989     Years since quittin.3    Smokeless tobacco: Former     Types: Chew     Quit date: 2019   Vaping Use    Vaping status: Never Used   Substance Use Topics    Alcohol use: Yes     Alcohol/week: 4.0 standard drinks of alcohol     Types: 4 Standard drinks or equivalent per week     Comment: occassional    Drug use: Yes     Frequency: 7.0 times per week     Types: Marijuana     Comment: medical           Procedures  None     Kirsten Rodrigues PA-C

## 2025-02-13 NOTE — PROGRESS NOTES
PT Evaluation     Today's date: 2025  Patient name: Osmin Hook  : 1965  MRN: [id number]  Referring provider: Kirsten Rodrigues PA-C  Dx:   Encounter Diagnosis     ICD-10-CM    1. S/P arthroscopy of right shoulder  Z98.890 Ambulatory Referral to Physical Therapy        Start Time: 1545  Stop Time: 1620  Total time in clinic (min): 35 minutes    Assessment  Impairments: abnormal or restricted ROM, activity intolerance, impaired physical strength, lacks appropriate home exercise program, pain with function, unable to perform ADL, participation limitations, activity limitations and endurance  Symptom irritability: moderate    Assessment details: Patient is a pleasant 60 y.o. male who presents to physical therapy with main reports of R shoulder pain 6 weeks s/p Right shoulder arthroscopic revision rotator cuff repair with bioinductive collagen implant augmentation, extensive debridement performed on 2025.     No further referral appears necessary at this time based upon examination results.    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 This limits Ananda's ability to perform ADLs, perform overhead activities, return to previous function, return to fishing, and hold his grandchildren.     Prognosis is good given HEP compliance and PT 2 times per week over the next 12 weeks.  Positive prognostic indicators include positive attitude toward recovery.    Please contact me if you have any questions.  Thank you for the opportunity to share in Osmin Hook care.    Understanding of Dx/Px/POC: good     Prognosis: good    Goals  Short term:  Pt will be independent w/ individualized HEP  Pt will have a decrease in symptom irritability by 2 points     Long term:  Pt will be able to fish for 2 hours w/o symptom irritability   Pt will be able to participate in events w/ his  grandchildren w/o limitation  Pt will be able to return to previous levels of function  Pt will improve FOTO by [facility]      Plan  Patient would benefit from: skilled physical therapy  Referral necessary: No    Planned therapy interventions: abdominal trunk stabilization, activity modification, IASTM, joint mobilization, manual therapy, massage, nerve gliding, neuromuscular re-education, patient/caregiver education, postural training, strengthening, stretching, therapeutic activities, therapeutic exercise, functional ROM exercises, flexibility and home exercise program    Frequency: 2x week  Plan of Care beginning date: 2025  Plan of Care expiration date: 2025  Treatment plan discussed with: patient        Subjective Evaluation    History of Present Illness  Date of surgery: 2025  Mechanism of injury: surgery  Mechanism of injury: Pt reports to outpatient PT 6 weeks s/p Right shoulder arthroscopic revision rotator cuff repair with bioinductive collagen implant augmentation, extensive debridement performed on 2025. Pt has been in frequent pain for the last 6 weeks. Pt is having a hard time sleeping, has requested a recliner for home. Pt reports the pain is located around the shoulder and in the bicep, no pain in R upper trap. Pt has been adherent to HEP and protocol restrictions. No red flags reported today.           Recurrent probem    Quality of life: poor    Patient Goals  Patient goals for therapy: decreased pain, increased motion, increased strength, independence with ADLs/IADLs, return to sport/leisure activities and return to work  Patient goal: Hold grandchildren, Return to fishing  Pain  Current pain ratin  At best pain ratin  At worst pain ratin  Location: Anterior shoulder and posterior shoulder, Bicep pain still present  Quality: sharp, tight, knife-like, discomfort and throbbing  Relieving factors: medications  Exacerbated by: Changing position, Sleeping.  Progression:  improved    Social Support  Lives with: spouse    Employment status: not working  Treatments  Previous treatment: physical therapy and medication        Objective     Postural Observations  Seated posture: good  Standing posture: good      Cervical/Thoracic Screen   Cervical range of motion within normal limits with the following exceptions: No pain, ROM WFL    Passive Range of Motion     Right Shoulder   Flexion: 120 degrees   Abduction: 90 degrees   External rotation 0°: 45 degrees   Internal rotation 0°: 35 degrees     Additional Passive Range of Motion Details  Pt met all protocol ROM measurements besides shoulder flexion as he has painful end range to 120 degrees.     General Comments:      Shoulder Comments   Pt has 6 week follow-up w/ physician tomorrow, defer HEP till NV following follow up             Precautions: HTN, hx of lumbar fusion, Lyme's   POC expires Unit limit Auth Expiration date PT/OT/ST + Visit Limit?   5/8/25 BOMN N/A BOMN                           Visit/Unit Tracking  AUTH Status:  Date 2/13              N/A Used 1               Remaining                  PROM of shoulder to empty end feel with intermittently applied grade II mobilizations in   the open packed position of the shoulder. Maximum PROM is:   o 140° forward flexion   o 90° abduction  o 45° external rotation   o 35° internal rotation   [employer] pulleys at 10 minute maximum with 2 minute rest provided at 5 minutes   Pendulum, elbow AAROM and progressive gripping exercises     HEP: NV  Manuals                                                                 Neuro Re-Ed                                                                                                        Ther Ex                                                                                                                     Ther Activity                                       Gait Training                                       Modalities

## 2025-02-14 ENCOUNTER — OFFICE VISIT (OUTPATIENT)
Dept: OBGYN CLINIC | Facility: CLINIC | Age: 60
End: 2025-02-14

## 2025-02-14 VITALS — RESPIRATION RATE: 18 BRPM | HEIGHT: 66 IN | BODY MASS INDEX: 50.62 KG/M2 | WEIGHT: 315 LBS

## 2025-02-14 DIAGNOSIS — Z98.890 S/P RIGHT ROTATOR CUFF REPAIR: Primary | ICD-10-CM

## 2025-02-14 DIAGNOSIS — Z48.89 AFTERCARE FOLLOWING SURGERY: ICD-10-CM

## 2025-02-14 PROCEDURE — 99024 POSTOP FOLLOW-UP VISIT: CPT | Performed by: PHYSICIAN ASSISTANT

## 2025-02-18 ENCOUNTER — OFFICE VISIT (OUTPATIENT)
Dept: PHYSICAL THERAPY | Facility: CLINIC | Age: 60
End: 2025-02-18
Payer: OTHER MISCELLANEOUS

## 2025-02-18 DIAGNOSIS — Z98.890 S/P ARTHROSCOPY OF RIGHT SHOULDER: Primary | ICD-10-CM

## 2025-02-18 PROCEDURE — 97110 THERAPEUTIC EXERCISES: CPT

## 2025-02-18 NOTE — PROGRESS NOTES
Daily Note     Today's date: 2025  Patient name: Osmin Hook  : 1965  MRN: [id number]  Referring provider: Kirsten Rodrigues PA-C  Dx:   Encounter Diagnosis     ICD-10-CM    1. S/P arthroscopy of right shoulder  Z98.890         Start Time: 1545  Stop Time: 1625  Total time in clinic (min): 40 minutes    Subjective: Pt reports that his follow-up went well and that he is to follow the protocol starting w/ PROM only. Pt reports he has actively been using the R shoulder for forward flexion. Pt has not broken protocol and has not gone past 75 degrees of active flexion.       Objective: See treatment diary below    Current measures R shoulder PROM:   110° forward flexion   90° abduction  30° external rotation   30° internal rotation     Assessment: Tolerated treatment well. Initiated program w/ focus on PROM. Pt reported fatigue w/ pulley's after 6 minutes. Provided Pt w/ updated HEP and educated Pt on PROM and AAROM for home. Patient demonstrated fatigue post treatment, exhibited good technique with therapeutic exercises, and would benefit from continued PT for increased ROM, increased strength, and decreased symptom irritability.       Plan: Continue per plan of care.      Precautions: HTN, hx of lumbar fusion, Lyme's   POC expires Unit limit Auth Expiration date PT/OT/ST + Visit Limit?   25 BOMN N/A BOMN                           Visit/Unit Tracking  AUTH Status:  Date              N/A Used 1 2              Remaining                  PROM of shoulder to empty end feel with intermittently applied grade II mobilizations in   the open packed position of the shoulder. Maximum PROM is:   o 140° forward flexion   o 90° abduction  o 45° external rotation   o 35° internal rotation   Scaption pulleys at 10 minute maximum with 2 minute rest provided at 5 minutes   Pendulum, elbow AAROM and progressive gripping exercises     HEP: [id number]   Manuals                                                                  Neuro Re-Ed                                                                                                        Ther Ex             Education  5'            R shoulder PROM 10' JMK            Scaption pulley's 6'            Pendulums  X20 ea            AAROM flex, abd, ER X10 ea                                                                Ther Activity                                       Gait Training                                       Modalities

## 2025-02-20 ENCOUNTER — OFFICE VISIT (OUTPATIENT)
Dept: PHYSICAL THERAPY | Facility: CLINIC | Age: 60
End: 2025-02-20
Payer: OTHER MISCELLANEOUS

## 2025-02-20 DIAGNOSIS — Z98.890 S/P ARTHROSCOPY OF RIGHT SHOULDER: Primary | ICD-10-CM

## 2025-02-20 PROCEDURE — 97110 THERAPEUTIC EXERCISES: CPT

## 2025-02-20 NOTE — PROGRESS NOTES
Daily Note     Today's date: 2025  Patient name: Osmin Hook  : 1965  MRN: [id number]  Referring provider: Kirsten Rodrigues PA-C  Dx:   Encounter Diagnosis     ICD-10-CM    1. S/P arthroscopy of right shoulder  Z98.890         Start Time: 1630  Stop Time: 1714  Total time in clinic (min): 44 minutes    Subjective: Pt reports that there has been no major changes since last visit.       Objective: See treatment diary below    Current measures R shoulder PROM:   130° forward flexion   90° abduction  30° external rotation   30° internal rotation     Assessment: Tolerated treatment well. Pt has empty painful end range w/ each PROM in R shoulder. Pt's PROM is much improved compared to previous visit. Pt did not report any onset of symptom irritability throughout today's session. Patient demonstrated fatigue post treatment, exhibited good technique with therapeutic exercises, and would benefit from continued PT for increased ROM, increased strength, and decreased symptom irritability.       Plan: Continue per plan of care.      Precautions: HTN, hx of lumbar fusion, Lyme's   POC expires Unit limit Auth Expiration date PT/OT/ST + Visit Limit?   25 BOMN N/A BOMN                           Visit/Unit Tracking  AUTH Status:  Date             N/A Used 1 2 4             Remaining                  PROM of shoulder to empty end feel with intermittently applied grade II mobilizations in   the open packed position of the shoulder. Maximum PROM is:   o 140° forward flexion   o 90° abduction  o 45° external rotation   o 35° internal rotation   Scaption pulleys at 10 minute maximum with 2 minute rest provided at 5 minutes   Pendulum, elbow AAROM and progressive gripping exercises     HEP: [id number]   Manuals                                                                Neuro Re-Ed                                                                                                        Ther Ex              Education  5' JUAN A           R shoulder PROM 10' JUAN A VELAZCO           Scaption pulley's 6' 6' x2 w/ rest break           Pendulums  X20 ea X20 ea           AAROM flex, abd, ER X10 ea X30 ea                                                               Ther Activity                                       Gait Training                                       Modalities

## 2025-02-25 ENCOUNTER — OFFICE VISIT (OUTPATIENT)
Dept: PHYSICAL THERAPY | Facility: CLINIC | Age: 60
End: 2025-02-25
Payer: OTHER MISCELLANEOUS

## 2025-02-25 DIAGNOSIS — Z98.890 S/P ARTHROSCOPY OF RIGHT SHOULDER: Primary | ICD-10-CM

## 2025-02-25 PROCEDURE — 97110 THERAPEUTIC EXERCISES: CPT

## 2025-02-25 NOTE — PROGRESS NOTES
Daily Note     Today's date: 2025  Patient name: Osmin Hook  : 1965  MRN: [id number]  Referring provider: Kirsten Rodrigues PA-C  Dx:   Encounter Diagnosis     ICD-10-CM    1. S/P arthroscopy of right shoulder  Z98.890         Start Time: 925  Stop Time: 1015  Total time in clinic (min): 50 minutes    Subjective: Pt reports that he is very sore today and that he is still not sleeping well.       Objective: See treatment diary below    Current measures R shoulder PROM:   140° forward flexion   95° abduction  30° external rotation   30° internal rotation     Assessment: Tolerated treatment well. Pt demonstrated an increase in shoulder flexion and shoulder abduction ROM today. Pt reported no pain w/ current PROM of ER/IR. Continue to slowly progress PROM and AAROM NV. Patient demonstrated fatigue post treatment, exhibited good technique with therapeutic exercises, and would benefit from continued PT for increased ROM, increased strength, and decreased symptom irritability.       Plan: Continue per plan of care.      Precautions: HTN, hx of lumbar fusion, Lyme's   POC expires Unit limit Auth Expiration date PT/OT/ST + Visit Limit?   25 BOMN N/A BOMN                           Visit/Unit Tracking  AUTH Status:  Date            N/A Used 1 2 4 5            Remaining                  PROM of shoulder to empty end feel with intermittently applied grade II mobilizations in   the open packed position of the shoulder. Maximum PROM is at week 6:   o 140° forward flexion   o 90° abduction  o 45° external rotation   o 35° internal rotation   Scaption pulleys at 10 minute maximum with 2 minute rest provided at 5 minutes   Pendulum, elbow AAROM and progressive gripping exercises     HEP: [id number]   Manuals                                                               Neuro Re-Ed                                                                                                         Ther Ex             Education  5' JUAN A VELAZCO          R shoulder PROM 10' JUAN A VELAZCO          Scaption pulley's 6' 6' x2 w/ rest break 5' x2 w/ rest          Pendulums  X20 ea X20 ea           AAROM flex, abd, ER X10 ea X30 ea X30 ea                                                              Ther Activity                                       Gait Training                                       Modalities

## 2025-02-27 ENCOUNTER — OFFICE VISIT (OUTPATIENT)
Dept: PHYSICAL THERAPY | Facility: CLINIC | Age: 60
End: 2025-02-27
Payer: OTHER MISCELLANEOUS

## 2025-02-27 DIAGNOSIS — Z98.890 S/P ARTHROSCOPY OF RIGHT SHOULDER: Primary | ICD-10-CM

## 2025-02-27 PROCEDURE — 97110 THERAPEUTIC EXERCISES: CPT

## 2025-02-27 NOTE — PROGRESS NOTES
Daily Note     Today's date: 2025  Patient name: Osmin Hook  : 1965  MRN: [id number]  Referring provider: Kirsten Rodrigues PA-C  Dx:   Encounter Diagnosis     ICD-10-CM    1. S/P arthroscopy of right shoulder  Z98.890         Start Time: 1545  Stop Time: 1623  Total time in clinic (min): 38 minutes    Subjective: Pt reports that he is less sore than last visit. Pt received his chair allowing him to sleep better at night.       Objective: See treatment diary below    Current measures R shoulder PROM:   148° forward flexion   102° abduction  45° external rotation   30° internal rotation     Assessment: Tolerated treatment well. Pt's PROM is much improved from previous visits. Pt still has bicep and anterior shoulder symptom irritability. Patient demonstrated fatigue post treatment, exhibited good technique with therapeutic exercises, and would benefit from continued PT for increased ROM, increased strength, and decreased symptom irritability.       Plan: Continue per plan of care.      Precautions: HTN, hx of lumbar fusion, Lyme's   POC expires Unit limit Auth Expiration date PT/OT/ST + Visit Limit?   25 BOMN N/A BOMN                           Visit/Unit Tracking  AUTH Status:  Date           N/A Used 1 2 4 5 6           Remaining                  PROM of shoulder to empty end feel with intermittently applied grade II mobilizations in   the open packed position of the shoulder. Maximum PROM is at week 6:   o 140° forward flexion   o 90° abduction  o 45° external rotation   o 35° internal rotation   Scaption pulleys at 10 minute maximum with 2 minute rest provided at 5 minutes   Pendulum, elbow AAROM and progressive gripping exercises     HEP: [id number]   Manuals                                                              Neuro Re-Ed                                                                                                        Ther Ex              Education  5' JUAN A VELAZCO          R shoulder PROM 10' JUAN A VELAZCO         Scaption pulley's 6' 6' x2 w/ rest break 5' x2 w/ rest 5' x2 w/ rest         Pendulums  X20 ea X20 ea           AAROM flex, abd, ER X10 ea X30 ea X30 ea X30 ea                                                             Ther Activity                                       Gait Training                                       Modalities

## 2025-03-04 ENCOUNTER — OFFICE VISIT (OUTPATIENT)
Dept: PHYSICAL THERAPY | Facility: CLINIC | Age: 60
End: 2025-03-04
Payer: OTHER MISCELLANEOUS

## 2025-03-04 DIAGNOSIS — Z98.890 S/P ARTHROSCOPY OF RIGHT SHOULDER: Primary | ICD-10-CM

## 2025-03-04 PROCEDURE — 97110 THERAPEUTIC EXERCISES: CPT

## 2025-03-04 PROCEDURE — 97112 NEUROMUSCULAR REEDUCATION: CPT

## 2025-03-04 NOTE — PROGRESS NOTES
Daily Note     Today's date: 3/4/2025  Patient name: Osmin Hook  : 1965  MRN: [id number]  Referring provider: Kirsten Rodrigues PA-C  Dx:   Encounter Diagnosis     ICD-10-CM    1. S/P arthroscopy of right shoulder  Z98.890         Start Time: 1543  Stop Time: 1625  Total time in clinic (min): 42 minutes    Subjective: Pt reports that he is sleeping much better now that he is utilizing his recliner. Pt reports he has been very sore since last visit.       Objective: See treatment diary below    Current measures R shoulder PROM:   148° forward flexion   102° abduction  45° external rotation   30° internal rotation     Assessment: Tolerated treatment well. No changes in overall PROM in R shoulder. Pt reported symptoms radiating down RUE into elbow and into the wrist. Provided Pt w/ repeated cervical retractions and extensions. Pt had no pain following repeated cervical retractions w/ extensions. Educated Pt to utilize technique at home if symptoms in elbow and wrist return. Monitor soreness and radiating pain NV. Patient demonstrated fatigue post treatment, exhibited good technique with therapeutic exercises, and would benefit from continued PT for increased ROM, increased strength, and decreased symptom irritability.       Plan: Continue per plan of care.      Precautions: HTN, hx of lumbar fusion, Lyme's   POC expires Unit limit Auth Expiration date PT/OT/ST + Visit Limit?   25 BOMN N/A BOMN                           Visit/Unit Tracking  AUTH Status:  Date  3/         N/A Used 1 2 4 5 6 7          Remaining                  PROM of shoulder to empty end feel with intermittently applied grade II mobilizations in   the open packed position of the shoulder. Maximum PROM is at week 6:   o 140° forward flexion   o 90° abduction  o 45° external rotation   o 35° internal rotation   Scaption pulleys at 10 minute maximum with 2 minute rest provided at 5 minutes   Pendulum, elbow AAROM  and progressive gripping exercises     HEP: [id number]   Manuals 2/18 2/20 2/25 2/27 3/4                                                            Neuro Re-Ed             Repeated cervical retraction     x30        Repeated cervical retraction w/ extension     x30                                                                         Ther Ex             Education  5' JUAN A VELAZCO          R shoulder PROM 10' JUAN A VELAZCO        Scaption pulley's 6' 6' x2 w/ rest break 5' x2 w/ rest 5' x2 w/ rest 5' x2 w/ rest        Pendulums  X20 ea X20 ea           AAROM flex, abd, ER X10 ea X30 ea X30 ea X30 ea X30 ea                                                            Ther Activity                                       Gait Training                                       Modalities

## 2025-03-06 ENCOUNTER — OFFICE VISIT (OUTPATIENT)
Dept: PHYSICAL THERAPY | Facility: CLINIC | Age: 60
End: 2025-03-06
Payer: OTHER MISCELLANEOUS

## 2025-03-06 DIAGNOSIS — Z98.890 S/P ARTHROSCOPY OF RIGHT SHOULDER: Primary | ICD-10-CM

## 2025-03-06 PROCEDURE — 97110 THERAPEUTIC EXERCISES: CPT

## 2025-03-06 NOTE — PROGRESS NOTES
Daily Note     Today's date: 3/6/2025  Patient name: Osmin Hook  : 1965  MRN: [id number]  Referring provider: Kirsten Rodrigues PA-C  Dx:   Encounter Diagnosis     ICD-10-CM    1. S/P arthroscopy of right shoulder  Z98.890         Start Time: 1543  Stop Time: 1622  Total time in clinic (min): 39 minutes    Subjective: Pt reports no major changes since last visit. Pt is still feeling sore. Reports that the cervical exercises did not make a difference in overall symptom irritability.       Objective: See treatment diary below    Current measures R shoulder PROM:   148° forward flexion   102° abduction  45° external rotation   30° internal rotation     Assessment: Tolerated treatment well. No changes following today's session. Pt still experiencing current end range pain w/ empty end feel. Patient demonstrated fatigue post treatment, exhibited good technique with therapeutic exercises, and would benefit from continued PT for increased ROM, increased strength, and decreased symptom irritability.       Plan: Continue per plan of care.      Precautions: HTN, hx of lumbar fusion, Lyme's   POC expires Unit limit Auth Expiration date PT/OT/ST + Visit Limit?   25 BOMN N/A BOMN                           Visit/Unit Tracking  AUTH Status:  Date 2/13 2/18 2/20 2/25 2/27 3/4 3/6        N/A Used 1 2 4 5 6 7 8         Remaining                  PROM of shoulder to empty end feel with intermittently applied grade II mobilizations in   the open packed position of the shoulder. Maximum PROM is at week 6:   o 140° forward flexion   o 90° abduction  o 45° external rotation   o 35° internal rotation   Scaption pulleys at 10 minute maximum with 2 minute rest provided at 5 minutes   Pendulum, elbow AAROM and progressive gripping exercises     HEP: [id number]   Manuals  3/ 3                                                           Neuro Re-Ed             Repeated cervical retraction     x30         Repeated cervical retraction w/ extension     x30                                                                         Ther Ex             Education  5' JUAN A VELAZCO          R shoulder PROM 10' JUAN A VELAZCO       Scaption pulley's 6' 6' x2 w/ rest break 5' x2 w/ rest 5' x2 w/ rest 5' x2 w/ rest 5' x2 w/ rest       Pendulums  X20 ea X20 ea           AAROM flex, abd, ER X10 ea X30 ea X30 ea X30 ea X30 ea X30 ea                                                           Ther Activity                                       Gait Training                                       Modalities

## 2025-03-13 ENCOUNTER — APPOINTMENT (OUTPATIENT)
Dept: PHYSICAL THERAPY | Facility: CLINIC | Age: 60
End: 2025-03-13
Payer: OTHER MISCELLANEOUS

## 2025-03-17 ENCOUNTER — TELEPHONE (OUTPATIENT)
Age: 60
End: 2025-03-17

## 2025-03-17 NOTE — TELEPHONE ENCOUNTER
Caller: Ananda    Doctor: Dr. Baird    Reason for call:  Patient had Sx in January . Patient is doing his PT and he is in a lot of pain and stiff and sore up in the neck area again. Patient was using a pepper  and felt like a stabbing pain in his bicep and dropped the .  Patient would like to speak to PA or   Please advise thank you    Call back#: [phone]

## 2025-03-17 NOTE — TELEPHONE ENCOUNTER
Spoke to patient at length. Had had increased pain and stiffness over the last 2 weeks. He felt he was doing quite well up until that time. Denies any new injury. The pepper  incident mentioned below was after his pain had already increased. He is having trouble sleeping. Still using a recliner. He is unsure if maybe he was doing too much at PT. Had been taking OTC tylenol and anti-inflammatories with minimal improvement. Moved patient's appointment up to Friday for repeat evaluation. He appreciated this. Advised to call back with any changes.

## 2025-03-18 ENCOUNTER — OFFICE VISIT (OUTPATIENT)
Dept: PHYSICAL THERAPY | Facility: CLINIC | Age: 60
End: 2025-03-18
Payer: OTHER MISCELLANEOUS

## 2025-03-18 DIAGNOSIS — Z98.890 S/P ARTHROSCOPY OF RIGHT SHOULDER: Primary | ICD-10-CM

## 2025-03-18 PROCEDURE — 97110 THERAPEUTIC EXERCISES: CPT

## 2025-03-18 PROCEDURE — 97112 NEUROMUSCULAR REEDUCATION: CPT

## 2025-03-18 NOTE — PROGRESS NOTES
Daily Note     Today's date: 3/18/2025  Patient name: Osmin Hoko  : 1965  MRN: [id number]  Referring provider: Kirsten Rodrigues PA-C  Dx:   Encounter Diagnosis     ICD-10-CM    1. S/P arthroscopy of right shoulder  Z98.890           Start Time: 1059  Stop Time: 1140  Total time in clinic (min): 41 minutes    Subjective: Pt reports he has a scheduled appointment this Friday w/ Surgeon since the pain has not reduced at all over last two weeks. Pt reports he is unable to get comfortable while sleeping and that he is experiencing more cramping/pulsating pains in the bicep and anterior shoulder.       Objective: See treatment diary below    Current measures R shoulder PROM:   148° forward flexion   102° abduction  45° external rotation   30° internal rotation     Assessment: Tolerated treatment well. No changes in PROM today. Pt has increased symptom irritability in neck and shoulder throughout today's session. Monitor Pt's symptoms NV. Patient demonstrated fatigue post treatment, exhibited good technique with therapeutic exercises, and would benefit from continued PT for increased ROM, increased strength, and decreased symptom irritability.       Plan: Continue per plan of care.      Precautions: HTN, hx of lumbar fusion, Lyme's   POC expires Unit limit Auth Expiration date PT/OT/ST + Visit Limit?   25 BOMN N/A BOMN                           Visit/Unit Tracking  AUTH Status:  Date  3/ 3/6        N/A Used 1 2 4 5 6 7 8         Remaining                  PROM of shoulder to empty end feel with intermittently applied grade II mobilizations in   the open packed position of the shoulder. Maximum PROM is at week 6:   o 140° forward flexion   o 90° abduction  o 45° external rotation   o 35° internal rotation   Scaption pulleys at 10 minute maximum with 2 minute rest provided at 5 minutes   Pendulum, elbow AAROM and progressive gripping exercises     HEP: [id number]   Manuals   "2/25 2/27 3/4 3/6 3/18                                                          Neuro Re-Ed             Repeated cervical retraction     x30        Repeated cervical retraction w/ extension     x30  x30      Scap isometric       3x10 5\"                                                          Ther Ex             Education  5' JUAN A VELAZCO          R shoulder PROM 10' JUAN A VELAZCO      Scaption pulley's 6' 6' x2 w/ rest break 5' x2 w/ rest 5' x2 w/ rest 5' x2 w/ rest 5' x2 w/ rest 5' x2 w/ rest      Pendulums  X20 ea X20 ea           AAROM flex, abd, ER X10 ea X30 ea X30 ea X30 ea X30 ea X30 ea X30 ea                                                          Ther Activity                                       Gait Training                                       Modalities                                            "

## 2025-03-20 ENCOUNTER — OFFICE VISIT (OUTPATIENT)
Dept: PHYSICAL THERAPY | Facility: CLINIC | Age: 60
End: 2025-03-20
Payer: OTHER MISCELLANEOUS

## 2025-03-20 DIAGNOSIS — Z98.890 S/P ARTHROSCOPY OF RIGHT SHOULDER: Primary | ICD-10-CM

## 2025-03-20 PROCEDURE — 97110 THERAPEUTIC EXERCISES: CPT

## 2025-03-20 PROCEDURE — 97112 NEUROMUSCULAR REEDUCATION: CPT

## 2025-03-20 NOTE — PROGRESS NOTES
Daily Note     Today's date: 3/20/2025  Patient name: Osmin Hook  : 1965  MRN: [id number]  Referring provider: Kirsten Rodrigues PA-C  Dx:   Encounter Diagnosis     ICD-10-CM    1. S/P arthroscopy of right shoulder  Z98.890         Start Time: 1542  Stop Time: 1630  Total time in clinic (min): 48 minutes    Subjective: Pt reports he has not had any changes in symptom irritability as the soreness and pain levels remain the exact same.      Objective: See treatment diary below    Current measures R shoulder PROM:   162° forward flexion   122° abduction  ER/IR WFL w/ no abduction  ER @ 90* abduction: 35*  IR @ 90* abduction: 40*    Assessment: Tolerated treatment well. Pt's PROM is much improved compared to previous visits. Pt able to passive move shoulder WFL, but is unable to achieve functional limits actively. Pt reports that his pain no longer decreased during therapy and it slightly increases for a few hours following therapy. Pt has follow-up w/ physician tomorrow to address issues w/ pain. Patient demonstrated fatigue post treatment, exhibited good technique with therapeutic exercises, and would benefit from continued PT for increased ROM, increased strength, and decreased symptom irritability.       Plan: Continue per plan of care.      Precautions: HTN, hx of lumbar fusion, Lyme's   POC expires Unit limit Auth Expiration date PT/OT/ST + Visit Limit?   25 BOMN N/A BOMN                           Visit/Unit Tracking  AUTH Status:  Date  3/ 3/6 3/18 3/20      N/A Used 1 2 4 5 6 7 8 9 10       Remaining                  PROM of shoulder to empty end feel with intermittently applied grade II mobilizations in   the open packed position of the shoulder. Maximum PROM is at week 6:   o 140° forward flexion   o 90° abduction  o 45° external rotation   o 35° internal rotation   Scaption pulleys at 10 minute maximum with 2 minute rest provided at 5 minutes   Pendulum elbow TIAOM  "and progressive gripping exercises     HEP: [id number]   Manuals 2/18 2/20 2/25 2/27 3/4 3/6 3/18 3/20                                                         Neuro Re-Ed             Repeated cervical retraction     x30        Repeated cervical retraction w/ extension     x30  x30 x30     Scap isometric       3x10 5\" 3x10 5\"                                                         Ther Ex             Education  5' MITESHK JUAN A          R shoulder PROM 10' MITESHK JUAN A SAGASTUMEK     Scaption pulley's 6' 6' x2 w/ rest break 5' x2 w/ rest 5' x2 w/ rest 5' x2 w/ rest 5' x2 w/ rest 5' x2 w/ rest 5' x2 w/ rest     Pendulums  X20 ea X20 ea           AAROM flex, abd, ER X10 ea X30 ea X30 ea X30 ea X30 ea X30 ea X30 ea X30 ea                                                         Ther Activity                                       Gait Training                                       Modalities                                            "

## 2025-03-21 ENCOUNTER — OFFICE VISIT (OUTPATIENT)
Dept: OBGYN CLINIC | Facility: CLINIC | Age: 60
End: 2025-03-21

## 2025-03-21 VITALS — RESPIRATION RATE: 18 BRPM | BODY MASS INDEX: 50.62 KG/M2 | WEIGHT: 315 LBS | HEIGHT: 66 IN

## 2025-03-21 DIAGNOSIS — Z98.890 S/P RIGHT ROTATOR CUFF REPAIR: Primary | ICD-10-CM

## 2025-03-21 PROCEDURE — 99024 POSTOP FOLLOW-UP VISIT: CPT | Performed by: ORTHOPAEDIC SURGERY

## 2025-03-21 NOTE — PROGRESS NOTES
Name: Osmin Hook      : 1965      MRN: [id number]  Encounter Provider: Tommie Baird DO  Encounter Date: 3/21/2025   Encounter department: [facility] [place]  :  Assessment & Plan  S/P right rotator cuff repair  S/p Right shoulder arthroscopic revision rotator cuff repair with bioinductive collagen implant augmentation, extensive debridement performed on 2025. Approximately 11 weeks post op.  Overall, patient is doing well s/p surgical intervention with a slight return of pain approximately 3 weeks ago.  Plan for continued formal physical therapy. Continue gradual range of motion, no strengthening at this time.  New order placed at today's visit.  Continue OTC medications as needed  Follow up in 6 weeks for reevaluation.    Orders:    Ambulatory Referral to Physical Therapy; Future      History of the Present Illness   History of Present Illness     HPI   Osmin Hook is a 60 y.o. male approximately 11 weeks s/p right shoulder arthroscopic revision rotator cuff repair with bioinductive collagen implant augmentation, extensive debridement performed on 2025.  Patient does note that he began to experience worsening pain approximately 3 weeks ago.  He notes pain is mainly to the anterolateral aspect of the shoulder.  He notes that he has been going to physical therapy.  He denies any distal paresthesias or other concerns at today's visit.    Review of Systems     Review of Systems  Constitutional:  Negative for chills and fever.   HENT:  Negative for ear pain and sore throat.    Eyes:  Negative for pain and visual disturbance.   Respiratory:  Negative for cough and shortness of breath.    Cardiovascular:  Negative for chest pain and palpitations.   Gastrointestinal:  Negative for abdominal pain and vomiting.   Genitourinary:  Negative for dysuria and hematuria.   Musculoskeletal:  Negative for arthralgias and back pain.   Skin:  Negative for color change  "and rash.   Neurological:  Negative for seizures and syncope.   All other systems reviewed and are negative.    Physical Exam   Objective   Resp 18   Ht 5' 6\" (1.676 m)   Wt (!) 144 kg (318 lb)   BMI 51.33 kg/m²        Right Shoulder:   Surgical incisions well healed without surrounding erythema  Active range of motion   90 degrees forward flexion  90 degrees abduction  Passive range of motion   130  degrees of forward flexion   There is no tenderness to palpation throughout the shoulder  The patient is neurovascularly intact distally in the extremity.      Data Review     I have personally reviewed pertinent films in PACS, and my interpretation follows.    No new images reviewed at today's visit.    Social History     Tobacco Use    Smoking status: Former     Current packs/day: 0.00     Average packs/day: 0.3 packs/day for 5.8 years (1.4 ttl pk-yrs)     Types: Cigarettes     Start date: 1984     Quit date: 10/20/1989     Years since quittin.4    Smokeless tobacco: Former     Types: Chew     Quit date: 2019   Vaping Use    Vaping status: Never Used   Substance Use Topics    Alcohol use: Yes     Alcohol/week: 4.0 standard drinks of alcohol     Types: 4 Standard drinks or equivalent per week     Comment: occassional    Drug use: Yes     Frequency: 7.0 times per week     Types: Marijuana     Comment: medical       Scribe Attestation      I,:  Rubin Amato PA-C am acting as a scribe while in the presence of the attending physician.:       I,:  Tommie Baird, DO personally performed the services described in this documentation    as scribed in my presence.:             "

## 2025-03-21 NOTE — ASSESSMENT & PLAN NOTE
S/p Right shoulder arthroscopic revision rotator cuff repair with bioinductive collagen implant augmentation, extensive debridement performed on 01/02/2025. Approximately 11 weeks post op.  Overall, patient is doing well s/p surgical intervention with a slight return of pain approximately 3 weeks ago.  Plan for continued formal physical therapy. Continue gradual range of motion, no strengthening at this time.  New order placed at today's visit.  Continue OTC medications as needed  Follow up in 6 weeks for reevaluation.    Orders:    Ambulatory Referral to Physical Therapy; Future

## 2025-03-25 ENCOUNTER — OFFICE VISIT (OUTPATIENT)
Dept: PHYSICAL THERAPY | Facility: CLINIC | Age: 60
End: 2025-03-25
Payer: OTHER MISCELLANEOUS

## 2025-03-25 DIAGNOSIS — Z98.890 S/P ARTHROSCOPY OF RIGHT SHOULDER: Primary | ICD-10-CM

## 2025-03-25 PROCEDURE — 97110 THERAPEUTIC EXERCISES: CPT

## 2025-03-25 PROCEDURE — 97112 NEUROMUSCULAR REEDUCATION: CPT

## 2025-03-25 NOTE — PROGRESS NOTES
Daily Note     Today's date: 3/25/2025  Patient name: Osmin Hook  : 1965  MRN: [id number]  Referring provider: Kirsten Rodrigues PA-C  Dx:   Encounter Diagnosis     ICD-10-CM    1. S/P arthroscopy of right shoulder  Z98.890         Start Time: 1010  Stop Time: 1100  Total time in clinic (min): 50 minutes    Subjective: Pt reports that he is too complete PT for additional 6 weeks w/ ROM focus only w/ no strength training. Pt reports his pain is the same and that it has not gotten worse over the last week.       Objective: See treatment diary below    Current measures R shoulder PROM:   162° forward flexion   122° abduction  ER/IR WFL w/ no abduction  ER @ 90* abduction: 35*  IR @ 90* abduction: 40*    Assessment: Tolerated treatment well. No major changes in today's session, Pt still challenged by exercise prescription and is slowly returning to full ROM. Patient demonstrated fatigue post treatment, exhibited good technique with therapeutic exercises, and would benefit from continued PT for increased ROM, increased strength, and decreased symptom irritability.       Plan: Continue per plan of care.      Precautions: HTN, hx of lumbar fusion, Lyme's   POC expires Unit limit Auth Expiration date PT/OT/ST + Visit Limit?   25 BOMN N/A BOMN                           Visit/Unit Tracking  AUTH Status:  Date 2/13 2/18 2/20 2/25 2/27 3/4 3/6 3/18 3/20 3/25  Re-eval   N/A Used 1 2 4 5 6 7 8 9 10 11      Remaining                  PROM of shoulder to empty end feel with intermittently applied grade II mobilizations in   the open packed position of the shoulder. Maximum PROM is at week 6:   o 140° forward flexion   o 90° abduction  o 45° external rotation   o 35° internal rotation   Scaption pulleys at 10 minute maximum with 2 minute rest provided at 5 minutes   Pendulum, elbow AAROM and progressive gripping exercises     HEP: [id number]   Manuals  3/4 3/6 3/18 3/20 3/25                           "                              Neuro Re-Ed             Repeated cervical retraction     x30        Repeated cervical retraction w/ extension     x30  x30 x30     Scap isometric       3x10 5\" 3x10 5\" 3x10 5\"                                                        Ther Ex             Education  5' JUAN A VELAZCO          R shoulder PROM 10' JUAN A VELAZCO    Scaption pulley's 6' 6' x2 w/ rest break 5' x2 w/ rest 5' x2 w/ rest 5' x2 w/ rest 5' x2 w/ rest 5' x2 w/ rest 5' x2 w/ rest 5' x2 w/ rest    Pendulums  X20 ea X20 ea           AAROM flex, abd, ER X10 ea X30 ea X30 ea X30 ea X30 ea X30 ea X30 ea X30 ea X30 ea                                                        Ther Activity                                       Gait Training                                       Modalities                                            "

## 2025-03-27 ENCOUNTER — OFFICE VISIT (OUTPATIENT)
Dept: PHYSICAL THERAPY | Facility: CLINIC | Age: 60
End: 2025-03-27
Payer: OTHER MISCELLANEOUS

## 2025-03-27 DIAGNOSIS — Z98.890 S/P ARTHROSCOPY OF RIGHT SHOULDER: Primary | ICD-10-CM

## 2025-03-27 PROCEDURE — 97110 THERAPEUTIC EXERCISES: CPT

## 2025-03-27 PROCEDURE — 97112 NEUROMUSCULAR REEDUCATION: CPT

## 2025-03-27 NOTE — PROGRESS NOTES
Daily Note     Today's date: 3/27/2025  Patient name: Osmin Hook  : 1965  MRN: [id number]  Referring provider: Kirsten Rodrigues PA-C  Dx:   Encounter Diagnosis     ICD-10-CM    1. S/P arthroscopy of right shoulder  Z98.890         Start Time: 1500  Stop Time: 1541  Total time in clinic (min): 41 minutes    Subjective: Pt reports that he is very sore and that his pain is only increasing not decreasing. Pt is having trouble sleeping and is looking to get a new pillow to assist in his sleeping.       Objective: See treatment diary below    Current measures R shoulder PROM:   162° forward flexion   122° abduction  ER/IR WFL w/ no abduction  ER @ 90* abduction: 35*  IR @ 90* abduction: 40*    Assessment: Tolerated treatment well. Pt maintains WFL R shoulder PROM. Pt still has end range pain w/ PROM. No major changes in AAROM in today's session. Continue to progress towards full PROM and AROM. Patient demonstrated fatigue post treatment, exhibited good technique with therapeutic exercises, and would benefit from continued PT for increased ROM, increased strength, and decreased symptom irritability.       Plan: Continue per plan of care.      Precautions: HTN, hx of lumbar fusion, Lyme's   POC expires Unit limit Auth Expiration date PT/OT/ST + Visit Limit?   25 BOMN N/A BOMN                           Visit/Unit Tracking  AUTH Status:  Date  3 3/6 3/18 3/20 3/25 3/27 Re-eval   N/A Used 1 2 4 5 6 7 8 9 10 11 12     Remaining                  PROM of shoulder to empty end feel with intermittently applied grade II mobilizations in   the open packed position of the shoulder. Maximum PROM is at week 6:   o 140° forward flexion   o 90° abduction  o 45° external rotation   o 35° internal rotation   Scaption pulleys at 10 minute maximum with 2 minute rest provided at 5 minutes   Pendulum, elbow AAROM and progressive gripping exercises     HEP: [id number]   Manuals  3/4  "3/6 3/18 3/20 3/25 3/27                                                       Neuro Re-Ed             Repeated cervical retraction     x30        Repeated cervical retraction w/ extension     x30  x30 x30     Scap isometric       3x10 5\" 3x10 5\" 3x10 5\" 3x10 5\"                                                       Ther Ex             Education  5' JMK MITESHK          R shoulder PROM 10' JUAN A SAGASTUMEK   Scaption pulley's 6' 6' x2 w/ rest break 5' x2 w/ rest 5' x2 w/ rest 5' x2 w/ rest 5' x2 w/ rest 5' x2 w/ rest 5' x2 w/ rest 5' x2 w/ rest 5' x2 w/ rest   Pendulums  X20 ea X20 ea           AAROM flex, abd, ER X10 ea X30 ea X30 ea X30 ea X30 ea X30 ea X30 ea X30 ea X30 ea X30 ea                                                       Ther Activity                                       Gait Training                                       Modalities                                            "

## 2025-04-01 ENCOUNTER — OFFICE VISIT (OUTPATIENT)
Dept: PHYSICAL THERAPY | Facility: CLINIC | Age: 60
End: 2025-04-01
Payer: OTHER MISCELLANEOUS

## 2025-04-01 DIAGNOSIS — Z98.890 S/P ARTHROSCOPY OF RIGHT SHOULDER: Primary | ICD-10-CM

## 2025-04-01 PROCEDURE — 97110 THERAPEUTIC EXERCISES: CPT

## 2025-04-01 PROCEDURE — 97112 NEUROMUSCULAR REEDUCATION: CPT

## 2025-04-01 NOTE — PROGRESS NOTES
Daily Note/ Progress Note     Today's date: 2025  Patient name: Osmin Hook  : 1965  MRN: [id number]  Referring provider: Kirsten Rodrigues PA-C  Dx:   Encounter Diagnosis     ICD-10-CM    1. S/P arthroscopy of right shoulder  Z98.890         Start Time: 1010  Stop Time: 1058  Total time in clinic (min): 48 minutes    Subjective: Pt reports that there are no changes in symptom irritability, but pt's pain has radiated across his R collarbone. Pt reports his shoulder feels very tight and that the collarbone pain is making it difficult to complete basic ADLs like cooking.     Objective: See treatment diary below    Current measures R shoulder PROM:   162° forward flexion   122° abduction  ER/IR WFL w/ no abduction  ER @ 90* abduction: 35*  IR @ 90* abduction: 40*    Assessment: Tolerated treatment well. No major changes in PROM compared to previous visits. AAROM remains the same as well w/ minimal improvements against gravity. Pt demonstrates good parascapular contractions w/ exercises. Pt is limited by pain more than GH hypomobility as he is slowly returning to WNL PROM. Patient demonstrated fatigue post treatment, exhibited good technique with therapeutic exercises, and would benefit from continued PT for increased ROM, increased strength, and decreased symptom irritability.       Plan: Continue per plan of care.      Precautions: HTN, hx of lumbar fusion, Lyme's   POC expires Unit limit Auth Expiration date PT/OT/ST + Visit Limit?   25 BOMN N/A BOMN                           Visit/Unit Tracking  AUTH Status:  Date 2/13 2/18 2/20 2/25 2/27 3/ 3/6 3/18 3/20 3/25 3/27 4/   N/A Used 1 2 4 5 6 7 8 9 10 11 12 13    Remaining                  PROM of shoulder to empty end feel with intermittently applied grade II mobilizations in   the open packed position of the shoulder. Maximum PROM is at week 6:   o 140° forward flexion   o 90° abduction  o 45° external rotation   o 35° internal rotation  "  Scaption pulleys at 10 minute maximum with 2 minute rest provided at 5 minutes   Pendulum, elbow AAROM and progressive gripping exercises     HEP: [id number]   Manuals 2/18 2/20 2/25 2/27 3/4 3/6 3/18 3/20 3/25 3/27 4/1                                                           Neuro Re-Ed              Repeated cervical retraction     x30         Repeated cervical retraction w/ extension     x30  x30 x30      Scap isometric       3x10 5\" 3x10 5\" 3x10 5\" 3x10 5\" 3x10 5\"                                                           Ther Ex              Education  5' JMK JMK           R shoulder PROM 10' JMK JMK JMK JMK JMK JMK JMK JMK JMK JMK JMK   Scaption pulley's 6' 6' x2 w/ rest break 5' x2 w/ rest 5' x2 w/ rest 5' x2 w/ rest 5' x2 w/ rest 5' x2 w/ rest 5' x2 w/ rest 5' x2 w/ rest 5' x2 w/ rest 5' x2 w/ rest   Pendulums  X20 ea X20 ea            AAROM flex, abd, ER X10 ea X30 ea X30 ea X30 ea X30 ea X30 ea X30 ea X30 ea X30 ea X30 ea X30 ea                                                           Ther Activity                                          Gait Training                                          Modalities                                               "

## 2025-04-03 ENCOUNTER — OFFICE VISIT (OUTPATIENT)
Dept: PHYSICAL THERAPY | Facility: CLINIC | Age: 60
End: 2025-04-03
Payer: OTHER MISCELLANEOUS

## 2025-04-03 DIAGNOSIS — Z98.890 S/P ARTHROSCOPY OF RIGHT SHOULDER: Primary | ICD-10-CM

## 2025-04-03 PROCEDURE — 97112 NEUROMUSCULAR REEDUCATION: CPT

## 2025-04-03 PROCEDURE — 97110 THERAPEUTIC EXERCISES: CPT

## 2025-04-03 NOTE — PROGRESS NOTES
Daily Note    Today's date: 4/3/2025  Patient name: Osmin Hook  : 1965  MRN: [id number]  Referring provider: Kirsten Rodrigues PA-C  Dx:   Encounter Diagnosis     ICD-10-CM    1. S/P arthroscopy of right shoulder  Z98.890         Start Time: 1542  Stop Time: 1628  Total time in clinic (min): 46 minutes    Subjective: Pt reports no changes since last visit.       Objective: See treatment diary below    Current measures R shoulder PROM:   162° forward flexion   122° abduction  ER/IR WFL w/ no abduction  ER @ 90* abduction: 35*  IR @ 90* abduction: 40*    Assessment: Tolerated treatment well. Added prone parascapular exercise to program to further AROM and promote return to normal function. Pt tolerated the new exercises well w/ prone Y being the only irritable motion. Pt's PROM is WFL and continues to improve each session. No changes in symptom irritability levels. Patient demonstrated fatigue post treatment, exhibited good technique with therapeutic exercises, and would benefit from continued PT for increased ROM, increased strength, and decreased symptom irritability.       Plan: Continue per plan of care.      Precautions: HTN, hx of lumbar fusion, Lyme's   POC expires Unit limit Auth Expiration date PT/OT/ST + Visit Limit?   25 BOMN N/A BOMN                           Visit/Unit Tracking  AUTH Status:  Date 2/13 2/18 2/20 2/25 2/27 3/4 3/6 3/18 3/20 3/25 3/27 4/ 4/3   N/A Used 1 2 4 5 6 7 8 9 10 11 12 13 14    Remaining                   PROM of shoulder to empty end feel with intermittently applied grade II mobilizations in   the open packed position of the shoulder. Maximum PROM is at week 6:   o 140° forward flexion   o 90° abduction  o 45° external rotation   o 35° internal rotation   Scaption pulleys at 10 minute maximum with 2 minute rest provided at 5 minutes   Pendulum, elbow AAROM and progressive gripping exercises     HEP: [id number]   Manuals  3/4 3/6 3/18 3/20 3/25  "3/27 4/1 4/3                                                               Neuro Re-Ed               Repeated cervical retraction     x30          Repeated cervical retraction w/ extension     x30  x30 x30       Scap isometric       3x10 5\" 3x10 5\" 3x10 5\" 3x10 5\" 3x10 5\"    Prone Y,T,I            3x10 3\" ea                                                Ther Ex               Education  5' JMK JMK            R shoulder PROM 10' JMK MITESHK MITESHK JMK JMK JMK JMK JMK JMK JMK JMK JMK   Scaption pulley's 6' 6' x2 w/ rest break 5' x2 w/ rest 5' x2 w/ rest 5' x2 w/ rest 5' x2 w/ rest 5' x2 w/ rest 5' x2 w/ rest 5' x2 w/ rest 5' x2 w/ rest 5' x2 w/ rest 5' x2 w/ rest   Pendulums  X20 ea X20 ea             AAROM flex, abd, ER X10 ea X30 ea X30 ea X30 ea X30 ea X30 ea X30 ea X30 ea X30 ea X30 ea X30 ea    Prone flexion w/ dowel            3x10                                                Ther Activity                                             Gait Training                                             Modalities                                                  "

## 2025-04-08 ENCOUNTER — OFFICE VISIT (OUTPATIENT)
Dept: PHYSICAL THERAPY | Facility: CLINIC | Age: 60
End: 2025-04-08
Payer: OTHER MISCELLANEOUS

## 2025-04-08 DIAGNOSIS — Z98.890 S/P ARTHROSCOPY OF RIGHT SHOULDER: Primary | ICD-10-CM

## 2025-04-08 PROCEDURE — 97112 NEUROMUSCULAR REEDUCATION: CPT

## 2025-04-08 PROCEDURE — 97110 THERAPEUTIC EXERCISES: CPT

## 2025-04-08 NOTE — PROGRESS NOTES
Daily Note    Today's date: 2025  Patient name: Osmin Hook  : 1965  MRN: [id number]  Referring provider: Kirsten Rodrigues PA-C  Dx:   Encounter Diagnosis     ICD-10-CM    1. S/P arthroscopy of right shoulder  Z98.890         Start Time: 1010  Stop Time: 1055  Total time in clinic (min): 45 minutes    Subjective: Pt reports no increase in symptom irritability following new exercises. Pt reports his soreness lasted a little longer due to DOMS.      Objective: See treatment diary below    Current measures R shoulder PROM:   162° forward flexion   122° abduction  ER/IR WFL w/ no abduction  ER @ 90* abduction: 35*  IR @ 90* abduction: 40*    Assessment: Tolerated treatment well. Pt reported increased soreness during prone exercises today, attributes it to the soreness from the weekend. Pt's PROM remains WFL and AROM continues to improve. Patient demonstrated fatigue post treatment, exhibited good technique with therapeutic exercises, and would benefit from continued PT for increased ROM, increased strength, and decreased symptom irritability.       Plan: Continue per plan of care.      Precautions: HTN, hx of lumbar fusion, Lyme's   POC expires Unit limit Auth Expiration date PT/OT/ST + Visit Limit?   25 BOMN N/A BOMN                           Visit/Unit Tracking  AUTH Status:  Date  3/4 3/6 3/18 3/20 3/25 3/27 4/1 4/3   N/A Used 1 2 4 5 6 7 8 9 10 11 12 13 14    Remaining                   PROM of shoulder to empty end feel with intermittently applied grade II mobilizations in   the open packed position of the shoulder. Maximum PROM is at week 6:   o 140° forward flexion   o 90° abduction  o 45° external rotation   o 35° internal rotation   Scaption pulleys at 10 minute maximum with 2 minute rest provided at 5 minutes   Pendulum, elbow AAROM and progressive gripping exercises     HEP: [id number]   Manuals  3/ 3/6 3/18 3/20 3/25 3/27 4/1 4/3 4/8               "                                                     Neuro Re-Ed                Repeated cervical retraction     x30           Repeated cervical retraction w/ extension     x30  x30 x30        Scap isometric       3x10 5\" 3x10 5\" 3x10 5\" 3x10 5\" 3x10 5\"     Prone Y,T,I            3x10 3\" ea 3x10 3\" ea                                                   Ther Ex                Education  5' JMK JMK             R shoulder PROM 10' JMK JMK JMK JMK JMK JMK JMK JMK JMK JMK JMK JMK JMK   Scaption pulley's 6' 6' x2 w/ rest break 5' x2 w/ rest 5' x2 w/ rest 5' x2 w/ rest 5' x2 w/ rest 5' x2 w/ rest 5' x2 w/ rest 5' x2 w/ rest 5' x2 w/ rest 5' x2 w/ rest 5' x2 w/ rest 5' x2 w/ rest   Pendulums  X20 ea X20 ea              AAROM flex, abd, ER X10 ea X30 ea X30 ea X30 ea X30 ea X30 ea X30 ea X30 ea X30 ea X30 ea X30 ea     Prone flexion w/ dowel            3x10 3x10 3#                                                   Ther Activity                                                Gait Training                                                Modalities                                                     "

## 2025-04-10 ENCOUNTER — APPOINTMENT (OUTPATIENT)
Dept: PHYSICAL THERAPY | Facility: CLINIC | Age: 60
End: 2025-04-10
Payer: OTHER MISCELLANEOUS

## 2025-04-15 ENCOUNTER — OFFICE VISIT (OUTPATIENT)
Dept: PHYSICAL THERAPY | Facility: CLINIC | Age: 60
End: 2025-04-15
Attending: PHYSICIAN ASSISTANT
Payer: OTHER MISCELLANEOUS

## 2025-04-15 DIAGNOSIS — Z98.890 S/P ARTHROSCOPY OF RIGHT SHOULDER: Primary | ICD-10-CM

## 2025-04-15 PROCEDURE — 97112 NEUROMUSCULAR REEDUCATION: CPT

## 2025-04-15 PROCEDURE — 97140 MANUAL THERAPY 1/> REGIONS: CPT

## 2025-04-15 PROCEDURE — 97110 THERAPEUTIC EXERCISES: CPT

## 2025-04-15 NOTE — PROGRESS NOTES
"Daily Note     Today's date: 4/15/2025  Patient name: Osmin Hook  : 1965  MRN: [id number]  Referring provider: Kirsten Rodrigues PA-C  Dx:   Encounter Diagnosis     ICD-10-CM    1. S/P arthroscopy of right shoulder  Z98.890                      Subjective: Pt reports no new complaints at this time.       Objective: See treatment diary below      Assessment: Tolerated treatment well. Patient would benefit from continued PT      Plan: Continue per plan of care.      Precautions: HTN, hx of lumbar fusion, Lyme's   POC expires Unit limit Auth Expiration date PT/OT/ST + Visit Limit?   25 BOMN N/A BOMN                           Visit/Unit Tracking  AUTH Status:  Date 3/4 3/6 3/18 3/20 3/25 3/27 4/1 4/3 4/15   N/A Used 7 8 9 10 11 12 13 14 15    Remaining               PROM of shoulder to empty end feel with intermittently applied grade II mobilizations in   the open packed position of the shoulder. Maximum PROM is at week 6:   o 140° forward flexion   o 90° abduction  o 45° external rotation   o 35° internal rotation   Scaption pulleys at 10 minute maximum with 2 minute rest provided at 5 minutes   Pendulum, elbow AAROM and progressive gripping exercises     HEP: [id number]   Manuals 3/4 3/6 3/18 3/20 3/25 3/27 4/1 4/3 4/8 4/15                                                       Neuro Re-Ed             Repeated cervical retraction x30            Repeated cervical retraction w/ extension x30  x30 x30         Scap isometric   3x10 5\" 3x10 5\" 3x10 5\" 3x10 5\" 3x10 5\"      Prone Y,T,I        3x10 3\" ea 3x10 3\" ea 3x10 3\" ea                                          Ther Ex             Education              R shoulder PROM JMK JUAN A VELAZCO HA   Scaption pulley's 5' x2 w/ rest 5' x2 w/ rest 5' x2 w/ rest 5' x2 w/ rest 5' x2 w/ rest 5' x2 w/ rest 5' x2 w/ rest 5' x2 w/ rest 5' x2 w/ rest 5'x2 w/ rest   Pendulums              AAROM flex, abd, ER X30 ea X30 ea X30 ea X30 ea X30 ea X30 ea X30 ea   "    Prone flexion w/ dowel        3x10 3x10 3# 3x10 3#                                          Ther Activity                                       Gait Training                                       Modalities

## 2025-04-17 ENCOUNTER — OFFICE VISIT (OUTPATIENT)
Dept: PHYSICAL THERAPY | Facility: CLINIC | Age: 60
End: 2025-04-17
Payer: OTHER MISCELLANEOUS

## 2025-04-17 DIAGNOSIS — Z98.890 S/P ARTHROSCOPY OF RIGHT SHOULDER: Primary | ICD-10-CM

## 2025-04-17 PROCEDURE — 97112 NEUROMUSCULAR REEDUCATION: CPT

## 2025-04-17 PROCEDURE — 97110 THERAPEUTIC EXERCISES: CPT

## 2025-04-17 NOTE — PROGRESS NOTES
"Daily Note     Today's date: 2025  Patient name: Osmin Hook  : 1965  MRN: [id number]  Referring provider: Kirsten Rodrigues PA-C  Dx:   Encounter Diagnosis     ICD-10-CM    1. S/P arthroscopy of right shoulder  Z98.890         Start Time: 1542  Stop Time: 1622  Total time in clinic (min): 40 minutes    Subjective: Pt reports no changes since last visit. Pt reports that he felt increase soreness following last session since he was off for a week.       Objective: See treatment diary below      Assessment: Tolerated treatment well. Pt's R shoulder maintains WFL PROM. Pt continues to progress on AROM. Patient demonstrated fatigue post treatment, exhibited good technique with therapeutic exercises, and would benefit from continued PT for increased ROM, increased strength, and decreased symptom irritability.       Plan: Continue per plan of care.      Precautions: HTN, hx of lumbar fusion, Lyme's   POC expires Unit limit Auth Expiration date PT/OT/ST + Visit Limit?   25 BOMN N/A BOMN                           Visit/Unit Tracking  AUTH Status:  Date 3/4 3/6 3/18 3/20 3/25 3/27 4/1 4/3 4/15 4/17   N/A Used 7 8 9 10 11 12 13 14 15 16    Remaining                PROM of shoulder to empty end feel with intermittently applied grade II mobilizations in   the open packed position of the shoulder. Maximum PROM is at week 6:   o 140° forward flexion   o 90° abduction  o 45° external rotation   o 35° internal rotation   Scaption pulleys at 10 minute maximum with 2 minute rest provided at 5 minutes   Pendulum, elbow AAROM and progressive gripping exercises     HEP: [id number]   Manuals 3/4 3/6 3/18 3/20 3/25 3/27 4/1 4/3 4/8 4/15 4/17                                                           Neuro Re-Ed              Repeated cervical retraction x30             Repeated cervical retraction w/ extension x30  x30 x30          Scap isometric   3x10 5\" 3x10 5\" 3x10 5\" 3x10 5\" 3x10 5\"       Prone Y,T,I        3x10 3\" " "ea 3x10 3\" ea 3x10 3\" ea 3x10 3\" ea                                             Ther Ex              Education               R shoulder PROM JMK JMK JMK JMK JMK JMK JMK JMK JMK HA JMK   Scaption pulley's 5' x2 w/ rest 5' x2 w/ rest 5' x2 w/ rest 5' x2 w/ rest 5' x2 w/ rest 5' x2 w/ rest 5' x2 w/ rest 5' x2 w/ rest 5' x2 w/ rest 5'x2 w/ rest 5'x2 w/ rest   Pendulums               AAROM flex, abd, ER X30 ea X30 ea X30 ea X30 ea X30 ea X30 ea X30 ea       Prone flexion w/ dowel        3x10 3x10 3# 3x10 3# 3x10 3#                                             Ther Activity                                          Gait Training                                          Modalities                                                 "

## 2025-04-22 ENCOUNTER — OFFICE VISIT (OUTPATIENT)
Dept: PHYSICAL THERAPY | Facility: CLINIC | Age: 60
End: 2025-04-22
Payer: OTHER MISCELLANEOUS

## 2025-04-22 DIAGNOSIS — Z98.890 S/P ARTHROSCOPY OF RIGHT SHOULDER: Primary | ICD-10-CM

## 2025-04-22 PROCEDURE — 97112 NEUROMUSCULAR REEDUCATION: CPT

## 2025-04-22 PROCEDURE — 97110 THERAPEUTIC EXERCISES: CPT

## 2025-04-24 ENCOUNTER — OFFICE VISIT (OUTPATIENT)
Dept: PHYSICAL THERAPY | Facility: CLINIC | Age: 60
End: 2025-04-24
Attending: PHYSICIAN ASSISTANT
Payer: OTHER MISCELLANEOUS

## 2025-04-24 DIAGNOSIS — Z98.890 S/P ARTHROSCOPY OF RIGHT SHOULDER: Primary | ICD-10-CM

## 2025-04-24 PROCEDURE — 97110 THERAPEUTIC EXERCISES: CPT | Performed by: PHYSICAL THERAPIST

## 2025-04-24 PROCEDURE — 97112 NEUROMUSCULAR REEDUCATION: CPT | Performed by: PHYSICAL THERAPIST

## 2025-04-24 NOTE — PROGRESS NOTES
"Daily Note     Today's date: 2025  Patient name: Osmin Hook  : 1965  MRN: [id number]  Referring provider: Kirsten Rodrigues PA-C  Dx:   Encounter Diagnosis     ICD-10-CM    1. S/P arthroscopy of right shoulder  Z98.890                    Subjective: Patient states no significant change at this time.       Objective: See treatment diary below      Assessment: Patient performed exercises without c/o pain; no c/o pain with manual shoulder PROM.       Plan: Continue per plan of care.      Precautions: HTN, hx of lumbar fusion, Lyme's   POC expires Unit limit Auth Expiration date PT/OT/ST + Visit Limit?   25 BOMN N/A BOMN                           Visit/Unit Tracking  AUTH Status:  Date 4/24 3/6 3/18 3/20 3/25 3/27 4/1 4/3 4/15 4/17 4/22   N/A Used 18 8 9 10 11 12 13 14 15 16 17    Remaining                 PROM of shoulder to empty end feel with intermittently applied grade II mobilizations in   the open packed position of the shoulder. Maximum PROM is at week 6:   o 140° forward flexion   o 90° abduction  o 45° external rotation   o 35° internal rotation   Scaption pulleys at 10 minute maximum with 2 minute rest provided at 5 minutes   Pendulum, elbow AAROM and progressive gripping exercises     HEP: [id number]   Manuals 4/24 3/6 3/18 3/20 3/25 3/27 4/1 4/3 4/8 4/15 4/17 4/22                                                               Neuro Re-Ed               Repeated cervical retraction               Repeated cervical retraction w/ extension   x30 x30           Scap isometric   3x10 5\" 3x10 5\" 3x10 5\" 3x10 5\" 3x10 5\"        Prone Y,T,I 3x10 3\" ea       3x10 3\" ea 3x10 3\" ea 3x10 3\" ea 3x10 3\" ea 3x10 3\" ea                                                Ther Ex               Education                R shoulder PROM KK MITESHK JMK JMK JMK JMK JMK JMK JMK HA JMK JMK   Scaption pulley's 5' x2 w/ rest 5' x2 w/ rest 5' x2 w/ rest 5' x2 w/ rest 5' x2 w/ rest 5' x2 w/ rest 5' x2 w/ rest 5' x2 w/ rest 5' x2 " w/ rest 5'x2 w/ rest 5'x2 w/ rest 5'x2 w/ rest   Pendulums                AAROM flex, abd, ER  X30 ea X30 ea X30 ea X30 ea X30 ea X30 ea        Prone flexion w/ dowel 3x10 3#       3x10 3x10 3# 3x10 3# 3x10 3# 3x10 3#                                                Ther Activity                                             Gait Training                                             Modalities

## 2025-04-29 ENCOUNTER — OFFICE VISIT (OUTPATIENT)
Dept: PHYSICAL THERAPY | Facility: CLINIC | Age: 60
End: 2025-04-29
Payer: OTHER MISCELLANEOUS

## 2025-04-29 DIAGNOSIS — Z98.890 S/P ARTHROSCOPY OF RIGHT SHOULDER: Primary | ICD-10-CM

## 2025-04-29 PROCEDURE — 97112 NEUROMUSCULAR REEDUCATION: CPT

## 2025-04-29 PROCEDURE — 97110 THERAPEUTIC EXERCISES: CPT

## 2025-04-29 NOTE — PROGRESS NOTES
"Daily Note     Today's date: 2025  Patient name: Osmin Hook  : 1965  MRN: [id number]  Referring provider: Kirsten Rodrigues PA-C  Dx:   Encounter Diagnosis     ICD-10-CM    1. S/P arthroscopy of right shoulder  Z98.890         Start Time: 1014  Stop Time: 1052  Total time in clinic (min): 38 minutes    Subjective: Patient reports no changes since last visit. Pt has follow-up w/ Physician at the end of the week.       Objective: See treatment diary below      Assessment: Patient tolerated treatment well. Pt maintains WFL ROM, stiff has strong pain at end ranges. Pt is compensating during prone Y by shrugging R upper trap. Pt would benefit from continued PT.       Plan: Continue per plan of care.      Precautions: HTN, hx of lumbar fusion, Lyme's   POC expires Unit limit Auth Expiration date PT/OT/ST + Visit Limit?   25 BOMN N/A BOMN                           Visit/Unit Tracking  AUTH Status:  Date 4/24 3/6 3/18 3/20 3/25 3/27 4/1 4/3 4/15 4/17 4/22 4/29   N/A Used 18 8 9 10 11 12 13 14 15 16 17 18    Remaining                  PROM of shoulder to empty end feel with intermittently applied grade II mobilizations in   the open packed position of the shoulder. Maximum PROM is at week 6:   o 140° forward flexion   o 90° abduction  o 45° external rotation   o 35° internal rotation   Scaption pulleys at 10 minute maximum with 2 minute rest provided at 5 minutes   Pendulum, elbow AAROM and progressive gripping exercises     HEP: [id number]   Manuals 4/24 3/6 3/18 3/20 3/25 3/27 4/1 4/3 4/8 4/15 4/17 4/22 4/29                                                                   Neuro Re-Ed                Repeated cervical retraction                Repeated cervical retraction w/ extension   x30 x30            Scap isometric   3x10 5\" 3x10 5\" 3x10 5\" 3x10 5\" 3x10 5\"         Prone Y,T,I 3x10 3\" ea       3x10 3\" ea 3x10 3\" ea 3x10 3\" ea 3x10 3\" ea 3x10 3\" ea 3x10 3\" ea                                            "        Ther Ex                Education                 R shoulder PROM KK MITESHK JUAN A VELAZCO HA JUAN A VELAZCO   Scaption pulley's 5' x2 w/ rest 5' x2 w/ rest 5' x2 w/ rest 5' x2 w/ rest 5' x2 w/ rest 5' x2 w/ rest 5' x2 w/ rest 5' x2 w/ rest 5' x2 w/ rest 5'x2 w/ rest 5'x2 w/ rest 5'x2 w/ rest 5'x2 w/ rest   Pendulums                 AAROM flex, abd, ER  X30 ea X30 ea X30 ea X30 ea X30 ea X30 ea         Prone flexion w/ dowel 3x10 3#       3x10 3x10 3# 3x10 3# 3x10 3# 3x10 3# 3x10 3#                                                   Ther Activity                                                Gait Training                                                Modalities

## 2025-05-01 ENCOUNTER — OFFICE VISIT (OUTPATIENT)
Dept: PHYSICAL THERAPY | Facility: CLINIC | Age: 60
End: 2025-05-01
Payer: OTHER MISCELLANEOUS

## 2025-05-01 DIAGNOSIS — Z98.890 S/P ARTHROSCOPY OF RIGHT SHOULDER: Primary | ICD-10-CM

## 2025-05-01 PROCEDURE — 97110 THERAPEUTIC EXERCISES: CPT

## 2025-05-01 PROCEDURE — 97112 NEUROMUSCULAR REEDUCATION: CPT

## 2025-05-01 NOTE — PROGRESS NOTES
"Daily Note     Today's date: 2025  Patient name: Osmin Hook  : 1965  MRN: [id number]  Referring provider: Kirsten Rodriuges PA-C  Dx:   Encounter Diagnosis     ICD-10-CM    1. S/P arthroscopy of right shoulder  Z98.890         Start Time: 1548  Stop Time: 1629  Total time in clinic (min): 41 minutes    Subjective: Patient reports that he more sore today due to decreased sleep last night. Pt reports his pain levels are not worse.       Objective: See treatment diary below      Assessment: Patient tolerated treatment well. Possible POC change NV based upon results from physician follow-up. Pt remains challenged by exercise prescription. ROM WFL. Pt would benefit from continued PT for increased functional strength and decreased symptom irritability.        Plan: Continue per plan of care.      Precautions: HTN, hx of lumbar fusion, Lyme's   POC expires Unit limit Auth Expiration date PT/OT/ST + Visit Limit?   25 BOMN N/A BOMN                           Visit/Unit Tracking  AUTH Status:  Date 4/1 4/3 4/15 4/17 4/22 4/29 5/1   N/A Used 13 14 15 16 17 19 20    Remaining             PROM of shoulder to empty end feel with intermittently applied grade II mobilizations in   the open packed position of the shoulder. Maximum PROM is at week 6:   o 140° forward flexion   o 90° abduction  o 45° external rotation   o 35° internal rotation   Scaption pulleys at 10 minute maximum with 2 minute rest provided at 5 minutes   Pendulum, elbow AAROM and progressive gripping exercises     HEP: [id number]   Manuals 4/24 4/3 4/8 4/15 4/17 4/22 4/29 5/1                                               Neuro Re-Ed           Repeated cervical retraction           Repeated cervical retraction w/ extension           Scap isometric           Prone Y,T,I 3x10 3\" ea 3x10 3\" ea 3x10 3\" ea 3x10 3\" ea 3x10 3\" ea 3x10 3\" ea 3x10 3\" ea 3x10 3\" ea                                    Ther Ex           Education            R shoulder PROM KK " JUAN A VELAZCO   Scaption pulley's 5' x2 w/ rest 5' x2 w/ rest 5' x2 w/ rest 5'x2 w/ rest 5'x2 w/ rest 5'x2 w/ rest 5'x2 w/ rest 5'x2 w/ rest   Pendulums            AAROM flex, abd, ER           Prone flexion w/ dowel 3x10 3# 3x10 3x10 3# 3x10 3# 3x10 3# 3x10 3# 3x10 3# 3x10 3#                                    Ther Activity                                 Gait Training                                 Modalities

## 2025-05-02 ENCOUNTER — OFFICE VISIT (OUTPATIENT)
Dept: OBGYN CLINIC | Facility: CLINIC | Age: 60
End: 2025-05-02
Payer: OTHER MISCELLANEOUS

## 2025-05-02 VITALS — BODY MASS INDEX: 50.62 KG/M2 | HEIGHT: 66 IN | WEIGHT: 315 LBS

## 2025-05-02 DIAGNOSIS — Z98.890 S/P RIGHT ROTATOR CUFF REPAIR: Primary | ICD-10-CM

## 2025-05-02 PROCEDURE — 99213 OFFICE O/P EST LOW 20 MIN: CPT | Performed by: ORTHOPAEDIC SURGERY

## 2025-05-02 NOTE — PROGRESS NOTES
Name: Osmin Hook      : 1965      MRN: [id number]  Encounter Provider: Tommie Baird DO  Encounter Date: 2025   Encounter department: [facility] [place]  :  Assessment & Plan  S/P right rotator cuff repair           approximately 4 months s/p right shoulder arthroscopic revision rotator cuff repair with bioinductive collagen implant augmentation, extensive debridement performed on 2025.  Patient doing well overall s/p operative management  Recommend range of motion exercises at home. Exercises demonstrated in the office today.  Continue physical therapy as directed  Avoid heavy lifting  No work at this time  Follow up 6 weeks for reevaluation    History of the Present Illness   History of Present Illness     HPI   Osmin Hook is a 60 y.o. male approximately 4 months s/p right shoulder arthroscopic revision rotator cuff repair with bioinductive collagen implant augmentation, extensive debridement performed on 2025. Today, the patient reports he is doing well overall. Patient continues to improve strengthening. He admits difficulty with I, Y.T's exercises at physical therapy. He feels compensation with periscapular muscles. Pain 4/10 today, admits he has constant discomfort. He is still sleeping in a recliner.    Review of Systems     Review of Systems   Constitutional:  Negative for chills and fever.   HENT:  Negative for ear pain and sore throat.    Eyes:  Negative for pain and visual disturbance.   Respiratory:  Negative for cough and shortness of breath.    Cardiovascular:  Negative for chest pain and palpitations.   Gastrointestinal:  Negative for abdominal pain and vomiting.   Genitourinary:  Negative for dysuria and hematuria.   Musculoskeletal:  Negative for arthralgias and back pain.   Skin:  Negative for color change and rash.   Neurological:  Negative for seizures and syncope.   All other systems reviewed and are negative.      Physical  "Exam   Objective   Ht 5' 6\" (1.676 m)   Wt (!) 144 kg (318 lb)   BMI 51.33 kg/m²      Right Shoulder:   Surgical incision well healed  Active range of motion   100 degrees forward flexion  100 degrees abduction  30 degrees external rotation   SI joint internal rotation    Passive range of motion   130 degrees of forward flexion   There is no tenderness present over the shoulder.   Forward flexion testing 4+/5  External rotation testing 4+/5  Internal rotation testing 5/5  The patient is neurovascularly intact distally in the extremity.        Data Review     I have personally reviewed pertinent films in PACS, and my interpretation follows.    No new images reviewed at today's visit.    Social History     Tobacco Use    Smoking status: Former     Current packs/day: 0.00     Average packs/day: 0.3 packs/day for 5.8 years (1.4 ttl pk-yrs)     Types: Cigarettes     Start date: 1984     Quit date: 10/20/1989     Years since quittin.5    Smokeless tobacco: Former     Types: Chew     Quit date: 2019   Vaping Use    Vaping status: Never Used   Substance Use Topics    Alcohol use: Yes     Alcohol/week: 4.0 standard drinks of alcohol     Types: 4 Standard drinks or equivalent per week     Comment: occassional    Drug use: Yes     Frequency: 7.0 times per week     Types: Marijuana     Comment: medical       Scribe Attestation      I,:  Kaylan Vera am acting as a scribe while in the presence of the attending physician.:       I,:  Tommie Baird, DO personally performed the services described in this documentation    as scribed in my presence.:             "

## 2025-05-06 ENCOUNTER — OFFICE VISIT (OUTPATIENT)
Dept: PHYSICAL THERAPY | Facility: CLINIC | Age: 60
End: 2025-05-06
Attending: PHYSICIAN ASSISTANT
Payer: OTHER MISCELLANEOUS

## 2025-05-06 DIAGNOSIS — Z98.890 S/P ARTHROSCOPY OF RIGHT SHOULDER: Primary | ICD-10-CM

## 2025-05-06 PROCEDURE — 97110 THERAPEUTIC EXERCISES: CPT

## 2025-05-06 PROCEDURE — 97112 NEUROMUSCULAR REEDUCATION: CPT

## 2025-05-06 NOTE — PROGRESS NOTES
"Daily Note     Today's date: 2025  Patient name: Osmin Hook  : 1965  MRN: [id number]  Referring provider: Kirsten Rodrigues PA-C  Dx:   Encounter Diagnosis     ICD-10-CM    1. S/P arthroscopy of right shoulder  Z98.890         Start Time: 1013  Stop Time: 1100  Total time in clinic (min): 47 minutes    Subjective: Patient reports that his follow-up went well. Pt is to continue ROM exercises, but minimize prone exercises that cause symptom irritability.       Objective: See treatment diary below      Assessment: Patient tolerated treatment well. Added supine Y to program to help facilitate active scaption ROM in a more gravity minimized position. Pt completed prone T and I w/o any increase in symptom irritability. Pt would benefit from continued PT for increased functional strength and decreased symptom irritability.        Plan: Continue per plan of care.      Precautions: HTN, hx of lumbar fusion, Lyme's   POC expires Unit limit Auth Expiration date PT/OT/ST + Visit Limit?   25 BOMN N/A BOMN                           Visit/Unit Tracking  AUTH Status:  Date 4/1 4/3 4/15 4/17 4/22 4/29 5/1 5/6   N/A Used 13 14 15 16 17 19 20 21    Remaining              PROM of shoulder to empty end feel with intermittently applied grade II mobilizations in   the open packed position of the shoulder. Maximum PROM is at week 6:   o 140° forward flexion   o 90° abduction  o 45° external rotation   o 35° internal rotation   Scaption pulleys at 10 minute maximum with 2 minute rest provided at 5 minutes   Pendulum, elbow AAROM and progressive gripping exercises     HEP: [id number]   Manuals 4/24 4/3 4/8 4/15 4/17 4/22 4/29 5/1 5/6                                                   Neuro Re-Ed            Repeated cervical retraction            Repeated cervical retraction w/ extension            Scap isometric            Prone Y,T,I 3x10 3\" ea 3x10 3\" ea 3x10 3\" ea 3x10 3\" ea 3x10 3\" ea 3x10 3\" ea 3x10 3\" ea 3x10 3\" ea " 3x10 ea no Y                                       Ther Ex            Education             R shoulder PROM KK JUAN A VELAZCO HA JUAN A VELAZCO   Scaption pulley's 5' x2 w/ rest 5' x2 w/ rest 5' x2 w/ rest 5'x2 w/ rest 5'x2 w/ rest 5'x2 w/ rest 5'x2 w/ rest 5'x2 w/ rest 5'x2 w/ rest   Pendulums             AAROM flex, abd, ER            Prone flexion w/ dowel 3x10 3# 3x10 3x10 3# 3x10 3# 3x10 3# 3x10 3# 3x10 3# 3x10 3# 3x10 3#   Supine Y AROM         3x10                           Ther Activity                                    Gait Training                                    Modalities

## 2025-05-08 ENCOUNTER — OFFICE VISIT (OUTPATIENT)
Dept: PHYSICAL THERAPY | Facility: CLINIC | Age: 60
End: 2025-05-08
Payer: OTHER MISCELLANEOUS

## 2025-05-08 DIAGNOSIS — Z98.890 S/P ARTHROSCOPY OF RIGHT SHOULDER: Primary | ICD-10-CM

## 2025-05-08 PROCEDURE — 97112 NEUROMUSCULAR REEDUCATION: CPT

## 2025-05-08 PROCEDURE — 97110 THERAPEUTIC EXERCISES: CPT

## 2025-05-08 NOTE — PROGRESS NOTES
"Daily Note     Today's date: 2025  Patient name: Osmin Hook  : 1965  MRN: [id number]  Referring provider: Kirsten Rodrigues PA-C  Dx:   Encounter Diagnosis     ICD-10-CM    1. S/P arthroscopy of right shoulder  Z98.890         Start Time: 1543  Stop Time: 1625  Total time in clinic (min): 42 minutes    Subjective: Patient reports that he is feeling okay today. Pt's soreness is decreased compared to previous visits.       Objective: See treatment diary below      Assessment: Patient tolerated treatment well. Pt maintains motion. No changes since previous visit. Parascapular exercises remain challenging. Pt would benefit from continued PT for increased functional strength and decreased symptom irritability.        Plan: Continue per plan of care.      Precautions: HTN, hx of lumbar fusion, Lyme's   POC expires Unit limit Auth Expiration date PT/OT/ST + Visit Limit?   25 BOMN N/A BOMN                           Visit/Unit Tracking  AUTH Status:  Date 4/1 4/3 4/15 4/17 4/22 4/29 5/1 5/6 5/8   N/A Used 13 14 15 16 17 19 20 21 22    Remaining               PROM of shoulder to empty end feel with intermittently applied grade II mobilizations in   the open packed position of the shoulder. Maximum PROM is at week 6:   o 140° forward flexion   o 90° abduction  o 45° external rotation   o 35° internal rotation   Scaption pulleys at 10 minute maximum with 2 minute rest provided at 5 minutes   Pendulum, elbow AAROM and progressive gripping exercises     HEP: [id number]   Manuals 4/24 4/3 4/8 4/15 4/17 4/22 4/29 5/1 5/6 5/8                                                       Neuro Re-Ed             Repeated cervical retraction             Repeated cervical retraction w/ extension             Scap isometric             Prone Y,T,I 3x10 3\" ea 3x10 3\" ea 3x10 3\" ea 3x10 3\" ea 3x10 3\" ea 3x10 3\" ea 3x10 3\" ea 3x10 3\" ea 3x10 ea no Y 3x10 ea no Y                                          Ther Ex           "   Education              R shoulder PROM KK JUAN A VELAZCO HA JUAN A VELAZCO   Scaption pulley's 5' x2 w/ rest 5' x2 w/ rest 5' x2 w/ rest 5'x2 w/ rest 5'x2 w/ rest 5'x2 w/ rest 5'x2 w/ rest 5'x2 w/ rest 5'x2 w/ rest 5'x2 w/ rest   Pendulums              AAROM flex, abd, ER             Prone flexion w/ dowel 3x10 3# 3x10 3x10 3# 3x10 3# 3x10 3# 3x10 3# 3x10 3# 3x10 3# 3x10 3# 3x10 3#   Supine Y AROM         3x10 3x10                             Ther Activity                                       Gait Training                                       Modalities

## 2025-05-13 ENCOUNTER — OFFICE VISIT (OUTPATIENT)
Dept: PHYSICAL THERAPY | Facility: CLINIC | Age: 60
End: 2025-05-13
Payer: OTHER MISCELLANEOUS

## 2025-05-13 DIAGNOSIS — Z98.890 S/P ARTHROSCOPY OF RIGHT SHOULDER: Primary | ICD-10-CM

## 2025-05-13 PROCEDURE — 97110 THERAPEUTIC EXERCISES: CPT

## 2025-05-13 PROCEDURE — 97112 NEUROMUSCULAR REEDUCATION: CPT

## 2025-05-13 NOTE — PROGRESS NOTES
"Daily Note     Today's date: 2025  Patient name: Osmin Hook  : 1965  MRN: [id number]  Referring provider: Kirsten Rodrigues PA-C  Dx:   Encounter Diagnosis     ICD-10-CM    1. S/P arthroscopy of right shoulder  Z98.890         Start Time: 1142  Stop Time: 1226  Total time in clinic (min): 44 minutes    Subjective: Patient reports that he is sore today, but not changes overall.       Objective: See treatment diary below      Assessment: Patient tolerated treatment well. No changes since previous visit. Pt would benefit from continued PT for increased functional strength and decreased symptom irritability.        Plan: Continue per plan of care.      Precautions: HTN, hx of lumbar fusion, Lyme's   POC expires Unit limit Auth Expiration date PT/OT/ST + Visit Limit?   25 BOMN N/A BOMN                           Visit/Unit Tracking  AUTH Status:  Date 4/1 4/3 4/15 4/17 4/22 4/29 5/1 5/6 5/8 5/13   N/A Used 13 14 15 16 17 19 20 21 22 23    Remaining                PROM of shoulder to empty end feel with intermittently applied grade II mobilizations in   the open packed position of the shoulder. Maximum PROM is at week 6:   o 140° forward flexion   o 90° abduction  o 45° external rotation   o 35° internal rotation   Scaption pulleys at 10 minute maximum with 2 minute rest provided at 5 minutes   Pendulum, elbow AAROM and progressive gripping exercises     HEP: [id number]   Manuals 4/24 4/3 4/8 4/15 4/17 4/22 4/29 5/1 5/6 5/8 5/13                                                           Neuro Re-Ed              Repeated cervical retraction              Repeated cervical retraction w/ extension              Scap isometric              Prone Y,T,I 3x10 3\" ea 3x10 3\" ea 3x10 3\" ea 3x10 3\" ea 3x10 3\" ea 3x10 3\" ea 3x10 3\" ea 3x10 3\" ea 3x10 ea no Y 3x10 ea no Y 3x10 ea no Y                                             Ther Ex              Education               R shoulder PROM KK JMK JMK HA JMK JMK JMK JMK " JMK JMK JMK   Scaption pulley's 5' x2 w/ rest 5' x2 w/ rest 5' x2 w/ rest 5'x2 w/ rest 5'x2 w/ rest 5'x2 w/ rest 5'x2 w/ rest 5'x2 w/ rest 5'x2 w/ rest 5'x2 w/ rest 5'x2 w/ rest   Pendulums               AAROM flex, abd, ER              Prone flexion w/ dowel 3x10 3# 3x10 3x10 3# 3x10 3# 3x10 3# 3x10 3# 3x10 3# 3x10 3# 3x10 3# 3x10 3# 3x10 3#   Supine Y AROM         3x10 3x10 3x10                               Ther Activity                                          Gait Training                                          Modalities

## 2025-05-15 ENCOUNTER — OFFICE VISIT (OUTPATIENT)
Dept: PHYSICAL THERAPY | Facility: CLINIC | Age: 60
End: 2025-05-15
Payer: OTHER MISCELLANEOUS

## 2025-05-15 DIAGNOSIS — Z98.890 S/P ARTHROSCOPY OF RIGHT SHOULDER: Primary | ICD-10-CM

## 2025-05-15 PROCEDURE — 97112 NEUROMUSCULAR REEDUCATION: CPT

## 2025-05-15 PROCEDURE — 97110 THERAPEUTIC EXERCISES: CPT

## 2025-05-15 NOTE — PROGRESS NOTES
"Daily Note     Today's date: 5/15/2025  Patient name: Osmin Hook  : 1965  MRN: [id number]  Referring provider: Kirsten Rodrigues PA-C  Dx:   Encounter Diagnosis     ICD-10-CM    1. S/P arthroscopy of right shoulder  Z98.890         Start Time: 1540  Stop Time: 1620  Total time in clinic (min): 40 minutes    Subjective: Patient reports he hasn't been sleeping well due to pain.       Objective: See treatment diary below      Assessment: Patient tolerated treatment well. No changes in ROM or symptom irritability w/ exercises. Pt would benefit from continued PT for increased functional strength and decreased symptom irritability.        Plan: Continue per plan of care.      Precautions: HTN, hx of lumbar fusion, Lyme's   POC expires Unit limit Auth Expiration date PT/OT/ST + Visit Limit?   25 BOMN N/A BOMN                           Visit/Unit Tracking  AUTH Status:  Date 4/1 4/3 4/15 4/17 4/22 4/29 5/1 5/6 5/8 5/13 5/15   N/A Used 13 14 15 16 17 19 20 21 22 23 24    Remaining                 PROM of shoulder to empty end feel with intermittently applied grade II mobilizations in   the open packed position of the shoulder. Maximum PROM is at week 6:   o 140° forward flexion   o 90° abduction  o 45° external rotation   o 35° internal rotation   Scaption pulleys at 10 minute maximum with 2 minute rest provided at 5 minutes   Pendulum, elbow AAROM and progressive gripping exercises     HEP: [id number]   Manuals 4/24 4/3 4/8 4/15 4/17 4/22 4/29 5/1 5/6 5/8 5/13 5/15                                                               Neuro Re-Ed               Repeated cervical retraction               Repeated cervical retraction w/ extension               Scap isometric               Prone Y,T,I 3x10 3\" ea 3x10 3\" ea 3x10 3\" ea 3x10 3\" ea 3x10 3\" ea 3x10 3\" ea 3x10 3\" ea 3x10 3\" ea 3x10 ea no Y 3x10 ea no Y 3x10 ea no Y 3x10 ea no Y                                                Ther Ex               Education       "          R shoulder PROM KK MITESHK JUAN A HA JUAN A VELAZCO   Scaption pulley's 5' x2 w/ rest 5' x2 w/ rest 5' x2 w/ rest 5'x2 w/ rest 5'x2 w/ rest 5'x2 w/ rest 5'x2 w/ rest 5'x2 w/ rest 5'x2 w/ rest 5'x2 w/ rest 5'x2 w/ rest 5'x2 w/ rest   Pendulums                AAROM flex, abd, ER               Prone flexion w/ dowel 3x10 3# 3x10 3x10 3# 3x10 3# 3x10 3# 3x10 3# 3x10 3# 3x10 3# 3x10 3# 3x10 3# 3x10 3# 3x10 3#   Supine Y AROM         3x10 3x10 3x10 3x10                                 Ther Activity                                             Gait Training                                             Modalities

## 2025-05-20 ENCOUNTER — OFFICE VISIT (OUTPATIENT)
Dept: PHYSICAL THERAPY | Facility: CLINIC | Age: 60
End: 2025-05-20
Payer: OTHER MISCELLANEOUS

## 2025-05-20 DIAGNOSIS — Z98.890 S/P ARTHROSCOPY OF RIGHT SHOULDER: Primary | ICD-10-CM

## 2025-05-20 PROCEDURE — 97110 THERAPEUTIC EXERCISES: CPT

## 2025-05-20 PROCEDURE — 97112 NEUROMUSCULAR REEDUCATION: CPT

## 2025-05-20 NOTE — PROGRESS NOTES
"Daily Note     Today's date: 2025  Patient name: Osmin Hook  : 1965  MRN: [id number]  Referring provider: Kirsten Rodrigues PA-C  Dx:   Encounter Diagnosis     ICD-10-CM    1. S/P arthroscopy of right shoulder  Z98.890         Start Time: 1140  Stop Time: 1221  Total time in clinic (min): 41 minutes    Subjective: Patient reports that he is feeling better today. Pt was able to sleep in his bed for 3 hours w/o having onset of R shoulder pain.       Objective: See treatment diary below      Assessment: Patient tolerated treatment well. Added AROM against gravity today to further progress Pt's ROM. Pt reports fatigue w/ forward flexion and minimal onset of symptom irritability w/ abduction to 90*. Pt would benefit from continued PT for increased functional strength and decreased symptom irritability.        Plan: Continue per plan of care.      Precautions: HTN, hx of lumbar fusion, Lyme's   POC expires Unit limit Auth Expiration date PT/OT/ST + Visit Limit?   25 BOMN N/A BOMN                           Visit/Unit Tracking  AUTH Status:  Date 4/1 4/3 4/15 4/17 4/22 4/29 5/1 5/6 5/8 5/13 5/15 5/20   N/A Used 13 14 15 16 17 19 20 21 22 23 24 25    Remaining                  PROM of shoulder to empty end feel with intermittently applied grade II mobilizations in   the open packed position of the shoulder. Maximum PROM is at week 6:   o 140° forward flexion   o 90° abduction  o 45° external rotation   o 35° internal rotation   Scaption pulleys at 10 minute maximum with 2 minute rest provided at 5 minutes   Pendulum, elbow AAROM and progressive gripping exercises     HEP: [id number]   Manuals 4/24 4/3 4/8 4/15 4/17 4/22 4/29 5/1 5/6 5/8 5/13 5/15 5/20                                                                   Neuro Re-Ed                Repeated cervical retraction                Repeated cervical retraction w/ extension                Scap isometric                Prone Y,T,I 3x10 3\" ea 3x10 3\" ea " "3x10 3\" ea 3x10 3\" ea 3x10 3\" ea 3x10 3\" ea 3x10 3\" ea 3x10 3\" ea 3x10 ea no Y 3x10 ea no Y 3x10 ea no Y 3x10 ea no Y 3x10 ea no Y                                                   Ther Ex                Education                 R shoulder PROM KK JMK JMK HA JMK JMK JMK JMK JMK JMK JMK JMK JMK   Scaption pulley's 5' x2 w/ rest 5' x2 w/ rest 5' x2 w/ rest 5'x2 w/ rest 5'x2 w/ rest 5'x2 w/ rest 5'x2 w/ rest 5'x2 w/ rest 5'x2 w/ rest 5'x2 w/ rest 5'x2 w/ rest 5'x2 w/ rest 5'x2 w/ rest   Pendulums                 AAROM flex, abd, ER                Prone flexion w/ dowel 3x10 3# 3x10 3x10 3# 3x10 3# 3x10 3# 3x10 3# 3x10 3# 3x10 3# 3x10 3# 3x10 3# 3x10 3# 3x10 3# 3x10 3#- DC   Supine Y AROM         3x10 3x10 3x10 3x10 3x10   AROM standing, Flex, abd             3x10 ea                   Ther Activity                                                Gait Training                                                Modalities                                                       "

## 2025-05-22 ENCOUNTER — OFFICE VISIT (OUTPATIENT)
Dept: PHYSICAL THERAPY | Facility: CLINIC | Age: 60
End: 2025-05-22
Payer: OTHER MISCELLANEOUS

## 2025-05-22 DIAGNOSIS — Z98.890 S/P ARTHROSCOPY OF RIGHT SHOULDER: Primary | ICD-10-CM

## 2025-05-22 PROCEDURE — 97110 THERAPEUTIC EXERCISES: CPT

## 2025-05-22 PROCEDURE — 97112 NEUROMUSCULAR REEDUCATION: CPT

## 2025-05-22 NOTE — PROGRESS NOTES
"Daily Note     Today's date: 2025  Patient name: Osmin Hook  : 1965  MRN: [id number]  Referring provider: Kirsten Rodrigues PA-C  Dx:   Encounter Diagnosis     ICD-10-CM    1. S/P arthroscopy of right shoulder  Z98.890         Start Time: 1540  Stop Time: 1622  Total time in clinic (min): 42 minutes    Subjective: Patient reports that he is more sore compared to previous visit. Pt reports that he felt fine following new exercise last visit.       Objective: See treatment diary below      Assessment: Patient tolerated treatment well. Pt minimally challenged by exercise prescription. ROM WFL, contact physician about possible isometric strengthening before Roseanne 30th date. Pt would benefit from continued PT for increased functional strength and decreased symptom irritability.        Plan: Continue per plan of care.      Precautions: HTN, hx of lumbar fusion, Lyme's   POC expires Unit limit Auth Expiration date PT/OT/ST + Visit Limit?   25 BOMN N/A BOMN                           Visit/Unit Tracking  AUTH Status:  Date 4/1 4/3 4/15 4/17 4/22 4/29 5/1 5/6 5/8 5/13 5/15 5/20 5/22   N/A Used 13 14 15 16 17 19 20 21 22 23 24 25 26    Remaining                   PROM of shoulder to empty end feel with intermittently applied grade II mobilizations in   the open packed position of the shoulder. Maximum PROM is at week 6:   o 140° forward flexion   o 90° abduction  o 45° external rotation   o 35° internal rotation   Scaption pulleys at 10 minute maximum with 2 minute rest provided at 5 minutes   Pendulum, elbow AAROM and progressive gripping exercises     HEP: [id number]   Manuals 4/24 4/3 4/8 4/15 4/17 4/22 4/29 5/1 5/6 5/8 5/13 5/15 5/20 5/22                                                                       Neuro Re-Ed                 Repeated cervical retraction                 Repeated cervical retraction w/ extension                 Scap isometric                 Prone Y,T,I 3x10 3\" ea 3x10 3\" ea 3x10 " "3\" ea 3x10 3\" ea 3x10 3\" ea 3x10 3\" ea 3x10 3\" ea 3x10 3\" ea 3x10 ea no Y 3x10 ea no Y 3x10 ea no Y 3x10 ea no Y 3x10 ea no Y 3x10 ea no Y                                                      Ther Ex                 Education                  R shoulder PROM KK JMK JMK HA JMK JMK JMK JMK JMK JMK JMK JMK JMK JMK   Scaption pulley's 5' x2 w/ rest 5' x2 w/ rest 5' x2 w/ rest 5'x2 w/ rest 5'x2 w/ rest 5'x2 w/ rest 5'x2 w/ rest 5'x2 w/ rest 5'x2 w/ rest 5'x2 w/ rest 5'x2 w/ rest 5'x2 w/ rest 5'x2 w/ rest 5'x2 w/ rest   Pendulums                  AAROM flex, abd, ER                 Prone flexion w/ dowel 3x10 3# 3x10 3x10 3# 3x10 3# 3x10 3# 3x10 3# 3x10 3# 3x10 3# 3x10 3# 3x10 3# 3x10 3# 3x10 3# 3x10 3#- DC    Supine Y AROM         3x10 3x10 3x10 3x10 3x10 3x10    AROM standing, Flex, abd             3x10 ea 3x10 ea                    Ther Activity                                                   Gait Training                                                   Modalities                                                          "

## 2025-05-27 ENCOUNTER — OFFICE VISIT (OUTPATIENT)
Dept: PHYSICAL THERAPY | Facility: CLINIC | Age: 60
End: 2025-05-27
Payer: OTHER MISCELLANEOUS

## 2025-05-27 DIAGNOSIS — Z98.890 S/P ARTHROSCOPY OF RIGHT SHOULDER: Primary | ICD-10-CM

## 2025-05-27 PROCEDURE — 97110 THERAPEUTIC EXERCISES: CPT

## 2025-05-27 PROCEDURE — 97140 MANUAL THERAPY 1/> REGIONS: CPT

## 2025-05-27 NOTE — PROGRESS NOTES
"Daily Note     Today's date: 2025  Patient name: Osmin Hook  : 1965  MRN: [id number]  Referring provider: Kirsten Rodrigues PA-C  Dx:   Encounter Diagnosis     ICD-10-CM    1. S/P arthroscopy of right shoulder  Z98.890           Start Time: 1030  Stop Time: 1115  Total time in clinic (min): 45 minutes    Subjective: He reports of still trying to sleep in his but still finds complications with doing so.      Objective: See treatment diary below      Assessment: Tolerated treatment well. He was introduced to wall slides and responded well to exercise as he needed tactile cueing for decreasing ROM where he had increased soreness and responded well to cues. Patient would benefit from continued PT.      Plan: Continue per plan of care.      Precautions: HTN, hx of lumbar fusion, Lyme's   POC expires Unit limit Auth Expiration date PT/OT/ST + Visit Limit?   25 BOMN N/A BOMN                           Visit/Unit Tracking  AUTH Status:  Date 4/1 4/3 4/15 4/17 4/22 4/29 5/1 5/6 5/8 5/13 5/15 5/20 5/22   N/A Used 13 14 15 16 17 19 20 21 22 23 24 25 26    Remaining                   PROM of shoulder to empty end feel with intermittently applied grade II mobilizations in   the open packed position of the shoulder. Maximum PROM is at week 6:   o 140° forward flexion   o 90° abduction  o 45° external rotation   o 35° internal rotation   Scaption pulleys at 10 minute maximum with 2 minute rest provided at 5 minutes   Pendulum, elbow AAROM and progressive gripping exercises     HEP: [id number]   Manuals 5/27       5/1 5/6 5/8 5/13 5/15 5/20 5/22                                                                       Neuro Re-Ed                 Repeated cervical retraction                 Repeated cervical retraction w/ extension                 Scap isometric                 Prone Y,T,I 3x10 ea no Y        3x10 3\" ea 3x10 ea no Y 3x10 ea no Y 3x10 ea no Y 3x10 ea no Y 3x10 ea no Y 3x10 ea no Y   Wall slides into " scaption 2x10                                                   Ther Ex                 Education                  R shoulder PROM DP       JUAN A VELAZCO   Scaption pulley's 5'x2 w/ rest        5'x2 w/ rest 5'x2 w/ rest 5'x2 w/ rest 5'x2 w/ rest 5'x2 w/ rest 5'x2 w/ rest 5'x2 w/ rest   Pendulums                  AAROM flex, abd, ER                 Prone flexion w/ dowel        3x10 3# 3x10 3# 3x10 3# 3x10 3# 3x10 3# 3x10 3#- DC    Supine Y AROM 3x10         3x10 3x10 3x10 3x10 3x10 3x10    AROM standing, Flex, abd             3x10 ea 3x10 ea                    Ther Activity                                                   Gait Training                                                   Modalities

## 2025-05-29 ENCOUNTER — OFFICE VISIT (OUTPATIENT)
Dept: PHYSICAL THERAPY | Facility: CLINIC | Age: 60
End: 2025-05-29
Payer: OTHER MISCELLANEOUS

## 2025-05-29 DIAGNOSIS — Z98.890 S/P ARTHROSCOPY OF RIGHT SHOULDER: Primary | ICD-10-CM

## 2025-05-29 PROCEDURE — 97110 THERAPEUTIC EXERCISES: CPT

## 2025-05-29 NOTE — PROGRESS NOTES
"Daily Note     Today's date: 2025  Patient name: Osmin Hook  : 1965  MRN: [id number]  Referring provider: Kirsten Rodrigues PA-C  Dx:   Encounter Diagnosis     ICD-10-CM    1. S/P arthroscopy of right shoulder  Z98.890           Start Time: 1545  Stop Time: 1620  Total time in clinic (min): 35 minutes    Subjective: He reports of being sore after last visit with the addition of wall slides, but reports of always feeling sore afterwards.       Objective: See treatment diary below      Assessment: Tolerated treatment well. He was able to complete exercises as shown on flowsheet without increase of reported symptoms. Patient exhibited good technique with therapeutic exercises and would benefit from continued PT.      Plan: Continue per plan of care.      Precautions: HTN, hx of lumbar fusion, Lyme's   POC expires Unit limit Auth Expiration date PT/OT/ST + Visit Limit?   25 BOMN N/A BOMN                           Visit/Unit Tracking  AUTH Status:  Date 4/1 4/3 4/15 4/17 4/22 4/29 5/1 5/6 5/8 5/13 5/15 5/20 5/22   N/A Used 13 14 15 16 17 19 20 21 22 23 24 25 26    Remaining                   PROM of shoulder to empty end feel with intermittently applied grade II mobilizations in   the open packed position of the shoulder. Maximum PROM is at week 6:   o 140° forward flexion   o 90° abduction  o 45° external rotation   o 35° internal rotation   Scaption pulleys at 10 minute maximum with 2 minute rest provided at 5 minutes   Pendulum, elbow AAROM and progressive gripping exercises     HEP: [id number]   Manuals 5/27 5/29      5/1 5/6 5/8 5/13 5/15 5/20 5/22                                                                       Neuro Re-Ed                 Repeated cervical retraction                 Repeated cervical retraction w/ extension                 Scap isometric                 Prone Y,T,I 3x10 ea no Y  3x10 ea no Y      3x10 3\" ea 3x10 ea no Y 3x10 ea no Y 3x10 ea no Y 3x10 ea no Y 3x10 ea no Y " 3x10 ea no Y   Wall slides into scaption 2x10                                                   Ther Ex                 Education                  R shoulder PROM DP DP      MITESHK JUAN A VELAZCO   Scaption pulley's 5'x2 w/ rest  5'x2 w/ rest       5'x2 w/ rest 5'x2 w/ rest 5'x2 w/ rest 5'x2 w/ rest 5'x2 w/ rest 5'x2 w/ rest 5'x2 w/ rest   Pendulums                  AAROM flex, abd, ER                 Prone flexion w/ dowel        3x10 3# 3x10 3# 3x10 3# 3x10 3# 3x10 3# 3x10 3#- DC    Supine Y AROM 3x10         3x10 3x10 3x10 3x10 3x10 3x10    AROM standing, Flex, abd  3x10 ea.           3x10 ea 3x10 ea                    Ther Activity                                                   Gait Training                                                   Modalities

## 2025-06-03 ENCOUNTER — OFFICE VISIT (OUTPATIENT)
Dept: PHYSICAL THERAPY | Facility: CLINIC | Age: 60
End: 2025-06-03
Payer: OTHER MISCELLANEOUS

## 2025-06-03 DIAGNOSIS — Z98.890 S/P ARTHROSCOPY OF RIGHT SHOULDER: Primary | ICD-10-CM

## 2025-06-03 PROCEDURE — 97110 THERAPEUTIC EXERCISES: CPT

## 2025-06-03 NOTE — PROGRESS NOTES
"Daily Note     Today's date: 6/3/2025  Patient name: Osmin Hook  : 1965  MRN: [id number]  Referring provider: Kirsten Rodrigues PA-C  Dx:   Encounter Diagnosis     ICD-10-CM    1. S/P arthroscopy of right shoulder  Z98.890           Start Time: 942  Stop Time: 1030  Total time in clinic (min): 48 minutes    Subjective: Pt reports of overall soreness after last session.       Objective: See treatment diary below      Assessment: Tolerated treatment well. Introduced pt to ER AAROM with dowel and he responded well with reports of a stretch with completion. Patient exhibited good technique with therapeutic exercises and would benefit from continued PT.      Plan: Continue per plan of care.      Precautions: HTN, hx of lumbar fusion, Lyme's   POC expires Unit limit Auth Expiration date PT/OT/ST + Visit Limit?   25 BOMN N/A BOMN                           Visit/Unit Tracking  AUTH Status:  Date 4/1 4/3 4/15 4/17 4/22 4/29 5/1 5/6 5/8 5/13 5/15 5/20 5/22   N/A Used 13 14 15 16 17 19 20 21 22 23 24 25 26    Remaining                   PROM of shoulder to empty end feel with intermittently applied grade II mobilizations in   the open packed position of the shoulder. Maximum PROM is at week 6:   o 140° forward flexion   o 90° abduction  o 45° external rotation   o 35° internal rotation   Scaption pulleys at 10 minute maximum with 2 minute rest provided at 5 minutes   Pendulum, elbow AAROM and progressive gripping exercises     HEP: [id number]   Manuals 5/27 5/29 6/3     5/1 5/6 5/8 5/13 5/15 5/20 5/22                                                                       Neuro Re-Ed                 Repeated cervical retraction                 Repeated cervical retraction w/ extension                 Scap isometric                 Prone Y,T,I 3x10 ea no Y  3x10 ea no Y 3x10 ea no Y     3x10 3\" ea 3x10 ea no Y 3x10 ea no Y 3x10 ea no Y 3x10 ea no Y 3x10 ea no Y 3x10 ea no Y   Wall slides into scaption 2x10         "                                           Ther Ex                 Education                  R shoulder PROM DP DP DP     JUAN A VELAZCO   Scaption pulley's 5'x2 w/ rest  5'x2 w/ rest  5'x2w/rest      5'x2 w/ rest 5'x2 w/ rest 5'x2 w/ rest 5'x2 w/ rest 5'x2 w/ rest 5'x2 w/ rest 5'x2 w/ rest   Pendulums                  AAROM flex, abd, ER                 Prone flexion w/ dowel        3x10 3# 3x10 3# 3x10 3# 3x10 3# 3x10 3# 3x10 3#- DC    ER AAROM   X30               Supine Y AROM 3x10         3x10 3x10 3x10 3x10 3x10 3x10    AROM standing, Flex, abd  3x10 ea. 3x10 ea.          3x10 ea 3x10 ea                    Ther Activity                                                   Gait Training                                                   Modalities

## 2025-06-05 ENCOUNTER — OFFICE VISIT (OUTPATIENT)
Dept: PHYSICAL THERAPY | Facility: CLINIC | Age: 60
End: 2025-06-05
Payer: OTHER MISCELLANEOUS

## 2025-06-05 DIAGNOSIS — Z98.890 S/P ARTHROSCOPY OF RIGHT SHOULDER: Primary | ICD-10-CM

## 2025-06-05 PROCEDURE — 97112 NEUROMUSCULAR REEDUCATION: CPT

## 2025-06-05 PROCEDURE — 97110 THERAPEUTIC EXERCISES: CPT

## 2025-06-05 NOTE — PROGRESS NOTES
"Daily Note     Today's date: 2025  Patient name: Osmin Hook  : 1965  MRN: [id number]  Referring provider: Kirsten Rodrigues PA-C  Dx:   Encounter Diagnosis     ICD-10-CM    1. S/P arthroscopy of right shoulder  Z98.890           Start Time: 1500  Stop Time: 1545  Total time in clinic (min): 45 minutes    Subjective: He reports of soreness and pain after last visit that he noticed while trying to sleep, but has started to subside.       Objective: See treatment diary below      Assessment: Tolerated treatment well. He was introduced to serratus reaches and responded well as he had no reports of increased soreness or symptoms with completion. Patient exhibited good technique with therapeutic exercises and would benefit from continued PT.      Plan: Continue per plan of care.      Precautions: HTN, hx of lumbar fusion, Lyme's   POC expires Unit limit Auth Expiration date PT/OT/ST + Visit Limit?   25 BOMN N/A BOMN                           Visit/Unit Tracking  AUTH Status:  Date 4/1 4/3 4/15 4/17 4/22 4/29 5/1 5/6 5/8 5/13 5/15 5/20 5/22   N/A Used 13 14 15 16 17 19 20 21 22 23 24 25 26    Remaining                   PROM of shoulder to empty end feel with intermittently applied grade II mobilizations in   the open packed position of the shoulder. Maximum PROM is at week 6:   o 140° forward flexion   o 90° abduction  o 45° external rotation   o 35° internal rotation   Scaption pulleys at 10 minute maximum with 2 minute rest provided at 5 minutes   Pendulum, elbow AAROM and progressive gripping exercises     HEP: [id number]   Manuals 5/27 5/29 6/3     5/1 5/6 5/8 5/13 5/15 5/20 5/22                                                                       Neuro Re-Ed                 Repeated cervical retraction                 Repeated cervical retraction w/ extension                 Scap isometric                 Prone Y,T,I 3x10 ea no Y  3x10 ea no Y 3x10 ea no Y 3x10 ea no Y    3x10 3\" ea 3x10 ea no Y " 3x10 ea no Y 3x10 ea no Y 3x10 ea no Y 3x10 ea no Y 3x10 ea no Y   Wall slides into scaption 2x10    2x10              Serratus reach    X10                               Ther Ex                 Education                  R shoulder PROM DP DP DP DP    MITESHK JUAN A VELAZCO   Scaption pulley's 5'x2 w/ rest  5'x2 w/ rest  5'x2w/rest  5'x2 w/rest    5'x2 w/ rest 5'x2 w/ rest 5'x2 w/ rest 5'x2 w/ rest 5'x2 w/ rest 5'x2 w/ rest 5'x2 w/ rest   Pendulums                  AAROM flex, abd, ER                 Prone flexion w/ dowel        3x10 3# 3x10 3# 3x10 3# 3x10 3# 3x10 3# 3x10 3#- DC    ER AAROM   X30  x30             Supine Y AROM 3x10         3x10 3x10 3x10 3x10 3x10 3x10    AROM standing, Flex, abd  3x10 ea. 3x10 ea. 3x10 ea.         3x10 ea 3x10 ea                    Ther Activity                                                   Gait Training                                                   Modalities

## 2025-06-10 ENCOUNTER — OFFICE VISIT (OUTPATIENT)
Dept: PHYSICAL THERAPY | Facility: CLINIC | Age: 60
End: 2025-06-10
Payer: OTHER MISCELLANEOUS

## 2025-06-10 DIAGNOSIS — Z98.890 S/P ARTHROSCOPY OF RIGHT SHOULDER: Primary | ICD-10-CM

## 2025-06-10 PROCEDURE — 97110 THERAPEUTIC EXERCISES: CPT

## 2025-06-10 PROCEDURE — 97112 NEUROMUSCULAR REEDUCATION: CPT

## 2025-06-10 PROCEDURE — 97140 MANUAL THERAPY 1/> REGIONS: CPT

## 2025-06-10 NOTE — PROGRESS NOTES
Daily Note     Today's date: 6/10/2025  Patient name: Osmin Hook  : 1965  MRN: [id number]  Referring provider: Kirsten Rodrigues PA-C  Dx:   Encounter Diagnosis     ICD-10-CM    1. S/P arthroscopy of right shoulder  Z98.890           Start Time: 0900  Stop Time: 09  Total time in clinic (min): 46 minutes    Subjective: He reports of discontinued discomfort with sleeping and in general with soreness. He reports of increased neck pain.       Objective: See treatment diary below      Assessment: Tolerated treatment well. With manuals, pt had reports of decreased symptoms in neck and were found to be effective. Progressed serratus reaches and he responded well with no increase in reported symptoms. Patient exhibited good technique with therapeutic exercises and would benefit from continued PT.      Plan: Continue per plan of care.      Precautions: HTN, hx of lumbar fusion, Lyme's   POC expires Unit limit Auth Expiration date PT/OT/ST + Visit Limit?   25 BOMN N/A BOMN                           Visit/Unit Tracking  AUTH Status:  Date 4/1 4/3 4/15 4/17 4/22 4/29 5/1 5/6 5/8 5/13 5/15 5/20 5/22   N/A Used 13 14 15 16 17 19 20 21 22 23 24 25 26    Remaining                   PROM of shoulder to empty end feel with intermittently applied grade II mobilizations in   the open packed position of the shoulder. Maximum PROM is at week 6:   o 140° forward flexion   o 90° abduction  o 45° external rotation   o 35° internal rotation   Scaption pulleys at 10 minute maximum with 2 minute rest provided at 5 minutes   Pendulum, elbow AAROM and progressive gripping exercises     HEP: [id number]   Manuals 5/27 5/29 6/3  6/10   5/1 5/6 5/8 5/13 5/15 5/20 5/22   Lateral glides     DP Gr3 C5-7                                                               Neuro Re-Ed                 Repeated cervical retraction                 Repeated cervical retraction w/ extension                 Scap isometric                 Prone Y,T,I  "3x10 ea no Y  3x10 ea no Y 3x10 ea no Y 3x10 ea no Y 3x10 ea no Y    3x10 3\" ea 3x10 ea no Y 3x10 ea no Y 3x10 ea no Y 3x10 ea no Y 3x10 ea no Y 3x10 ea no Y   Wall slides into scaption 2x10    2x10  2x10             Serratus reach    X10  x20                             Ther Ex                 Education                  R shoulder PROM DP DP DP DP    JMK JUAN A VELAZCO   Scaption pulley's 5'x2 w/ rest  5'x2 w/ rest  5'x2w/rest  5'x2 w/rest 5'x2 w/ rest    5'x2 w/ rest 5'x2 w/ rest 5'x2 w/ rest 5'x2 w/ rest 5'x2 w/ rest 5'x2 w/ rest 5'x2 w/ rest   Pendulums                  AAROM flex, abd, ER                 Prone flexion w/ dowel        3x10 3# 3x10 3# 3x10 3# 3x10 3# 3x10 3# 3x10 3#- DC    ER AAROM   X30  x30 X30             Supine Y AROM 3x10         3x10 3x10 3x10 3x10 3x10 3x10    AROM standing, Flex, abd  3x10 ea. 3x10 ea. 3x10 ea. 3x10 ea.        3x10 ea 3x10 ea                    Ther Activity                                                   Gait Training                                                   Modalities                                                                    "

## 2025-06-12 ENCOUNTER — OFFICE VISIT (OUTPATIENT)
Dept: PHYSICAL THERAPY | Facility: CLINIC | Age: 60
End: 2025-06-12
Payer: OTHER MISCELLANEOUS

## 2025-06-12 DIAGNOSIS — Z98.890 S/P ARTHROSCOPY OF RIGHT SHOULDER: Primary | ICD-10-CM

## 2025-06-12 PROCEDURE — 97110 THERAPEUTIC EXERCISES: CPT

## 2025-06-12 PROCEDURE — 97140 MANUAL THERAPY 1/> REGIONS: CPT

## 2025-06-12 NOTE — PROGRESS NOTES
Daily Note     Today's date: 2025  Patient name: Osmin Hook  : 1965  MRN: [id number]  Referring provider: Kirsten Rodrigues PA-C  Dx:   Encounter Diagnosis     ICD-10-CM    1. S/P arthroscopy of right shoulder  Z98.890           Start Time: 1459  Stop Time: 1545  Total time in clinic (min): 46 minutes    Subjective: Pt reports of continued soreness in R shoulder as well as C/s pain.       Objective: See treatment diary below      Assessment: Tolerated treatment well. He was able to tolerate exercises as shown on flowsheet with reports of soreness after completion. There was a noted capsular end feel with PROM. Patient exhibited good technique with therapeutic exercises and would benefit from continued PT.      Plan: Continue per plan of care.      Precautions: HTN, hx of lumbar fusion, Lyme's   POC expires Unit limit Auth Expiration date PT/OT/ST + Visit Limit?   25 BOMN N/A BOMN                           Visit/Unit Tracking  AUTH Status:  Date 4/1 4/3 4/15 4/17 4/22 4/29 5/1 5/6 5/8 5/13 5/15 5/20 5/22   N/A Used 13 14 15 16 17 19 20 21 22 23 24 25 26    Remaining                   PROM of shoulder to empty end feel with intermittently applied grade II mobilizations in   the open packed position of the shoulder. Maximum PROM is at week 6:   o 140° forward flexion   o 90° abduction  o 45° external rotation   o 35° internal rotation   Scaption pulleys at 10 minute maximum with 2 minute rest provided at 5 minutes   Pendulum, elbow AAROM and progressive gripping exercises     HEP: [id number]   Manuals 5/27 5/29 6/3  6/10 6/12  5/1 5/6 5/8 5/13 5/15 5/20 5/22   Lateral glides     DP Gr3 C5-7                                                               Neuro Re-Ed                 Repeated cervical retraction                 Repeated cervical retraction w/ extension                 Scap isometric                 Prone Y,T,I 3x10 ea no Y  3x10 ea no Y 3x10 ea no Y 3x10 ea no Y 3x10 ea no Y  3x10 ea no  "Y   3x10 3\" ea 3x10 ea no Y 3x10 ea no Y 3x10 ea no Y 3x10 ea no Y 3x10 ea no Y 3x10 ea no Y   Wall slides into scaption 2x10    2x10  2x10             Serratus reach    X10  x20 X20                             Ther Ex                 Education                  R shoulder PROM DP DP DP DP    JMK JUAN A VELAZCO   Scaption pulley's 5'x2 w/ rest  5'x2 w/ rest  5'x2w/rest  5'x2 w/rest 5'x2 w/ rest  5'x2 w/rest   5'x2 w/ rest 5'x2 w/ rest 5'x2 w/ rest 5'x2 w/ rest 5'x2 w/ rest 5'x2 w/ rest 5'x2 w/ rest   Pendulums                  AAROM flex, abd, ER                 Prone flexion w/ dowel        3x10 3# 3x10 3# 3x10 3# 3x10 3# 3x10 3# 3x10 3#- DC    ER AAROM   X30  x30 X30             Supine Y AROM 3x10         3x10 3x10 3x10 3x10 3x10 3x10    AROM standing, Flex, abd  3x10 ea. 3x10 ea. 3x10 ea. 3x10 ea.        3x10 ea 3x10 ea                    Ther Activity                                                   Gait Training                                                   Modalities                                                                      "

## 2025-06-17 ENCOUNTER — OFFICE VISIT (OUTPATIENT)
Dept: PHYSICAL THERAPY | Facility: CLINIC | Age: 60
End: 2025-06-17
Payer: OTHER MISCELLANEOUS

## 2025-06-17 DIAGNOSIS — Z98.890 S/P ARTHROSCOPY OF RIGHT SHOULDER: Primary | ICD-10-CM

## 2025-06-17 PROCEDURE — 97110 THERAPEUTIC EXERCISES: CPT

## 2025-06-19 ENCOUNTER — OFFICE VISIT (OUTPATIENT)
Dept: PHYSICAL THERAPY | Facility: CLINIC | Age: 60
End: 2025-06-19
Payer: OTHER MISCELLANEOUS

## 2025-06-19 DIAGNOSIS — Z98.890 S/P ARTHROSCOPY OF RIGHT SHOULDER: Primary | ICD-10-CM

## 2025-06-19 PROCEDURE — 97110 THERAPEUTIC EXERCISES: CPT

## 2025-06-19 PROCEDURE — 97140 MANUAL THERAPY 1/> REGIONS: CPT

## 2025-06-19 NOTE — PROGRESS NOTES
Daily Note     Today's date: 2025  Patient name: Osmin Hook  : 1965  MRN: [id number]  Referring provider: Kirsten Rodrigues PA-C  Dx:   Encounter Diagnosis     ICD-10-CM    1. S/P arthroscopy of right shoulder  Z98.890           Start Time: 1543  Stop Time: 1624  Total time in clinic (min): 41 minutes    Subjective: Pt reports of continued soreness in his R shoulder.       Objective: See treatment diary below      Assessment: Tolerated treatment well. He responded well to exercises this session, reporting of fatigue with AROM today. Patient would benefit from continued PT.      Plan: Continue per plan of care.      Precautions: HTN, hx of lumbar fusion, Lyme's   POC expires Unit limit Auth Expiration date PT/OT/ST + Visit Limit?   25 BOMN N/A BOMN                           Visit/Unit Tracking  AUTH Status:  Date               N/A Used 1               Remaining                  PROM of shoulder to empty end feel with intermittently applied grade II mobilizations in   the open packed position of the shoulder. Maximum PROM is:   o 140° forward flexion   o 90° abduction  o 45° external rotation   o 35° internal rotation   Scaption pulleys at 10 minute maximum with 2 minute rest provided at 5 minutes   Pendulum, elbow AAROM and progressive gripping exercises     HEP: NV  Manuals 5/27 5/29 6/3  6/10 6/12 6/17 6/19         Lateral glides     DP Gr3 C5-7                                                               Neuro Re-Ed                 Repeated cervical retraction                 Repeated cervical retraction w/ extension                 Scap isometric                 Prone Y,T,I 3x10 ea no Y  3x10 ea no Y 3x10 ea no Y 3x10 ea no Y 3x10 ea no Y  3x10 ea no Y   3x10 no Y ea.         Wall slides into scaption 2x10    2x10  2x10             Serratus reach    X10  x20 X20   X30                           Ther Ex                 Education                  Re-eval       DP          R shoulder PROM  DP DP DP DP   DP DP         Scaption pulley's 5'x2 w/ rest  5'x2 w/ rest  5'x2w/rest  5'x2 w/rest 5'x2 w/ rest  5'x2 w/rest  5x2' w/rest  5x2' w/ rest          Pendulums                  AAROM flex, abd, ER                 Prone flexion w/ dowel                 ER AAROM   X30  x30 X30    X30 5sh         Supine Y AROM 3x10                 AROM standing, Flex, abd  3x10 ea. 3x10 ea. 3x10 ea. 3x10 ea.   3x10 ea.                          Ther Activity                                                   Gait Training                                                   Modalities

## 2025-06-24 ENCOUNTER — OFFICE VISIT (OUTPATIENT)
Dept: PHYSICAL THERAPY | Facility: CLINIC | Age: 60
End: 2025-06-24
Payer: OTHER MISCELLANEOUS

## 2025-06-24 DIAGNOSIS — Z98.890 S/P ARTHROSCOPY OF RIGHT SHOULDER: Primary | ICD-10-CM

## 2025-06-24 PROCEDURE — 97110 THERAPEUTIC EXERCISES: CPT

## 2025-06-24 NOTE — PROGRESS NOTES
Daily Note     Today's date: 2025  Patient name: Osmin Hook  : 1965  MRN: [id number]  Referring provider: Kirsten Rodrigues PA-C  Dx:   Encounter Diagnosis     ICD-10-CM    1. S/P arthroscopy of right shoulder  Z98.890           Start Time: 09  Stop Time: 1030  Total time in clinic (min): 45 minutes    Subjective: Pt reports of continued soreness and pain in R shoulder.       Objective: See treatment diary below      Assessment: Tolerated treatment well. Pt responded well to therapeutic exercises with fatigue after completion. Patient would benefit from continued PT.      Plan: Continue per plan of care.      Precautions: HTN, hx of lumbar fusion, Lyme's   POC expires Unit limit Auth Expiration date PT/OT/ST + Visit Limit?   25 BOMN N/A BOMN                           Visit/Unit Tracking  AUTH Status:  Date               N/A Used 1               Remaining                  PROM of shoulder to empty end feel with intermittently applied grade II mobilizations in   the open packed position of the shoulder. Maximum PROM is:   o 140° forward flexion   o 90° abduction  o 45° external rotation   o 35° internal rotation   Scaption pulleys at 10 minute maximum with 2 minute rest provided at 5 minutes   Pendulum, elbow AAROM and progressive gripping exercises     HEP: NV  Manuals 5/27 5/29 6/3  6/10 6/12 6/17 6/19 6/24        Lateral glides     DP Gr3 C5-7                                                               Neuro Re-Ed                 Repeated cervical retraction                 Repeated cervical retraction w/ extension                 Scap isometric                 Prone Y,T,I 3x10 ea no Y  3x10 ea no Y 3x10 ea no Y 3x10 ea no Y 3x10 ea no Y  3x10 ea no Y   3x10 no Y ea. 3x10 no Y ea.        Wall slides into scaption 2x10    2x10  2x10             Serratus reach    X10  x20 X20   X30  x30                         Ther Ex                 Education                  Re-eval       DP           R shoulder PROM DP DP DP DP   DP DP DP        Scaption pulley's 5'x2 w/ rest  5'x2 w/ rest  5'x2w/rest  5'x2 w/rest 5'x2 w/ rest  5'x2 w/rest  5x2' w/rest  5x2' w/ rest  5x 2' w/ rest         Pendulums                  AAROM flex, abd, ER                 Prone flexion w/ dowel                 ER AAROM   X30  x30 X30    X30 5sh X30 5sh        Supine Y AROM 3x10                 AROM standing, Flex, abd  3x10 ea. 3x10 ea. 3x10 ea. 3x10 ea.   3x10 ea. 3x10 ea.                         Ther Activity                                                   Gait Training                                                   Modalities

## 2025-06-26 ENCOUNTER — OFFICE VISIT (OUTPATIENT)
Dept: PHYSICAL THERAPY | Facility: CLINIC | Age: 60
End: 2025-06-26
Payer: OTHER MISCELLANEOUS

## 2025-06-26 DIAGNOSIS — Z98.890 S/P ARTHROSCOPY OF RIGHT SHOULDER: Primary | ICD-10-CM

## 2025-06-26 PROCEDURE — 97110 THERAPEUTIC EXERCISES: CPT

## 2025-06-26 NOTE — PROGRESS NOTES
Daily Note     Today's date: 2025  Patient name: Osmin Hook  : 1965  MRN: [id number]  Referring provider: Kirsten Rordigues PA-C  Dx:   Encounter Diagnosis     ICD-10-CM    1. S/P arthroscopy of right shoulder  Z98.890           Start Time: 1545  Stop Time: 1628  Total time in clinic (min): 43 minutes    Subjective: He reports of no new changes since last session.       Objective: See treatment diary below      Assessment: Tolerated treatment well. Pt responded well to therapeutic exercises and PROM, but he needed TC for ER AAROM and responded well to cues as he reported of decreased symptoms with completion. Patient would benefit from continued PT.      Plan: Continue per plan of care.      Precautions: HTN, hx of lumbar fusion, Lyme's   POC expires Unit limit Auth Expiration date PT/OT/ST + Visit Limit?   25 BOMN N/A BOMN                           Visit/Unit Tracking  AUTH Status:  Date               N/A Used 1               Remaining                  PROM of shoulder to empty end feel with intermittently applied grade II mobilizations in   the open packed position of the shoulder. Maximum PROM is:   o 140° forward flexion   o 90° abduction  o 45° external rotation   o 35° internal rotation   Scaption pulleys at 10 minute maximum with 2 minute rest provided at 5 minutes   Pendulum, elbow AAROM and progressive gripping exercises     HEP: NV  Manuals  6/3  6/10 6/12 6/17 6/19 6/24 6/26       Lateral glides     DP Gr3 C5-7                                                               Neuro Re-Ed                 Repeated cervical retraction                 Repeated cervical retraction w/ extension                 Scap isometric                 Prone Y,T,I 3x10 ea no Y  3x10 ea no Y 3x10 ea no Y 3x10 ea no Y 3x10 ea no Y  3x10 ea no Y   3x10 no Y ea. 3x10 no Y ea. 3x10 no Y ea       Wall slides into scaption 2x10    2x10  2x10             Serratus reach    X10  x20 X20   X30  x30 X30                          Ther Ex                 Education                  Re-eval       DP          R shoulder PROM DP DP DP DP   DP DP DP DP       Scaption pulley's 5'x2 w/ rest  5'x2 w/ rest  5'x2w/rest  5'x2 w/rest 5'x2 w/ rest  5'x2 w/rest  5x2' w/rest  5x2' w/ rest  5x 2' w/ rest  5x2' w/ rest       Pendulums                  AAROM flex, abd, ER                 Prone flexion w/ dowel                 ER AAROM   X30  x30 X30    X30 5sh X30 5sh X30 5sh       Supine Y AROM 3x10                 AROM standing, Flex, abd  3x10 ea. 3x10 ea. 3x10 ea. 3x10 ea.   3x10 ea. 3x10 ea. 3x10 ea.                        Ther Activity                                                   Gait Training                                                   Modalities

## 2025-06-30 ENCOUNTER — OFFICE VISIT (OUTPATIENT)
Dept: OBGYN CLINIC | Facility: CLINIC | Age: 60
End: 2025-06-30
Payer: OTHER MISCELLANEOUS

## 2025-06-30 VITALS — BODY MASS INDEX: 50.62 KG/M2 | WEIGHT: 315 LBS | HEIGHT: 66 IN

## 2025-06-30 DIAGNOSIS — Z48.89 AFTERCARE FOLLOWING SURGERY: ICD-10-CM

## 2025-06-30 DIAGNOSIS — Z98.890 S/P RIGHT ROTATOR CUFF REPAIR: Primary | ICD-10-CM

## 2025-06-30 PROCEDURE — 99213 OFFICE O/P EST LOW 20 MIN: CPT | Performed by: ORTHOPAEDIC SURGERY

## 2025-06-30 PROCEDURE — 20610 DRAIN/INJ JOINT/BURSA W/O US: CPT | Performed by: ORTHOPAEDIC SURGERY

## 2025-06-30 RX ORDER — LIDOCAINE HYDROCHLORIDE 10 MG/ML
2 INJECTION, SOLUTION INFILTRATION; PERINEURAL
Status: COMPLETED | OUTPATIENT
Start: 2025-06-30 | End: 2025-06-30

## 2025-06-30 RX ORDER — METHYLPREDNISOLONE ACETATE 40 MG/ML
2 INJECTION, SUSPENSION INTRA-ARTICULAR; INTRALESIONAL; INTRAMUSCULAR; SOFT TISSUE
Status: COMPLETED | OUTPATIENT
Start: 2025-06-30 | End: 2025-06-30

## 2025-06-30 RX ORDER — BUPIVACAINE HYDROCHLORIDE 2.5 MG/ML
2 INJECTION, SOLUTION INFILTRATION; PERINEURAL
Status: COMPLETED | OUTPATIENT
Start: 2025-06-30 | End: 2025-06-30

## 2025-06-30 RX ADMIN — BUPIVACAINE HYDROCHLORIDE 2 ML: 2.5 INJECTION, SOLUTION INFILTRATION; PERINEURAL at 15:45

## 2025-06-30 RX ADMIN — METHYLPREDNISOLONE ACETATE 2 ML: 40 INJECTION, SUSPENSION INTRA-ARTICULAR; INTRALESIONAL; INTRAMUSCULAR; SOFT TISSUE at 15:45

## 2025-06-30 RX ADMIN — LIDOCAINE HYDROCHLORIDE 2 ML: 10 INJECTION, SOLUTION INFILTRATION; PERINEURAL at 15:45

## 2025-07-01 ENCOUNTER — OFFICE VISIT (OUTPATIENT)
Dept: PHYSICAL THERAPY | Facility: CLINIC | Age: 60
End: 2025-07-01
Attending: PHYSICIAN ASSISTANT
Payer: OTHER MISCELLANEOUS

## 2025-07-01 DIAGNOSIS — Z98.890 S/P ARTHROSCOPY OF RIGHT SHOULDER: Primary | ICD-10-CM

## 2025-07-01 PROCEDURE — 97140 MANUAL THERAPY 1/> REGIONS: CPT

## 2025-07-01 PROCEDURE — 97112 NEUROMUSCULAR REEDUCATION: CPT

## 2025-07-01 PROCEDURE — 97110 THERAPEUTIC EXERCISES: CPT

## 2025-07-01 NOTE — PROGRESS NOTES
Daily Note     Today's date: 2025  Patient name: Osmin Hook  : 1965  MRN: [id number]  Referring provider: Kirsten Rodrigues PA-C  Dx:   Encounter Diagnosis     ICD-10-CM    1. S/P arthroscopy of right shoulder  Z98.890                      Subjective: Patient states he had an injection yesterday. Pain is about the same.       Objective: See treatment diary below      Assessment: Tolerated treatment well. Focus on AROM within his tolerance. No pain or increased discomfort reported during session. Exhibits some improvement in PROM, grossly assessed. Patient demonstrated fatigue post treatment and would benefit from continued PT      Plan: Progress treatment as tolerated.       Precautions: HTN, hx of lumbar fusion, Lyme's   POC expires Unit limit Auth Expiration date PT/OT/ST + Visit Limit?   25 BOMN N/A BOMN                           Visit/Unit Tracking  AUTH Status:  Date               N/A Used 1               Remaining                  PROM of shoulder to empty end feel with intermittently applied grade II mobilizations in   the open packed position of the shoulder. Maximum PROM is:   o 140° forward flexion   o 90° abduction  o 45° external rotation   o 35° internal rotation   Scaption pulleys at 10 minute maximum with 2 minute rest provided at 5 minutes   Pendulum, elbow AAROM and progressive gripping exercises     HEP: NV  Manuals  6/3  6/10 6/12 6/17 6/19 6/24 6/26 7      Lateral glides     DP Gr3 C5-7                                                               Neuro Re-Ed                 Repeated cervical retraction                 Repeated cervical retraction w/ extension                 Scap isometric                 Prone Y,T,I 3x10 ea no Y  3x10 ea no Y 3x10 ea no Y 3x10 ea no Y 3x10 ea no Y  3x10 ea no Y   3x10 no Y ea. 3x10 no Y ea. 3x10 no Y ea 3x10  no Y ea      Wall slides into scaption 2x10    2x10  2x10       X20       Serratus reach    X10  x20 X20   X30  x30  X30  X30                        Ther Ex                 Education                  Re-eval       DP          R shoulder PROM DP DP DP DP   DP DP DP DP MONICA      Scaption pulley's 5'x2 w/ rest  5'x2 w/ rest  5'x2w/rest  5'x2 w/rest 5'x2 w/ rest  5'x2 w/rest  5x2' w/rest  5x2' w/ rest  5x 2' w/ rest  5x2' w/ rest 7' w/ rest prn       Pendulums                  AAROM flex, abd, ER                 Prone flexion w/ dowel                 ER AAROM   X30  x30 X30    X30 5sh X30 5sh X30 5sh X30   5s      Supine Y AROM 3x10                 AROM standing, Flex, abd  3x10 ea. 3x10 ea. 3x10 ea. 3x10 ea.   3x10 ea. 3x10 ea. 3x10 ea. 3x10 ea                       Ther Activity                                                   Gait Training                                                   Modalities

## 2025-07-03 ENCOUNTER — OFFICE VISIT (OUTPATIENT)
Dept: PHYSICAL THERAPY | Facility: CLINIC | Age: 60
End: 2025-07-03
Payer: OTHER MISCELLANEOUS

## 2025-07-03 DIAGNOSIS — Z98.890 S/P ARTHROSCOPY OF RIGHT SHOULDER: Primary | ICD-10-CM

## 2025-07-03 PROCEDURE — 97110 THERAPEUTIC EXERCISES: CPT

## 2025-07-03 NOTE — PROGRESS NOTES
Daily Note     Today's date: 7/3/2025  Patient name: Osmin Hook  : 1965  MRN: [id number]  Referring provider: Kirsten Rodrigues PA-C  Dx:   Encounter Diagnosis     ICD-10-CM    1. S/P arthroscopy of right shoulder  Z98.890           Start Time: 1545  Stop Time: 1630  Total time in clinic (min): 45 minutes    Subjective: He reports that he is feeling the CSI was effective. He was able to get a full nights sleep yesterday.       Objective: See treatment diary below      Assessment: Tolerated treatment well. Introduced light strengthening to pt today and he responded well with no difference in reported symptoms compared to previous visits. Patient exhibited good technique with therapeutic exercises and would benefit from continued PT.      Plan: Continue per plan of care.      Precautions: HTN, hx of lumbar fusion, Lyme's   POC expires Unit limit Auth Expiration date PT/OT/ST + Visit Limit?   25 BOMN N/A BOMN                           Visit/Unit Tracking  AUTH Status:  Date               N/A Used 1               Remaining                  PROM of shoulder to empty end feel with intermittently applied grade II mobilizations in   the open packed position of the shoulder. Maximum PROM is:   o 140° forward flexion   o 90° abduction  o 45° external rotation   o 35° internal rotation   Scaption pulleys at 10 minute maximum with 2 minute rest provided at 5 minutes   Pendulum, elbow AAROM and progressive gripping exercises     HEP: NV  Manuals 5/27 5/29 6/3  6/10 6/12 6/17 6/19 6/24 6/26 7/1 7/3     Lateral glides     DP Gr3 C5-7                                                               Neuro Re-Ed                 Repeated cervical retraction                 Repeated cervical retraction w/ extension                 Scap isometric                 Prone Y,T,I 3x10 ea no Y  3x10 ea no Y 3x10 ea no Y 3x10 ea no Y 3x10 ea no Y  3x10 ea no Y   3x10 no Y ea. 3x10 no Y ea. 3x10 no Y ea 3x10  no Y ea 3x10 no Y  ea w/ 1 lb db     Wall slides into scaption 2x10    2x10  2x10       X20       Serratus reach    X10  x20 X20   X30  x30 X30  X30  X30 w/ 1lb db                       Ther Ex                 Education                  Re-eval       DP          R shoulder PROM DP DP DP DP   DP DP DP DP MONICA DP     Scaption pulley's 5'x2 w/ rest  5'x2 w/ rest  5'x2w/rest  5'x2 w/rest 5'x2 w/ rest  5'x2 w/rest  5x2' w/rest  5x2' w/ rest  5x 2' w/ rest  5x2' w/ rest 7' w/ rest prn  10' w/ rest prn     Pendulums                  AAROM flex, abd, ER                 Prone flexion w/ dowel                 ER AAROM   X30  x30 X30    X30 5sh X30 5sh X30 5sh X30   5s      Supine Y AROM 3x10                 AROM standing, Flex, abd  3x10 ea. 3x10 ea. 3x10 ea. 3x10 ea.   3x10 ea. 3x10 ea. 3x10 ea. 3x10 ea                       Ther Activity                                                   Gait Training                                                   Modalities

## 2025-07-08 ENCOUNTER — OFFICE VISIT (OUTPATIENT)
Dept: PHYSICAL THERAPY | Facility: CLINIC | Age: 60
End: 2025-07-08
Attending: PHYSICIAN ASSISTANT
Payer: OTHER MISCELLANEOUS

## 2025-07-08 ENCOUNTER — APPOINTMENT (OUTPATIENT)
Age: 60
End: 2025-07-08
Attending: INTERNAL MEDICINE
Payer: COMMERCIAL

## 2025-07-08 DIAGNOSIS — Z98.890 S/P ARTHROSCOPY OF RIGHT SHOULDER: Primary | ICD-10-CM

## 2025-07-08 DIAGNOSIS — E78.00 HYPERCHOLESTEROLEMIA: Chronic | ICD-10-CM

## 2025-07-08 LAB
ANION GAP SERPL CALCULATED.3IONS-SCNC: 7 MMOL/L (ref 4–13)
BUN SERPL-MCNC: 16 MG/DL (ref 5–25)
CALCIUM SERPL-MCNC: 9 MG/DL (ref 8.4–10.2)
CHLORIDE SERPL-SCNC: 101 MMOL/L (ref 96–108)
CHOLEST SERPL-MCNC: 146 MG/DL (ref ?–200)
CO2 SERPL-SCNC: 31 MMOL/L (ref 21–32)
CREAT SERPL-MCNC: 0.89 MG/DL (ref 0.6–1.3)
ERYTHROCYTE [DISTWIDTH] IN BLOOD BY AUTOMATED COUNT: 13.8 % (ref 11.6–15.1)
GFR SERPL CREATININE-BSD FRML MDRD: 93 ML/MIN/1.73SQ M
GLUCOSE P FAST SERPL-MCNC: 99 MG/DL (ref 65–99)
HCT VFR BLD AUTO: 46.7 % (ref 36.5–49.3)
HDLC SERPL-MCNC: 52 MG/DL
HGB BLD-MCNC: 14.8 G/DL (ref 12–17)
LDLC SERPL CALC-MCNC: 81 MG/DL (ref 0–100)
MCH RBC QN AUTO: 27.6 PG (ref 26.8–34.3)
MCHC RBC AUTO-ENTMCNC: 31.7 G/DL (ref 31.4–37.4)
MCV RBC AUTO: 87 FL (ref 82–98)
PLATELET # BLD AUTO: 177 THOUSANDS/UL (ref 149–390)
PMV BLD AUTO: 8.9 FL (ref 8.9–12.7)
POTASSIUM SERPL-SCNC: 4.3 MMOL/L (ref 3.5–5.3)
RBC # BLD AUTO: 5.37 MILLION/UL (ref 3.88–5.62)
SODIUM SERPL-SCNC: 139 MMOL/L (ref 135–147)
TRIGL SERPL-MCNC: 64 MG/DL (ref ?–150)
WBC # BLD AUTO: 9.06 THOUSAND/UL (ref 4.31–10.16)

## 2025-07-08 PROCEDURE — 97110 THERAPEUTIC EXERCISES: CPT

## 2025-07-08 PROCEDURE — 80048 BASIC METABOLIC PNL TOTAL CA: CPT

## 2025-07-08 PROCEDURE — 97140 MANUAL THERAPY 1/> REGIONS: CPT

## 2025-07-08 PROCEDURE — 36415 COLL VENOUS BLD VENIPUNCTURE: CPT

## 2025-07-08 PROCEDURE — 80061 LIPID PANEL: CPT

## 2025-07-08 PROCEDURE — 85027 COMPLETE CBC AUTOMATED: CPT

## 2025-07-08 PROCEDURE — 97112 NEUROMUSCULAR REEDUCATION: CPT

## 2025-07-08 NOTE — PROGRESS NOTES
Daily Note     Today's date: 2025  Patient name: Osmin Hook  : 1965  MRN: [id number]  Referring provider: Kirsten Rodrigues PA-C  Dx:   Encounter Diagnosis     ICD-10-CM    1. S/P arthroscopy of right shoulder  Z98.890                      Subjective: Patient offers no new complaints.       Objective: See treatment diary below      Assessment: Tolerated treatment well. Cues to reduce UT with AROM. Patient demonstrated fatigue post treatment and would benefit from continued PT      Plan: Progress treatment as tolerated.       Precautions: HTN, hx of lumbar fusion, Lyme's   POC expires Unit limit Auth Expiration date PT/OT/ST + Visit Limit?   25 BOMN N/A BOMN                           Visit/Unit Tracking  AUTH Status:  Date               N/A Used 1               Remaining                  PROM of shoulder to empty end feel with intermittently applied grade II mobilizations in   the open packed position of the shoulder. Maximum PROM is:   o 140° forward flexion   o 90° abduction  o 45° external rotation   o 35° internal rotation   Scaption pulleys at 10 minute maximum with 2 minute rest provided at 5 minutes   Pendulum, elbow AAROM and progressive gripping exercises     HEP: NV  Manuals  6/3  6/10 6/12 6/17 6/19 6/24 6/26 7/1 7/3 7/8    Lateral glides     DP Gr3 C5-7                                                               Neuro Re-Ed                 Repeated cervical retraction                 Repeated cervical retraction w/ extension                 Scap isometric                 Prone Y,T,I 3x10 ea no Y  3x10 ea no Y 3x10 ea no Y 3x10 ea no Y 3x10 ea no Y  3x10 ea no Y   3x10 no Y ea. 3x10 no Y ea. 3x10 no Y ea 3x10  no Y ea 3x10 no Y ea w/ 1 lb db 3x10 no Y ea w/ 1 lb db    Wall slides into scaption 2x10    2x10  2x10       X20       Serratus reach    X10  x20 X20   X30  x30 X30  X30  X30 w/ 1lb db  X30 w/ 1lb db                     Ther Ex                 Education                   Re-eval       DP          R shoulder PROM DP DP DP DP   DP DP DP DP MONICA DP MONICA    Scaption pulley's 5'x2 w/ rest  5'x2 w/ rest  5'x2w/rest  5'x2 w/rest 5'x2 w/ rest  5'x2 w/rest  5x2' w/rest  5x2' w/ rest  5x 2' w/ rest  5x2' w/ rest 7' w/ rest prn  10' w/ rest prn 10' w/ rest prn     Pendulums                  AAROM flex, abd, ER                 Prone flexion w/ dowel                 ER AAROM   X30  x30 X30    X30 5sh X30 5sh X30 5sh X30   5s  NV    Supine Y AROM 3x10                 AROM standing, Flex, abd  3x10 ea. 3x10 ea. 3x10 ea. 3x10 ea.   3x10 ea. 3x10 ea. 3x10 ea. 3x10 ea  3x10 ea                      Ther Activity                                                   Gait Training                                                   Modalities

## 2025-07-10 ENCOUNTER — OFFICE VISIT (OUTPATIENT)
Dept: PHYSICAL THERAPY | Facility: CLINIC | Age: 60
End: 2025-07-10
Attending: PHYSICIAN ASSISTANT
Payer: OTHER MISCELLANEOUS

## 2025-07-10 DIAGNOSIS — Z98.890 S/P ARTHROSCOPY OF RIGHT SHOULDER: Primary | ICD-10-CM

## 2025-07-10 PROCEDURE — 97110 THERAPEUTIC EXERCISES: CPT

## 2025-07-10 NOTE — PROGRESS NOTES
Daily Note     Today's date: 7/10/2025  Patient name: Osmin Hook  : 1965  MRN: [id number]  Referring provider: Kirsten Rodrigues PA-C  Dx:   Encounter Diagnosis     ICD-10-CM    1. S/P arthroscopy of right shoulder  Z98.890           Start Time: 1600  Stop Time: 1645  Total time in clinic (min): 45 minutes    Subjective: He reports of continued soreness into R shoulder.      Objective: See treatment diary below      Assessment: Tolerated treatment well. Pt experienced increased reports of soreness after completion of exercises and pt did not feel ready for increase in resistance in weight. Patient would benefit from continued PT.      Plan: Continue per plan of care.  Progress note during next visit.      Precautions: HTN, hx of lumbar fusion, Lyme's   POC expires Unit limit Auth Expiration date PT/OT/ST + Visit Limit?   25 BOMN N/A BOMN                           Visit/Unit Tracking  AUTH Status:  Date               N/A Used 1               Remaining                  PROM of shoulder to empty end feel with intermittently applied grade II mobilizations in   the open packed position of the shoulder. Maximum PROM is:   o 140° forward flexion   o 90° abduction  o 45° external rotation   o 35° internal rotation   Scaption pulleys at 10 minute maximum with 2 minute rest provided at 5 minutes   Pendulum, elbow AAROM and progressive gripping exercises     HEP: NV  Manuals  6/3  6/10 6/12 6/17 6/19 6/24 6/26 7/1 7/3 7/8 7/10   Lateral glides     DP Gr3 C5-7                                                               Neuro Re-Ed                 Repeated cervical retraction                 Repeated cervical retraction w/ extension                 Scap isometric                 Prone Y,T,I 3x10 ea no Y  3x10 ea no Y 3x10 ea no Y 3x10 ea no Y 3x10 ea no Y  3x10 ea no Y   3x10 no Y ea. 3x10 no Y ea. 3x10 no Y ea 3x10  no Y ea 3x10 no Y ea w/ 1 lb db 3x10 no Y ea w/ 1 lb db 3x10 no Y ea w/ 1lb db     Wall slides into scaption 2x10    2x10  2x10       X20       Serratus reach    X10  x20 X20   X30  x30 X30  X30  X30 w/ 1lb db  X30 w/ 1lb db X30 w/ 1lb db                     Ther Ex                 Education                  Re-eval       DP          R shoulder PROM DP DP DP DP   DP DP DP DP MONICA DP MONICA DP   Scaption pulley's 5'x2 w/ rest  5'x2 w/ rest  5'x2w/rest  5'x2 w/rest 5'x2 w/ rest  5'x2 w/rest  5x2' w/rest  5x2' w/ rest  5x 2' w/ rest  5x2' w/ rest 7' w/ rest prn  10' w/ rest prn 10' w/ rest prn  10' w/ rest prn   Pendulums                  AAROM flex, abd, ER                 Prone flexion w/ dowel                 ER AAROM   X30  x30 X30    X30 5sh X30 5sh X30 5sh X30   5s  NV    Supine Y AROM 3x10                 AROM standing, Flex, abd  3x10 ea. 3x10 ea. 3x10 ea. 3x10 ea.   3x10 ea. 3x10 ea. 3x10 ea. 3x10 ea  3x10 ea  3x10 ea.                    Ther Activity                                                   Gait Training                                                   Modalities

## 2025-07-15 ENCOUNTER — OFFICE VISIT (OUTPATIENT)
Dept: PHYSICAL THERAPY | Facility: CLINIC | Age: 60
End: 2025-07-15
Attending: PHYSICIAN ASSISTANT
Payer: OTHER MISCELLANEOUS

## 2025-07-15 ENCOUNTER — OFFICE VISIT (OUTPATIENT)
Age: 60
End: 2025-07-15
Payer: COMMERCIAL

## 2025-07-15 VITALS
OXYGEN SATURATION: 97 % | BODY MASS INDEX: 50.62 KG/M2 | DIASTOLIC BLOOD PRESSURE: 80 MMHG | RESPIRATION RATE: 18 BRPM | WEIGHT: 315 LBS | HEIGHT: 66 IN | TEMPERATURE: 96.7 F | HEART RATE: 73 BPM | SYSTOLIC BLOOD PRESSURE: 124 MMHG

## 2025-07-15 DIAGNOSIS — E78.00 HYPERCHOLESTEROLEMIA: Chronic | ICD-10-CM

## 2025-07-15 DIAGNOSIS — E66.01 MORBID OBESITY WITH BMI OF 50.0-59.9, ADULT (HCC): Primary | Chronic | ICD-10-CM

## 2025-07-15 DIAGNOSIS — Z12.5 SCREENING FOR PROSTATE CANCER: ICD-10-CM

## 2025-07-15 DIAGNOSIS — I10 PRIMARY HYPERTENSION: Chronic | ICD-10-CM

## 2025-07-15 DIAGNOSIS — Z98.890 S/P ARTHROSCOPY OF RIGHT SHOULDER: Primary | ICD-10-CM

## 2025-07-15 PROBLEM — K76.0 METABOLIC DYSFUNCTION-ASSOCIATED STEATOTIC LIVER DISEASE (MASLD): Status: ACTIVE | Noted: 2025-07-15

## 2025-07-15 PROCEDURE — 99214 OFFICE O/P EST MOD 30 MIN: CPT | Performed by: INTERNAL MEDICINE

## 2025-07-15 PROCEDURE — 97110 THERAPEUTIC EXERCISES: CPT

## 2025-07-15 RX ORDER — TIRZEPATIDE 2.5 MG/.5ML
2.5 INJECTION, SOLUTION SUBCUTANEOUS WEEKLY
Qty: 2 ML | Refills: 0 | Status: SHIPPED | OUTPATIENT
Start: 2025-07-15 | End: 2025-07-16

## 2025-07-17 ENCOUNTER — OFFICE VISIT (OUTPATIENT)
Dept: PHYSICAL THERAPY | Facility: CLINIC | Age: 60
End: 2025-07-17
Payer: OTHER MISCELLANEOUS

## 2025-07-17 DIAGNOSIS — Z98.890 S/P ARTHROSCOPY OF RIGHT SHOULDER: Primary | ICD-10-CM

## 2025-07-17 PROCEDURE — 97110 THERAPEUTIC EXERCISES: CPT

## 2025-07-17 PROCEDURE — 97140 MANUAL THERAPY 1/> REGIONS: CPT

## 2025-07-22 ENCOUNTER — OFFICE VISIT (OUTPATIENT)
Dept: PHYSICAL THERAPY | Facility: CLINIC | Age: 60
End: 2025-07-22
Attending: PHYSICIAN ASSISTANT
Payer: OTHER MISCELLANEOUS

## 2025-07-22 DIAGNOSIS — Z98.890 S/P ARTHROSCOPY OF RIGHT SHOULDER: Primary | ICD-10-CM

## 2025-07-22 PROCEDURE — 97140 MANUAL THERAPY 1/> REGIONS: CPT

## 2025-07-22 NOTE — PROGRESS NOTES
Daily Note     Today's date: 2025  Patient name: Osmin Hook  : 1965  MRN: [id number]  Referring provider: Kirsten Rodrigues PA-C  Dx:   Encounter Diagnosis     ICD-10-CM    1. S/P arthroscopy of right shoulder  Z98.890           Start Time: 09  Stop Time: 1030  Total time in clinic (min): 45 minutes    Subjective: He reports of continued discomfort in R shoulder. No reported issues of completing HEP.       Objective: See treatment diary below      Assessment: Tolerated treatment fair. Focus on C/s manuals this session with minimal improvement with AROM and no improvement with symptoms. Patient would benefit from continued PT.      Plan: Continue per plan of care. Will focus more on strengthening of R shoulder NV.      Precautions: HTN, hx of lumbar fusion, Lyme's   POC expires Unit limit Auth Expiration date PT/OT/ST + Visit Limit?   25 BOMN N/A BOMN                           Visit/Unit Tracking  AUTH Status:  Date               N/A Used 1               Remaining                  PROM of shoulder to empty end feel with intermittently applied grade II mobilizations in   the open packed position of the shoulder. Maximum PROM is:   o 140° forward flexion   o 90° abduction  o 45° external rotation   o 35° internal rotation   Scaption pulleys at 10 minute maximum with 2 minute rest provided at 5 minutes   Pendulum, elbow AAROM and progressive gripping exercises     HEP: [email or web address]  Access Code: [email or web address]  Manuals 7/15 7/17 7/22   6/12 6/17 6/19 6/24 6/26 7/1 7/3 7/8 7/10   Lateral glides  Gr 3 DP               C4-5 R UPA   DP Gr 3  DP Gr 3               T/s P-A T1-5  DP GR 3                                 Neuro Re-Ed                 Repeated cervical retraction                 Repeated cervical retraction w/ extension                 Scap isometric                 Prone Y,T,I      3x10 ea no Y   3x10 no Y ea. 3x10 no Y ea. 3x10 no Y ea 3x10  no Y ea 3x10 no Y ea w/ 1 lb  db 3x10 no Y ea w/ 1 lb db 3x10 no Y ea w/ 1lb db    Wall slides into scaption           X20       Serratus reach      X20   X30  x30 X30  X30  X30 w/ 1lb db  X30 w/ 1lb db X30 w/ 1lb db                     Ther Ex                 Education   DP               Re-eval DP      DP          R shoulder PROM       DP DP DP DP MONICA DP MONICA DP   Scaption elmer's      5'x2 w/rest  5x2' w/rest  5x2' w/ rest  5x 2' w/ rest  5x2' w/ rest 7' w/ rest prn  10' w/ rest prn 10' w/ rest prn  10' w/ rest prn   Pendulums                  AAROM flex, abd, ER                 Prone flexion w/ dowel                 ER AAROM        X30 5sh X30 5sh X30 5sh X30   5s  NV    Supine Y AROM                 AROM standing, Flex, abd        3x10 ea. 3x10 ea. 3x10 ea. 3x10 ea  3x10 ea  3x10 ea.   C/s SNAG rot bl/  HEP               Seated T/s ext  HEP               Chin tuck w/ ext   HEP               Ther Activity                                                   Gait Training                                                   Modalities

## 2025-07-24 ENCOUNTER — OFFICE VISIT (OUTPATIENT)
Dept: PHYSICAL THERAPY | Facility: CLINIC | Age: 60
End: 2025-07-24
Payer: OTHER MISCELLANEOUS

## 2025-07-24 DIAGNOSIS — Z98.890 S/P ARTHROSCOPY OF RIGHT SHOULDER: Primary | ICD-10-CM

## 2025-07-24 PROCEDURE — 97110 THERAPEUTIC EXERCISES: CPT

## 2025-07-24 PROCEDURE — 97112 NEUROMUSCULAR REEDUCATION: CPT

## 2025-07-24 NOTE — PROGRESS NOTES
Daily Note     Today's date: 2025  Patient name: Osmin Hook  : 1965  MRN: [id number]  Referring provider: Kirsten Rodrigues PA-C  Dx:   Encounter Diagnosis     ICD-10-CM    1. S/P arthroscopy of right shoulder  Z98.890           Start Time: 1543  Stop Time: 1621  Total time in clinic (min): 38 minutes    Subjective: He reports of continued soreness in shoulder.       Objective: See treatment diary below      Assessment: Tolerated treatment well. Returned to light strengthening of RTC due to lack of progress with C/s mobility. He was introduced to isometrics and responded well with no reports of increased symptoms after completion. Patient would benefit from continued PT.      Plan: Continue per plan of care.      Precautions: HTN, hx of lumbar fusion, Lyme's   POC expires Unit limit Auth Expiration date PT/OT/ST + Visit Limit?   25 BOMN N/A BOMN                           Visit/Unit Tracking  AUTH Status:  Date               N/A Used 1               Remaining                  PROM of shoulder to empty end feel with intermittently applied grade II mobilizations in   the open packed position of the shoulder. Maximum PROM is:   o 140° forward flexion   o 90° abduction  o 45° external rotation   o 35° internal rotation   Scaption pulleys at 10 minute maximum with 2 minute rest provided at 5 minutes   Pendulum, elbow AAROM and progressive gripping exercises     HEP: [email or web address]  Access Code: [email or web address]  Manuals 7/15 7/17 7/22 7/24  6/12 6/17 6/19 6/24 6/26 7/1 7/3 7/8 7/10   Lateral glides  Gr 3 DP               C4-5 R UPA   DP Gr 3  DP Gr 3               T/s P-A T1-5  DP GR 3                                 Neuro Re-Ed                 Repeated cervical retraction                 Repeated cervical retraction w/ extension                 Scap isometric                 Prone Y,T,I    3x10 no Y ea. E/ 1lb db   3x10 ea no Y   3x10 no Y ea. 3x10 no Y ea. 3x10 no Y ea 3x10  no Y ea  3x10 no Y ea w/ 1 lb db 3x10 no Y ea w/ 1 lb db 3x10 no Y ea w/ 1lb db    Wall slides into scaption           X20       Serratus reach    X30 1/ 1lb db   X20   X30  x30 X30  X30  X30 w/ 1lb db  X30 w/ 1lb db X30 w/ 1lb db    ER iso    X6 10sh             Ther Ex                 Education   DP               Re-eval DP      DP          R shoulder PROM    DP   DP DP DP DP MONICA DP MONICA DP   Scaption pulley's    10 w/ rest prn  5'x2 w/rest  5x2' w/rest  5x2' w/ rest  5x 2' w/ rest  5x2' w/ rest 7' w/ rest prn  10' w/ rest prn 10' w/ rest prn  10' w/ rest prn   Pendulums                  AAROM flex, abd, ER                 Prone flexion w/ dowel                 ER AAROM        X30 5sh X30 5sh X30 5sh X30   5s  NV    Supine Y AROM                 AROM standing, Flex, abd        3x10 ea. 3x10 ea. 3x10 ea. 3x10 ea  3x10 ea  3x10 ea.   C/s SNAG rot bl/  HEP               Seated T/s ext  HEP               Chin tuck w/ ext   HEP               Ther Activity                                                   Gait Training                                                   Modalities

## 2025-07-29 ENCOUNTER — OFFICE VISIT (OUTPATIENT)
Dept: PHYSICAL THERAPY | Facility: CLINIC | Age: 60
End: 2025-07-29
Attending: PHYSICIAN ASSISTANT
Payer: OTHER MISCELLANEOUS

## 2025-07-29 DIAGNOSIS — Z98.890 S/P ARTHROSCOPY OF RIGHT SHOULDER: Primary | ICD-10-CM

## 2025-07-29 PROCEDURE — 97110 THERAPEUTIC EXERCISES: CPT

## 2025-07-31 ENCOUNTER — OFFICE VISIT (OUTPATIENT)
Dept: PHYSICAL THERAPY | Facility: CLINIC | Age: 60
End: 2025-07-31
Attending: PHYSICIAN ASSISTANT
Payer: OTHER MISCELLANEOUS

## 2025-07-31 DIAGNOSIS — Z98.890 S/P ARTHROSCOPY OF RIGHT SHOULDER: Primary | ICD-10-CM

## 2025-07-31 PROCEDURE — 97110 THERAPEUTIC EXERCISES: CPT

## 2025-07-31 PROCEDURE — 97112 NEUROMUSCULAR REEDUCATION: CPT

## 2025-08-05 ENCOUNTER — OFFICE VISIT (OUTPATIENT)
Dept: PHYSICAL THERAPY | Facility: CLINIC | Age: 60
End: 2025-08-05
Attending: PHYSICIAN ASSISTANT
Payer: OTHER MISCELLANEOUS

## 2025-08-05 DIAGNOSIS — Z98.890 S/P ARTHROSCOPY OF RIGHT SHOULDER: Primary | ICD-10-CM

## 2025-08-05 PROCEDURE — 97110 THERAPEUTIC EXERCISES: CPT

## 2025-08-07 ENCOUNTER — OFFICE VISIT (OUTPATIENT)
Dept: PHYSICAL THERAPY | Facility: CLINIC | Age: 60
End: 2025-08-07
Attending: PHYSICIAN ASSISTANT
Payer: OTHER MISCELLANEOUS

## 2025-08-07 DIAGNOSIS — Z98.890 S/P ARTHROSCOPY OF RIGHT SHOULDER: Primary | ICD-10-CM

## 2025-08-07 PROCEDURE — 97110 THERAPEUTIC EXERCISES: CPT

## 2025-08-07 PROCEDURE — 97112 NEUROMUSCULAR REEDUCATION: CPT

## 2025-08-12 ENCOUNTER — OFFICE VISIT (OUTPATIENT)
Dept: PHYSICAL THERAPY | Facility: CLINIC | Age: 60
End: 2025-08-12
Attending: PHYSICIAN ASSISTANT
Payer: OTHER MISCELLANEOUS

## 2025-08-14 ENCOUNTER — OFFICE VISIT (OUTPATIENT)
Dept: PHYSICAL THERAPY | Facility: CLINIC | Age: 60
End: 2025-08-14
Attending: PHYSICIAN ASSISTANT
Payer: OTHER MISCELLANEOUS

## 2025-08-16 ENCOUNTER — PATIENT MESSAGE (OUTPATIENT)
Age: 60
End: 2025-08-16

## 2025-08-18 ENCOUNTER — NURSE TRIAGE (OUTPATIENT)
Age: 60
End: 2025-08-18

## 2025-08-19 ENCOUNTER — OFFICE VISIT (OUTPATIENT)
Dept: PHYSICAL THERAPY | Facility: CLINIC | Age: 60
End: 2025-08-19
Attending: PHYSICIAN ASSISTANT
Payer: OTHER MISCELLANEOUS

## 2025-08-19 DIAGNOSIS — Z98.890 S/P ARTHROSCOPY OF RIGHT SHOULDER: Primary | ICD-10-CM

## 2025-08-19 PROCEDURE — 97110 THERAPEUTIC EXERCISES: CPT

## (undated) DEVICE — THREADED CLEAR CANNULA WITH OBTURATOR 8.5MM X 75MM

## (undated) DEVICE — GAUZE SPONGES,16 PLY: Brand: CURITY

## (undated) DEVICE — BETHLEHEM UNIVERSAL  ARTHRO PK: Brand: CARDINAL HEALTH

## (undated) DEVICE — U-DRAPE: Brand: CONVERTORS

## (undated) DEVICE — OCCLUSIVE GAUZE STRIP,3% BISMUTH TRIBROMOPHENATE IN PETROLATUM BLEND: Brand: XEROFORM

## (undated) DEVICE — THREADED CLEAR CANNULA WITH OBTURATOR 7MM X 75MM

## (undated) DEVICE — CANNULA ARTHRO LOPRFL 5MM X 7CM

## (undated) DEVICE — BURR  OVAL 4MM 13CM 8 FLUTE COOLCUT

## (undated) DEVICE — SCD SEQUENTIAL COMPRESSION COMFORT SLEEVE MEDIUM KNEE LENGTH: Brand: KENDALL SCD

## (undated) DEVICE — SHOULDER SUSPENSION KIT 6 PER BOX

## (undated) DEVICE — PROBE ABLATION  APOLLO RF ASPIRING 90 DEG

## (undated) DEVICE — SUT ETHILON 3-0 PS-1 18 IN 1663H

## (undated) DEVICE — MEDI-VAC TUBING CONNECTOR 6-IN-1 STRAIGHT: Brand: CARDINAL HEALTH

## (undated) DEVICE — ABDOMINAL PAD: Brand: DERMACEA

## (undated) DEVICE — 3M™ MICROFOAM™ SURGICAL TAPE 4 ROLLS/CARTON 6 CARTONS/CASE 1528-3: Brand: 3M™ MICROFOAM™

## (undated) DEVICE — CHLORAPREP HI-LITE 26ML ORANGE

## (undated) DEVICE — 4-PORT MANIFOLD: Brand: NEPTUNE 2

## (undated) DEVICE — GLOVE SRG BIOGEL ECLIPSE 7

## (undated) DEVICE — TUBING ARTHROSCOPIC WAVE  MAIN PUMP

## (undated) DEVICE — BLADE SHAVER DISSECTOR 4MM 13CM COOLCUT

## (undated) DEVICE — TUBING SUCTION 5MM X 12 FT

## (undated) DEVICE — NEEDLE SUT SCORPION MEGALOADER

## (undated) DEVICE — DRAPE SHEET THREE QUARTER

## (undated) DEVICE — SUT PDS II 0 CT-1 27 IN Z340H

## (undated) DEVICE — GLOVE INDICATOR PI UNDERGLOVE SZ 7 BLUE

## (undated) DEVICE — GLOVE INDICATOR PI UNDERGLOVE SZ 7.5 BLUE

## (undated) DEVICE — FIBERTAK ROTATOR CUFF DISPOSABLES KIT

## (undated) DEVICE — INTENDED FOR TISSUE SEPARATION, AND OTHER PROCEDURES THAT REQUIRE A SHARP SURGICAL BLADE TO PUNCTURE OR CUT.: Brand: BARD-PARKER ® CARBON RIB-BACK BLADES

## (undated) DEVICE — PROBE ABLATION  APOLLO RF 90 DEG MULTI PORT